# Patient Record
Sex: MALE | Race: BLACK OR AFRICAN AMERICAN | Employment: OTHER | URBAN - METROPOLITAN AREA
[De-identification: names, ages, dates, MRNs, and addresses within clinical notes are randomized per-mention and may not be internally consistent; named-entity substitution may affect disease eponyms.]

---

## 2017-03-17 ENCOUNTER — HOSPITAL ENCOUNTER (OUTPATIENT)
Dept: MRI IMAGING | Age: 52
Discharge: HOME | End: 2017-03-17
Attending: FAMILY MEDICINE | Admitting: FAMILY MEDICINE

## 2017-03-17 DIAGNOSIS — E22.1 HYPERPROLACTINEMIA (HCC): ICD-10-CM

## 2017-04-05 ENCOUNTER — HOSPITAL ENCOUNTER (OUTPATIENT)
Dept: MRI IMAGING | Age: 52
Discharge: HOME | End: 2017-04-05
Attending: FAMILY MEDICINE | Admitting: FAMILY MEDICINE

## 2017-04-05 DIAGNOSIS — E22.1 HYPERPROLACTINEMIA (HCC): ICD-10-CM

## 2018-03-12 ENCOUNTER — OFFICE VISIT (OUTPATIENT)
Dept: INTERNAL MEDICINE | Age: 53
End: 2018-03-12

## 2018-03-12 VITALS
OXYGEN SATURATION: 98 % | HEIGHT: 72 IN | SYSTOLIC BLOOD PRESSURE: 172 MMHG | HEART RATE: 63 BPM | TEMPERATURE: 97.7 F | DIASTOLIC BLOOD PRESSURE: 98 MMHG | BODY MASS INDEX: 42.66 KG/M2 | WEIGHT: 315 LBS

## 2018-03-12 DIAGNOSIS — Z00.00 ROUTINE GENERAL MEDICAL EXAMINATION AT A HEALTH CARE FACILITY: ICD-10-CM

## 2018-03-12 DIAGNOSIS — F31.9 BIPOLAR I DISORDER (HCC): Chronic | ICD-10-CM

## 2018-03-12 DIAGNOSIS — E89.0 POSTOPERATIVE HYPOTHYROIDISM: ICD-10-CM

## 2018-03-12 DIAGNOSIS — Z00.00 ROUTINE GENERAL MEDICAL EXAMINATION AT A HEALTH CARE FACILITY: Primary | ICD-10-CM

## 2018-03-12 DIAGNOSIS — I10 ESSENTIAL HYPERTENSION, BENIGN: Chronic | ICD-10-CM

## 2018-03-12 LAB
ANION GAP SERPL CALCULATED.3IONS-SCNC: 18 MMOL/L (ref 3–16)
BUN BLDV-MCNC: 21 MG/DL (ref 7–20)
CALCIUM SERPL-MCNC: 9.3 MG/DL (ref 8.3–10.6)
CHLORIDE BLD-SCNC: 101 MMOL/L (ref 99–110)
CHOLESTEROL, TOTAL: 282 MG/DL (ref 0–199)
CO2: 25 MMOL/L (ref 21–32)
CREAT SERPL-MCNC: 1.5 MG/DL (ref 0.9–1.3)
GFR AFRICAN AMERICAN: 60
GFR NON-AFRICAN AMERICAN: 49
GLUCOSE BLD-MCNC: 99 MG/DL (ref 70–99)
HCT VFR BLD CALC: 39.4 % (ref 40.5–52.5)
HDLC SERPL-MCNC: 56 MG/DL (ref 40–60)
HEMOGLOBIN: 13.2 G/DL (ref 13.5–17.5)
HEPATITIS C ANTIBODY INTERPRETATION: NORMAL
LDL CHOLESTEROL CALCULATED: 197 MG/DL
MCH RBC QN AUTO: 29.2 PG (ref 26–34)
MCHC RBC AUTO-ENTMCNC: 33.4 G/DL (ref 31–36)
MCV RBC AUTO: 87.5 FL (ref 80–100)
PDW BLD-RTO: 18.5 % (ref 12.4–15.4)
PLATELET # BLD: 288 K/UL (ref 135–450)
PMV BLD AUTO: 8.7 FL (ref 5–10.5)
POTASSIUM SERPL-SCNC: 4.3 MMOL/L (ref 3.5–5.1)
RBC # BLD: 4.5 M/UL (ref 4.2–5.9)
SODIUM BLD-SCNC: 144 MMOL/L (ref 136–145)
T4 FREE: 0.3 NG/DL (ref 0.9–1.8)
TRIGL SERPL-MCNC: 143 MG/DL (ref 0–150)
TSH REFLEX: 116.8 UIU/ML (ref 0.27–4.2)
VLDLC SERPL CALC-MCNC: 29 MG/DL
WBC # BLD: 5.8 K/UL (ref 4–11)

## 2018-03-12 PROCEDURE — 3017F COLORECTAL CA SCREEN DOC REV: CPT | Performed by: NURSE PRACTITIONER

## 2018-03-12 PROCEDURE — G8417 CALC BMI ABV UP PARAM F/U: HCPCS | Performed by: NURSE PRACTITIONER

## 2018-03-12 PROCEDURE — 1036F TOBACCO NON-USER: CPT | Performed by: NURSE PRACTITIONER

## 2018-03-12 PROCEDURE — G8484 FLU IMMUNIZE NO ADMIN: HCPCS | Performed by: NURSE PRACTITIONER

## 2018-03-12 PROCEDURE — G8428 CUR MEDS NOT DOCUMENT: HCPCS | Performed by: NURSE PRACTITIONER

## 2018-03-12 PROCEDURE — 99213 OFFICE O/P EST LOW 20 MIN: CPT | Performed by: NURSE PRACTITIONER

## 2018-03-12 ASSESSMENT — ENCOUNTER SYMPTOMS
SORE THROAT: 0
COLOR CHANGE: 0
WHEEZING: 0
BACK PAIN: 0
ABDOMINAL PAIN: 0
NAUSEA: 0
COUGH: 0
RHINORRHEA: 0
CHEST TIGHTNESS: 0
SINUS PRESSURE: 0
SHORTNESS OF BREATH: 0
ABDOMINAL DISTENTION: 0
DIARRHEA: 0
VOMITING: 0
CONSTIPATION: 0
SINUS PAIN: 0

## 2018-03-12 NOTE — PROGRESS NOTES
Subjective:      Patient ID: Guerda Metz is a 47 y.o. male. [facility] [place]   Rhae Dakins is here to establish care. He has not been taking medications for a week and half. He states his meds were making his \"chest region feel unhealthy. \" He has a  who helps him with his meds. He was seeing Dr. Antonette Bertrand for psychiatry who recently closed her practice. He will be seeing Dr. Pete Herrera but has not seen him yet because he could not make it to his last appointment. He wants to discontinue psych meds. 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 He states this is shown on imaging through . He saw an endocrinologist who told him to stop taking the [place]. He subsequently stopped taking all medication. His blood pressure is elevated in the office today. He has been hospitalized multiple times for psychiatric issues. He had a goiter removed and has subsequent hypothyroidism. He goes to [facility]. Past Medical History:   Diagnosis Date    Bipolar 1 disorder ([facility] [address] 47    Goiter     Hypertension     Hypocalcemia     Hypothyroid     [facility]     Sleep apnea      Past Surgical History:   Procedure Laterality Date    THYROIDECTOMY       Family History   Problem Relation Age of Onset    Diabetes Mother     Diabetes Maternal Grandmother      Social History     Social History    Marital status: Single     Spouse name: N/A    Number of children: N/A    Years of education: N/A     Occupational History    Not on file. Social History Main Topics    Smoking status: Former Smoker    Smokeless tobacco: Never Used    Alcohol use No      Comment: rare    Drug use: No    Sexual activity: No     Other Topics Concern    Not on file     Social History Narrative    No narrative on file       Review of Systems   Constitutional: Negative for chills, fatigue, fever and unexpected weight change.    HENT: Negative

## 2018-03-13 ENCOUNTER — TELEPHONE (OUTPATIENT)
Dept: INTERNAL MEDICINE | Age: 53
End: 2018-03-13

## 2018-03-13 DIAGNOSIS — E11.9 TYPE 2 DIABETES MELLITUS WITHOUT COMPLICATION, WITHOUT LONG-TERM CURRENT USE OF INSULIN (HCC): Primary | ICD-10-CM

## 2018-03-13 DIAGNOSIS — E04.9 GOITER: Primary | ICD-10-CM

## 2018-03-13 DIAGNOSIS — R73.03 PRE-DIABETES: Primary | ICD-10-CM

## 2018-03-13 LAB
ESTIMATED AVERAGE GLUCOSE: 134.1 MG/DL
HBA1C MFR BLD: 6.3 %
HIV AG/AB: NORMAL
HIV ANTIGEN: NORMAL
HIV-1 ANTIBODY: NORMAL
HIV-2 AB: NORMAL
VALPROIC ACID LEVEL: <2.8 UG/ML (ref 50–100)

## 2018-03-13 NOTE — TELEPHONE ENCOUNTER
Spoke to patient   He restarted levothyroxine this morning  He started his Statin last night  He would like to start Metformin

## 2018-03-14 LAB — VITAMIN D 1,25-DIHYDROXY: 65.7 PG/ML (ref 19.9–79.3)

## 2018-03-14 NOTE — TELEPHONE ENCOUNTER
Patient calling concerning message unable to take Metformin     He states while under [facility] care he had a bad reaction and     Will not try it again     Call back [phone]

## 2018-03-21 ENCOUNTER — TELEPHONE (OUTPATIENT)
Dept: INTERNAL MEDICINE | Age: 53
End: 2018-03-21

## 2018-03-21 NOTE — TELEPHONE ENCOUNTER
Pt called [facility] to see if Nanci Rob is ready  He was told by the pharmacy that this needs a PA?  He wasn't sure  Please call [facility] at: [phone] Option 3 then Option 4

## 2018-03-28 ENCOUNTER — TELEPHONE (OUTPATIENT)
Dept: INTERNAL MEDICINE | Age: 53
End: 2018-03-28

## 2018-03-29 NOTE — TELEPHONE ENCOUNTER
I am not sure what this means either? Can we try to reschedule him? Or is he saying he does not want to come here anymore? He has a , but I have never met them before, maybe getting in touch with them could be helpful?

## 2018-04-02 ENCOUNTER — TELEPHONE (OUTPATIENT)
Dept: INTERNAL MEDICINE | Age: 53
End: 2018-04-02

## 2018-04-02 NOTE — TELEPHONE ENCOUNTER
Pt will not be in today  Yesterday he had throbbing torso pain and is just not feeling well enough to come in

## 2018-04-04 ENCOUNTER — TELEPHONE (OUTPATIENT)
Dept: INTERNAL MEDICINE | Age: 53
End: 2018-04-04

## 2018-04-06 ENCOUNTER — TELEPHONE (OUTPATIENT)
Dept: INTERNAL MEDICINE | Age: 53
End: 2018-04-06

## 2018-09-09 ENCOUNTER — HOSPITAL ENCOUNTER (EMERGENCY)
Age: 53
Discharge: HOME | End: 2018-09-09
Attending: EMERGENCY MEDICINE
Payer: MEDICAID

## 2018-09-09 ENCOUNTER — APPOINTMENT (OUTPATIENT)
Dept: GENERAL RADIOLOGY | Age: 53
End: 2018-09-09
Payer: MEDICAID

## 2018-09-09 VITALS
TEMPERATURE: 98 F | WEIGHT: 315 LBS | BODY MASS INDEX: 54.52 KG/M2 | OXYGEN SATURATION: 98 % | DIASTOLIC BLOOD PRESSURE: 78 MMHG | SYSTOLIC BLOOD PRESSURE: 134 MMHG | HEART RATE: 78 BPM | RESPIRATION RATE: 22 BRPM

## 2018-09-09 DIAGNOSIS — R60.0 BILATERAL LOWER EXTREMITY EDEMA: Primary | ICD-10-CM

## 2018-09-09 DIAGNOSIS — L03.119 CELLULITIS OF LOWER EXTREMITY, UNSPECIFIED LATERALITY: ICD-10-CM

## 2018-09-09 LAB
B-TYPE NATRIURETIC PEPTIDE: 75 PG/ML (ref 0–99.9)
BASOPHILS ABSOLUTE: 0 K/UL (ref 0–0.2)
BASOPHILS RELATIVE PERCENT: 0.5 %
BILIRUBIN URINE: NEGATIVE MG/DL
BLOOD, URINE: NEGATIVE
CALCIUM IONIZED: 1.12 MMOL/L (ref 1.12–1.32)
CLARITY: ABNORMAL
CO2: 29 MMOL/L (ref 21–32)
COLOR: ABNORMAL
EOSINOPHILS ABSOLUTE: 0.4 K/UL (ref 0–0.6)
EOSINOPHILS RELATIVE PERCENT: 7 %
EPITHELIAL CELLS, UA: NORMAL /HPF
GFR AFRICAN AMERICAN: >60
GFR NON-AFRICAN AMERICAN: >60
GLUCOSE BLD-MCNC: 103 MG/DL (ref 70–99)
GLUCOSE URINE: NEGATIVE MG/DL
HCT VFR BLD CALC: 35.7 % (ref 40.5–52.5)
HEMOGLOBIN: 12.1 G/DL (ref 13.5–17.5)
KETONES, URINE: NEGATIVE MG/DL
LEUKOCYTE ESTERASE, URINE: NEGATIVE
LYMPHOCYTES ABSOLUTE: 1.2 K/UL (ref 1–5.1)
LYMPHOCYTES RELATIVE PERCENT: 18.5 %
MCH RBC QN AUTO: 29 PG (ref 26–34)
MCHC RBC AUTO-ENTMCNC: 33.8 G/DL (ref 31–36)
MCV RBC AUTO: 85.8 FL (ref 80–100)
MICROSCOPIC EXAMINATION: YES
MONOCYTES ABSOLUTE: 0.6 K/UL (ref 0–1.3)
MONOCYTES RELATIVE PERCENT: 9.3 %
NEUTROPHILS ABSOLUTE: 4.1 K/UL (ref 1.7–7.7)
NEUTROPHILS RELATIVE PERCENT: 64.7 %
NITRITE, URINE: NEGATIVE
PDW BLD-RTO: 18.2 % (ref 12.4–15.4)
PERFORMED ON: ABNORMAL
PERFORMED ON: NORMAL
PH UA: 6.5
PLATELET # BLD: 285 K/UL (ref 135–450)
PMV BLD AUTO: 8.1 FL (ref 5–10.5)
POC ANION GAP: 9 (ref 10–20)
POC BUN: 26 MG/DL (ref 7–18)
POC CHLORIDE: 105 MMOL/L (ref 99–110)
POC CREATININE: 1.2 MG/DL (ref 0.9–1.3)
POC POTASSIUM: 4.2 MMOL/L (ref 3.5–5.1)
POC SAMPLE TYPE: ABNORMAL
POC SAMPLE TYPE: NORMAL
POC SODIUM: 143 MMOL/L (ref 136–145)
PROTEIN UA: 30 MG/DL
RBC # BLD: 4.16 M/UL (ref 4.2–5.9)
RBC UA: NORMAL /HPF (ref 0–2)
SPECIFIC GRAVITY UA: 1.02
UROBILINOGEN, URINE: 1 E.U./DL
WBC # BLD: 6.3 K/UL (ref 4–11)
WBC UA: NORMAL /HPF (ref 0–5)

## 2018-09-09 PROCEDURE — 71046 X-RAY EXAM CHEST 2 VIEWS: CPT

## 2018-09-09 PROCEDURE — 99284 EMERGENCY DEPT VISIT MOD MDM: CPT

## 2018-09-09 PROCEDURE — 83880 ASSAY OF NATRIURETIC PEPTIDE: CPT

## 2018-09-09 PROCEDURE — 85025 COMPLETE CBC W/AUTO DIFF WBC: CPT

## 2018-09-09 PROCEDURE — 36415 COLL VENOUS BLD VENIPUNCTURE: CPT

## 2018-09-09 PROCEDURE — 80047 BASIC METABLC PNL IONIZED CA: CPT

## 2018-09-09 PROCEDURE — 81001 URINALYSIS AUTO W/SCOPE: CPT

## 2018-09-09 PROCEDURE — 93005 ELECTROCARDIOGRAM TRACING: CPT | Performed by: EMERGENCY MEDICINE

## 2018-09-09 RX ORDER — CEPHALEXIN 500 MG/1
500 CAPSULE ORAL 4 TIMES DAILY
Qty: 28 CAPSULE | Refills: 0 | Status: SHIPPED | OUTPATIENT
Start: 2018-09-09 | End: 2018-09-16

## 2018-09-09 RX ORDER — FUROSEMIDE 20 MG/1
20 TABLET ORAL DAILY
Qty: 5 TABLET | Refills: 0 | Status: ON HOLD | OUTPATIENT
Start: 2018-09-09 | End: 2019-02-22 | Stop reason: HOSPADM

## 2018-09-09 ASSESSMENT — PAIN DESCRIPTION - ORIENTATION: ORIENTATION: LEFT;RIGHT

## 2018-09-09 ASSESSMENT — ENCOUNTER SYMPTOMS
NAUSEA: 0
ABDOMINAL PAIN: 0
VOMITING: 0
COUGH: 0
DIARRHEA: 0
SHORTNESS OF BREATH: 0

## 2018-09-09 ASSESSMENT — PAIN DESCRIPTION - LOCATION: LOCATION: LEG

## 2018-09-09 NOTE — ED PROVIDER NOTES
[facility]          ATTENDING PHYSICIAN NOTE       Date of evaluation: 9/9/2018    Chief Complaint     Leg Swelling      History of Present Illness     Terry Mares is a 47 y.o. male with an extensive psychiatric history who presents with complaints of bilateral lower extremity swelling, pain, redness, of several weeks' duration. The patient reports that he was at one time on Lasix for leg swelling. He does not know how long ago he was taken off this medication. In fact he states that there are a lot of medications he is supposed to be taking but is not and there are a lot of medications that he is taking but is not supposed to be. His medication list is very unclear to me and even him at this time. The patient reports no chest pain or shortness of breath. He states that he walks a lot. He did not report this to me but he was seen just a couple of weeks ago at [facility] [place] 2[facility] for the same complaints. At that time it was recommended that he wear compressive stockings after a negative medical evaluation was completed. He was subsequently transferred to psychiatry at that time for further evaluation of his mental illness. The patient has not been wearing his compressive stockings. The patient states that the redness of his legs may be new but he is unsure. He has not noted any fevers. Review of Systems     Review of Systems   Constitutional: Negative for chills and fever. Respiratory: Negative for cough and shortness of breath. Cardiovascular: Positive for leg swelling. Negative for chest pain. Gastrointestinal: Negative for abdominal pain, diarrhea, nausea and vomiting. Genitourinary: Negative for difficulty urinating, dysuria and frequency. Foul smell to urine   All other systems reviewed and are negative.       Past Medical, Surgical, Family, and Social History     He has a past medical history of Bipolar 1 disorder rate and regular rhythm. Occasional extrasystoles are present. Pulmonary/Chest: Effort normal and breath sounds normal. He has no rales. Abdominal: Soft. Bowel sounds are normal. He exhibits no distension. There is no tenderness. Musculoskeletal: He exhibits edema (3+ BLE). Neurological: He is alert and oriented to person, place, and time. Skin: Skin is warm and dry. He is not diaphoretic. There is erythema (BLE - from ankles to knees). Psychiatric: Thought content is not paranoid and not delusional. He does not exhibit a depressed mood. He expresses no homicidal and no suicidal ideation. Nursing note and vitals reviewed. Diagnostic Results     EKG   Interpreted by Jon Bautista MD     Rhythm: normal sinus   Rate: normal  Axis: normal  Ectopy: frequent PVCs  Conduction: normal  ST Segments: no acute change  T Waves: no acute change  Q Waves: none    Clinical Impression: normal sinus rhythm with no acute changes, PVCs      RADIOLOGY:  XR CHEST STANDARD (2 VW)   Final Result   Impression:   1. No acute cardiopulmonary disease.           LABS:   Results for orders placed or performed during the hospital encounter of 09/09/18   CBC auto differential   Result Value Ref Range    WBC 6.3 4.0 - 11.0 K/uL    RBC 4.16 (L) 4.20 - 5.90 M/uL    Hemoglobin 12.1 (L) 13.5 - 17.5 g/dL    Hematocrit 35.7 (L) 40.5 - 52.5 %    MCV 85.8 80.0 - 100.0 fL    MCH 29.0 26.0 - 34.0 pg    MCHC 33.8 31.0 - 36.0 g/dL    RDW 18.2 (H) 12.4 - 15.4 %    Platelets 519 537 - 601 K/uL    MPV 8.1 5.0 - 10.5 fL    Neutrophils % 64.7 %    Lymphocytes % 18.5 %    Monocytes % 9.3 %    Eosinophils % 7.0 %    Basophils % 0.5 %    Neutrophils # 4.1 1.7 - 7.7 K/uL    Lymphocytes # 1.2 1.0 - 5.1 K/uL    Monocytes # 0.6 0.0 - 1.3 K/uL    Eosinophils # 0.4 0.0 - 0.6 K/uL    Basophils # 0.0 0.0 - 0.2 K/uL   Microscopic Urinalysis   Result Value Ref Range    WBC, UA None seen 0 - 5 /HPF    RBC, UA 0-2 0 - 2 /HPF    Epi Cells 0-2 /HPF   POC URINE with Microscopic   Result Value Ref Range    Color, UA Not Entered Straw/Yellow    Clarity, UA Not Entered Clear    Glucose, Ur Negative Negative mg/dL    Bilirubin Urine Negative Negative mg/dL    Ketones, Urine Negative Negative mg/dL    Specific Gravity, UA 1.020 1.005 - 1.030    Blood, Urine Negative Negative    pH, UA 6.5 5.0 - 8.0    Protein, UA 30 (A) Negative mg/dL    Urobilinogen, Urine 1.0 <2.0 E.U./dL    Nitrite, Urine Negative Negative    Leukocyte Esterase, Urine Negative Negative    Microscopic Examination Yes    POCT Venous   Result Value Ref Range    POC Sodium 143 136 - 145 mmol/L    POC Potassium 4.2 3.5 - 5.1 mmol/L    POC Chloride 105 99 - 110 mmol/L    CO2 29 21 - 32 mmol/L    POC Anion Gap 9 (L) 10 - 20    POC Glucose 103 (H) 70 - 99 mg/dl    POC BUN 26 (H) 7 - 18 mg/dL    POC Creatinine 1.2 0.9 - 1.3 mg/dL    GFR Non-African American >60 >60    GFR African American >60     Calcium, Ion 1.12 1.12 - 1.32 mmol/L    Sample Type LEATHA     Performed on SEE BELOW    POCT Venous   Result Value Ref Range    BNP 75.0 0.0 - 99.9 pg/mL    Sample Type LEATHA     Performed on SEE BELOW        RECENT VITALS:  BP: 134/78, Temp: 98 °F (36.7 °C), Pulse: 78, Resp: 22, SpO2: 98 %       ED Course     Nursing Notes, Past Medical Hx, Past Surgical Hx, Social Hx, Allergies, and Family Hx were reviewed. The patient was given the following medications:  Orders Placed This Encounter   Medications    cephALEXin (KEFLEX) 500 MG capsule     Sig: Take 1 capsule by mouth 4 times daily for 7 days     Dispense:  28 capsule     Refill:  0    furosemide (LASIX) 20 MG tablet     Sig: Take 1 tablet by mouth daily for 5 days     Dispense:  5 tablet     Refill:  0       CONSULTS:  None    MEDICAL DECISION MAKING / ASSESSMENT / Ashley Gabriel is a 47 y.o. male presenting with long-standing bilateral lower extremity edema. This is been present for over a month.   He was previously evaluated for this at [facility]

## 2018-09-09 NOTE — ED NOTES
Bed: A07-07  Expected date:   Expected time:   Means of arrival:   Comments:  [facility], [address] [facility]  09/09/18 1993

## 2018-09-10 LAB
EKG ATRIAL RATE: 75 BPM
EKG DIAGNOSIS: NORMAL
EKG P AXIS: 43 DEGREES
EKG P-R INTERVAL: 150 MS
EKG Q-T INTERVAL: 394 MS
EKG QRS DURATION: 110 MS
EKG QTC CALCULATION (BAZETT): 439 MS
EKG R AXIS: -20 DEGREES
EKG T AXIS: 27 DEGREES
EKG VENTRICULAR RATE: 75 BPM

## 2018-10-06 ENCOUNTER — APPOINTMENT (OUTPATIENT)
Dept: GENERAL RADIOLOGY | Age: 53
End: 2018-10-06
Payer: MEDICAID

## 2018-10-06 ENCOUNTER — HOSPITAL ENCOUNTER (EMERGENCY)
Age: 53
Discharge: HOME | End: 2018-10-07
Attending: EMERGENCY MEDICINE
Payer: MEDICAID

## 2018-10-06 DIAGNOSIS — I87.8 VENOUS STASIS: Primary | ICD-10-CM

## 2018-10-06 PROCEDURE — 71046 X-RAY EXAM CHEST 2 VIEWS: CPT

## 2018-10-06 PROCEDURE — 99285 EMERGENCY DEPT VISIT HI MDM: CPT

## 2018-10-06 ASSESSMENT — PAIN DESCRIPTION - DESCRIPTORS: DESCRIPTORS: ACHING

## 2018-10-06 ASSESSMENT — PAIN DESCRIPTION - ORIENTATION: ORIENTATION: RIGHT;LEFT;LOWER

## 2018-10-06 ASSESSMENT — PAIN DESCRIPTION - PAIN TYPE: TYPE: ACUTE PAIN

## 2018-10-06 ASSESSMENT — PAIN DESCRIPTION - LOCATION: LOCATION: LEG

## 2018-10-06 ASSESSMENT — PAIN SCALES - GENERAL: PAINLEVEL_OUTOF10: 8

## 2018-10-06 ASSESSMENT — PAIN DESCRIPTION - FREQUENCY: FREQUENCY: CONTINUOUS

## 2018-10-07 VITALS
BODY MASS INDEX: 41.75 KG/M2 | TEMPERATURE: 98.9 F | DIASTOLIC BLOOD PRESSURE: 102 MMHG | WEIGHT: 315 LBS | SYSTOLIC BLOOD PRESSURE: 186 MMHG | HEART RATE: 61 BPM | OXYGEN SATURATION: 93 % | HEIGHT: 73 IN | RESPIRATION RATE: 20 BRPM

## 2018-10-07 LAB
ANION GAP SERPL CALCULATED.3IONS-SCNC: 12 MMOL/L (ref 3–16)
BUN BLDV-MCNC: 17 MG/DL (ref 7–20)
CALCIUM SERPL-MCNC: 9.2 MG/DL (ref 8.3–10.6)
CHLORIDE BLD-SCNC: 105 MMOL/L (ref 99–110)
CO2: 24 MMOL/L (ref 21–32)
CREAT SERPL-MCNC: 1.1 MG/DL (ref 0.9–1.3)
GFR AFRICAN AMERICAN: >60
GFR NON-AFRICAN AMERICAN: >60
GLUCOSE BLD-MCNC: 99 MG/DL (ref 70–99)
POTASSIUM SERPL-SCNC: 4.4 MMOL/L (ref 3.5–5.1)
PRO-BNP: 106 PG/ML (ref 0–124)
SODIUM BLD-SCNC: 141 MMOL/L (ref 136–145)
TROPONIN: <0.01 NG/ML

## 2018-10-07 PROCEDURE — 93005 ELECTROCARDIOGRAM TRACING: CPT | Performed by: INTERNAL MEDICINE

## 2018-10-07 PROCEDURE — 80048 BASIC METABOLIC PNL TOTAL CA: CPT

## 2018-10-07 PROCEDURE — 96374 THER/PROPH/DIAG INJ IV PUSH: CPT

## 2018-10-07 PROCEDURE — 83880 ASSAY OF NATRIURETIC PEPTIDE: CPT

## 2018-10-07 PROCEDURE — 84484 ASSAY OF TROPONIN QUANT: CPT

## 2018-10-07 PROCEDURE — 6360000002 HC RX W HCPCS: Performed by: INTERNAL MEDICINE

## 2018-10-07 RX ORDER — FUROSEMIDE 10 MG/ML
40 INJECTION INTRAMUSCULAR; INTRAVENOUS ONCE
Status: COMPLETED | OUTPATIENT
Start: 2018-10-07 | End: 2018-10-07

## 2018-10-07 RX ADMIN — FUROSEMIDE 40 MG: 10 INJECTION, SOLUTION INTRAMUSCULAR; INTRAVENOUS at 01:51

## 2018-10-07 ASSESSMENT — ENCOUNTER SYMPTOMS
ABDOMINAL PAIN: 0
BACK PAIN: 0
SHORTNESS OF BREATH: 0
VOMITING: 0
CHEST TIGHTNESS: 0
ABDOMINAL DISTENTION: 0
NAUSEA: 0
CHOKING: 0
STRIDOR: 0
COUGH: 0
WHEEZING: 0
DIARRHEA: 0

## 2018-10-07 NOTE — ED PROVIDER NOTES
care plans were discussed and agreed upon. Clinical Impression     No diagnosis found.     Disposition     PATIENT REFERRED TO:  Javier Ogdenr, [address] [facility] [place] [address] [facility]  [phone]    In 1 week        DISCHARGE MEDICATIONS:  Current Discharge Medication List          Neftali Abraham MD  Resident  10/07/18 1108

## 2018-10-10 LAB
EKG ATRIAL RATE: 59 BPM
EKG DIAGNOSIS: NORMAL
EKG P AXIS: 33 DEGREES
EKG P-R INTERVAL: 154 MS
EKG Q-T INTERVAL: 414 MS
EKG QRS DURATION: 102 MS
EKG QTC CALCULATION (BAZETT): 409 MS
EKG R AXIS: -17 DEGREES
EKG T AXIS: 23 DEGREES
EKG VENTRICULAR RATE: 59 BPM

## 2018-12-14 ENCOUNTER — HOSPITAL ENCOUNTER (EMERGENCY)
Age: 53
Discharge: HOME | End: 2018-12-14
Attending: EMERGENCY MEDICINE
Payer: MEDICAID

## 2018-12-14 VITALS
RESPIRATION RATE: 20 BRPM | OXYGEN SATURATION: 93 % | DIASTOLIC BLOOD PRESSURE: 84 MMHG | TEMPERATURE: 97.9 F | HEART RATE: 58 BPM | SYSTOLIC BLOOD PRESSURE: 154 MMHG

## 2018-12-14 DIAGNOSIS — I87.331 STASIS DERMATITIS OF RIGHT LOWER EXTREMITY WITH VENOUS ULCER DUE TO CHRONIC PERIPHERAL VENOUS HYPERTENSION (HCC): Primary | ICD-10-CM

## 2018-12-14 DIAGNOSIS — L97.919 STASIS DERMATITIS OF RIGHT LOWER EXTREMITY WITH VENOUS ULCER DUE TO CHRONIC PERIPHERAL VENOUS HYPERTENSION (HCC): Primary | ICD-10-CM

## 2018-12-14 LAB
ANION GAP SERPL CALCULATED.3IONS-SCNC: 10 MMOL/L (ref 3–16)
BASOPHILS ABSOLUTE: 0 K/UL (ref 0–0.2)
BASOPHILS RELATIVE PERCENT: 0.5 %
BUN BLDV-MCNC: 24 MG/DL (ref 7–20)
CALCIUM SERPL-MCNC: 9.1 MG/DL (ref 8.3–10.6)
CHLORIDE BLD-SCNC: 105 MMOL/L (ref 99–110)
CO2: 25 MMOL/L (ref 21–32)
CREAT SERPL-MCNC: 1.4 MG/DL (ref 0.9–1.3)
EOSINOPHILS ABSOLUTE: 0.1 K/UL (ref 0–0.6)
EOSINOPHILS RELATIVE PERCENT: 2.5 %
GFR AFRICAN AMERICAN: >60
GFR NON-AFRICAN AMERICAN: 53
GLUCOSE BLD-MCNC: 107 MG/DL (ref 70–99)
HCT VFR BLD CALC: 34.4 % (ref 40.5–52.5)
HEMOGLOBIN: 11 G/DL (ref 13.5–17.5)
LYMPHOCYTES ABSOLUTE: 1.6 K/UL (ref 1–5.1)
LYMPHOCYTES RELATIVE PERCENT: 29.9 %
MCH RBC QN AUTO: 27.1 PG (ref 26–34)
MCHC RBC AUTO-ENTMCNC: 32.1 G/DL (ref 31–36)
MCV RBC AUTO: 84.3 FL (ref 80–100)
MONOCYTES ABSOLUTE: 0.3 K/UL (ref 0–1.3)
MONOCYTES RELATIVE PERCENT: 6.3 %
NEUTROPHILS ABSOLUTE: 3.3 K/UL (ref 1.7–7.7)
NEUTROPHILS RELATIVE PERCENT: 60.8 %
PDW BLD-RTO: 17.7 % (ref 12.4–15.4)
PLATELET # BLD: 316 K/UL (ref 135–450)
PMV BLD AUTO: 7.4 FL (ref 5–10.5)
POTASSIUM SERPL-SCNC: 3.8 MMOL/L (ref 3.5–5.1)
RBC # BLD: 4.08 M/UL (ref 4.2–5.9)
SODIUM BLD-SCNC: 140 MMOL/L (ref 136–145)
WBC # BLD: 5.3 K/UL (ref 4–11)

## 2018-12-14 PROCEDURE — 99283 EMERGENCY DEPT VISIT LOW MDM: CPT

## 2018-12-14 PROCEDURE — 6370000000 HC RX 637 (ALT 250 FOR IP): Performed by: EMERGENCY MEDICINE

## 2018-12-14 PROCEDURE — 80048 BASIC METABOLIC PNL TOTAL CA: CPT

## 2018-12-14 PROCEDURE — 85025 COMPLETE CBC W/AUTO DIFF WBC: CPT

## 2018-12-14 RX ORDER — BACITRACIN ZINC AND POLYMYXIN B SULFATE 500; 1000 [USP'U]/G; [USP'U]/G
OINTMENT TOPICAL ONCE
Status: COMPLETED | OUTPATIENT
Start: 2018-12-14 | End: 2018-12-14

## 2018-12-14 RX ORDER — BACITRACIN ZINC AND POLYMYXIN B SULFATE 500; 1000 [USP'U]/G; [USP'U]/G
OINTMENT TOPICAL
Qty: 1 TUBE | Refills: 1 | Status: SHIPPED | OUTPATIENT
Start: 2018-12-14 | End: 2018-12-21

## 2018-12-14 RX ADMIN — BACITRACIN ZINC AND POLYMYXIN B SULFATE: at 05:26

## 2018-12-14 ASSESSMENT — PAIN SCALES - GENERAL: PAINLEVEL_OUTOF10: 10

## 2018-12-14 ASSESSMENT — PAIN DESCRIPTION - LOCATION: LOCATION: LEG

## 2018-12-14 ASSESSMENT — ENCOUNTER SYMPTOMS
ROS SKIN COMMENTS: SEE HPI
EYES NEGATIVE: 1
RESPIRATORY NEGATIVE: 1
GASTROINTESTINAL NEGATIVE: 1

## 2018-12-14 ASSESSMENT — PAIN DESCRIPTION - DESCRIPTORS: DESCRIPTORS: ACHING

## 2018-12-14 ASSESSMENT — PAIN DESCRIPTION - PAIN TYPE
TYPE: ACUTE PAIN
TYPE: CHRONIC PAIN

## 2018-12-14 ASSESSMENT — PAIN DESCRIPTION - FREQUENCY: FREQUENCY: CONTINUOUS

## 2018-12-14 ASSESSMENT — PAIN DESCRIPTION - ORIENTATION: ORIENTATION: RIGHT

## 2018-12-14 NOTE — ED PROVIDER NOTES
ASPIRIN 81 MG CHEWABLE TABLET    Take 81 mg by mouth daily. DAPAGLIFLOZIN (FARXIGA) 5 MG TABLET    Take 1 tablet by mouth every morning    DESOXIMETASONE (TOPICORT) 0.25 % CREAM    Apply topically 2 times daily. DIPHENHYDRAMINE (BENADRYL) 25 MG TABLET    Take 1 tablet by mouth every 6 hours as needed for Itching. DIVALPROEX (DEPAKOTE) 500 MG DR TABLET    Take 500 mg by mouth 2 times daily. FUROSEMIDE (LASIX) 20 MG TABLET    Take 1 tablet by mouth daily for 5 days    HYDROCHLOROTHIAZIDE (HYDRODIURIL) 25 MG TABLET    Take 25 mg by mouth daily. LEVOTHYROXINE (SYNTHROID) 150 MCG TABLET    Take 1 tablet by mouth daily. LISINOPRIL (PRINIVIL;ZESTRIL) 10 MG TABLET    Take 10 mg by mouth daily. METFORMIN (GLUCOPHAGE) 500 MG TABLET    Take 1 tablet by mouth 2 times daily (with meals)    NUTRITIONAL SUPPLEMENTS (VITAMIN D MAINTENANCE PO)    Take 1,000 Units by mouth daily. VITAMIN D3 (CHOLECALCIFEROL) 1000 UNITS TABS TABLET    Take 1,000 Units by mouth daily. Allergies     He is allergic to carbamazepine; chlorpromazine; and thioridazine. Physical Exam     INITIAL VITALS: BP: (!) 154/84, Temp: 97.9 °F (36.6 °C), Pulse: 58, Resp: 20, SpO2: 93 %   Physical Exam   Constitutional:   Morbidly obese   Cardiovascular: Intact distal pulses. Musculoskeletal: He exhibits edema. 4+ pitting edema to bilateral lower extremities. The right leg has several areas of excoriation and ulceration consistent with chronic venous stasis. There is no surrounding erythema or drainage noted. He does have healed scars on the left leg with no open ulcerations noted. Nursing note and vitals reviewed.       Diagnostic Results     LABS:   Results for orders placed or performed during the hospital encounter of 12/14/18   CBC auto differential   Result Value Ref Range    WBC 5.3 4.0 - 11.0 K/uL    RBC 4.08 (L) 4.20 - 5.90 M/uL    Hemoglobin 11.0 (L) 13.5 - 17.5 g/dL    Hematocrit 34.4 (L) 40.5 - 52.5 %    MCV 84.3 80.0 - 100.0 fL    MCH 27.1 26.0 - 34.0 pg    MCHC 32.1 31.0 - 36.0 g/dL    RDW 17.7 (H) 12.4 - 15.4 %    Platelets 985 604 - 179 K/uL    MPV 7.4 5.0 - 10.5 fL    Neutrophils % 60.8 %    Lymphocytes % 29.9 %    Monocytes % 6.3 %    Eosinophils % 2.5 %    Basophils % 0.5 %    Neutrophils # 3.3 1.7 - 7.7 K/uL    Lymphocytes # 1.6 1.0 - 5.1 K/uL    Monocytes # 0.3 0.0 - 1.3 K/uL    Eosinophils # 0.1 0.0 - 0.6 K/uL    Basophils # 0.0 0.0 - 0.2 K/uL   Basic Metabolic Panel (EP - 1)   Result Value Ref Range    Sodium 140 136 - 145 mmol/L    Potassium 3.8 3.5 - 5.1 mmol/L    Chloride 105 99 - 110 mmol/L    CO2 25 21 - 32 mmol/L    Anion Gap 10 3 - 16    Glucose 107 (H) 70 - 99 mg/dL    BUN 24 (H) 7 - 20 mg/dL    CREATININE 1.4 (H) 0.9 - 1.3 mg/dL    GFR Non- 53 (A) >60    GFR African American >60 >60    Calcium 9.1 8.3 - 10.6 mg/dL     RECENT VITALS:  BP: (!) 154/84,Temp: 97.9 °F (36.6 °C), Pulse: 58, Resp: 20, SpO2: 93 %     Procedures     N/A    ED Course     Nursing Notes, Past Medical Hx, Past Surgical Hx, Social Hx,Allergies, and Family Hx were reviewed. The patient was given the following medications:  Orders Placed This Encounter   Medications    bacitracin-polymyxin b (POLYSPORIN) ointment    bacitracin-polymyxin b (POLYSPORIN) [id number] UNIT/GM ointment     Sig: Apply topically 2 times daily. Dispense:  1 Tube     Refill:  1       CONSULTS:  None    MEDICAL DECISIONMAKING / ASSESSMENT / Selvin Amaya is a 47 y.o. male presents to the emergency department complaining of wounds to his right lower extremity. Patient does have several chronic venous stasis ulcerations to the extremity. There is no surrounding erythema or drainage to be concern for secondary infection. CBC is unremarkable. Renal panel was at the patient's baseline. Patient had a dressing placed.   He will be instructed to follow-up with his primary care provider for further discussion of management of this as

## 2018-12-14 NOTE — ED NOTES
Spoke with [facility] and they arranged transport per BRANDEE/Lisa, RN  12/14/18 [id number]

## 2018-12-20 ENCOUNTER — HOSPITAL ENCOUNTER (EMERGENCY)
Age: 53
Discharge: HOME | End: 2018-12-20
Attending: EMERGENCY MEDICINE
Payer: MEDICAID

## 2018-12-20 ENCOUNTER — APPOINTMENT (OUTPATIENT)
Dept: GENERAL RADIOLOGY | Age: 53
End: 2018-12-20
Payer: MEDICAID

## 2018-12-20 VITALS
DIASTOLIC BLOOD PRESSURE: 103 MMHG | HEART RATE: 66 BPM | WEIGHT: 315 LBS | OXYGEN SATURATION: 92 % | RESPIRATION RATE: 18 BRPM | TEMPERATURE: 98 F | HEIGHT: 72 IN | BODY MASS INDEX: 42.66 KG/M2 | SYSTOLIC BLOOD PRESSURE: 221 MMHG

## 2018-12-20 DIAGNOSIS — S90.32XA CONTUSION OF LEFT FOOT, INITIAL ENCOUNTER: Primary | ICD-10-CM

## 2018-12-20 PROCEDURE — 73650 X-RAY EXAM OF HEEL: CPT

## 2018-12-20 PROCEDURE — 73610 X-RAY EXAM OF ANKLE: CPT

## 2018-12-20 PROCEDURE — 99284 EMERGENCY DEPT VISIT MOD MDM: CPT

## 2018-12-20 ASSESSMENT — PAIN DESCRIPTION - ORIENTATION: ORIENTATION: RIGHT

## 2018-12-20 ASSESSMENT — PAIN DESCRIPTION - PAIN TYPE: TYPE: ACUTE PAIN

## 2018-12-20 ASSESSMENT — PAIN SCALES - GENERAL: PAINLEVEL_OUTOF10: 10

## 2018-12-20 ASSESSMENT — PAIN DESCRIPTION - LOCATION: LOCATION: FOOT
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
## 2019-02-19 ENCOUNTER — APPOINTMENT (OUTPATIENT)
Dept: CT IMAGING | Age: 54
DRG: 815 | End: 2019-02-19
Payer: MEDICAID

## 2019-02-19 ENCOUNTER — HOSPITAL ENCOUNTER (INPATIENT)
Age: 54
LOS: 6 days | Discharge: PSYCHIATRIC OR SUBSTANCE USE FACILITY | DRG: 815 | End: 2019-02-25
Attending: EMERGENCY MEDICINE | Admitting: INTERNAL MEDICINE
Payer: MEDICAID

## 2019-02-19 ENCOUNTER — APPOINTMENT (OUTPATIENT)
Dept: GENERAL RADIOLOGY | Age: 54
DRG: 815 | End: 2019-02-19
Payer: MEDICAID

## 2019-02-19 DIAGNOSIS — T68.XXXA HYPOTHERMIA, INITIAL ENCOUNTER: ICD-10-CM

## 2019-02-19 DIAGNOSIS — J96.01 ACUTE RESPIRATORY FAILURE WITH HYPOXIA AND HYPERCAPNIA (HCC): ICD-10-CM

## 2019-02-19 DIAGNOSIS — E11.9 TYPE 2 DIABETES MELLITUS WITHOUT COMPLICATION, WITHOUT LONG-TERM CURRENT USE OF INSULIN (HCC): ICD-10-CM

## 2019-02-19 DIAGNOSIS — R41.82 ALTERED MENTAL STATUS, UNSPECIFIED ALTERED MENTAL STATUS TYPE: Primary | ICD-10-CM

## 2019-02-19 DIAGNOSIS — J96.02 ACUTE RESPIRATORY FAILURE WITH HYPOXIA AND HYPERCAPNIA (HCC): ICD-10-CM

## 2019-02-19 LAB
ALBUMIN SERPL-MCNC: 2.3 G/DL (ref 3.4–5)
ALP BLD-CCNC: 42 U/L (ref 40–129)
ALT SERPL-CCNC: 36 U/L (ref 10–40)
AMMONIA: 42 UMOL/L (ref 16–60)
AMPHETAMINE SCREEN, URINE: NORMAL
ANION GAP SERPL CALCULATED.3IONS-SCNC: 13 MMOL/L (ref 3–16)
ANION GAP SERPL CALCULATED.3IONS-SCNC: 9 MMOL/L (ref 3–16)
AST SERPL-CCNC: 66 U/L (ref 15–37)
BACTERIA: ABNORMAL /HPF
BARBITURATE SCREEN URINE: NORMAL
BASE EXCESS VENOUS: 4 (ref -3–3)
BASOPHILS ABSOLUTE: 0 K/UL (ref 0–0.2)
BASOPHILS RELATIVE PERCENT: 0.3 %
BENZODIAZEPINE SCREEN, URINE: NORMAL
BILIRUB SERPL-MCNC: <0.2 MG/DL (ref 0–1)
BILIRUBIN DIRECT: <0.2 MG/DL (ref 0–0.3)
BILIRUBIN URINE: NEGATIVE
BILIRUBIN, INDIRECT: ABNORMAL MG/DL (ref 0–1)
BLOOD, URINE: ABNORMAL
BUN BLDV-MCNC: 11 MG/DL (ref 7–20)
BUN BLDV-MCNC: 8 MG/DL (ref 7–20)
CALCIUM IONIZED: 1.11 MMOL/L (ref 1.12–1.32)
CALCIUM SERPL-MCNC: 5.2 MG/DL (ref 8.3–10.6)
CALCIUM SERPL-MCNC: 8.7 MG/DL (ref 8.3–10.6)
CANNABINOID SCREEN URINE: NORMAL
CHLORIDE BLD-SCNC: 100 MMOL/L (ref 99–110)
CHLORIDE BLD-SCNC: 117 MMOL/L (ref 99–110)
CLARITY: ABNORMAL
CO2: 18 MMOL/L (ref 21–32)
CO2: 29 MMOL/L (ref 21–32)
CO2: 31 MMOL/L (ref 21–32)
COCAINE METABOLITE SCREEN URINE: NORMAL
COLOR: YELLOW
CREAT SERPL-MCNC: <0.5 MG/DL (ref 0.9–1.3)
CREAT SERPL-MCNC: <0.5 MG/DL (ref 0.9–1.3)
EOSINOPHILS ABSOLUTE: 0 K/UL (ref 0–0.6)
EOSINOPHILS RELATIVE PERCENT: 0.6 %
EPITHELIAL CELLS, UA: ABNORMAL /HPF
ETHANOL: NORMAL MG/DL (ref 0–0.08)
GFR AFRICAN AMERICAN: >60
GFR NON-AFRICAN AMERICAN: >60
GLUCOSE BLD-MCNC: 104 MG/DL (ref 70–99)
GLUCOSE BLD-MCNC: 147 MG/DL (ref 70–99)
GLUCOSE BLD-MCNC: 150 MG/DL (ref 70–99)
GLUCOSE BLD-MCNC: 166 MG/DL (ref 70–99)
GLUCOSE URINE: NEGATIVE MG/DL
HCO3 VENOUS: 31 MMOL/L (ref 23–29)
HCT VFR BLD CALC: 41.2 % (ref 40.5–52.5)
HEMOGLOBIN: 13.5 G/DL (ref 13.5–17.5)
KETONES, URINE: NEGATIVE MG/DL
LACTATE: 1.28 MMOL/L (ref 0.4–2)
LEUKOCYTE ESTERASE, URINE: NEGATIVE
LIPASE: 12 U/L (ref 13–60)
LYMPHOCYTES ABSOLUTE: 0.5 K/UL (ref 1–5.1)
LYMPHOCYTES RELATIVE PERCENT: 17.3 %
Lab: NORMAL
MAGNESIUM: 0.8 MG/DL (ref 1.8–2.4)
MAGNESIUM: 2.4 MG/DL (ref 1.8–2.4)
MCH RBC QN AUTO: 27.2 PG (ref 26–34)
MCHC RBC AUTO-ENTMCNC: 32.6 G/DL (ref 31–36)
MCV RBC AUTO: 83.3 FL (ref 80–100)
METHADONE SCREEN, URINE: NORMAL
MICROSCOPIC EXAMINATION: YES
MONOCYTES ABSOLUTE: 0.2 K/UL (ref 0–1.3)
MONOCYTES RELATIVE PERCENT: 6.9 %
NEUTROPHILS ABSOLUTE: 2.2 K/UL (ref 1.7–7.7)
NEUTROPHILS RELATIVE PERCENT: 74.9 %
NITRITE, URINE: NEGATIVE
O2 SAT, VEN: 79 %
OPIATE SCREEN URINE: NORMAL
OXYCODONE URINE: NORMAL
PCO2, VEN: 69.7 MM HG (ref 40–50)
PDW BLD-RTO: 18.9 % (ref 12.4–15.4)
PERFORMED ON: ABNORMAL
PERFORMED ON: NORMAL
PH UA: 6
PH UA: 6
PH VENOUS: 7.26 (ref 7.35–7.45)
PHENCYCLIDINE SCREEN URINE: NORMAL
PHOSPHORUS: 2 MG/DL (ref 2.5–4.9)
PHOSPHORUS: 3.4 MG/DL (ref 2.5–4.9)
PLATELET # BLD: 228 K/UL (ref 135–450)
PMV BLD AUTO: 7.7 FL (ref 5–10.5)
PO2, VEN: 52 MM HG
POC ANION GAP: 8 (ref 10–20)
POC BUN: 14 MG/DL (ref 7–18)
POC CHLORIDE: 99 MMOL/L (ref 99–110)
POC CREATININE: 0.6 MG/DL (ref 0.9–1.3)
POC POTASSIUM: 3.7 MMOL/L (ref 3.5–5.1)
POC SAMPLE TYPE: ABNORMAL
POC SAMPLE TYPE: ABNORMAL
POC SAMPLE TYPE: NORMAL
POC SODIUM: 138 MMOL/L (ref 136–145)
POC TROPONIN I: 0 NG/ML (ref 0–0.1)
POTASSIUM SERPL-SCNC: 2.7 MMOL/L (ref 3.5–5.1)
POTASSIUM SERPL-SCNC: 3.7 MMOL/L (ref 3.5–5.1)
PROPOXYPHENE SCREEN: NORMAL
PROTEIN UA: >=300 MG/DL
RBC # BLD: 4.95 M/UL (ref 4.2–5.9)
RBC UA: ABNORMAL /HPF (ref 0–2)
SODIUM BLD-SCNC: 142 MMOL/L (ref 136–145)
SODIUM BLD-SCNC: 144 MMOL/L (ref 136–145)
SPECIFIC GRAVITY UA: >=1.03
TCO2 CALC VENOUS: 33 MMOL/L
TOTAL CK: 1723 U/L (ref 39–308)
TOTAL PROTEIN: 4.7 G/DL (ref 6.4–8.2)
URINE TYPE: ABNORMAL
UROBILINOGEN, URINE: 0.2 E.U./DL
WBC # BLD: 2.9 K/UL (ref 4–11)
WBC UA: ABNORMAL /HPF (ref 0–5)

## 2019-02-19 PROCEDURE — 83690 ASSAY OF LIPASE: CPT

## 2019-02-19 PROCEDURE — 94664 DEMO&/EVAL PT USE INHALER: CPT

## 2019-02-19 PROCEDURE — 83605 ASSAY OF LACTIC ACID: CPT

## 2019-02-19 PROCEDURE — 80076 HEPATIC FUNCTION PANEL: CPT

## 2019-02-19 PROCEDURE — 81001 URINALYSIS AUTO W/SCOPE: CPT

## 2019-02-19 PROCEDURE — 2700000000 HC OXYGEN THERAPY PER DAY

## 2019-02-19 PROCEDURE — 99291 CRITICAL CARE FIRST HOUR: CPT

## 2019-02-19 PROCEDURE — 2580000003 HC RX 258: Performed by: EMERGENCY MEDICINE

## 2019-02-19 PROCEDURE — 94660 CPAP INITIATION&MGMT: CPT

## 2019-02-19 PROCEDURE — 90471 IMMUNIZATION ADMIN: CPT | Performed by: EMERGENCY MEDICINE

## 2019-02-19 PROCEDURE — 85025 COMPLETE CBC W/AUTO DIFF WBC: CPT

## 2019-02-19 PROCEDURE — 80307 DRUG TEST PRSMV CHEM ANLYZR: CPT

## 2019-02-19 PROCEDURE — 93005 ELECTROCARDIOGRAM TRACING: CPT | Performed by: EMERGENCY MEDICINE

## 2019-02-19 PROCEDURE — 6370000000 HC RX 637 (ALT 250 FOR IP): Performed by: STUDENT IN AN ORGANIZED HEALTH CARE EDUCATION/TRAINING PROGRAM

## 2019-02-19 PROCEDURE — 87086 URINE CULTURE/COLONY COUNT: CPT

## 2019-02-19 PROCEDURE — 96365 THER/PROPH/DIAG IV INF INIT: CPT

## 2019-02-19 PROCEDURE — 83735 ASSAY OF MAGNESIUM: CPT

## 2019-02-19 PROCEDURE — 87040 BLOOD CULTURE FOR BACTERIA: CPT

## 2019-02-19 PROCEDURE — 94761 N-INVAS EAR/PLS OXIMETRY MLT: CPT

## 2019-02-19 PROCEDURE — 82550 ASSAY OF CK (CPK): CPT

## 2019-02-19 PROCEDURE — 84100 ASSAY OF PHOSPHORUS: CPT

## 2019-02-19 PROCEDURE — 87641 MR-STAPH DNA AMP PROBE: CPT

## 2019-02-19 PROCEDURE — 90715 TDAP VACCINE 7 YRS/> IM: CPT | Performed by: EMERGENCY MEDICINE

## 2019-02-19 PROCEDURE — 84484 ASSAY OF TROPONIN QUANT: CPT

## 2019-02-19 PROCEDURE — 82533 TOTAL CORTISOL: CPT

## 2019-02-19 PROCEDURE — 96367 TX/PROPH/DG ADDL SEQ IV INF: CPT

## 2019-02-19 PROCEDURE — 82803 BLOOD GASES ANY COMBINATION: CPT

## 2019-02-19 PROCEDURE — G0480 DRUG TEST DEF 1-7 CLASSES: HCPCS

## 2019-02-19 PROCEDURE — 6360000002 HC RX W HCPCS: Performed by: EMERGENCY MEDICINE

## 2019-02-19 PROCEDURE — 51702 INSERT TEMP BLADDER CATH: CPT

## 2019-02-19 PROCEDURE — 96368 THER/DIAG CONCURRENT INF: CPT

## 2019-02-19 PROCEDURE — 71045 X-RAY EXAM CHEST 1 VIEW: CPT

## 2019-02-19 PROCEDURE — 99292 CRITICAL CARE ADDL 30 MIN: CPT

## 2019-02-19 PROCEDURE — 36415 COLL VENOUS BLD VENIPUNCTURE: CPT

## 2019-02-19 PROCEDURE — 2000000000 HC ICU R&B

## 2019-02-19 PROCEDURE — 80047 BASIC METABLC PNL IONIZED CA: CPT

## 2019-02-19 PROCEDURE — 70450 CT HEAD/BRAIN W/O DYE: CPT

## 2019-02-19 PROCEDURE — 70486 CT MAXILLOFACIAL W/O DYE: CPT

## 2019-02-19 PROCEDURE — 82140 ASSAY OF AMMONIA: CPT

## 2019-02-19 RX ORDER — PIOGLITAZONEHYDROCHLORIDE 30 MG/1
30 TABLET ORAL DAILY
Status: ON HOLD | COMMUNITY
End: 2019-03-14 | Stop reason: SDUPTHER

## 2019-02-19 RX ORDER — MAGNESIUM SULFATE IN WATER 40 MG/ML
2 INJECTION, SOLUTION INTRAVENOUS ONCE
Status: COMPLETED | OUTPATIENT
Start: 2019-02-19 | End: 2019-02-19

## 2019-02-19 RX ORDER — ONDANSETRON 2 MG/ML
4 INJECTION INTRAMUSCULAR; INTRAVENOUS EVERY 6 HOURS PRN
Status: DISCONTINUED | OUTPATIENT
Start: 2019-02-19 | End: 2019-02-25 | Stop reason: HOSPADM

## 2019-02-19 RX ORDER — SODIUM CHLORIDE, SODIUM LACTATE, POTASSIUM CHLORIDE, CALCIUM CHLORIDE 600; 310; 30; 20 MG/100ML; MG/100ML; MG/100ML; MG/100ML
1000 INJECTION, SOLUTION INTRAVENOUS ONCE
Status: COMPLETED | OUTPATIENT
Start: 2019-02-19 | End: 2019-02-19

## 2019-02-19 RX ORDER — SODIUM CHLORIDE, SODIUM LACTATE, POTASSIUM CHLORIDE, CALCIUM CHLORIDE 600; 310; 30; 20 MG/100ML; MG/100ML; MG/100ML; MG/100ML
1000 INJECTION, SOLUTION INTRAVENOUS ONCE
Status: DISCONTINUED | OUTPATIENT
Start: 2019-02-19 | End: 2019-02-19

## 2019-02-19 RX ORDER — DEXTROSE MONOHYDRATE 50 MG/ML
100 INJECTION, SOLUTION INTRAVENOUS PRN
Status: DISCONTINUED | OUTPATIENT
Start: 2019-02-19 | End: 2019-02-25 | Stop reason: HOSPADM

## 2019-02-19 RX ORDER — DEXTROSE MONOHYDRATE 25 G/50ML
12.5 INJECTION, SOLUTION INTRAVENOUS PRN
Status: DISCONTINUED | OUTPATIENT
Start: 2019-02-19 | End: 2019-02-25 | Stop reason: HOSPADM

## 2019-02-19 RX ORDER — HALOPERIDOL 5 MG
5 TABLET ORAL 2 TIMES DAILY
Status: ON HOLD | COMMUNITY
End: 2019-03-14 | Stop reason: SDUPTHER

## 2019-02-19 RX ORDER — AMLODIPINE BESYLATE 5 MG/1
5 TABLET ORAL DAILY
Status: ON HOLD | COMMUNITY
End: 2019-02-22 | Stop reason: HOSPADM

## 2019-02-19 RX ORDER — SODIUM CHLORIDE 0.9 % (FLUSH) 0.9 %
10 SYRINGE (ML) INJECTION PRN
Status: DISCONTINUED | OUTPATIENT
Start: 2019-02-19 | End: 2019-02-25 | Stop reason: HOSPADM

## 2019-02-19 RX ORDER — SODIUM CHLORIDE 0.9 % (FLUSH) 0.9 %
10 SYRINGE (ML) INJECTION EVERY 12 HOURS SCHEDULED
Status: DISCONTINUED | OUTPATIENT
Start: 2019-02-19 | End: 2019-02-25 | Stop reason: HOSPADM

## 2019-02-19 RX ORDER — LEVOTHYROXINE SODIUM 0.2 MG/1
200 TABLET ORAL DAILY
Status: ON HOLD | COMMUNITY
End: 2019-02-22 | Stop reason: HOSPADM

## 2019-02-19 RX ORDER — ATORVASTATIN CALCIUM 20 MG/1
20 TABLET, FILM COATED ORAL DAILY
Status: ON HOLD | COMMUNITY
End: 2019-03-14 | Stop reason: HOSPADM

## 2019-02-19 RX ORDER — NICOTINE POLACRILEX 4 MG
15 LOZENGE BUCCAL PRN
Status: DISCONTINUED | OUTPATIENT
Start: 2019-02-19 | End: 2019-02-25 | Stop reason: HOSPADM

## 2019-02-19 RX ORDER — BACITRACIN ZINC AND POLYMYXIN B SULFATE 500; 1000 [USP'U]/G; [USP'U]/G
OINTMENT TOPICAL 2 TIMES DAILY
Status: DISCONTINUED | OUTPATIENT
Start: 2019-02-19 | End: 2019-02-25 | Stop reason: HOSPADM

## 2019-02-19 RX ORDER — FUROSEMIDE 40 MG/1
40 TABLET ORAL DAILY
Status: ON HOLD | COMMUNITY
End: 2019-02-22 | Stop reason: HOSPADM

## 2019-02-19 RX ADMIN — INSULIN LISPRO 1 UNITS: 100 INJECTION, SOLUTION INTRAVENOUS; SUBCUTANEOUS at 23:28

## 2019-02-19 RX ADMIN — SODIUM CHLORIDE, POTASSIUM CHLORIDE, SODIUM LACTATE AND CALCIUM CHLORIDE 1000 ML: 600; 310; 30; 20 INJECTION, SOLUTION INTRAVENOUS at 19:45

## 2019-02-19 RX ADMIN — TETANUS TOXOID, REDUCED DIPHTHERIA TOXOID AND ACELLULAR PERTUSSIS VACCINE, ADSORBED 0.5 ML: 5; 2.5; 8; 8; 2.5 SUSPENSION INTRAMUSCULAR at 20:47

## 2019-02-19 RX ADMIN — VANCOMYCIN HYDROCHLORIDE 2000 MG: 10 INJECTION, POWDER, LYOPHILIZED, FOR SOLUTION INTRAVENOUS at 21:20

## 2019-02-19 RX ADMIN — BACITRACIN ZINC AND POLYMYXIN B SULFATE: at 23:29

## 2019-02-19 RX ADMIN — MAGNESIUM SULFATE HEPTAHYDRATE 2 G: 40 INJECTION, SOLUTION INTRAVENOUS at 20:28

## 2019-02-19 RX ADMIN — CEFEPIME HYDROCHLORIDE 1 G: 1 INJECTION, POWDER, FOR SOLUTION INTRAMUSCULAR; INTRAVENOUS at 20:07

## 2019-02-19 ASSESSMENT — PAIN SCALES - GENERAL: PAINLEVEL_OUTOF10: 2

## 2019-02-19 ASSESSMENT — PAIN DESCRIPTION - FREQUENCY: FREQUENCY: CONTINUOUS

## 2019-02-19 ASSESSMENT — PAIN DESCRIPTION - ORIENTATION: ORIENTATION: LEFT

## 2019-02-19 ASSESSMENT — PAIN DESCRIPTION - PAIN TYPE: TYPE: ACUTE PAIN

## 2019-02-19 ASSESSMENT — PAIN DESCRIPTION - DESCRIPTORS: DESCRIPTORS: ACHING

## 2019-02-19 ASSESSMENT — PAIN DESCRIPTION - LOCATION: LOCATION: OTHER (COMMENT)

## 2019-02-20 ENCOUNTER — APPOINTMENT (OUTPATIENT)
Dept: GENERAL RADIOLOGY | Age: 54
DRG: 815 | End: 2019-02-20
Payer: MEDICAID

## 2019-02-20 ENCOUNTER — APPOINTMENT (OUTPATIENT)
Dept: MRI IMAGING | Age: 54
DRG: 815 | End: 2019-02-20
Payer: MEDICAID

## 2019-02-20 PROBLEM — J81.1 PULMONARY EDEMA: Status: ACTIVE | Noted: 2019-02-20

## 2019-02-20 PROBLEM — F25.9 SCHIZOAFFECTIVE DISORDER (HCC): Status: ACTIVE | Noted: 2018-07-21

## 2019-02-20 PROBLEM — F25.9 SCHIZOAFFECTIVE DISORDER (HCC): Chronic | Status: ACTIVE | Noted: 2018-07-21

## 2019-02-20 PROBLEM — R00.1 BRADYCARDIA: Status: ACTIVE | Noted: 2019-02-20

## 2019-02-20 PROBLEM — G93.6 CEREBRAL EDEMA (HCC): Status: ACTIVE | Noted: 2019-02-20

## 2019-02-20 PROBLEM — G93.41 ACUTE METABOLIC ENCEPHALOPATHY: Status: ACTIVE | Noted: 2019-02-20

## 2019-02-20 LAB
ANION GAP SERPL CALCULATED.3IONS-SCNC: 13 MMOL/L (ref 3–16)
ANION GAP SERPL CALCULATED.3IONS-SCNC: 13 MMOL/L (ref 3–16)
BUN BLDV-MCNC: 12 MG/DL (ref 7–20)
BUN BLDV-MCNC: 13 MG/DL (ref 7–20)
CALCIUM SERPL-MCNC: 8.6 MG/DL (ref 8.3–10.6)
CALCIUM SERPL-MCNC: 8.8 MG/DL (ref 8.3–10.6)
CHLORIDE BLD-SCNC: 101 MMOL/L (ref 99–110)
CHLORIDE BLD-SCNC: 102 MMOL/L (ref 99–110)
CO2: 28 MMOL/L (ref 21–32)
CO2: 29 MMOL/L (ref 21–32)
CORTISOL TOTAL: 9.4 UG/DL
CREAT SERPL-MCNC: 0.8 MG/DL (ref 0.9–1.3)
CREAT SERPL-MCNC: <0.5 MG/DL (ref 0.9–1.3)
EKG ATRIAL RATE: 42 BPM
EKG DIAGNOSIS: NORMAL
EKG P AXIS: 42 DEGREES
EKG P-R INTERVAL: 166 MS
EKG Q-T INTERVAL: 618 MS
EKG QRS DURATION: 130 MS
EKG QTC CALCULATION (BAZETT): 516 MS
EKG R AXIS: -19 DEGREES
EKG T AXIS: 49 DEGREES
EKG VENTRICULAR RATE: 42 BPM
GFR AFRICAN AMERICAN: >60
GFR AFRICAN AMERICAN: >60
GFR NON-AFRICAN AMERICAN: >60
GFR NON-AFRICAN AMERICAN: >60
GLUCOSE BLD-MCNC: 100 MG/DL (ref 70–99)
GLUCOSE BLD-MCNC: 137 MG/DL (ref 70–99)
GLUCOSE BLD-MCNC: 169 MG/DL (ref 70–99)
GLUCOSE BLD-MCNC: 59 MG/DL (ref 70–99)
GLUCOSE BLD-MCNC: 74 MG/DL (ref 70–99)
GLUCOSE BLD-MCNC: 87 MG/DL (ref 70–99)
GLUCOSE BLD-MCNC: 90 MG/DL (ref 70–99)
HCT VFR BLD CALC: 35.9 % (ref 40.5–52.5)
HEMOGLOBIN: 11.7 G/DL (ref 13.5–17.5)
MAGNESIUM: 1.8 MG/DL (ref 1.8–2.4)
MCH RBC QN AUTO: 27.1 PG (ref 26–34)
MCHC RBC AUTO-ENTMCNC: 32.6 G/DL (ref 31–36)
MCV RBC AUTO: 83.1 FL (ref 80–100)
MRSA SCREEN RT-PCR: NORMAL
PDW BLD-RTO: 18.7 % (ref 12.4–15.4)
PERFORMED ON: ABNORMAL
PERFORMED ON: ABNORMAL
PERFORMED ON: NORMAL
PHOSPHORUS: 3.1 MG/DL (ref 2.5–4.9)
PLATELET # BLD: 254 K/UL (ref 135–450)
PMV BLD AUTO: 7.7 FL (ref 5–10.5)
POTASSIUM REFLEX MAGNESIUM: 3.9 MMOL/L (ref 3.5–5.1)
POTASSIUM SERPL-SCNC: 4 MMOL/L (ref 3.5–5.1)
POTASSIUM SERPL-SCNC: 4.4 MMOL/L (ref 3.5–5.1)
RAPID INFLUENZA  B AGN: NEGATIVE
RAPID INFLUENZA A AGN: NEGATIVE
RBC # BLD: 4.32 M/UL (ref 4.2–5.9)
SODIUM BLD-SCNC: 142 MMOL/L (ref 136–145)
SODIUM BLD-SCNC: 144 MMOL/L (ref 136–145)
TOTAL CK: 3632 U/L (ref 39–308)
WBC # BLD: 4.6 K/UL (ref 4–11)

## 2019-02-20 PROCEDURE — 6360000004 HC RX CONTRAST MEDICATION: Performed by: INTERNAL MEDICINE

## 2019-02-20 PROCEDURE — 70546 MR ANGIOGRAPH HEAD W/O&W/DYE: CPT

## 2019-02-20 PROCEDURE — A9579 GAD-BASE MR CONTRAST NOS,1ML: HCPCS | Performed by: INTERNAL MEDICINE

## 2019-02-20 PROCEDURE — 94660 CPAP INITIATION&MGMT: CPT

## 2019-02-20 PROCEDURE — 71045 X-RAY EXAM CHEST 1 VIEW: CPT

## 2019-02-20 PROCEDURE — 92610 EVALUATE SWALLOWING FUNCTION: CPT

## 2019-02-20 PROCEDURE — 82550 ASSAY OF CK (CPK): CPT

## 2019-02-20 PROCEDURE — 87040 BLOOD CULTURE FOR BACTERIA: CPT

## 2019-02-20 PROCEDURE — 6360000002 HC RX W HCPCS: Performed by: STUDENT IN AN ORGANIZED HEALTH CARE EDUCATION/TRAINING PROGRAM

## 2019-02-20 PROCEDURE — 80048 BASIC METABOLIC PNL TOTAL CA: CPT

## 2019-02-20 PROCEDURE — 84100 ASSAY OF PHOSPHORUS: CPT

## 2019-02-20 PROCEDURE — 2000000000 HC ICU R&B

## 2019-02-20 PROCEDURE — 94761 N-INVAS EAR/PLS OXIMETRY MLT: CPT

## 2019-02-20 PROCEDURE — 70553 MRI BRAIN STEM W/O & W/DYE: CPT

## 2019-02-20 PROCEDURE — 94664 DEMO&/EVAL PT USE INHALER: CPT

## 2019-02-20 PROCEDURE — 2580000003 HC RX 258: Performed by: STUDENT IN AN ORGANIZED HEALTH CARE EDUCATION/TRAINING PROGRAM

## 2019-02-20 PROCEDURE — 2700000000 HC OXYGEN THERAPY PER DAY

## 2019-02-20 PROCEDURE — 92526 ORAL FUNCTION THERAPY: CPT

## 2019-02-20 PROCEDURE — 99223 1ST HOSP IP/OBS HIGH 75: CPT | Performed by: INTERNAL MEDICINE

## 2019-02-20 PROCEDURE — 84132 ASSAY OF SERUM POTASSIUM: CPT

## 2019-02-20 PROCEDURE — 89220 SPUTUM SPECIMEN COLLECTION: CPT

## 2019-02-20 PROCEDURE — 6370000000 HC RX 637 (ALT 250 FOR IP): Performed by: STUDENT IN AN ORGANIZED HEALTH CARE EDUCATION/TRAINING PROGRAM

## 2019-02-20 PROCEDURE — 36415 COLL VENOUS BLD VENIPUNCTURE: CPT

## 2019-02-20 PROCEDURE — 87804 INFLUENZA ASSAY W/OPTIC: CPT

## 2019-02-20 PROCEDURE — 2580000003 HC RX 258: Performed by: INTERNAL MEDICINE

## 2019-02-20 PROCEDURE — 85027 COMPLETE CBC AUTOMATED: CPT

## 2019-02-20 RX ORDER — SODIUM CHLORIDE 9 MG/ML
INJECTION, SOLUTION INTRAVENOUS CONTINUOUS
Status: DISCONTINUED | OUTPATIENT
Start: 2019-02-20 | End: 2019-02-25

## 2019-02-20 RX ORDER — 0.9 % SODIUM CHLORIDE 0.9 %
500 INTRAVENOUS SOLUTION INTRAVENOUS ONCE
Status: COMPLETED | OUTPATIENT
Start: 2019-02-20 | End: 2019-02-20

## 2019-02-20 RX ORDER — ACETAMINOPHEN 325 MG/1
650 TABLET ORAL EVERY 4 HOURS PRN
Status: DISCONTINUED | OUTPATIENT
Start: 2019-02-20 | End: 2019-02-25 | Stop reason: HOSPADM

## 2019-02-20 RX ORDER — SODIUM CHLORIDE 9 MG/ML
INJECTION, SOLUTION INTRAVENOUS
Status: DISPENSED
Start: 2019-02-20 | End: 2019-02-20

## 2019-02-20 RX ADMIN — GADOTERIDOL 20 ML: 279.3 INJECTION, SOLUTION INTRAVENOUS at 16:26

## 2019-02-20 RX ADMIN — MUPIROCIN 1 G: 20 OINTMENT TOPICAL at 20:01

## 2019-02-20 RX ADMIN — Medication 10 ML: at 08:59

## 2019-02-20 RX ADMIN — BACITRACIN ZINC AND POLYMYXIN B SULFATE: at 20:02

## 2019-02-20 RX ADMIN — CEFEPIME HYDROCHLORIDE 2 G: 2 INJECTION, POWDER, FOR SOLUTION INTRAVENOUS at 08:51

## 2019-02-20 RX ADMIN — BACITRACIN ZINC AND POLYMYXIN B SULFATE: at 08:59

## 2019-02-20 RX ADMIN — ACETAMINOPHEN 650 MG: 325 TABLET ORAL at 12:20

## 2019-02-20 RX ADMIN — DEXTROSE 50 % IN WATER (D50W) INTRAVENOUS SYRINGE 12.5 G: at 08:49

## 2019-02-20 RX ADMIN — ENOXAPARIN SODIUM 40 MG: 40 INJECTION SUBCUTANEOUS at 09:05

## 2019-02-20 RX ADMIN — Medication 10 ML: at 20:00

## 2019-02-20 RX ADMIN — VANCOMYCIN HYDROCHLORIDE 1750 MG: 10 INJECTION, POWDER, LYOPHILIZED, FOR SOLUTION INTRAVENOUS at 05:25

## 2019-02-20 RX ADMIN — SODIUM CHLORIDE: 9 INJECTION, SOLUTION INTRAVENOUS at 18:56

## 2019-02-20 RX ADMIN — MUPIROCIN 1 G: 20 OINTMENT TOPICAL at 14:31

## 2019-02-20 RX ADMIN — CEFEPIME HYDROCHLORIDE 2 G: 2 INJECTION, POWDER, FOR SOLUTION INTRAVENOUS at 20:00

## 2019-02-20 RX ADMIN — SODIUM CHLORIDE 500 ML: 9 INJECTION, SOLUTION INTRAVENOUS at 08:57

## 2019-02-20 ASSESSMENT — PAIN DESCRIPTION - FREQUENCY
FREQUENCY: CONTINUOUS

## 2019-02-20 ASSESSMENT — PAIN DESCRIPTION - PAIN TYPE
TYPE: ACUTE PAIN

## 2019-02-20 ASSESSMENT — PAIN SCALES - GENERAL
PAINLEVEL_OUTOF10: 7
PAINLEVEL_OUTOF10: 0
PAINLEVEL_OUTOF10: 0
PAINLEVEL_OUTOF10: 5
PAINLEVEL_OUTOF10: 0

## 2019-02-20 ASSESSMENT — PAIN DESCRIPTION - LOCATION
LOCATION: HEAD

## 2019-02-20 ASSESSMENT — ENCOUNTER SYMPTOMS
WHEEZING: 0
SORE THROAT: 0
SHORTNESS OF BREATH: 0
BLOOD IN STOOL: 0
ABDOMINAL PAIN: 0
DIARRHEA: 0
NAUSEA: 0
COUGH: 0
COLOR CHANGE: 0
VOMITING: 0
CONSTIPATION: 0
ALLERGIC/IMMUNOLOGIC NEGATIVE: 1

## 2019-02-20 ASSESSMENT — PAIN DESCRIPTION - DESCRIPTORS
DESCRIPTORS: ACHING

## 2019-02-21 LAB
ANION GAP SERPL CALCULATED.3IONS-SCNC: 14 MMOL/L (ref 3–16)
BUN BLDV-MCNC: 18 MG/DL (ref 7–20)
CALCIUM SERPL-MCNC: 8.7 MG/DL (ref 8.3–10.6)
CHLORIDE BLD-SCNC: 105 MMOL/L (ref 99–110)
CO2: 27 MMOL/L (ref 21–32)
CREAT SERPL-MCNC: 1.3 MG/DL (ref 0.9–1.3)
GFR AFRICAN AMERICAN: >60
GFR NON-AFRICAN AMERICAN: 58
GLUCOSE BLD-MCNC: 111 MG/DL (ref 70–99)
GLUCOSE BLD-MCNC: 91 MG/DL (ref 70–99)
GLUCOSE BLD-MCNC: 99 MG/DL (ref 70–99)
HCT VFR BLD CALC: 36.9 % (ref 40.5–52.5)
HEMOGLOBIN: 12.1 G/DL (ref 13.5–17.5)
MCH RBC QN AUTO: 27.1 PG (ref 26–34)
MCHC RBC AUTO-ENTMCNC: 32.7 G/DL (ref 31–36)
MCV RBC AUTO: 82.8 FL (ref 80–100)
PDW BLD-RTO: 19.4 % (ref 12.4–15.4)
PERFORMED ON: ABNORMAL
PERFORMED ON: NORMAL
PHOSPHORUS: 3.2 MG/DL (ref 2.5–4.9)
PLATELET # BLD: 242 K/UL (ref 135–450)
PMV BLD AUTO: 7.4 FL (ref 5–10.5)
POTASSIUM REFLEX MAGNESIUM: 3.9 MMOL/L (ref 3.5–5.1)
POTASSIUM SERPL-SCNC: 3.9 MMOL/L (ref 3.5–5.1)
RBC # BLD: 4.46 M/UL (ref 4.2–5.9)
SODIUM BLD-SCNC: 146 MMOL/L (ref 136–145)
TOTAL CK: 1470 U/L (ref 39–308)
URINE CULTURE, ROUTINE: NORMAL
WBC # BLD: 4.4 K/UL (ref 4–11)

## 2019-02-21 PROCEDURE — 2700000000 HC OXYGEN THERAPY PER DAY

## 2019-02-21 PROCEDURE — 99233 SBSQ HOSP IP/OBS HIGH 50: CPT | Performed by: INTERNAL MEDICINE

## 2019-02-21 PROCEDURE — 80048 BASIC METABOLIC PNL TOTAL CA: CPT

## 2019-02-21 PROCEDURE — 94660 CPAP INITIATION&MGMT: CPT

## 2019-02-21 PROCEDURE — 6360000002 HC RX W HCPCS: Performed by: STUDENT IN AN ORGANIZED HEALTH CARE EDUCATION/TRAINING PROGRAM

## 2019-02-21 PROCEDURE — 84132 ASSAY OF SERUM POTASSIUM: CPT

## 2019-02-21 PROCEDURE — 94761 N-INVAS EAR/PLS OXIMETRY MLT: CPT

## 2019-02-21 PROCEDURE — 1200000000 HC SEMI PRIVATE

## 2019-02-21 PROCEDURE — 84100 ASSAY OF PHOSPHORUS: CPT

## 2019-02-21 PROCEDURE — 82550 ASSAY OF CK (CPK): CPT

## 2019-02-21 PROCEDURE — 2580000003 HC RX 258: Performed by: STUDENT IN AN ORGANIZED HEALTH CARE EDUCATION/TRAINING PROGRAM

## 2019-02-21 PROCEDURE — 36415 COLL VENOUS BLD VENIPUNCTURE: CPT

## 2019-02-21 PROCEDURE — 85027 COMPLETE CBC AUTOMATED: CPT

## 2019-02-21 PROCEDURE — 92526 ORAL FUNCTION THERAPY: CPT

## 2019-02-21 PROCEDURE — 6370000000 HC RX 637 (ALT 250 FOR IP): Performed by: STUDENT IN AN ORGANIZED HEALTH CARE EDUCATION/TRAINING PROGRAM

## 2019-02-21 RX ORDER — LEVOTHYROXINE SODIUM 0.05 MG/1
50 TABLET ORAL DAILY
Status: DISCONTINUED | OUTPATIENT
Start: 2019-02-21 | End: 2019-02-22

## 2019-02-21 RX ORDER — HALOPERIDOL 5 MG
5 TABLET ORAL 2 TIMES DAILY
Status: DISCONTINUED | OUTPATIENT
Start: 2019-02-21 | End: 2019-02-25 | Stop reason: HOSPADM

## 2019-02-21 RX ADMIN — Medication 10 ML: at 09:29

## 2019-02-21 RX ADMIN — LEVOTHYROXINE SODIUM 50 MCG: 50 TABLET ORAL at 15:18

## 2019-02-21 RX ADMIN — HALOPERIDOL 5 MG: 5 TABLET ORAL at 21:19

## 2019-02-21 RX ADMIN — CEFEPIME HYDROCHLORIDE 2 G: 2 INJECTION, POWDER, FOR SOLUTION INTRAVENOUS at 09:26

## 2019-02-21 RX ADMIN — MUPIROCIN 1 G: 20 OINTMENT TOPICAL at 21:19

## 2019-02-21 RX ADMIN — HALOPERIDOL 5 MG: 5 TABLET ORAL at 15:18

## 2019-02-21 RX ADMIN — BACITRACIN ZINC AND POLYMYXIN B SULFATE: at 09:29

## 2019-02-21 RX ADMIN — ENOXAPARIN SODIUM 40 MG: 40 INJECTION SUBCUTANEOUS at 09:29

## 2019-02-21 RX ADMIN — MUPIROCIN 1 G: 20 OINTMENT TOPICAL at 09:29

## 2019-02-21 RX ADMIN — ACETAMINOPHEN 650 MG: 325 TABLET ORAL at 15:24

## 2019-02-21 RX ADMIN — BACITRACIN ZINC AND POLYMYXIN B SULFATE: at 21:20

## 2019-02-21 RX ADMIN — Medication 10 ML: at 21:19

## 2019-02-21 ASSESSMENT — PAIN DESCRIPTION - PROGRESSION: CLINICAL_PROGRESSION: GRADUALLY WORSENING

## 2019-02-21 ASSESSMENT — PAIN SCALES - GENERAL
PAINLEVEL_OUTOF10: 10
PAINLEVEL_OUTOF10: 0

## 2019-02-21 ASSESSMENT — PAIN DESCRIPTION - DESCRIPTORS: DESCRIPTORS: ACHING

## 2019-02-21 ASSESSMENT — PAIN DESCRIPTION - FREQUENCY: FREQUENCY: CONTINUOUS

## 2019-02-21 ASSESSMENT — PAIN DESCRIPTION - ORIENTATION: ORIENTATION: RIGHT;LEFT

## 2019-02-21 ASSESSMENT — PAIN DESCRIPTION - PAIN TYPE: TYPE: ACUTE PAIN

## 2019-02-21 ASSESSMENT — PAIN DESCRIPTION - LOCATION: LOCATION: LEG

## 2019-02-21 ASSESSMENT — PAIN DESCRIPTION - ONSET: ONSET: GRADUAL

## 2019-02-22 PROBLEM — F29 PSYCHOSIS (HCC): Status: ACTIVE | Noted: 2019-02-22

## 2019-02-22 LAB
ANION GAP SERPL CALCULATED.3IONS-SCNC: 13 MMOL/L (ref 3–16)
BUN BLDV-MCNC: 19 MG/DL (ref 7–20)
CALCIUM SERPL-MCNC: 9.1 MG/DL (ref 8.3–10.6)
CHLORIDE BLD-SCNC: 105 MMOL/L (ref 99–110)
CO2: 27 MMOL/L (ref 21–32)
CREAT SERPL-MCNC: 1.3 MG/DL (ref 0.9–1.3)
EKG ATRIAL RATE: 71 BPM
EKG DIAGNOSIS: NORMAL
EKG P AXIS: 44 DEGREES
EKG P-R INTERVAL: 178 MS
EKG Q-T INTERVAL: 446 MS
EKG QRS DURATION: 110 MS
EKG QTC CALCULATION (BAZETT): 484 MS
EKG R AXIS: -19 DEGREES
EKG T AXIS: 64 DEGREES
EKG VENTRICULAR RATE: 71 BPM
GFR AFRICAN AMERICAN: >60
GFR NON-AFRICAN AMERICAN: 58
GLUCOSE BLD-MCNC: 100 MG/DL (ref 70–99)
GLUCOSE BLD-MCNC: 110 MG/DL (ref 70–99)
GLUCOSE BLD-MCNC: 72 MG/DL (ref 70–99)
GLUCOSE BLD-MCNC: 91 MG/DL (ref 70–99)
GLUCOSE BLD-MCNC: 94 MG/DL (ref 70–99)
HCT VFR BLD CALC: 35.7 % (ref 40.5–52.5)
HEMOGLOBIN: 11.6 G/DL (ref 13.5–17.5)
MCH RBC QN AUTO: 26.8 PG (ref 26–34)
MCHC RBC AUTO-ENTMCNC: 32.6 G/DL (ref 31–36)
MCV RBC AUTO: 82.3 FL (ref 80–100)
PDW BLD-RTO: 19.8 % (ref 12.4–15.4)
PERFORMED ON: ABNORMAL
PERFORMED ON: NORMAL
PLATELET # BLD: 247 K/UL (ref 135–450)
PMV BLD AUTO: 7.4 FL (ref 5–10.5)
POTASSIUM REFLEX MAGNESIUM: 4 MMOL/L (ref 3.5–5.1)
RBC # BLD: 4.34 M/UL (ref 4.2–5.9)
SODIUM BLD-SCNC: 145 MMOL/L (ref 136–145)
T3 FREE: 0.8 PG/ML (ref 2.3–4.2)
T4 FREE: 0.2 NG/DL (ref 0.9–1.8)
TOTAL CK: 782 U/L (ref 39–308)
TSH SERPL DL<=0.05 MIU/L-ACNC: 155 UIU/ML (ref 0.27–4.2)
WBC # BLD: 4.7 K/UL (ref 4–11)

## 2019-02-22 PROCEDURE — 80048 BASIC METABOLIC PNL TOTAL CA: CPT

## 2019-02-22 PROCEDURE — 85027 COMPLETE CBC AUTOMATED: CPT

## 2019-02-22 PROCEDURE — 6370000000 HC RX 637 (ALT 250 FOR IP): Performed by: STUDENT IN AN ORGANIZED HEALTH CARE EDUCATION/TRAINING PROGRAM

## 2019-02-22 PROCEDURE — 97535 SELF CARE MNGMENT TRAINING: CPT

## 2019-02-22 PROCEDURE — 2580000003 HC RX 258: Performed by: STUDENT IN AN ORGANIZED HEALTH CARE EDUCATION/TRAINING PROGRAM

## 2019-02-22 PROCEDURE — 93010 ELECTROCARDIOGRAM REPORT: CPT | Performed by: INTERNAL MEDICINE

## 2019-02-22 PROCEDURE — 82550 ASSAY OF CK (CPK): CPT

## 2019-02-22 PROCEDURE — 36415 COLL VENOUS BLD VENIPUNCTURE: CPT

## 2019-02-22 PROCEDURE — 97166 OT EVAL MOD COMPLEX 45 MIN: CPT

## 2019-02-22 PROCEDURE — 99254 IP/OBS CNSLTJ NEW/EST MOD 60: CPT | Performed by: PSYCHIATRY & NEUROLOGY

## 2019-02-22 PROCEDURE — 97530 THERAPEUTIC ACTIVITIES: CPT

## 2019-02-22 PROCEDURE — 92526 ORAL FUNCTION THERAPY: CPT

## 2019-02-22 PROCEDURE — 97162 PT EVAL MOD COMPLEX 30 MIN: CPT

## 2019-02-22 PROCEDURE — 93005 ELECTROCARDIOGRAM TRACING: CPT | Performed by: PSYCHIATRY & NEUROLOGY

## 2019-02-22 PROCEDURE — 84439 ASSAY OF FREE THYROXINE: CPT

## 2019-02-22 PROCEDURE — 84443 ASSAY THYROID STIM HORMONE: CPT

## 2019-02-22 PROCEDURE — 84481 FREE ASSAY (FT-3): CPT

## 2019-02-22 PROCEDURE — 1200000000 HC SEMI PRIVATE

## 2019-02-22 PROCEDURE — 6360000002 HC RX W HCPCS: Performed by: STUDENT IN AN ORGANIZED HEALTH CARE EDUCATION/TRAINING PROGRAM

## 2019-02-22 RX ORDER — DESOXIMETASONE 2.5 MG/G
CREAM TOPICAL
Qty: 30 G | Refills: 1 | Status: CANCELLED | OUTPATIENT
Start: 2019-02-22

## 2019-02-22 RX ORDER — PIOGLITAZONEHYDROCHLORIDE 30 MG/1
30 TABLET ORAL DAILY
Qty: 30 TABLET | Refills: 3 | Status: CANCELLED | OUTPATIENT
Start: 2019-02-22

## 2019-02-22 RX ORDER — ATORVASTATIN CALCIUM 20 MG/1
20 TABLET, FILM COATED ORAL DAILY
Qty: 30 TABLET | Refills: 3 | Status: CANCELLED | OUTPATIENT
Start: 2019-02-22

## 2019-02-22 RX ORDER — LEVOTHYROXINE SODIUM 0.07 MG/1
75 TABLET ORAL DAILY
Status: DISCONTINUED | OUTPATIENT
Start: 2019-02-23 | End: 2019-02-23

## 2019-02-22 RX ORDER — LEVOTHYROXINE SODIUM 0.07 MG/1
75 TABLET ORAL DAILY
Qty: 30 TABLET | Refills: 1 | Status: ON HOLD | OUTPATIENT
Start: 2019-02-23 | End: 2019-03-14 | Stop reason: HOSPADM

## 2019-02-22 RX ORDER — LEVOTHYROXINE SODIUM 0.07 MG/1
75 TABLET ORAL DAILY
Qty: 30 TABLET | Refills: 1 | Status: CANCELLED | OUTPATIENT
Start: 2019-02-23

## 2019-02-22 RX ADMIN — HALOPERIDOL 5 MG: 5 TABLET ORAL at 08:06

## 2019-02-22 RX ADMIN — BACITRACIN ZINC AND POLYMYXIN B SULFATE: at 08:05

## 2019-02-22 RX ADMIN — Medication 10 ML: at 08:06

## 2019-02-22 RX ADMIN — LEVOTHYROXINE SODIUM 50 MCG: 50 TABLET ORAL at 08:03

## 2019-02-22 RX ADMIN — MUPIROCIN 1 G: 20 OINTMENT TOPICAL at 08:04

## 2019-02-22 RX ADMIN — SODIUM CHLORIDE: 9 INJECTION, SOLUTION INTRAVENOUS at 11:55

## 2019-02-22 RX ADMIN — Medication 10 ML: at 21:08

## 2019-02-22 RX ADMIN — ENOXAPARIN SODIUM 40 MG: 40 INJECTION SUBCUTANEOUS at 08:03

## 2019-02-22 RX ADMIN — HALOPERIDOL 5 MG: 5 TABLET ORAL at 21:08

## 2019-02-22 ASSESSMENT — PAIN SCALES - GENERAL
PAINLEVEL_OUTOF10: 0

## 2019-02-23 LAB
ANION GAP SERPL CALCULATED.3IONS-SCNC: 10 MMOL/L (ref 3–16)
BUN BLDV-MCNC: 19 MG/DL (ref 7–20)
CALCIUM SERPL-MCNC: 9.7 MG/DL (ref 8.3–10.6)
CHLORIDE BLD-SCNC: 103 MMOL/L (ref 99–110)
CO2: 29 MMOL/L (ref 21–32)
CREAT SERPL-MCNC: 1.2 MG/DL (ref 0.9–1.3)
FOLATE: 8.59 NG/ML (ref 4.78–24.2)
GFR AFRICAN AMERICAN: >60
GFR NON-AFRICAN AMERICAN: >60
GLUCOSE BLD-MCNC: 101 MG/DL (ref 70–99)
GLUCOSE BLD-MCNC: 81 MG/DL (ref 70–99)
GLUCOSE BLD-MCNC: 85 MG/DL (ref 70–99)
GLUCOSE BLD-MCNC: 85 MG/DL (ref 70–99)
GLUCOSE BLD-MCNC: 94 MG/DL (ref 70–99)
HCT VFR BLD CALC: 35.4 % (ref 40.5–52.5)
HEMOGLOBIN: 11.5 G/DL (ref 13.5–17.5)
MCH RBC QN AUTO: 27 PG (ref 26–34)
MCHC RBC AUTO-ENTMCNC: 32.5 G/DL (ref 31–36)
MCV RBC AUTO: 83.1 FL (ref 80–100)
PDW BLD-RTO: 19.5 % (ref 12.4–15.4)
PERFORMED ON: NORMAL
PLATELET # BLD: 242 K/UL (ref 135–450)
PMV BLD AUTO: 7.5 FL (ref 5–10.5)
POTASSIUM REFLEX MAGNESIUM: 4.4 MMOL/L (ref 3.5–5.1)
RBC # BLD: 4.26 M/UL (ref 4.2–5.9)
SODIUM BLD-SCNC: 142 MMOL/L (ref 136–145)
TOTAL CK: 611 U/L (ref 39–308)
VITAMIN B-12: 643 PG/ML (ref 211–911)
WBC # BLD: 4.1 K/UL (ref 4–11)

## 2019-02-23 PROCEDURE — 82550 ASSAY OF CK (CPK): CPT

## 2019-02-23 PROCEDURE — 82607 VITAMIN B-12: CPT

## 2019-02-23 PROCEDURE — 6370000000 HC RX 637 (ALT 250 FOR IP): Performed by: STUDENT IN AN ORGANIZED HEALTH CARE EDUCATION/TRAINING PROGRAM

## 2019-02-23 PROCEDURE — 1200000000 HC SEMI PRIVATE

## 2019-02-23 PROCEDURE — 80048 BASIC METABOLIC PNL TOTAL CA: CPT

## 2019-02-23 PROCEDURE — 6370000000 HC RX 637 (ALT 250 FOR IP): Performed by: INTERNAL MEDICINE

## 2019-02-23 PROCEDURE — 6360000002 HC RX W HCPCS: Performed by: STUDENT IN AN ORGANIZED HEALTH CARE EDUCATION/TRAINING PROGRAM

## 2019-02-23 PROCEDURE — 36415 COLL VENOUS BLD VENIPUNCTURE: CPT

## 2019-02-23 PROCEDURE — 85027 COMPLETE CBC AUTOMATED: CPT

## 2019-02-23 PROCEDURE — 82746 ASSAY OF FOLIC ACID SERUM: CPT

## 2019-02-23 PROCEDURE — 2580000003 HC RX 258: Performed by: STUDENT IN AN ORGANIZED HEALTH CARE EDUCATION/TRAINING PROGRAM

## 2019-02-23 RX ORDER — LABETALOL HYDROCHLORIDE 5 MG/ML
10 INJECTION, SOLUTION INTRAVENOUS EVERY 4 HOURS PRN
Status: DISCONTINUED | OUTPATIENT
Start: 2019-02-23 | End: 2019-02-25 | Stop reason: HOSPADM

## 2019-02-23 RX ORDER — LISINOPRIL 20 MG/1
20 TABLET ORAL DAILY
Status: DISCONTINUED | OUTPATIENT
Start: 2019-02-23 | End: 2019-02-25 | Stop reason: HOSPADM

## 2019-02-23 RX ORDER — LEVOTHYROXINE SODIUM 0.12 MG/1
125 TABLET ORAL ONCE
Status: DISCONTINUED | OUTPATIENT
Start: 2019-02-23 | End: 2019-02-23

## 2019-02-23 RX ORDER — LEVOTHYROXINE SODIUM 0.2 MG/1
200 TABLET ORAL DAILY
Status: DISCONTINUED | OUTPATIENT
Start: 2019-02-24 | End: 2019-02-25 | Stop reason: HOSPADM

## 2019-02-23 RX ORDER — LEVOTHYROXINE SODIUM 0.1 MG/1
100 TABLET ORAL ONCE
Status: COMPLETED | OUTPATIENT
Start: 2019-02-23 | End: 2019-02-23

## 2019-02-23 RX ADMIN — HALOPERIDOL 5 MG: 5 TABLET ORAL at 22:45

## 2019-02-23 RX ADMIN — BACITRACIN ZINC AND POLYMYXIN B SULFATE: at 00:26

## 2019-02-23 RX ADMIN — LISINOPRIL 20 MG: 20 TABLET ORAL at 01:29

## 2019-02-23 RX ADMIN — MUPIROCIN 1 G: 20 OINTMENT TOPICAL at 10:08

## 2019-02-23 RX ADMIN — HALOPERIDOL 5 MG: 5 TABLET ORAL at 09:59

## 2019-02-23 RX ADMIN — LEVOTHYROXINE SODIUM 100 MCG: 100 TABLET ORAL at 10:06

## 2019-02-23 RX ADMIN — BACITRACIN ZINC AND POLYMYXIN B SULFATE: at 10:08

## 2019-02-23 RX ADMIN — ENOXAPARIN SODIUM 40 MG: 40 INJECTION SUBCUTANEOUS at 09:59

## 2019-02-23 RX ADMIN — Medication 10 ML: at 10:00

## 2019-02-23 RX ADMIN — LEVOTHYROXINE SODIUM 75 MCG: 75 TABLET ORAL at 06:20

## 2019-02-23 RX ADMIN — MUPIROCIN 1 G: 20 OINTMENT TOPICAL at 00:26

## 2019-02-23 ASSESSMENT — PAIN SCALES - GENERAL
PAINLEVEL_OUTOF10: 0

## 2019-02-24 PROCEDURE — 2580000003 HC RX 258: Performed by: STUDENT IN AN ORGANIZED HEALTH CARE EDUCATION/TRAINING PROGRAM

## 2019-02-24 PROCEDURE — 1200000000 HC SEMI PRIVATE

## 2019-02-24 PROCEDURE — 6370000000 HC RX 637 (ALT 250 FOR IP): Performed by: STUDENT IN AN ORGANIZED HEALTH CARE EDUCATION/TRAINING PROGRAM

## 2019-02-24 PROCEDURE — 2500000003 HC RX 250 WO HCPCS: Performed by: INTERNAL MEDICINE

## 2019-02-24 PROCEDURE — 6360000002 HC RX W HCPCS: Performed by: STUDENT IN AN ORGANIZED HEALTH CARE EDUCATION/TRAINING PROGRAM

## 2019-02-24 RX ORDER — HALOPERIDOL 5 MG/ML
5 INJECTION INTRAMUSCULAR ONCE
Status: COMPLETED | OUTPATIENT
Start: 2019-02-24 | End: 2019-02-24

## 2019-02-24 RX ORDER — OLANZAPINE 10 MG/1
10 INJECTION, POWDER, LYOPHILIZED, FOR SOLUTION INTRAMUSCULAR
Status: COMPLETED | OUTPATIENT
Start: 2019-02-24 | End: 2019-02-24

## 2019-02-24 RX ADMIN — LISINOPRIL 20 MG: 20 TABLET ORAL at 08:08

## 2019-02-24 RX ADMIN — OLANZAPINE 10 MG: 10 INJECTION, POWDER, FOR SOLUTION INTRAMUSCULAR at 12:40

## 2019-02-24 RX ADMIN — Medication 10 ML: at 22:35

## 2019-02-24 RX ADMIN — HALOPERIDOL LACTATE 5 MG: 5 INJECTION, SOLUTION INTRAMUSCULAR at 22:06

## 2019-02-24 ASSESSMENT — PAIN SCALES - GENERAL: PAINLEVEL_OUTOF10: 0

## 2019-02-25 ENCOUNTER — HOSPITAL ENCOUNTER (INPATIENT)
Age: 54
LOS: 17 days | Discharge: OTHER INSTITUTION | DRG: 750 | End: 2019-03-14
Attending: PSYCHIATRY & NEUROLOGY | Admitting: PSYCHIATRY & NEUROLOGY
Payer: MEDICAID

## 2019-02-25 VITALS
HEIGHT: 72 IN | DIASTOLIC BLOOD PRESSURE: 82 MMHG | TEMPERATURE: 97.6 F | SYSTOLIC BLOOD PRESSURE: 125 MMHG | HEART RATE: 74 BPM | OXYGEN SATURATION: 97 % | RESPIRATION RATE: 18 BRPM | BODY MASS INDEX: 42.66 KG/M2 | WEIGHT: 315 LBS

## 2019-02-25 DIAGNOSIS — F25.0 SCHIZOAFFECTIVE DISORDER, BIPOLAR TYPE (HCC): Primary | Chronic | ICD-10-CM

## 2019-02-25 PROBLEM — F25.9: Status: ACTIVE | Noted: 2019-02-25

## 2019-02-25 LAB
ANION GAP SERPL CALCULATED.3IONS-SCNC: 12 MMOL/L (ref 3–16)
BLOOD CULTURE, ROUTINE: NORMAL
BUN BLDV-MCNC: 19 MG/DL (ref 7–20)
CALCIUM SERPL-MCNC: 10.1 MG/DL (ref 8.3–10.6)
CHLORIDE BLD-SCNC: 102 MMOL/L (ref 99–110)
CO2: 29 MMOL/L (ref 21–32)
CREAT SERPL-MCNC: 1.3 MG/DL (ref 0.9–1.3)
CULTURE, BLOOD 2: NORMAL
GFR AFRICAN AMERICAN: >60
GFR NON-AFRICAN AMERICAN: 58
GLUCOSE BLD-MCNC: 114 MG/DL (ref 70–99)
GLUCOSE BLD-MCNC: 83 MG/DL (ref 70–99)
GLUCOSE BLD-MCNC: 98 MG/DL (ref 70–99)
HCT VFR BLD CALC: 40.4 % (ref 40.5–52.5)
HEMOGLOBIN: 12.9 G/DL (ref 13.5–17.5)
MCH RBC QN AUTO: 27.1 PG (ref 26–34)
MCHC RBC AUTO-ENTMCNC: 31.8 G/DL (ref 31–36)
MCV RBC AUTO: 85 FL (ref 80–100)
PDW BLD-RTO: 20 % (ref 12.4–15.4)
PERFORMED ON: NORMAL
PERFORMED ON: NORMAL
PLATELET # BLD: 246 K/UL (ref 135–450)
PMV BLD AUTO: 7.8 FL (ref 5–10.5)
POTASSIUM REFLEX MAGNESIUM: 4.1 MMOL/L (ref 3.5–5.1)
RBC # BLD: 4.75 M/UL (ref 4.2–5.9)
SODIUM BLD-SCNC: 143 MMOL/L (ref 136–145)
TOTAL CK: 631 U/L (ref 39–308)
WBC # BLD: 3.4 K/UL (ref 4–11)

## 2019-02-25 PROCEDURE — 93005 ELECTROCARDIOGRAM TRACING: CPT | Performed by: INTERNAL MEDICINE

## 2019-02-25 PROCEDURE — 6360000002 HC RX W HCPCS: Performed by: INTERNAL MEDICINE

## 2019-02-25 PROCEDURE — 80048 BASIC METABOLIC PNL TOTAL CA: CPT

## 2019-02-25 PROCEDURE — 2500000003 HC RX 250 WO HCPCS: Performed by: INTERNAL MEDICINE

## 2019-02-25 PROCEDURE — 85027 COMPLETE CBC AUTOMATED: CPT

## 2019-02-25 PROCEDURE — 1240000000 HC EMOTIONAL WELLNESS R&B

## 2019-02-25 PROCEDURE — 6370000000 HC RX 637 (ALT 250 FOR IP): Performed by: STUDENT IN AN ORGANIZED HEALTH CARE EDUCATION/TRAINING PROGRAM

## 2019-02-25 PROCEDURE — 82550 ASSAY OF CK (CPK): CPT

## 2019-02-25 PROCEDURE — 36415 COLL VENOUS BLD VENIPUNCTURE: CPT

## 2019-02-25 RX ORDER — HALOPERIDOL 5 MG
5 TABLET ORAL 2 TIMES DAILY
Status: DISCONTINUED | OUTPATIENT
Start: 2019-02-26 | End: 2019-02-27

## 2019-02-25 RX ORDER — LISINOPRIL 20 MG/1
20 TABLET ORAL DAILY
Status: DISCONTINUED | OUTPATIENT
Start: 2019-02-26 | End: 2019-03-14 | Stop reason: HOSPADM

## 2019-02-25 RX ORDER — OLANZAPINE 5 MG/1
10 TABLET, ORALLY DISINTEGRATING ORAL 2 TIMES DAILY PRN
Status: DISCONTINUED | OUTPATIENT
Start: 2019-02-25 | End: 2019-02-27

## 2019-02-25 RX ORDER — OLANZAPINE 10 MG/1
10 INJECTION, POWDER, LYOPHILIZED, FOR SOLUTION INTRAMUSCULAR 2 TIMES DAILY PRN
Status: DISCONTINUED | OUTPATIENT
Start: 2019-02-25 | End: 2019-03-14 | Stop reason: HOSPADM

## 2019-02-25 RX ORDER — DIPHENHYDRAMINE HYDROCHLORIDE 50 MG/ML
50 INJECTION INTRAMUSCULAR; INTRAVENOUS ONCE
Status: COMPLETED | OUTPATIENT
Start: 2019-02-25 | End: 2019-02-25

## 2019-02-25 RX ORDER — TRAZODONE HYDROCHLORIDE 50 MG/1
50 TABLET ORAL NIGHTLY PRN
Status: DISCONTINUED | OUTPATIENT
Start: 2019-02-25 | End: 2019-03-14 | Stop reason: HOSPADM

## 2019-02-25 RX ORDER — MAGNESIUM HYDROXIDE/ALUMINUM HYDROXICE/SIMETHICONE 120; 1200; 1200 MG/30ML; MG/30ML; MG/30ML
30 SUSPENSION ORAL EVERY 6 HOURS PRN
Status: DISCONTINUED | OUTPATIENT
Start: 2019-02-25 | End: 2019-03-14 | Stop reason: HOSPADM

## 2019-02-25 RX ORDER — LEVOTHYROXINE SODIUM 0.2 MG/1
200 TABLET ORAL DAILY
Status: DISCONTINUED | OUTPATIENT
Start: 2019-02-26 | End: 2019-03-14 | Stop reason: HOSPADM

## 2019-02-25 RX ORDER — BENZTROPINE MESYLATE 1 MG/ML
2 INJECTION INTRAMUSCULAR; INTRAVENOUS 2 TIMES DAILY PRN
Status: DISCONTINUED | OUTPATIENT
Start: 2019-02-25 | End: 2019-02-26

## 2019-02-25 RX ORDER — ACETAMINOPHEN 325 MG/1
650 TABLET ORAL EVERY 4 HOURS PRN
Status: DISCONTINUED | OUTPATIENT
Start: 2019-02-25 | End: 2019-03-14 | Stop reason: HOSPADM

## 2019-02-25 RX ORDER — OLANZAPINE 10 MG/1
10 INJECTION, POWDER, LYOPHILIZED, FOR SOLUTION INTRAMUSCULAR ONCE
Status: COMPLETED | OUTPATIENT
Start: 2019-02-25 | End: 2019-02-25

## 2019-02-25 RX ORDER — HYDROXYZINE PAMOATE 25 MG/1
50 CAPSULE ORAL 3 TIMES DAILY PRN
Status: DISCONTINUED | OUTPATIENT
Start: 2019-02-25 | End: 2019-02-28

## 2019-02-25 RX ADMIN — HALOPERIDOL 5 MG: 5 TABLET ORAL at 16:50

## 2019-02-25 RX ADMIN — DIPHENHYDRAMINE HYDROCHLORIDE 50 MG: 50 INJECTION INTRAMUSCULAR; INTRAVENOUS at 19:30

## 2019-02-25 RX ADMIN — OLANZAPINE 10 MG: 10 INJECTION, POWDER, FOR SOLUTION INTRAMUSCULAR at 19:45

## 2019-02-25 ASSESSMENT — PAIN SCALES - GENERAL
PAINLEVEL_OUTOF10: 0
PAINLEVEL_OUTOF10: 0

## 2019-02-26 LAB
EKG ATRIAL RATE: 82 BPM
EKG DIAGNOSIS: NORMAL
EKG P AXIS: 40 DEGREES
EKG P-R INTERVAL: 164 MS
EKG Q-T INTERVAL: 386 MS
EKG QRS DURATION: 110 MS
EKG QTC CALCULATION (BAZETT): 450 MS
EKG R AXIS: -23 DEGREES
EKG T AXIS: 70 DEGREES
EKG VENTRICULAR RATE: 82 BPM

## 2019-02-26 PROCEDURE — 90792 PSYCH DIAG EVAL W/MED SRVCS: CPT | Performed by: NURSE PRACTITIONER

## 2019-02-26 PROCEDURE — 1240000000 HC EMOTIONAL WELLNESS R&B

## 2019-02-26 PROCEDURE — 6370000000 HC RX 637 (ALT 250 FOR IP): Performed by: PHYSICIAN ASSISTANT

## 2019-02-26 PROCEDURE — 93010 ELECTROCARDIOGRAM REPORT: CPT | Performed by: INTERNAL MEDICINE

## 2019-02-26 PROCEDURE — 6370000000 HC RX 637 (ALT 250 FOR IP): Performed by: NURSE PRACTITIONER

## 2019-02-26 PROCEDURE — 6370000000 HC RX 637 (ALT 250 FOR IP): Performed by: PSYCHIATRY & NEUROLOGY

## 2019-02-26 PROCEDURE — 99222 1ST HOSP IP/OBS MODERATE 55: CPT | Performed by: PHYSICIAN ASSISTANT

## 2019-02-26 RX ORDER — FUROSEMIDE 10 MG/ML
40 INJECTION INTRAMUSCULAR; INTRAVENOUS DAILY
Status: DISCONTINUED | OUTPATIENT
Start: 2019-02-27 | End: 2019-02-27 | Stop reason: CLARIF

## 2019-02-26 RX ORDER — AMLODIPINE BESYLATE 5 MG/1
5 TABLET ORAL ONCE
Status: COMPLETED | OUTPATIENT
Start: 2019-02-26 | End: 2019-02-26

## 2019-02-26 RX ORDER — PANTOPRAZOLE SODIUM 40 MG/1
40 TABLET, DELAYED RELEASE ORAL
Status: DISCONTINUED | OUTPATIENT
Start: 2019-02-27 | End: 2019-03-14 | Stop reason: HOSPADM

## 2019-02-26 RX ORDER — ATORVASTATIN CALCIUM 10 MG/1
20 TABLET, FILM COATED ORAL NIGHTLY
Status: DISCONTINUED | OUTPATIENT
Start: 2019-02-26 | End: 2019-03-14 | Stop reason: HOSPADM

## 2019-02-26 RX ORDER — PIOGLITAZONEHYDROCHLORIDE 30 MG/1
30 TABLET ORAL DAILY
Status: DISCONTINUED | OUTPATIENT
Start: 2019-02-27 | End: 2019-03-14 | Stop reason: HOSPADM

## 2019-02-26 RX ORDER — ASPIRIN 81 MG/1
81 TABLET, CHEWABLE ORAL DAILY
Status: DISCONTINUED | OUTPATIENT
Start: 2019-02-26 | End: 2019-03-14 | Stop reason: HOSPADM

## 2019-02-26 RX ORDER — DIVALPROEX SODIUM 500 MG/1
500 TABLET, EXTENDED RELEASE ORAL 2 TIMES DAILY
Status: DISCONTINUED | OUTPATIENT
Start: 2019-02-26 | End: 2019-03-07

## 2019-02-26 RX ADMIN — LISINOPRIL 20 MG: 20 TABLET ORAL at 11:51

## 2019-02-26 RX ADMIN — AMLODIPINE BESYLATE 5 MG: 5 TABLET ORAL at 21:20

## 2019-02-26 RX ADMIN — HALOPERIDOL 5 MG: 5 TABLET ORAL at 11:53

## 2019-02-26 RX ADMIN — LEVOTHYROXINE SODIUM 200 MCG: 200 TABLET ORAL at 06:06

## 2019-02-26 RX ADMIN — HALOPERIDOL 5 MG: 5 TABLET ORAL at 21:20

## 2019-02-26 RX ADMIN — ASPIRIN 81 MG 81 MG: 81 TABLET ORAL at 21:21

## 2019-02-26 RX ADMIN — DIVALPROEX SODIUM 500 MG: 500 TABLET, EXTENDED RELEASE ORAL at 13:59

## 2019-02-26 RX ADMIN — ATORVASTATIN CALCIUM 20 MG: 10 TABLET, FILM COATED ORAL at 21:21

## 2019-02-27 LAB
GLUCOSE BLD-MCNC: 104 MG/DL (ref 70–99)
PERFORMED ON: ABNORMAL

## 2019-02-27 PROCEDURE — 99233 SBSQ HOSP IP/OBS HIGH 50: CPT | Performed by: NURSE PRACTITIONER

## 2019-02-27 PROCEDURE — 2580000003 HC RX 258

## 2019-02-27 PROCEDURE — 1240000000 HC EMOTIONAL WELLNESS R&B

## 2019-02-27 PROCEDURE — 6370000000 HC RX 637 (ALT 250 FOR IP): Performed by: PSYCHIATRY & NEUROLOGY

## 2019-02-27 PROCEDURE — 97167 OT EVAL HIGH COMPLEX 60 MIN: CPT

## 2019-02-27 PROCEDURE — 2500000003 HC RX 250 WO HCPCS: Performed by: PSYCHIATRY & NEUROLOGY

## 2019-02-27 PROCEDURE — 6370000000 HC RX 637 (ALT 250 FOR IP): Performed by: PHYSICIAN ASSISTANT

## 2019-02-27 PROCEDURE — 6370000000 HC RX 637 (ALT 250 FOR IP): Performed by: NURSE PRACTITIONER

## 2019-02-27 RX ORDER — HALOPERIDOL 5 MG
10 TABLET ORAL 2 TIMES DAILY
Status: DISCONTINUED | OUTPATIENT
Start: 2019-02-27 | End: 2019-03-14 | Stop reason: HOSPADM

## 2019-02-27 RX ORDER — RISPERIDONE 1 MG/1
2 TABLET, ORALLY DISINTEGRATING ORAL 3 TIMES DAILY PRN
Status: DISCONTINUED | OUTPATIENT
Start: 2019-02-27 | End: 2019-02-28

## 2019-02-27 RX ORDER — DEXTROSE MONOHYDRATE 50 MG/ML
100 INJECTION, SOLUTION INTRAVENOUS PRN
Status: DISCONTINUED | OUTPATIENT
Start: 2019-02-27 | End: 2019-03-14 | Stop reason: HOSPADM

## 2019-02-27 RX ORDER — DEXTROSE MONOHYDRATE 25 G/50ML
12.5 INJECTION, SOLUTION INTRAVENOUS PRN
Status: DISCONTINUED | OUTPATIENT
Start: 2019-02-27 | End: 2019-03-14 | Stop reason: HOSPADM

## 2019-02-27 RX ORDER — FUROSEMIDE 40 MG/1
40 TABLET ORAL DAILY
Status: DISCONTINUED | OUTPATIENT
Start: 2019-02-27 | End: 2019-03-14 | Stop reason: HOSPADM

## 2019-02-27 RX ORDER — NICOTINE POLACRILEX 4 MG
15 LOZENGE BUCCAL PRN
Status: DISCONTINUED | OUTPATIENT
Start: 2019-02-27 | End: 2019-03-14 | Stop reason: HOSPADM

## 2019-02-27 RX ADMIN — OLANZAPINE 10 MG: 5 TABLET, ORALLY DISINTEGRATING ORAL at 04:09

## 2019-02-27 RX ADMIN — ASPIRIN 81 MG 81 MG: 81 TABLET ORAL at 08:49

## 2019-02-27 RX ADMIN — DIVALPROEX SODIUM 500 MG: 500 TABLET, EXTENDED RELEASE ORAL at 21:07

## 2019-02-27 RX ADMIN — HALOPERIDOL 10 MG: 5 TABLET ORAL at 21:07

## 2019-02-27 RX ADMIN — PIOGLITAZONE 30 MG: 30 TABLET ORAL at 08:49

## 2019-02-27 RX ADMIN — HALOPERIDOL 5 MG: 5 TABLET ORAL at 08:49

## 2019-02-27 RX ADMIN — PANTOPRAZOLE SODIUM 40 MG: 40 TABLET, DELAYED RELEASE ORAL at 08:49

## 2019-02-27 RX ADMIN — LISINOPRIL 20 MG: 20 TABLET ORAL at 08:49

## 2019-02-27 RX ADMIN — LEVOTHYROXINE SODIUM 200 MCG: 200 TABLET ORAL at 05:35

## 2019-02-27 RX ADMIN — WATER 20 ML: 1 INJECTION INTRAMUSCULAR; INTRAVENOUS; SUBCUTANEOUS at 22:45

## 2019-02-27 RX ADMIN — OLANZAPINE 10 MG: 10 INJECTION, POWDER, FOR SOLUTION INTRAMUSCULAR at 22:45

## 2019-02-27 RX ADMIN — ATORVASTATIN CALCIUM 20 MG: 10 TABLET, FILM COATED ORAL at 21:08

## 2019-02-27 RX ADMIN — RISPERIDONE 2 MG: 1 TABLET, ORALLY DISINTEGRATING ORAL at 11:55

## 2019-02-27 RX ADMIN — DIVALPROEX SODIUM 500 MG: 500 TABLET, EXTENDED RELEASE ORAL at 08:49

## 2019-02-27 RX ADMIN — FUROSEMIDE 40 MG: 40 TABLET ORAL at 10:02

## 2019-02-28 PROCEDURE — 1240000000 HC EMOTIONAL WELLNESS R&B

## 2019-02-28 PROCEDURE — 99233 SBSQ HOSP IP/OBS HIGH 50: CPT | Performed by: NURSE PRACTITIONER

## 2019-02-28 PROCEDURE — 6370000000 HC RX 637 (ALT 250 FOR IP): Performed by: PHYSICIAN ASSISTANT

## 2019-02-28 PROCEDURE — 6370000000 HC RX 637 (ALT 250 FOR IP): Performed by: PSYCHIATRY & NEUROLOGY

## 2019-02-28 PROCEDURE — 97530 THERAPEUTIC ACTIVITIES: CPT

## 2019-02-28 PROCEDURE — 97535 SELF CARE MNGMENT TRAINING: CPT

## 2019-02-28 PROCEDURE — 6370000000 HC RX 637 (ALT 250 FOR IP): Performed by: NURSE PRACTITIONER

## 2019-02-28 RX ORDER — HYDROXYZINE 50 MG/1
50 TABLET, FILM COATED ORAL 3 TIMES DAILY PRN
Status: DISCONTINUED | OUTPATIENT
Start: 2019-02-28 | End: 2019-03-14 | Stop reason: HOSPADM

## 2019-02-28 RX ORDER — RISPERIDONE 0.5 MG/1
2 TABLET, ORALLY DISINTEGRATING ORAL 3 TIMES DAILY PRN
Status: DISCONTINUED | OUTPATIENT
Start: 2019-02-28 | End: 2019-03-14 | Stop reason: HOSPADM

## 2019-02-28 RX ADMIN — LISINOPRIL 20 MG: 20 TABLET ORAL at 08:00

## 2019-02-28 RX ADMIN — FUROSEMIDE 40 MG: 40 TABLET ORAL at 07:57

## 2019-02-28 RX ADMIN — DIVALPROEX SODIUM 500 MG: 500 TABLET, EXTENDED RELEASE ORAL at 07:57

## 2019-02-28 RX ADMIN — RISPERIDONE 2 MG: 1 TABLET, ORALLY DISINTEGRATING ORAL at 13:59

## 2019-02-28 RX ADMIN — ACETAMINOPHEN 650 MG: 325 TABLET ORAL at 06:15

## 2019-02-28 RX ADMIN — PIOGLITAZONE 30 MG: 30 TABLET ORAL at 07:57

## 2019-02-28 RX ADMIN — ASPIRIN 81 MG 81 MG: 81 TABLET ORAL at 07:57

## 2019-02-28 RX ADMIN — HALOPERIDOL 10 MG: 5 TABLET ORAL at 07:57

## 2019-02-28 ASSESSMENT — PAIN DESCRIPTION - DESCRIPTORS
DESCRIPTORS: ACHING
DESCRIPTORS: ACHING

## 2019-02-28 ASSESSMENT — PAIN DESCRIPTION - PROGRESSION
CLINICAL_PROGRESSION: GRADUALLY IMPROVING
CLINICAL_PROGRESSION: GRADUALLY WORSENING

## 2019-02-28 ASSESSMENT — PAIN - FUNCTIONAL ASSESSMENT
PAIN_FUNCTIONAL_ASSESSMENT: ACTIVITIES ARE NOT PREVENTED
PAIN_FUNCTIONAL_ASSESSMENT: ACTIVITIES ARE NOT PREVENTED

## 2019-02-28 ASSESSMENT — PAIN DESCRIPTION - PAIN TYPE
TYPE: ACUTE PAIN
TYPE: ACUTE PAIN

## 2019-02-28 ASSESSMENT — PAIN DESCRIPTION - FREQUENCY: FREQUENCY: INTERMITTENT

## 2019-02-28 ASSESSMENT — PAIN DESCRIPTION - LOCATION
LOCATION: GENERALIZED
LOCATION: GENERALIZED

## 2019-02-28 ASSESSMENT — PAIN DESCRIPTION - ONSET: ONSET: GRADUAL

## 2019-02-28 ASSESSMENT — PAIN SCALES - GENERAL
PAINLEVEL_OUTOF10: 10
PAINLEVEL_OUTOF10: 0

## 2019-03-01 LAB
GLUCOSE BLD-MCNC: 95 MG/DL (ref 70–99)
PERFORMED ON: NORMAL

## 2019-03-01 PROCEDURE — 6370000000 HC RX 637 (ALT 250 FOR IP): Performed by: PHYSICIAN ASSISTANT

## 2019-03-01 PROCEDURE — 6370000000 HC RX 637 (ALT 250 FOR IP): Performed by: PSYCHIATRY & NEUROLOGY

## 2019-03-01 PROCEDURE — 6370000000 HC RX 637 (ALT 250 FOR IP): Performed by: NURSE PRACTITIONER

## 2019-03-01 PROCEDURE — 1240000000 HC EMOTIONAL WELLNESS R&B

## 2019-03-01 RX ADMIN — HALOPERIDOL 10 MG: 5 TABLET ORAL at 08:41

## 2019-03-01 RX ADMIN — ATORVASTATIN CALCIUM 20 MG: 10 TABLET, FILM COATED ORAL at 20:36

## 2019-03-01 RX ADMIN — RISPERIDONE 2 MG: 0.5 TABLET, ORALLY DISINTEGRATING ORAL at 00:04

## 2019-03-01 RX ADMIN — DIVALPROEX SODIUM 500 MG: 500 TABLET, EXTENDED RELEASE ORAL at 20:36

## 2019-03-01 RX ADMIN — DIVALPROEX SODIUM 500 MG: 500 TABLET, EXTENDED RELEASE ORAL at 08:41

## 2019-03-01 RX ADMIN — PANTOPRAZOLE SODIUM 40 MG: 40 TABLET, DELAYED RELEASE ORAL at 06:29

## 2019-03-01 RX ADMIN — HALOPERIDOL 10 MG: 5 TABLET ORAL at 20:36

## 2019-03-01 RX ADMIN — ASPIRIN 81 MG 81 MG: 81 TABLET ORAL at 08:41

## 2019-03-01 RX ADMIN — FUROSEMIDE 40 MG: 40 TABLET ORAL at 08:41

## 2019-03-01 RX ADMIN — PIOGLITAZONE 30 MG: 30 TABLET ORAL at 08:41

## 2019-03-01 RX ADMIN — TRAZODONE HYDROCHLORIDE 50 MG: 50 TABLET ORAL at 00:04

## 2019-03-01 RX ADMIN — LEVOTHYROXINE SODIUM 200 MCG: 200 TABLET ORAL at 06:29

## 2019-03-01 RX ADMIN — LISINOPRIL 20 MG: 20 TABLET ORAL at 08:41

## 2019-03-01 ASSESSMENT — PAIN SCALES - GENERAL
PAINLEVEL_OUTOF10: 0
PAINLEVEL_OUTOF10: 10

## 2019-03-01 ASSESSMENT — PAIN DESCRIPTION - LOCATION: LOCATION: CHEST

## 2019-03-01 ASSESSMENT — PAIN DESCRIPTION - ORIENTATION: ORIENTATION: LEFT

## 2019-03-02 PROCEDURE — 6370000000 HC RX 637 (ALT 250 FOR IP): Performed by: PHYSICIAN ASSISTANT

## 2019-03-02 PROCEDURE — 6370000000 HC RX 637 (ALT 250 FOR IP): Performed by: NURSE PRACTITIONER

## 2019-03-02 PROCEDURE — 1240000000 HC EMOTIONAL WELLNESS R&B

## 2019-03-02 PROCEDURE — 6370000000 HC RX 637 (ALT 250 FOR IP): Performed by: PSYCHIATRY & NEUROLOGY

## 2019-03-02 PROCEDURE — 99233 SBSQ HOSP IP/OBS HIGH 50: CPT | Performed by: PSYCHIATRY & NEUROLOGY

## 2019-03-02 RX ADMIN — DIVALPROEX SODIUM 500 MG: 500 TABLET, EXTENDED RELEASE ORAL at 09:02

## 2019-03-02 RX ADMIN — PANTOPRAZOLE SODIUM 40 MG: 40 TABLET, DELAYED RELEASE ORAL at 09:02

## 2019-03-02 RX ADMIN — LISINOPRIL 20 MG: 20 TABLET ORAL at 09:02

## 2019-03-02 RX ADMIN — FUROSEMIDE 40 MG: 40 TABLET ORAL at 09:02

## 2019-03-02 RX ADMIN — ATORVASTATIN CALCIUM 20 MG: 10 TABLET, FILM COATED ORAL at 21:06

## 2019-03-02 RX ADMIN — LEVOTHYROXINE SODIUM 200 MCG: 200 TABLET ORAL at 09:02

## 2019-03-02 RX ADMIN — DIVALPROEX SODIUM 500 MG: 500 TABLET, EXTENDED RELEASE ORAL at 21:06

## 2019-03-02 RX ADMIN — HALOPERIDOL 10 MG: 5 TABLET ORAL at 09:02

## 2019-03-02 RX ADMIN — TRAZODONE HYDROCHLORIDE 50 MG: 50 TABLET ORAL at 21:06

## 2019-03-02 RX ADMIN — ASPIRIN 81 MG 81 MG: 81 TABLET ORAL at 09:02

## 2019-03-02 RX ADMIN — PIOGLITAZONE 30 MG: 30 TABLET ORAL at 09:02

## 2019-03-02 RX ADMIN — HALOPERIDOL 10 MG: 5 TABLET ORAL at 21:06

## 2019-03-03 LAB
GLUCOSE BLD-MCNC: 112 MG/DL (ref 70–99)
PERFORMED ON: ABNORMAL

## 2019-03-03 PROCEDURE — 6370000000 HC RX 637 (ALT 250 FOR IP): Performed by: PSYCHIATRY & NEUROLOGY

## 2019-03-03 PROCEDURE — 6370000000 HC RX 637 (ALT 250 FOR IP): Performed by: NURSE PRACTITIONER

## 2019-03-03 PROCEDURE — 1240000000 HC EMOTIONAL WELLNESS R&B

## 2019-03-03 PROCEDURE — 6370000000 HC RX 637 (ALT 250 FOR IP): Performed by: PHYSICIAN ASSISTANT

## 2019-03-03 RX ADMIN — HALOPERIDOL 10 MG: 5 TABLET ORAL at 20:59

## 2019-03-03 RX ADMIN — DIVALPROEX SODIUM 500 MG: 500 TABLET, EXTENDED RELEASE ORAL at 20:59

## 2019-03-03 RX ADMIN — DIVALPROEX SODIUM 500 MG: 500 TABLET, EXTENDED RELEASE ORAL at 08:34

## 2019-03-03 RX ADMIN — ASPIRIN 81 MG 81 MG: 81 TABLET ORAL at 08:34

## 2019-03-03 RX ADMIN — LISINOPRIL 20 MG: 20 TABLET ORAL at 08:34

## 2019-03-03 RX ADMIN — LEVOTHYROXINE SODIUM 200 MCG: 200 TABLET ORAL at 06:53

## 2019-03-03 RX ADMIN — FUROSEMIDE 40 MG: 40 TABLET ORAL at 08:35

## 2019-03-03 RX ADMIN — PANTOPRAZOLE SODIUM 40 MG: 40 TABLET, DELAYED RELEASE ORAL at 06:53

## 2019-03-03 RX ADMIN — HALOPERIDOL 10 MG: 5 TABLET ORAL at 08:35

## 2019-03-03 RX ADMIN — ATORVASTATIN CALCIUM 20 MG: 10 TABLET, FILM COATED ORAL at 20:59

## 2019-03-03 RX ADMIN — PIOGLITAZONE 30 MG: 30 TABLET ORAL at 08:35

## 2019-03-04 LAB
GLUCOSE BLD-MCNC: 100 MG/DL (ref 70–99)
PERFORMED ON: ABNORMAL

## 2019-03-04 PROCEDURE — 97530 THERAPEUTIC ACTIVITIES: CPT

## 2019-03-04 PROCEDURE — 99233 SBSQ HOSP IP/OBS HIGH 50: CPT | Performed by: NURSE PRACTITIONER

## 2019-03-04 PROCEDURE — 6360000002 HC RX W HCPCS: Performed by: NURSE PRACTITIONER

## 2019-03-04 PROCEDURE — 6370000000 HC RX 637 (ALT 250 FOR IP): Performed by: NURSE PRACTITIONER

## 2019-03-04 PROCEDURE — 1240000000 HC EMOTIONAL WELLNESS R&B

## 2019-03-04 PROCEDURE — 6370000000 HC RX 637 (ALT 250 FOR IP): Performed by: PSYCHIATRY & NEUROLOGY

## 2019-03-04 PROCEDURE — 6370000000 HC RX 637 (ALT 250 FOR IP): Performed by: PHYSICIAN ASSISTANT

## 2019-03-04 RX ORDER — HALOPERIDOL DECANOATE 100 MG/ML
200 INJECTION INTRAMUSCULAR
Status: DISCONTINUED | OUTPATIENT
Start: 2019-03-04 | End: 2019-03-14 | Stop reason: HOSPADM

## 2019-03-04 RX ADMIN — RISPERIDONE 2 MG: 0.5 TABLET, ORALLY DISINTEGRATING ORAL at 08:38

## 2019-03-04 RX ADMIN — ATORVASTATIN CALCIUM 20 MG: 10 TABLET, FILM COATED ORAL at 21:20

## 2019-03-04 RX ADMIN — PANTOPRAZOLE SODIUM 40 MG: 40 TABLET, DELAYED RELEASE ORAL at 06:42

## 2019-03-04 RX ADMIN — FUROSEMIDE 40 MG: 40 TABLET ORAL at 08:38

## 2019-03-04 RX ADMIN — HALOPERIDOL DECANOATE 200 MG: 100 INJECTION INTRAMUSCULAR at 14:43

## 2019-03-04 RX ADMIN — TRAZODONE HYDROCHLORIDE 50 MG: 50 TABLET ORAL at 21:20

## 2019-03-04 RX ADMIN — HALOPERIDOL 10 MG: 5 TABLET ORAL at 21:20

## 2019-03-04 RX ADMIN — ASPIRIN 81 MG 81 MG: 81 TABLET ORAL at 08:38

## 2019-03-04 RX ADMIN — RISPERIDONE 2 MG: 0.5 TABLET, ORALLY DISINTEGRATING ORAL at 17:11

## 2019-03-04 RX ADMIN — PIOGLITAZONE 30 MG: 30 TABLET ORAL at 08:38

## 2019-03-04 RX ADMIN — DIVALPROEX SODIUM 500 MG: 500 TABLET, EXTENDED RELEASE ORAL at 08:38

## 2019-03-04 RX ADMIN — HALOPERIDOL 10 MG: 5 TABLET ORAL at 08:38

## 2019-03-04 RX ADMIN — DIVALPROEX SODIUM 500 MG: 500 TABLET, EXTENDED RELEASE ORAL at 21:20

## 2019-03-04 RX ADMIN — LEVOTHYROXINE SODIUM 200 MCG: 200 TABLET ORAL at 06:42

## 2019-03-05 PROBLEM — F25.9 SCHIZOAFFECTIVE DISORDER, CHRONIC CONDITION WITH ACUTE EXACERBATION (HCC): Status: ACTIVE | Noted: 2019-03-05

## 2019-03-05 LAB
A/G RATIO: 1.1 (ref 1.1–2.2)
ALBUMIN SERPL-MCNC: 3.6 G/DL (ref 3.4–5)
ALP BLD-CCNC: 81 U/L (ref 40–129)
ALT SERPL-CCNC: 23 U/L (ref 10–40)
ANION GAP SERPL CALCULATED.3IONS-SCNC: 8 MMOL/L (ref 3–16)
AST SERPL-CCNC: 23 U/L (ref 15–37)
BILIRUB SERPL-MCNC: <0.2 MG/DL (ref 0–1)
BUN BLDV-MCNC: 30 MG/DL (ref 7–20)
CALCIUM SERPL-MCNC: 8.8 MG/DL (ref 8.3–10.6)
CHLORIDE BLD-SCNC: 96 MMOL/L (ref 99–110)
CO2: 33 MMOL/L (ref 21–32)
CREAT SERPL-MCNC: 1.3 MG/DL (ref 0.9–1.3)
GFR AFRICAN AMERICAN: >60
GFR NON-AFRICAN AMERICAN: 58
GLOBULIN: 3.4 G/DL
GLUCOSE BLD-MCNC: 116 MG/DL (ref 70–99)
GLUCOSE BLD-MCNC: 98 MG/DL (ref 70–99)
PERFORMED ON: NORMAL
POTASSIUM SERPL-SCNC: 4.4 MMOL/L (ref 3.5–5.1)
SODIUM BLD-SCNC: 137 MMOL/L (ref 136–145)
TOTAL PROTEIN: 7 G/DL (ref 6.4–8.2)
VALPROIC ACID LEVEL: 39.3 UG/ML (ref 50–100)

## 2019-03-05 PROCEDURE — 6370000000 HC RX 637 (ALT 250 FOR IP): Performed by: PSYCHIATRY & NEUROLOGY

## 2019-03-05 PROCEDURE — 99233 SBSQ HOSP IP/OBS HIGH 50: CPT | Performed by: NURSE PRACTITIONER

## 2019-03-05 PROCEDURE — 80164 ASSAY DIPROPYLACETIC ACD TOT: CPT

## 2019-03-05 PROCEDURE — 6370000000 HC RX 637 (ALT 250 FOR IP): Performed by: PHYSICIAN ASSISTANT

## 2019-03-05 PROCEDURE — 6370000000 HC RX 637 (ALT 250 FOR IP): Performed by: NURSE PRACTITIONER

## 2019-03-05 PROCEDURE — 80053 COMPREHEN METABOLIC PANEL: CPT

## 2019-03-05 PROCEDURE — 1240000000 HC EMOTIONAL WELLNESS R&B

## 2019-03-05 PROCEDURE — 36415 COLL VENOUS BLD VENIPUNCTURE: CPT

## 2019-03-05 RX ADMIN — ASPIRIN 81 MG 81 MG: 81 TABLET ORAL at 11:58

## 2019-03-05 RX ADMIN — PANTOPRAZOLE SODIUM 40 MG: 40 TABLET, DELAYED RELEASE ORAL at 07:19

## 2019-03-05 RX ADMIN — LEVOTHYROXINE SODIUM 200 MCG: 200 TABLET ORAL at 07:19

## 2019-03-05 RX ADMIN — TRAZODONE HYDROCHLORIDE 50 MG: 50 TABLET ORAL at 21:47

## 2019-03-05 RX ADMIN — DIVALPROEX SODIUM 500 MG: 500 TABLET, EXTENDED RELEASE ORAL at 21:47

## 2019-03-05 RX ADMIN — DIVALPROEX SODIUM 500 MG: 500 TABLET, EXTENDED RELEASE ORAL at 11:58

## 2019-03-05 RX ADMIN — FUROSEMIDE 40 MG: 40 TABLET ORAL at 11:58

## 2019-03-05 RX ADMIN — HALOPERIDOL 10 MG: 5 TABLET ORAL at 11:58

## 2019-03-05 RX ADMIN — HALOPERIDOL 10 MG: 5 TABLET ORAL at 21:47

## 2019-03-05 RX ADMIN — PIOGLITAZONE 30 MG: 30 TABLET ORAL at 11:58

## 2019-03-05 RX ADMIN — ATORVASTATIN CALCIUM 20 MG: 10 TABLET, FILM COATED ORAL at 21:47

## 2019-03-05 RX ADMIN — LISINOPRIL 20 MG: 20 TABLET ORAL at 11:59

## 2019-03-06 PROCEDURE — 1240000000 HC EMOTIONAL WELLNESS R&B

## 2019-03-06 PROCEDURE — 6370000000 HC RX 637 (ALT 250 FOR IP): Performed by: PSYCHIATRY & NEUROLOGY

## 2019-03-06 PROCEDURE — 6370000000 HC RX 637 (ALT 250 FOR IP): Performed by: NURSE PRACTITIONER

## 2019-03-06 PROCEDURE — 99233 SBSQ HOSP IP/OBS HIGH 50: CPT | Performed by: NURSE PRACTITIONER

## 2019-03-06 PROCEDURE — 6370000000 HC RX 637 (ALT 250 FOR IP): Performed by: PHYSICIAN ASSISTANT

## 2019-03-06 RX ADMIN — DIVALPROEX SODIUM 500 MG: 500 TABLET, EXTENDED RELEASE ORAL at 08:54

## 2019-03-06 RX ADMIN — ASPIRIN 81 MG 81 MG: 81 TABLET ORAL at 08:54

## 2019-03-06 RX ADMIN — HALOPERIDOL 10 MG: 5 TABLET ORAL at 08:54

## 2019-03-06 RX ADMIN — PIOGLITAZONE 30 MG: 30 TABLET ORAL at 08:54

## 2019-03-06 RX ADMIN — LEVOTHYROXINE SODIUM 200 MCG: 200 TABLET ORAL at 07:25

## 2019-03-06 RX ADMIN — DIVALPROEX SODIUM 500 MG: 500 TABLET, EXTENDED RELEASE ORAL at 20:57

## 2019-03-06 RX ADMIN — FUROSEMIDE 40 MG: 40 TABLET ORAL at 08:54

## 2019-03-06 RX ADMIN — LISINOPRIL 20 MG: 20 TABLET ORAL at 08:54

## 2019-03-06 RX ADMIN — HALOPERIDOL 10 MG: 5 TABLET ORAL at 20:57

## 2019-03-06 RX ADMIN — PANTOPRAZOLE SODIUM 40 MG: 40 TABLET, DELAYED RELEASE ORAL at 07:25

## 2019-03-06 RX ADMIN — ATORVASTATIN CALCIUM 20 MG: 10 TABLET, FILM COATED ORAL at 20:57

## 2019-03-07 PROCEDURE — 6370000000 HC RX 637 (ALT 250 FOR IP): Performed by: NURSE PRACTITIONER

## 2019-03-07 PROCEDURE — 97535 SELF CARE MNGMENT TRAINING: CPT

## 2019-03-07 PROCEDURE — 1240000000 HC EMOTIONAL WELLNESS R&B

## 2019-03-07 PROCEDURE — 6370000000 HC RX 637 (ALT 250 FOR IP): Performed by: PSYCHIATRY & NEUROLOGY

## 2019-03-07 PROCEDURE — 6370000000 HC RX 637 (ALT 250 FOR IP): Performed by: PHYSICIAN ASSISTANT

## 2019-03-07 PROCEDURE — 99233 SBSQ HOSP IP/OBS HIGH 50: CPT | Performed by: NURSE PRACTITIONER

## 2019-03-07 RX ADMIN — DIVALPROEX SODIUM 750 MG: 500 TABLET, EXTENDED RELEASE ORAL at 21:03

## 2019-03-07 RX ADMIN — ASPIRIN 81 MG 81 MG: 81 TABLET ORAL at 08:45

## 2019-03-07 RX ADMIN — HALOPERIDOL 10 MG: 5 TABLET ORAL at 08:44

## 2019-03-07 RX ADMIN — DIVALPROEX SODIUM 500 MG: 500 TABLET, EXTENDED RELEASE ORAL at 08:44

## 2019-03-07 RX ADMIN — LEVOTHYROXINE SODIUM 200 MCG: 200 TABLET ORAL at 08:43

## 2019-03-07 RX ADMIN — HALOPERIDOL 10 MG: 5 TABLET ORAL at 21:03

## 2019-03-07 RX ADMIN — RISPERIDONE 2 MG: 0.5 TABLET, ORALLY DISINTEGRATING ORAL at 02:48

## 2019-03-07 RX ADMIN — FUROSEMIDE 40 MG: 40 TABLET ORAL at 08:45

## 2019-03-07 RX ADMIN — TRAZODONE HYDROCHLORIDE 50 MG: 50 TABLET ORAL at 02:48

## 2019-03-07 RX ADMIN — PANTOPRAZOLE SODIUM 40 MG: 40 TABLET, DELAYED RELEASE ORAL at 08:43

## 2019-03-07 RX ADMIN — RISPERIDONE 2 MG: 0.5 TABLET, ORALLY DISINTEGRATING ORAL at 17:57

## 2019-03-07 RX ADMIN — ATORVASTATIN CALCIUM 20 MG: 10 TABLET, FILM COATED ORAL at 21:03

## 2019-03-08 PROCEDURE — 6370000000 HC RX 637 (ALT 250 FOR IP): Performed by: NURSE PRACTITIONER

## 2019-03-08 PROCEDURE — 1240000000 HC EMOTIONAL WELLNESS R&B

## 2019-03-08 PROCEDURE — 99233 SBSQ HOSP IP/OBS HIGH 50: CPT | Performed by: NURSE PRACTITIONER

## 2019-03-08 PROCEDURE — 6370000000 HC RX 637 (ALT 250 FOR IP): Performed by: PHYSICIAN ASSISTANT

## 2019-03-08 PROCEDURE — 6370000000 HC RX 637 (ALT 250 FOR IP): Performed by: PSYCHIATRY & NEUROLOGY

## 2019-03-08 RX ADMIN — ASPIRIN 81 MG 81 MG: 81 TABLET ORAL at 08:37

## 2019-03-08 RX ADMIN — LISINOPRIL 20 MG: 20 TABLET ORAL at 08:37

## 2019-03-08 RX ADMIN — RISPERIDONE 2 MG: 0.5 TABLET, ORALLY DISINTEGRATING ORAL at 17:20

## 2019-03-08 RX ADMIN — DIVALPROEX SODIUM 750 MG: 500 TABLET, EXTENDED RELEASE ORAL at 08:37

## 2019-03-08 RX ADMIN — PANTOPRAZOLE SODIUM 40 MG: 40 TABLET, DELAYED RELEASE ORAL at 08:52

## 2019-03-08 RX ADMIN — FUROSEMIDE 40 MG: 40 TABLET ORAL at 08:37

## 2019-03-08 RX ADMIN — PIOGLITAZONE 30 MG: 30 TABLET ORAL at 08:37

## 2019-03-08 RX ADMIN — LEVOTHYROXINE SODIUM 200 MCG: 200 TABLET ORAL at 08:52

## 2019-03-08 RX ADMIN — HALOPERIDOL 10 MG: 5 TABLET ORAL at 08:37

## 2019-03-09 PROCEDURE — 99232 SBSQ HOSP IP/OBS MODERATE 35: CPT | Performed by: PSYCHIATRY & NEUROLOGY

## 2019-03-09 PROCEDURE — 1240000000 HC EMOTIONAL WELLNESS R&B

## 2019-03-09 PROCEDURE — 6370000000 HC RX 637 (ALT 250 FOR IP): Performed by: NURSE PRACTITIONER

## 2019-03-09 PROCEDURE — 6370000000 HC RX 637 (ALT 250 FOR IP): Performed by: PHYSICIAN ASSISTANT

## 2019-03-09 PROCEDURE — 6370000000 HC RX 637 (ALT 250 FOR IP): Performed by: PSYCHIATRY & NEUROLOGY

## 2019-03-09 RX ADMIN — DIVALPROEX SODIUM 750 MG: 500 TABLET, EXTENDED RELEASE ORAL at 20:59

## 2019-03-09 RX ADMIN — HALOPERIDOL 10 MG: 5 TABLET ORAL at 08:02

## 2019-03-09 RX ADMIN — ATORVASTATIN CALCIUM 20 MG: 10 TABLET, FILM COATED ORAL at 20:59

## 2019-03-09 RX ADMIN — HALOPERIDOL 10 MG: 5 TABLET ORAL at 22:34

## 2019-03-09 RX ADMIN — LISINOPRIL 20 MG: 20 TABLET ORAL at 08:02

## 2019-03-09 RX ADMIN — ASPIRIN 81 MG 81 MG: 81 TABLET ORAL at 08:02

## 2019-03-09 RX ADMIN — PIOGLITAZONE 30 MG: 30 TABLET ORAL at 08:02

## 2019-03-09 RX ADMIN — DIVALPROEX SODIUM 750 MG: 500 TABLET, EXTENDED RELEASE ORAL at 08:02

## 2019-03-09 RX ADMIN — FUROSEMIDE 40 MG: 40 TABLET ORAL at 08:02

## 2019-03-10 PROCEDURE — 6370000000 HC RX 637 (ALT 250 FOR IP): Performed by: PHYSICIAN ASSISTANT

## 2019-03-10 PROCEDURE — 6370000000 HC RX 637 (ALT 250 FOR IP): Performed by: PSYCHIATRY & NEUROLOGY

## 2019-03-10 PROCEDURE — 1240000000 HC EMOTIONAL WELLNESS R&B

## 2019-03-10 PROCEDURE — 2500000003 HC RX 250 WO HCPCS: Performed by: PSYCHIATRY & NEUROLOGY

## 2019-03-10 PROCEDURE — 6370000000 HC RX 637 (ALT 250 FOR IP): Performed by: NURSE PRACTITIONER

## 2019-03-10 RX ADMIN — ATORVASTATIN CALCIUM 20 MG: 10 TABLET, FILM COATED ORAL at 22:16

## 2019-03-10 RX ADMIN — DIVALPROEX SODIUM 750 MG: 500 TABLET, EXTENDED RELEASE ORAL at 22:16

## 2019-03-10 RX ADMIN — OLANZAPINE 10 MG: 10 INJECTION, POWDER, FOR SOLUTION INTRAMUSCULAR at 08:47

## 2019-03-11 PROCEDURE — 99233 SBSQ HOSP IP/OBS HIGH 50: CPT | Performed by: NURSE PRACTITIONER

## 2019-03-11 PROCEDURE — 6370000000 HC RX 637 (ALT 250 FOR IP): Performed by: NURSE PRACTITIONER

## 2019-03-11 PROCEDURE — 6370000000 HC RX 637 (ALT 250 FOR IP): Performed by: PHYSICIAN ASSISTANT

## 2019-03-11 PROCEDURE — 1240000000 HC EMOTIONAL WELLNESS R&B

## 2019-03-11 PROCEDURE — 6370000000 HC RX 637 (ALT 250 FOR IP): Performed by: PSYCHIATRY & NEUROLOGY

## 2019-03-11 RX ADMIN — ASPIRIN 81 MG 81 MG: 81 TABLET ORAL at 08:26

## 2019-03-11 RX ADMIN — HALOPERIDOL 10 MG: 5 TABLET ORAL at 03:02

## 2019-03-11 RX ADMIN — HYDROXYZINE HYDROCHLORIDE 50 MG: 50 TABLET, FILM COATED ORAL at 17:26

## 2019-03-11 RX ADMIN — HALOPERIDOL 10 MG: 5 TABLET ORAL at 21:07

## 2019-03-11 RX ADMIN — ATORVASTATIN CALCIUM 20 MG: 10 TABLET, FILM COATED ORAL at 21:07

## 2019-03-11 RX ADMIN — FUROSEMIDE 40 MG: 40 TABLET ORAL at 08:26

## 2019-03-11 RX ADMIN — DIVALPROEX SODIUM 750 MG: 500 TABLET, EXTENDED RELEASE ORAL at 08:26

## 2019-03-11 RX ADMIN — HALOPERIDOL 10 MG: 5 TABLET ORAL at 08:26

## 2019-03-11 RX ADMIN — TRAZODONE HYDROCHLORIDE 50 MG: 50 TABLET ORAL at 21:07

## 2019-03-11 RX ADMIN — LISINOPRIL 20 MG: 20 TABLET ORAL at 08:26

## 2019-03-11 RX ADMIN — PIOGLITAZONE 30 MG: 30 TABLET ORAL at 08:26

## 2019-03-11 RX ADMIN — ACETAMINOPHEN 650 MG: 325 TABLET ORAL at 17:26

## 2019-03-11 RX ADMIN — ACETAMINOPHEN 650 MG: 325 TABLET ORAL at 11:51

## 2019-03-11 RX ADMIN — DIVALPROEX SODIUM 750 MG: 500 TABLET, EXTENDED RELEASE ORAL at 21:07

## 2019-03-11 ASSESSMENT — PAIN SCALES - GENERAL
PAINLEVEL_OUTOF10: 6
PAINLEVEL_OUTOF10: 6

## 2019-03-12 PROCEDURE — 99233 SBSQ HOSP IP/OBS HIGH 50: CPT | Performed by: NURSE PRACTITIONER

## 2019-03-12 PROCEDURE — 6370000000 HC RX 637 (ALT 250 FOR IP): Performed by: PHYSICIAN ASSISTANT

## 2019-03-12 PROCEDURE — 2500000003 HC RX 250 WO HCPCS: Performed by: PSYCHIATRY & NEUROLOGY

## 2019-03-12 PROCEDURE — 6370000000 HC RX 637 (ALT 250 FOR IP): Performed by: NURSE PRACTITIONER

## 2019-03-12 PROCEDURE — 6370000000 HC RX 637 (ALT 250 FOR IP): Performed by: PSYCHIATRY & NEUROLOGY

## 2019-03-12 PROCEDURE — 1240000000 HC EMOTIONAL WELLNESS R&B

## 2019-03-12 RX ADMIN — HALOPERIDOL 10 MG: 5 TABLET ORAL at 07:59

## 2019-03-12 RX ADMIN — HALOPERIDOL 10 MG: 5 TABLET ORAL at 23:00

## 2019-03-12 RX ADMIN — DIVALPROEX SODIUM 750 MG: 500 TABLET, EXTENDED RELEASE ORAL at 23:00

## 2019-03-12 RX ADMIN — RISPERIDONE 2 MG: 0.5 TABLET, ORALLY DISINTEGRATING ORAL at 07:59

## 2019-03-12 RX ADMIN — LEVOTHYROXINE SODIUM 200 MCG: 200 TABLET ORAL at 07:22

## 2019-03-12 RX ADMIN — DIVALPROEX SODIUM 750 MG: 500 TABLET, EXTENDED RELEASE ORAL at 07:59

## 2019-03-12 RX ADMIN — LISINOPRIL 20 MG: 20 TABLET ORAL at 07:59

## 2019-03-12 RX ADMIN — FUROSEMIDE 40 MG: 40 TABLET ORAL at 07:59

## 2019-03-12 RX ADMIN — TRAZODONE HYDROCHLORIDE 50 MG: 50 TABLET ORAL at 23:00

## 2019-03-12 RX ADMIN — ATORVASTATIN CALCIUM 20 MG: 10 TABLET, FILM COATED ORAL at 23:00

## 2019-03-12 RX ADMIN — ASPIRIN 81 MG 81 MG: 81 TABLET ORAL at 07:59

## 2019-03-12 RX ADMIN — OLANZAPINE 10 MG: 10 INJECTION, POWDER, FOR SOLUTION INTRAMUSCULAR at 10:20

## 2019-03-12 RX ADMIN — PANTOPRAZOLE SODIUM 40 MG: 40 TABLET, DELAYED RELEASE ORAL at 07:22

## 2019-03-12 RX ADMIN — PIOGLITAZONE 30 MG: 30 TABLET ORAL at 07:59

## 2019-03-12 ASSESSMENT — PAIN DESCRIPTION - ORIENTATION: ORIENTATION: LEFT

## 2019-03-12 ASSESSMENT — PAIN DESCRIPTION - LOCATION: LOCATION: EYE

## 2019-03-12 ASSESSMENT — PAIN DESCRIPTION - PAIN TYPE: TYPE: ACUTE PAIN

## 2019-03-12 ASSESSMENT — PAIN DESCRIPTION - DESCRIPTORS: DESCRIPTORS: PRESSURE;DISCOMFORT

## 2019-03-13 PROCEDURE — 1240000000 HC EMOTIONAL WELLNESS R&B

## 2019-03-13 PROCEDURE — 6370000000 HC RX 637 (ALT 250 FOR IP): Performed by: PSYCHIATRY & NEUROLOGY

## 2019-03-13 PROCEDURE — 99233 SBSQ HOSP IP/OBS HIGH 50: CPT | Performed by: NURSE PRACTITIONER

## 2019-03-13 PROCEDURE — 6370000000 HC RX 637 (ALT 250 FOR IP): Performed by: PHYSICIAN ASSISTANT

## 2019-03-13 PROCEDURE — 6370000000 HC RX 637 (ALT 250 FOR IP): Performed by: NURSE PRACTITIONER

## 2019-03-13 RX ADMIN — DIVALPROEX SODIUM 750 MG: 500 TABLET, EXTENDED RELEASE ORAL at 08:26

## 2019-03-13 RX ADMIN — LEVOTHYROXINE SODIUM 200 MCG: 200 TABLET ORAL at 06:42

## 2019-03-13 RX ADMIN — HALOPERIDOL 10 MG: 5 TABLET ORAL at 08:22

## 2019-03-13 RX ADMIN — FUROSEMIDE 40 MG: 40 TABLET ORAL at 08:23

## 2019-03-13 RX ADMIN — PANTOPRAZOLE SODIUM 40 MG: 40 TABLET, DELAYED RELEASE ORAL at 06:42

## 2019-03-13 RX ADMIN — PIOGLITAZONE 30 MG: 30 TABLET ORAL at 08:23

## 2019-03-13 RX ADMIN — RISPERIDONE 2 MG: 0.5 TABLET, ORALLY DISINTEGRATING ORAL at 13:15

## 2019-03-13 RX ADMIN — LISINOPRIL 20 MG: 20 TABLET ORAL at 08:23

## 2019-03-13 RX ADMIN — ASPIRIN 81 MG 81 MG: 81 TABLET ORAL at 08:23

## 2019-03-14 VITALS
BODY MASS INDEX: 51.73 KG/M2 | SYSTOLIC BLOOD PRESSURE: 128 MMHG | TEMPERATURE: 98.3 F | RESPIRATION RATE: 16 BRPM | HEART RATE: 86 BPM | HEIGHT: 72 IN | DIASTOLIC BLOOD PRESSURE: 78 MMHG

## 2019-03-14 PROCEDURE — 99239 HOSP IP/OBS DSCHRG MGMT >30: CPT | Performed by: NURSE PRACTITIONER

## 2019-03-14 PROCEDURE — 5130000000 HC BRIDGE APPOINTMENT

## 2019-03-14 PROCEDURE — 6370000000 HC RX 637 (ALT 250 FOR IP): Performed by: PHYSICIAN ASSISTANT

## 2019-03-14 PROCEDURE — 6370000000 HC RX 637 (ALT 250 FOR IP): Performed by: PSYCHIATRY & NEUROLOGY

## 2019-03-14 PROCEDURE — 6370000000 HC RX 637 (ALT 250 FOR IP): Performed by: NURSE PRACTITIONER

## 2019-03-14 RX ORDER — DIVALPROEX SODIUM 250 MG/1
750 TABLET, EXTENDED RELEASE ORAL 2 TIMES DAILY
Qty: 180 TABLET | Refills: 0 | Status: ON HOLD | OUTPATIENT
Start: 2019-03-14 | End: 2019-04-08 | Stop reason: HOSPADM

## 2019-03-14 RX ORDER — ATORVASTATIN CALCIUM 20 MG/1
20 TABLET, FILM COATED ORAL NIGHTLY
Qty: 30 TABLET | Refills: 0 | Status: ON HOLD | OUTPATIENT
Start: 2019-03-14 | End: 2019-08-06 | Stop reason: HOSPADM

## 2019-03-14 RX ORDER — LISINOPRIL 20 MG/1
20 TABLET ORAL DAILY
Qty: 30 TABLET | Refills: 0 | Status: ON HOLD | OUTPATIENT
Start: 2019-03-14 | End: 2019-08-06 | Stop reason: SDUPTHER

## 2019-03-14 RX ORDER — LEVOTHYROXINE SODIUM 0.2 MG/1
200 TABLET ORAL DAILY
Qty: 30 TABLET | Refills: 0 | Status: ON HOLD | OUTPATIENT
Start: 2019-03-15 | End: 2019-08-06 | Stop reason: HOSPADM

## 2019-03-14 RX ORDER — ASPIRIN 81 MG/1
81 TABLET ORAL DAILY
Qty: 30 TABLET | Refills: 0 | Status: ON HOLD | OUTPATIENT
Start: 2019-03-14 | End: 2019-08-06 | Stop reason: SDUPTHER

## 2019-03-14 RX ORDER — PANTOPRAZOLE SODIUM 40 MG/1
40 TABLET, DELAYED RELEASE ORAL
Qty: 30 TABLET | Refills: 0 | Status: ON HOLD | OUTPATIENT
Start: 2019-03-15 | End: 2019-08-06 | Stop reason: HOSPADM

## 2019-03-14 RX ORDER — PIOGLITAZONEHYDROCHLORIDE 30 MG/1
30 TABLET ORAL DAILY
Qty: 30 TABLET | Refills: 0 | Status: ON HOLD | OUTPATIENT
Start: 2019-03-14 | End: 2019-04-08 | Stop reason: HOSPADM

## 2019-03-14 RX ORDER — HALOPERIDOL 10 MG/1
10 TABLET ORAL 2 TIMES DAILY
Qty: 60 TABLET | Refills: 0 | Status: ON HOLD | OUTPATIENT
Start: 2019-03-14 | End: 2019-04-08 | Stop reason: HOSPADM

## 2019-03-14 RX ORDER — HALOPERIDOL DECANOATE 100 MG/ML
200 INJECTION INTRAMUSCULAR
Status: ON HOLD | COMMUNITY
Start: 2019-04-03 | End: 2019-04-08 | Stop reason: HOSPADM

## 2019-03-14 RX ORDER — FUROSEMIDE 40 MG/1
40 TABLET ORAL DAILY
Qty: 30 TABLET | Refills: 0 | Status: ON HOLD | OUTPATIENT
Start: 2019-03-15 | End: 2019-08-06 | Stop reason: SDUPTHER

## 2019-03-14 RX ADMIN — LEVOTHYROXINE SODIUM 200 MCG: 200 TABLET ORAL at 10:15

## 2019-03-14 RX ADMIN — DIVALPROEX SODIUM 750 MG: 500 TABLET, EXTENDED RELEASE ORAL at 10:13

## 2019-03-14 RX ADMIN — HALOPERIDOL 10 MG: 5 TABLET ORAL at 10:22

## 2019-03-14 RX ADMIN — FUROSEMIDE 40 MG: 40 TABLET ORAL at 10:26

## 2019-03-14 RX ADMIN — LISINOPRIL 20 MG: 20 TABLET ORAL at 10:27

## 2019-03-14 RX ADMIN — ASPIRIN 81 MG 81 MG: 81 TABLET ORAL at 10:14

## 2019-03-14 RX ADMIN — PANTOPRAZOLE SODIUM 40 MG: 40 TABLET, DELAYED RELEASE ORAL at 10:15

## 2019-03-14 RX ADMIN — PIOGLITAZONE 30 MG: 30 TABLET ORAL at 10:21

## 2019-04-03 ENCOUNTER — HOSPITAL ENCOUNTER (INPATIENT)
Age: 54
LOS: 4 days | Discharge: NURSING, REHAB OR LONG-TERM CARE | DRG: 420 | End: 2019-04-08
Attending: EMERGENCY MEDICINE | Admitting: INTERNAL MEDICINE
Payer: MEDICAID

## 2019-04-03 DIAGNOSIS — J18.9 HCAP (HEALTHCARE-ASSOCIATED PNEUMONIA): Primary | ICD-10-CM

## 2019-04-03 DIAGNOSIS — R53.83 LETHARGY: ICD-10-CM

## 2019-04-03 DIAGNOSIS — R09.02 HYPOXIA: ICD-10-CM

## 2019-04-03 PROCEDURE — 99285 EMERGENCY DEPT VISIT HI MDM: CPT

## 2019-04-04 ENCOUNTER — APPOINTMENT (OUTPATIENT)
Dept: GENERAL RADIOLOGY | Age: 54
DRG: 420 | End: 2019-04-04
Payer: MEDICAID

## 2019-04-04 ENCOUNTER — APPOINTMENT (OUTPATIENT)
Dept: CT IMAGING | Age: 54
DRG: 420 | End: 2019-04-04
Payer: MEDICAID

## 2019-04-04 PROBLEM — J96.21 ACUTE AND CHRONIC RESPIRATORY FAILURE WITH HYPOXIA (HCC): Status: ACTIVE | Noted: 2019-04-04

## 2019-04-04 LAB
AMPHETAMINE SCREEN, URINE: NORMAL
ANION GAP SERPL CALCULATED.3IONS-SCNC: 10 MMOL/L (ref 3–16)
BARBITURATE SCREEN URINE: NORMAL
BASE EXCESS VENOUS: 7 (ref -3–3)
BASOPHILS ABSOLUTE: 0.1 K/UL (ref 0–0.2)
BASOPHILS RELATIVE PERCENT: 0.8 %
BENZODIAZEPINE SCREEN, URINE: NORMAL
BUN BLDV-MCNC: 23 MG/DL (ref 7–20)
C DIFFICILE TOXIN, EIA: NORMAL
CALCIUM SERPL-MCNC: 9 MG/DL (ref 8.3–10.6)
CANNABINOID SCREEN URINE: NORMAL
CHLORIDE BLD-SCNC: 101 MMOL/L (ref 99–110)
CO2: 27 MMOL/L (ref 21–32)
COCAINE METABOLITE SCREEN URINE: NORMAL
CREAT SERPL-MCNC: 1 MG/DL (ref 0.9–1.3)
EKG ATRIAL RATE: 58 BPM
EKG DIAGNOSIS: NORMAL
EKG P AXIS: 50 DEGREES
EKG P-R INTERVAL: 186 MS
EKG Q-T INTERVAL: 414 MS
EKG QRS DURATION: 124 MS
EKG QTC CALCULATION (BAZETT): 406 MS
EKG R AXIS: -14 DEGREES
EKG T AXIS: 42 DEGREES
EKG VENTRICULAR RATE: 58 BPM
EOSINOPHILS ABSOLUTE: 0.1 K/UL (ref 0–0.6)
EOSINOPHILS RELATIVE PERCENT: 0.7 %
ESTIMATED AVERAGE GLUCOSE: 128.4 MG/DL
ETHANOL: NORMAL MG/DL (ref 0–0.08)
GFR AFRICAN AMERICAN: >60
GFR NON-AFRICAN AMERICAN: >60
GLUCOSE BLD-MCNC: 105 MG/DL (ref 70–99)
GLUCOSE BLD-MCNC: 114 MG/DL (ref 70–99)
GLUCOSE BLD-MCNC: 115 MG/DL (ref 70–99)
GLUCOSE BLD-MCNC: 115 MG/DL (ref 70–99)
GLUCOSE BLD-MCNC: 124 MG/DL (ref 70–99)
GLUCOSE BLD-MCNC: 91 MG/DL (ref 70–99)
GLUCOSE BLD-MCNC: 92 MG/DL (ref 70–99)
HBA1C MFR BLD: 6.1 %
HCO3 VENOUS: 33.1 MMOL/L (ref 23–29)
HCT VFR BLD CALC: 32.7 % (ref 40.5–52.5)
HEMOGLOBIN: 10.6 G/DL (ref 13.5–17.5)
LYMPHOCYTES ABSOLUTE: 1 K/UL (ref 1–5.1)
LYMPHOCYTES RELATIVE PERCENT: 12.5 %
Lab: NORMAL
MCH RBC QN AUTO: 27.3 PG (ref 26–34)
MCHC RBC AUTO-ENTMCNC: 32.2 G/DL (ref 31–36)
MCV RBC AUTO: 84.7 FL (ref 80–100)
METHADONE SCREEN, URINE: NORMAL
MONOCYTES ABSOLUTE: 1 K/UL (ref 0–1.3)
MONOCYTES RELATIVE PERCENT: 12.1 %
NEUTROPHILS ABSOLUTE: 6.2 K/UL (ref 1.7–7.7)
NEUTROPHILS RELATIVE PERCENT: 73.9 %
O2 SAT, VEN: 43 %
OPIATE SCREEN URINE: NORMAL
OXYCODONE URINE: NORMAL
PCO2, VEN: 64.9 MM HG (ref 40–50)
PDW BLD-RTO: 19.7 % (ref 12.4–15.4)
PERFORMED ON: ABNORMAL
PERFORMED ON: NORMAL
PERFORMED ON: NORMAL
PH UA: 5
PH VENOUS: 7.32 (ref 7.35–7.45)
PHENCYCLIDINE SCREEN URINE: NORMAL
PLATELET # BLD: 270 K/UL (ref 135–450)
PMV BLD AUTO: 8.2 FL (ref 5–10.5)
PO2, VEN: 27 MM HG
POC SAMPLE TYPE: ABNORMAL
POTASSIUM SERPL-SCNC: 5.4 MMOL/L (ref 3.5–5.1)
POTASSIUM SERPL-SCNC: 5.7 MMOL/L (ref 3.5–5.1)
PRO-BNP: 127 PG/ML (ref 0–124)
PRO-BNP: 144 PG/ML (ref 0–124)
PROCALCITONIN: 0.21 NG/ML (ref 0–0.15)
PROPOXYPHENE SCREEN: NORMAL
RBC # BLD: 3.87 M/UL (ref 4.2–5.9)
SODIUM BLD-SCNC: 138 MMOL/L (ref 136–145)
T4 FREE: 0.5 NG/DL (ref 0.9–1.8)
TCO2 CALC VENOUS: 35 MMOL/L
TROPONIN: <0.01 NG/ML
TROPONIN: <0.01 NG/ML
TSH REFLEX: 70.39 UIU/ML (ref 0.27–4.2)
WBC # BLD: 8.4 K/UL (ref 4–11)

## 2019-04-04 PROCEDURE — 96375 TX/PRO/DX INJ NEW DRUG ADDON: CPT

## 2019-04-04 PROCEDURE — 85025 COMPLETE CBC W/AUTO DIFF WBC: CPT

## 2019-04-04 PROCEDURE — 71045 X-RAY EXAM CHEST 1 VIEW: CPT

## 2019-04-04 PROCEDURE — 84145 PROCALCITONIN (PCT): CPT

## 2019-04-04 PROCEDURE — 80048 BASIC METABOLIC PNL TOTAL CA: CPT

## 2019-04-04 PROCEDURE — 73610 X-RAY EXAM OF ANKLE: CPT

## 2019-04-04 PROCEDURE — 84132 ASSAY OF SERUM POTASSIUM: CPT

## 2019-04-04 PROCEDURE — G0378 HOSPITAL OBSERVATION PER HR: HCPCS

## 2019-04-04 PROCEDURE — 83880 ASSAY OF NATRIURETIC PEPTIDE: CPT

## 2019-04-04 PROCEDURE — 96365 THER/PROPH/DIAG IV INF INIT: CPT

## 2019-04-04 PROCEDURE — 87040 BLOOD CULTURE FOR BACTERIA: CPT

## 2019-04-04 PROCEDURE — 87449 NOS EACH ORGANISM AG IA: CPT

## 2019-04-04 PROCEDURE — 96372 THER/PROPH/DIAG INJ SC/IM: CPT

## 2019-04-04 PROCEDURE — 6360000002 HC RX W HCPCS: Performed by: EMERGENCY MEDICINE

## 2019-04-04 PROCEDURE — 84484 ASSAY OF TROPONIN QUANT: CPT

## 2019-04-04 PROCEDURE — 6360000002 HC RX W HCPCS: Performed by: INTERNAL MEDICINE

## 2019-04-04 PROCEDURE — 96367 TX/PROPH/DG ADDL SEQ IV INF: CPT

## 2019-04-04 PROCEDURE — 82803 BLOOD GASES ANY COMBINATION: CPT

## 2019-04-04 PROCEDURE — 87324 CLOSTRIDIUM AG IA: CPT

## 2019-04-04 PROCEDURE — 2580000003 HC RX 258: Performed by: EMERGENCY MEDICINE

## 2019-04-04 PROCEDURE — 84439 ASSAY OF FREE THYROXINE: CPT

## 2019-04-04 PROCEDURE — 93010 ELECTROCARDIOGRAM REPORT: CPT | Performed by: INTERNAL MEDICINE

## 2019-04-04 PROCEDURE — 1200000000 HC SEMI PRIVATE

## 2019-04-04 PROCEDURE — 83036 HEMOGLOBIN GLYCOSYLATED A1C: CPT

## 2019-04-04 PROCEDURE — 36415 COLL VENOUS BLD VENIPUNCTURE: CPT

## 2019-04-04 PROCEDURE — 6370000000 HC RX 637 (ALT 250 FOR IP): Performed by: INTERNAL MEDICINE

## 2019-04-04 PROCEDURE — 84443 ASSAY THYROID STIM HORMONE: CPT

## 2019-04-04 PROCEDURE — G0480 DRUG TEST DEF 1-7 CLASSES: HCPCS

## 2019-04-04 PROCEDURE — 93005 ELECTROCARDIOGRAM TRACING: CPT | Performed by: INTERNAL MEDICINE

## 2019-04-04 PROCEDURE — 96366 THER/PROPH/DIAG IV INF ADDON: CPT

## 2019-04-04 PROCEDURE — 2580000003 HC RX 258: Performed by: INTERNAL MEDICINE

## 2019-04-04 PROCEDURE — 80307 DRUG TEST PRSMV CHEM ANLYZR: CPT

## 2019-04-04 PROCEDURE — 70450 CT HEAD/BRAIN W/O DYE: CPT

## 2019-04-04 RX ORDER — PIOGLITAZONEHYDROCHLORIDE 15 MG/1
30 TABLET ORAL DAILY
Status: DISCONTINUED | OUTPATIENT
Start: 2019-04-04 | End: 2019-04-04

## 2019-04-04 RX ORDER — ALBUTEROL SULFATE 2.5 MG/3ML
2.5 SOLUTION RESPIRATORY (INHALATION)
Status: DISCONTINUED | OUTPATIENT
Start: 2019-04-04 | End: 2019-04-04

## 2019-04-04 RX ORDER — ACETAMINOPHEN 325 MG/1
650 TABLET ORAL EVERY 4 HOURS PRN
Status: DISCONTINUED | OUTPATIENT
Start: 2019-04-04 | End: 2019-04-08 | Stop reason: HOSPADM

## 2019-04-04 RX ORDER — SODIUM CHLORIDE 0.9 % (FLUSH) 0.9 %
10 SYRINGE (ML) INJECTION EVERY 12 HOURS SCHEDULED
Status: DISCONTINUED | OUTPATIENT
Start: 2019-04-04 | End: 2019-04-08 | Stop reason: HOSPADM

## 2019-04-04 RX ORDER — PANTOPRAZOLE SODIUM 40 MG/1
40 TABLET, DELAYED RELEASE ORAL
Status: DISCONTINUED | OUTPATIENT
Start: 2019-04-04 | End: 2019-04-08 | Stop reason: HOSPADM

## 2019-04-04 RX ORDER — SODIUM CHLORIDE 0.9 % (FLUSH) 0.9 %
10 SYRINGE (ML) INJECTION PRN
Status: DISCONTINUED | OUTPATIENT
Start: 2019-04-04 | End: 2019-04-08 | Stop reason: HOSPADM

## 2019-04-04 RX ORDER — LOPERAMIDE HYDROCHLORIDE 2 MG/1
2 CAPSULE ORAL 4 TIMES DAILY PRN
Status: DISCONTINUED | OUTPATIENT
Start: 2019-04-04 | End: 2019-04-08 | Stop reason: HOSPADM

## 2019-04-04 RX ORDER — NICOTINE POLACRILEX 4 MG
15 LOZENGE BUCCAL PRN
Status: DISCONTINUED | OUTPATIENT
Start: 2019-04-04 | End: 2019-04-08 | Stop reason: HOSPADM

## 2019-04-04 RX ORDER — DEXTROSE MONOHYDRATE 50 MG/ML
100 INJECTION, SOLUTION INTRAVENOUS PRN
Status: DISCONTINUED | OUTPATIENT
Start: 2019-04-04 | End: 2019-04-08 | Stop reason: HOSPADM

## 2019-04-04 RX ORDER — DEXTROSE MONOHYDRATE 25 G/50ML
12.5 INJECTION, SOLUTION INTRAVENOUS PRN
Status: DISCONTINUED | OUTPATIENT
Start: 2019-04-04 | End: 2019-04-08 | Stop reason: HOSPADM

## 2019-04-04 RX ORDER — HALOPERIDOL 5 MG
10 TABLET ORAL 2 TIMES DAILY
Status: DISCONTINUED | OUTPATIENT
Start: 2019-04-04 | End: 2019-04-08 | Stop reason: HOSPADM

## 2019-04-04 RX ORDER — ALBUTEROL SULFATE 2.5 MG/3ML
2.5 SOLUTION RESPIRATORY (INHALATION) EVERY 4 HOURS PRN
Status: DISCONTINUED | OUTPATIENT
Start: 2019-04-04 | End: 2019-04-07

## 2019-04-04 RX ORDER — LISINOPRIL 20 MG/1
20 TABLET ORAL DAILY
Status: DISCONTINUED | OUTPATIENT
Start: 2019-04-04 | End: 2019-04-05

## 2019-04-04 RX ORDER — IPRATROPIUM BROMIDE AND ALBUTEROL SULFATE 2.5; .5 MG/3ML; MG/3ML
1 SOLUTION RESPIRATORY (INHALATION)
Status: DISCONTINUED | OUTPATIENT
Start: 2019-04-04 | End: 2019-04-04 | Stop reason: SDUPTHER

## 2019-04-04 RX ORDER — LEVOTHYROXINE SODIUM 0.1 MG/1
200 TABLET ORAL DAILY
Status: DISCONTINUED | OUTPATIENT
Start: 2019-04-04 | End: 2019-04-08 | Stop reason: HOSPADM

## 2019-04-04 RX ORDER — HALOPERIDOL 5 MG/ML
2 INJECTION INTRAMUSCULAR EVERY 6 HOURS PRN
Status: DISCONTINUED | OUTPATIENT
Start: 2019-04-04 | End: 2019-04-08 | Stop reason: HOSPADM

## 2019-04-04 RX ORDER — FUROSEMIDE 10 MG/ML
60 INJECTION INTRAMUSCULAR; INTRAVENOUS 2 TIMES DAILY
Status: DISCONTINUED | OUTPATIENT
Start: 2019-04-04 | End: 2019-04-05

## 2019-04-04 RX ORDER — ONDANSETRON 2 MG/ML
4 INJECTION INTRAMUSCULAR; INTRAVENOUS EVERY 6 HOURS PRN
Status: DISCONTINUED | OUTPATIENT
Start: 2019-04-04 | End: 2019-04-08 | Stop reason: HOSPADM

## 2019-04-04 RX ORDER — ATORVASTATIN CALCIUM 20 MG/1
20 TABLET, FILM COATED ORAL NIGHTLY
Status: DISCONTINUED | OUTPATIENT
Start: 2019-04-04 | End: 2019-04-08 | Stop reason: HOSPADM

## 2019-04-04 RX ORDER — ASPIRIN 81 MG/1
81 TABLET ORAL DAILY
Status: DISCONTINUED | OUTPATIENT
Start: 2019-04-04 | End: 2019-04-08 | Stop reason: HOSPADM

## 2019-04-04 RX ORDER — FUROSEMIDE 40 MG/1
40 TABLET ORAL DAILY
Status: DISCONTINUED | OUTPATIENT
Start: 2019-04-04 | End: 2019-04-04

## 2019-04-04 RX ADMIN — Medication 10 ML: at 10:59

## 2019-04-04 RX ADMIN — LISINOPRIL 20 MG: 20 TABLET ORAL at 10:57

## 2019-04-04 RX ADMIN — LEVOTHYROXINE SODIUM 200 MCG: 100 TABLET ORAL at 10:54

## 2019-04-04 RX ADMIN — HALOPERIDOL 10 MG: 5 TABLET ORAL at 10:58

## 2019-04-04 RX ADMIN — AZITHROMYCIN DIHYDRATE 500 MG: 500 INJECTION, POWDER, LYOPHILIZED, FOR SOLUTION INTRAVENOUS at 03:29

## 2019-04-04 RX ADMIN — PANTOPRAZOLE SODIUM 40 MG: 40 TABLET, DELAYED RELEASE ORAL at 10:57

## 2019-04-04 RX ADMIN — ENOXAPARIN SODIUM 40 MG: 40 INJECTION SUBCUTANEOUS at 10:57

## 2019-04-04 RX ADMIN — ASPIRIN 81 MG: 81 TABLET, COATED ORAL at 10:55

## 2019-04-04 RX ADMIN — CEFEPIME HYDROCHLORIDE 1 G: 1 INJECTION, POWDER, FOR SOLUTION INTRAMUSCULAR; INTRAVENOUS at 06:21

## 2019-04-04 RX ADMIN — FUROSEMIDE 40 MG: 10 INJECTION, SOLUTION INTRAMUSCULAR; INTRAVENOUS at 12:45

## 2019-04-04 RX ADMIN — VANCOMYCIN HYDROCHLORIDE 2000 MG: 10 INJECTION, POWDER, LYOPHILIZED, FOR SOLUTION INTRAVENOUS at 07:00

## 2019-04-04 RX ADMIN — DIVALPROEX SODIUM 750 MG: 500 TABLET, EXTENDED RELEASE ORAL at 10:56

## 2019-04-04 RX ADMIN — FUROSEMIDE 40 MG: 40 TABLET ORAL at 10:57

## 2019-04-04 RX ADMIN — ATORVASTATIN CALCIUM 20 MG: 20 TABLET, FILM COATED ORAL at 20:36

## 2019-04-04 ASSESSMENT — PAIN SCALES - GENERAL
PAINLEVEL_OUTOF10: 0
PAINLEVEL_OUTOF10: 0

## 2019-04-04 NOTE — ED NOTES
Pt requesting to go to BR, attempt to assist pt to BR. Max assist x2 just to assist pt to edge of the bed. Pt unable to get out of bed. Assisted pt with urinal.  MD made aware.   Pt on 1/2 L 02 per NC and will trial without 48 Monie Welsh, RN  04/04/19 [id number]

## 2019-04-04 NOTE — CONSULTS
Clinical Pharmacy Progress Note    Admit date: 4/3/2019     Subjective/Objective:  Pt presented to ED with and  found to have HCAP  Pharmacy is consulted to dose Vancomycin x1 dose in ED per Dr. Christy Hankins    Ht = 72 in  Wt = 172.4 kg      Assessment/Plan:  · Will order Vancomycin 2000 mg IVx1 for administration in ED per ER pharmacy consult. · If Vancomycin is to continue on admission and pharmacy is to manage dosing, please re-consult with admission orders.       Thanks--    [employer], [facility]

## 2019-04-04 NOTE — H&P
Hospital Medicine History & Physical      PCP: Bo Corcoran MD    Date of Admission: 4/3/2019    Date of Service: Pt seen/examined on 4/3/2019 11:58 PM and   Admitted to Inpatient with expected LOS greater than two midnights due to medical therapy. Chief Complaint:  Ifall and hypoglycemia    History Of Present Illness:    47 y.o. male with PMHx significant for morbid obesity, type 2 diabetes mellitus on oral medication, schizoaffective disorder, previous thyroidectomy, previous parathyroidectomy, admitted to the hospital with low blood sugar and fall  Patient has extensive psych history was essentially found on the ground. On arrival his blood sugar was around 50  Patient was intermittently agitated  Was not cooperating with the ED physician  Currently patient is awake alert oriented in time place and person however is a very poor historian  He tends to be extremely tangential and at times outright doesn't make any sense  He states that he had a fall on [address]  Is not able to tell me circumstances of the fall  When asked if his swelling in his lower extremity is increased he only sees yes  He complains of some shortness of breath but no fevers no cough  He denies any chest pain  Only complaining of some pain in his right ankle      Past Medical History:          Diagnosis Date    Bipolar 1 disorder ([address] 1984    Cellulitis     Chest pain     Diabetes mellitus ([address]     Goiter     Hypertension     Hypocalcemia     Hypothyroid     Kidney stone     Pyelonephritis     Sch[facility]ia [facility] [address] [facility]     Sleep apnea        Past Surgical History:          Procedure Laterality Date    THYROIDECTOMY         Medications Prior to Admission:      Prior to Admission medications    Medication Sig Start Date End Date Taking?  Authorizing Provider   aspirin 81 MG EC tablet Take 1 tablet by mouth daily 3/14/19  Yes TIFFANIE Erazo - CNP   divalproex (DEPAKOTE ER) 250 MG extended release tablet Take 3 tablets by mouth 2 times daily 3/14/19  Yes [place], APRN - CNP   metFORMIN (GLUCOPHAGE) 500 MG tablet Take 1 tablet by mouth 2 times daily (with meals) 3/14/19  Yes [place], APRN - CNP   pioglitazone (ACTOS) 30 MG tablet Take 1 tablet by mouth daily 3/14/19  Yes [place], APRN - CNP   atorvastatin (LIPITOR) 20 MG tablet Take 1 tablet by mouth nightly 3/14/19  Yes [place], APRN - CNP   lisinopril (PRINIVIL;ZESTRIL) 20 MG tablet Take 1 tablet by mouth daily 3/14/19  Yes [place], APRN - CNP   haloperidol (HALDOL) 10 MG tablet Take 1 tablet by mouth 2 times daily 3/14/19  Yes [place], APRN - CNP   furosemide (LASIX) 40 MG tablet Take 1 tablet by mouth daily 3/15/19  Yes [place], APRN - CNP   levothyroxine (SYNTHROID) 200 MCG tablet Take 1 tablet by mouth Daily 3/15/19  Yes [place], APRN - CNP   pantoprazole (PROTONIX) 40 MG tablet Take 1 tablet by mouth every morning (before breakfast) 3/15/19  Yes [place], APRN - CNP   haloperidol decanoate (HALDOL DECANOATE) 100 MG/ML injection Inject 2 mLs into the muscle every 30 days Next Dose Due 4/3/2019 4/3/19   [place], APRN - CNP       Allergies:  Carbamazepine; Chlorpromazine; and Thioridazine    Social History:      The patient currently is homeless    TOBACCO:   reports that he has quit smoking. He has never used smokeless tobacco.  ETOH:   reports that he does not drink alcohol. Family History:      Reviewed in detail and negative for DM, CAD, Cancer, CVA. Positive as follows:        Problem Relation Age of Onset    Diabetes Mother     Diabetes Maternal Grandmother        REVIEW OF SYSTEMS:   Pertinent positives as noted in the HPI. All other systems reviewed and negative.     PHYSICAL EXAM:    /60   Pulse 60   Temp 98.4 °F (36.9 °C) (Oral)   Resp 20   Ht 6' 0.05\" (1.83 m)   Wt (!) 365 lb 8.4 oz (165.8 kg)   SpO2 (!) 75% Comment: Pt arrived to floor off O2, placed on 4L  BMI 49.51 kg/m²     General appearance:  No apparent distress, appears stated age and cooperative. HEENT:  Normal cephalic, atraumatic without obvious deformity. Pupils equal, round, and reactive to light. Extra ocular muscles intact. Conjunctivae/corneas clear. Neck: Supple, with full range of motion. No jugular venous distention. Trachea midline. Respiratory: Decreased air entry at lung bases  Cardiovascular:  Regular rate and rhythm with normal S1/S2 without MURMUR, rubs or gallops. Abdomen: Soft, non-tender, non-distended with normal bowel sounds. Musculoskeletal:  3+ pitting edema both lower extremity with erythema  Skin: Skin color, texture, turgor normal.  No rashes or lesions. Neurologic:  Neurovascularly intact without any focal sensory/motor deficits. Cranial nerves: II-XII intact, grossly non-focal.  Psychiatric:  Alert and oriented, thought content appropriate, normal insight  Capillary Refill: Brisk,< 3 seconds   Peripheral Pulses: +2 palpable, equal bilaterally       Labs:     Recent Labs     04/04/19 0201   WBC 8.4   HGB 10.6*   HCT 32.7*        Recent Labs     04/04/19 0201 04/04/19  1059     --    K 5.7* 5.4*     --    CO2 27  --    BUN 23*  --    CREATININE 1.0  --    CALCIUM 9.0  --      No results for input(s): AST, ALT, BILIDIR, BILITOT, ALKPHOS in the last 72 hours. No results for input(s): INR in the last 72 hours. Recent Labs     04/04/19 0201   TROPONINI <0.01       Urinalysis:      Lab Results   Component Value Date    NITRU Negative 02/19/2019    WBCUA 0-2 02/19/2019    BACTERIA Rare 02/19/2019    RBCUA 0-2 02/19/2019    BLOODU LARGE 02/19/2019    SPECGRAV >=1.030 02/19/2019    GLUCOSEU Negative 02/19/2019       Radiology:     CXR: I have reviewed the CXR with the following interpretation:pulm vasc congestion      XR ANKLE LEFT (MIN 3 VIEWS)   Final Result       No fracture or dislocation. Flattening of the tibial plafond and adjacent talus.          Appearance of flattening of the hindfoot although not weightbearing    images. Appearance of soft tissue swelling at the partially imaged dorsal foot. XR CHEST PORTABLE   Final Result   Cardiomegaly. Diffuse airspace opacities throughout both lungs either    represents edema or pneumonia. CT Head WO Contrast   Final Result       No intracranial hemorrhage, mass effect or calvarial fracture. The ventricles are unchanged in size, configuration and remain midline. Confluent areas of bilateral symmetric appearing white matter low    attenuation is not significantly changed. Appearance of the sulci and extra-axial spaces are unchanged. ASSESSMENT/PLAN:    Active Hospital Problems    Diagnosis Date Noted    Acute and chronic respiratory failure with hypoxia (HCC) [J96.21] 04/04/2019       Acute Medical Issues Being Addressed:    21-year-old gentleman admitted with fall    Fall suspect mechanical however unable to get any detailed history to figure out that says this is syncope versus fall  He is moving all 4 extremities denies any pain in any of his joints    Hypoglycemia on background of type 2 diabetes mellitus  Check A1c next line blood Sugar stable  Home oral medications are on hold  Will do sliding scale    Probable heart failure systolic versus diastolic  Will start Lasix 60 IV b.i.d.   Strict intake and output  Monitor electrolytes  Check echocardiogram  Will get EKG and troponins  I do not think he has a pneumonia  Will discontinue antibiotics  Check pro-calcitonin    Morbid obesity with probable underlying LADONNA/OHS  Patient is homeless  Has extensive psychiatric history  Schizoaffective disorder      DVT Prophylaxis: sc lovenox   Diet: DIET CARB CONTROL;  Code Status: Full Code    PT/OT Eval Status: will have eval if appropriate given patient's condition    Dispo - once acute medical process is resolved       Kristine Fishman MD    Thank you Kristine Pereira MD for

## 2019-04-04 NOTE — PROGRESS NOTES
Pt is very lethargic. He does arouse but falls right back to sleep. He does appear to have periods of apnea while sleeping. This RN does not feel comfortable administering any oral medications at this time. Also the patient's K+ is 5.7 and is having frequent PVCs on the monitor. MD made aware.

## 2019-04-04 NOTE — PROGRESS NOTES
Pt is being combative, verbal abusive and swinging fists at staff. He is refusing vital signs and wearing O2. Pt does desaturate to the 70's when not on O2. This RN left room after patient stated, \"Bitch get out of my room\" MD made aware.

## 2019-04-04 NOTE — PROGRESS NOTES
Patient admitted to room 4451 from ED. Oriented to room, call light, and floor policies. Plan of care reviewed with patient/family. Pt is resting in bed sleeping and snoring very loudly. No pain at this time; no s/s of distress noted. Assessment completed; VSS. Tele in place readingSB rhythm with a rate of 58. Pt rates a high fall risk;  bed alarm to be placed on the bed/chair. Safety precautions in place; call light and bedside table within reach. Pt encouraged to call for needs or ambulation. Pt VU. Will continue to monitor.

## 2019-04-04 NOTE — PROGRESS NOTES
RESPIRATORY THERAPY ASSESSMENT    Name:  Manuel Jolly  Medical Record Number:  [id number]  Age: 47 y.o. Gender: male  : 1965  Today's Date:  2019  Room:  3022/1386-62    Assessment     Is the patient being admitted for a COPD or Asthma exacerbation? No   (If yes the patient will be seen every 4 hours for the first 24 hours and then reassessed)    Patient Admission Diagnosis      Allergies  Allergies   Allergen Reactions    Carbamazepine      Per chart review 12    Chlorpromazine      Per chart review 12    Thioridazine      Per chart review 12       Minimum Predicted Vital Capacity:     9579           Actual Vital Capacity:     unable /pt agitated, Bipolar             Pulmonary History: former smoker  Home Oxygen Therapy:  room air  Home Respiratory Therapy:None   Current Respiratory Therapy:  HHN Albuterol Q4WA          Respiratory Severity Index(RSI)   Patients with orders for inhalation medications, oxygen, or any therapeutic treatment modality will be placed on Respiratory Protocol. They will be assessed with the first treatment and at least every 72 hours thereafter. The following severity scale will be used to determine frequency of treatment intervention.     Smoking History: Pulmonary Disease or Smoking History, Greater than 15 pack year = 2    Social History  Social History     Tobacco Use    Smoking status: Former Smoker    Smokeless tobacco: Never Used   Substance Use Topics    Alcohol use: No     Comment: rare    Drug use: No       Recent Surgical History: None = 0  Past Surgical History  Past Surgical History:   Procedure Laterality Date    THYROIDECTOMY         Level of Consciousness: Alert, Follows Commands but Disoriented = 1    Level of Activity: Walking with assistance = 1    Respiratory Pattern: Regular Pattern; RR 8-20 = 0    Breath Sounds: Clear = 0    Sputum   ,  ,    Cough: Strong, spontaneous, non-productive = 0    Vital Signs   /60   Pulse 60

## 2019-04-04 NOTE — ED NOTES
Pt to EMS by squad for left akle pain after fall. sts that \"his legs gave out\" while walking down the sidewalk. Pt also arrives incontinent of large amount of stool and urine and pants completely saturated. EMS also sts that pts blood sugar was 50 upon their arrival. Given oral glucose. Pt cleaned and changed into clean gown upon arrival. Pt difficult to keep awake. Requires constant stimulation to stay awake and answer questions.       Kellen Castellon RN  04/04/19 [id number]

## 2019-04-04 NOTE — PLAN OF CARE
Remains free from falls and accidental injury. Assessed as high fall risk, all precautions in place, utilizing call light appropriately for needs. Will monitor.

## 2019-04-04 NOTE — PROGRESS NOTES
Pt still refusing vital signs and assessment. Was able to obtain O2 saturations at 93% on 2L nasal cannula.

## 2019-04-04 NOTE — ED PROVIDER NOTES
[address] [facility]          ATTENDING PHYSICIAN NOTE       Date of evaluation: 4/3/2019    Chief Complaint     Fall; Diarrhea; and Hypoglycemia      History of Present Illness     Sola Moody is a 47 y.o. male who presents to the emergency department for low blood sugar and a fall. Patient has a history of multiple psychiatric disease and was found to on the ground. Patient did have initial blood sugar by EMS of 50 was given oral glucose. On arrival to the emergency department, the patient was intermittently agitated at other times lethargic. He would not fully cooperate with examination. He answered the majority of my questions was \"I don't fucking know. \"  He does state that his left ankle hurts will not answer about pain in other areas. Review of Systems     Review of Systems   Unable to perform ROS: Psychiatric disorder       Past Medical, Surgical, Family, and Social History     He has a past medical history of Bipolar 1 disorder ([address] Cellulitis, Chest pain, Diabetes mellitus ([address] Goiter, Hypertension, Hypocalcemia, Hypothyroid, Kidney stone, Pyelonephritis, Schizophrenia ([address] and Sleep apnea. He has a past surgical history that includes Thyroidectomy. His family history includes Diabetes in his maternal grandmother and mother. He reports that he has quit smoking. He has never used smokeless tobacco. He reports that he does not drink alcohol or use drugs.     Medications     Previous Medications    ASPIRIN 81 MG EC TABLET    Take 1 tablet by mouth daily    ATORVASTATIN (LIPITOR) 20 MG TABLET    Take 1 tablet by mouth nightly    DIVALPROEX (DEPAKOTE ER) 250 MG EXTENDED RELEASE TABLET    Take 3 tablets by mouth 2 times daily    FUROSEMIDE (LASIX) 40 MG TABLET    Take 1 tablet by mouth daily    HALOPERIDOL (HALDOL) 10 MG TABLET    Take 1 tablet by mouth 2 times daily    HALOPERIDOL DECANOATE (HALDOL DECANOATE) 100 MG/ML INJECTION    Inject 2 mLs into the muscle every 30 days Next Dose Due 4/3/2019    LEVOTHYROXINE (SYNTHROID) 200 MCG TABLET    Take 1 tablet by mouth Daily    LISINOPRIL (PRINIVIL;ZESTRIL) 20 MG TABLET    Take 1 tablet by mouth daily    METFORMIN (GLUCOPHAGE) 500 MG TABLET    Take 1 tablet by mouth 2 times daily (with meals)    PANTOPRAZOLE (PROTONIX) 40 MG TABLET    Take 1 tablet by mouth every morning (before breakfast)    PIOGLITAZONE (ACTOS) 30 MG TABLET    Take 1 tablet by mouth daily       Allergies     He is allergic to carbamazepine; chlorpromazine; and thioridazine. Physical Exam     INITIAL VITALS: BP: (!) 158/88, Temp: 98.4 °F (36.9 °C), Pulse: 72, Resp: 18, SpO2: 90 %    Physical Exam   Constitutional: He is oriented to person, place, and time. No distress. Morbidly obese, disheveled appearing. HENT:   Head: Normocephalic and atraumatic. Mouth/Throat: Oropharynx is clear and moist. No oropharyngeal exudate. Eyes: Pupils are equal, round, and reactive to light. Conjunctivae are normal. No scleral icterus. Neck: Normal range of motion. Neck supple. Cardiovascular: Normal rate, regular rhythm and normal heart sounds. Exam reveals no gallop and no friction rub. No murmur heard. Pulmonary/Chest:   Diminished breath sounds though limited by body habitus and poor inspiratory effort. Abdominal: Soft. There is no tenderness. There is no rebound and no guarding. Musculoskeletal: Normal range of motion. He exhibits edema. Chronic lower extremity edema with chronic venous stasis changes present. Neurological: He is alert and oriented to person, place, and time. No cranial nerve deficit. He exhibits normal muscle tone. Coordination normal.   Skin: Skin is warm and dry. There is erythema. Mild erythema of the bilateral lower extremities with chronic venous stasis changes. Nursing note and vitals reviewed. Diagnostic Results     RADIOLOGY:  XR ANKLE LEFT (MIN 3 VIEWS)   Final Result       No fracture or dislocation. Flattening of the tibial plafond and adjacent talus. Appearance of flattening of the hindfoot although not weightbearing    images. Appearance of soft tissue swelling at the partially imaged dorsal foot. XR CHEST PORTABLE   Final Result   Cardiomegaly. Diffuse airspace opacities throughout both lungs either    represents edema or pneumonia. CT Head WO Contrast   Final Result       No intracranial hemorrhage, mass effect or calvarial fracture. The ventricles are unchanged in size, configuration and remain midline. Confluent areas of bilateral symmetric appearing white matter low    attenuation is not significantly changed. Appearance of the sulci and extra-axial spaces are unchanged.               LABS:   Results for orders placed or performed during the hospital encounter of 04/03/19   CBC auto differential   Result Value Ref Range    WBC 8.4 4.0 - 11.0 K/uL    RBC 3.87 (L) 4.20 - 5.90 M/uL    Hemoglobin 10.6 (L) 13.5 - 17.5 g/dL    Hematocrit 32.7 (L) 40.5 - 52.5 %    MCV 84.7 80.0 - 100.0 fL    MCH 27.3 26.0 - 34.0 pg    MCHC 32.2 31.0 - 36.0 g/dL    RDW 19.7 (H) 12.4 - 15.4 %    Platelets 016 302 - 927 K/uL    MPV 8.2 5.0 - 10.5 fL    Neutrophils % 73.9 %    Lymphocytes % 12.5 %    Monocytes % 12.1 %    Eosinophils % 0.7 %    Basophils % 0.8 %    Neutrophils # 6.2 1.7 - 7.7 K/uL    Lymphocytes # 1.0 1.0 - 5.1 K/uL    Monocytes # 1.0 0.0 - 1.3 K/uL    Eosinophils # 0.1 0.0 - 0.6 K/uL    Basophils # 0.1 0.0 - 0.2 K/uL   Basic Metabolic Panel (EP - 1)   Result Value Ref Range    Sodium 138 136 - 145 mmol/L    Potassium 5.7 (H) 3.5 - 5.1 mmol/L    Chloride 101 99 - 110 mmol/L    CO2 27 21 - 32 mmol/L    Anion Gap 10 3 - 16    Glucose 115 (H) 70 - 99 mg/dL    BUN 23 (H) 7 - 20 mg/dL    CREATININE 1.0 0.9 - 1.3 mg/dL    GFR Non-African American >60 >60    GFR African American >60 >60    Calcium 9.0 8.3 - 10.6 mg/dL   Ethanol   Result Value Ref Range thoughts that his hypoxia was more due to his sleep apnea. He did become more awake but continued to have low oximeter on room air. He was very difficult to try to get up to ambulate so it is felt that he likely needs admission for treatment. Patient was recently admitted to the hospital so will need treatment for healthcare acquired pneumonia. Clinical Impression     1. HCAP (healthcare-associated pneumonia)    2. Lethargy    3. Hypoxia        Disposition     PATIENT REFERRED TO:  No follow-up provider specified.     DISCHARGE MEDICATIONS:  New Prescriptions    No medications on file       [id number] Tye Martinez MD  04/04/19 2849

## 2019-04-04 NOTE — ED NOTES
Pt 02 sats with 02 80's, 02 1L NC applied.   Will continue to monitor     Rekha العلي RN  04/04/19 2545

## 2019-04-05 ENCOUNTER — APPOINTMENT (OUTPATIENT)
Dept: VASCULAR LAB | Age: 54
DRG: 420 | End: 2019-04-05
Payer: MEDICAID

## 2019-04-05 LAB
ALBUMIN SERPL-MCNC: 3.4 G/DL (ref 3.4–5)
ALP BLD-CCNC: 73 U/L (ref 40–129)
ALT SERPL-CCNC: 16 U/L (ref 10–40)
ANION GAP SERPL CALCULATED.3IONS-SCNC: 12 MMOL/L (ref 3–16)
AST SERPL-CCNC: 26 U/L (ref 15–37)
BASOPHILS ABSOLUTE: 0 K/UL (ref 0–0.2)
BASOPHILS RELATIVE PERCENT: 0.4 %
BILIRUB SERPL-MCNC: <0.2 MG/DL (ref 0–1)
BILIRUBIN DIRECT: <0.2 MG/DL (ref 0–0.3)
BILIRUBIN, INDIRECT: NORMAL MG/DL (ref 0–1)
BUN BLDV-MCNC: 22 MG/DL (ref 7–20)
CALCIUM SERPL-MCNC: 8.9 MG/DL (ref 8.3–10.6)
CHLORIDE BLD-SCNC: 106 MMOL/L (ref 99–110)
CO2: 26 MMOL/L (ref 21–32)
CREAT SERPL-MCNC: 1.4 MG/DL (ref 0.9–1.3)
EOSINOPHILS ABSOLUTE: 0.2 K/UL (ref 0–0.6)
EOSINOPHILS RELATIVE PERCENT: 4 %
GFR AFRICAN AMERICAN: >60
GFR NON-AFRICAN AMERICAN: 53
GLUCOSE BLD-MCNC: 106 MG/DL (ref 70–99)
GLUCOSE BLD-MCNC: 150 MG/DL (ref 70–99)
GLUCOSE BLD-MCNC: 240 MG/DL (ref 70–99)
GLUCOSE BLD-MCNC: 97 MG/DL (ref 70–99)
HCT VFR BLD CALC: 30.9 % (ref 40.5–52.5)
HEMOGLOBIN: 10 G/DL (ref 13.5–17.5)
LYMPHOCYTES ABSOLUTE: 1.1 K/UL (ref 1–5.1)
LYMPHOCYTES RELATIVE PERCENT: 22.4 %
MAGNESIUM: 1.7 MG/DL (ref 1.8–2.4)
MCH RBC QN AUTO: 27.6 PG (ref 26–34)
MCHC RBC AUTO-ENTMCNC: 32.5 G/DL (ref 31–36)
MCV RBC AUTO: 85 FL (ref 80–100)
MONOCYTES ABSOLUTE: 0.5 K/UL (ref 0–1.3)
MONOCYTES RELATIVE PERCENT: 10.8 %
NEUTROPHILS ABSOLUTE: 3.1 K/UL (ref 1.7–7.7)
NEUTROPHILS RELATIVE PERCENT: 62.4 %
PDW BLD-RTO: 19.5 % (ref 12.4–15.4)
PERFORMED ON: ABNORMAL
PLATELET # BLD: 235 K/UL (ref 135–450)
PMV BLD AUTO: 7.8 FL (ref 5–10.5)
POTASSIUM REFLEX MAGNESIUM: 5 MMOL/L (ref 3.5–5.1)
POTASSIUM SERPL-SCNC: 5 MMOL/L (ref 3.5–5.1)
RBC # BLD: 3.63 M/UL (ref 4.2–5.9)
SODIUM BLD-SCNC: 144 MMOL/L (ref 136–145)
TOTAL PROTEIN: 7.5 G/DL (ref 6.4–8.2)
VALPROIC ACID LEVEL: 10.8 UG/ML (ref 50–100)
WBC # BLD: 5 K/UL (ref 4–11)

## 2019-04-05 PROCEDURE — 83735 ASSAY OF MAGNESIUM: CPT

## 2019-04-05 PROCEDURE — 80164 ASSAY DIPROPYLACETIC ACD TOT: CPT

## 2019-04-05 PROCEDURE — 97535 SELF CARE MNGMENT TRAINING: CPT

## 2019-04-05 PROCEDURE — 85025 COMPLETE CBC W/AUTO DIFF WBC: CPT

## 2019-04-05 PROCEDURE — 97166 OT EVAL MOD COMPLEX 45 MIN: CPT

## 2019-04-05 PROCEDURE — 6370000000 HC RX 637 (ALT 250 FOR IP): Performed by: INTERNAL MEDICINE

## 2019-04-05 PROCEDURE — 80076 HEPATIC FUNCTION PANEL: CPT

## 2019-04-05 PROCEDURE — G0378 HOSPITAL OBSERVATION PER HR: HCPCS

## 2019-04-05 PROCEDURE — 2580000003 HC RX 258: Performed by: INTERNAL MEDICINE

## 2019-04-05 PROCEDURE — 80048 BASIC METABOLIC PNL TOTAL CA: CPT

## 2019-04-05 PROCEDURE — 36415 COLL VENOUS BLD VENIPUNCTURE: CPT

## 2019-04-05 PROCEDURE — 1200000000 HC SEMI PRIVATE

## 2019-04-05 PROCEDURE — 96372 THER/PROPH/DIAG INJ SC/IM: CPT

## 2019-04-05 PROCEDURE — 6360000002 HC RX W HCPCS: Performed by: INTERNAL MEDICINE

## 2019-04-05 PROCEDURE — 97530 THERAPEUTIC ACTIVITIES: CPT

## 2019-04-05 PROCEDURE — 6370000000 HC RX 637 (ALT 250 FOR IP): Performed by: PSYCHIATRY & NEUROLOGY

## 2019-04-05 PROCEDURE — 97116 GAIT TRAINING THERAPY: CPT

## 2019-04-05 PROCEDURE — 99254 IP/OBS CNSLTJ NEW/EST MOD 60: CPT | Performed by: PSYCHIATRY & NEUROLOGY

## 2019-04-05 PROCEDURE — 97162 PT EVAL MOD COMPLEX 30 MIN: CPT

## 2019-04-05 PROCEDURE — 93971 EXTREMITY STUDY: CPT

## 2019-04-05 RX ORDER — MAGNESIUM SULFATE 1 G/100ML
1 INJECTION INTRAVENOUS ONCE
Status: DISCONTINUED | OUTPATIENT
Start: 2019-04-05 | End: 2019-04-08 | Stop reason: HOSPADM

## 2019-04-05 RX ORDER — DIVALPROEX SODIUM 250 MG/1
250 TABLET, EXTENDED RELEASE ORAL DAILY
Status: DISCONTINUED | OUTPATIENT
Start: 2019-04-05 | End: 2019-04-08 | Stop reason: HOSPADM

## 2019-04-05 RX ORDER — DIVALPROEX SODIUM 500 MG/1
500 TABLET, EXTENDED RELEASE ORAL NIGHTLY
Status: DISCONTINUED | OUTPATIENT
Start: 2019-04-05 | End: 2019-04-08 | Stop reason: HOSPADM

## 2019-04-05 RX ADMIN — DIVALPROEX SODIUM 500 MG: 500 TABLET, EXTENDED RELEASE ORAL at 20:17

## 2019-04-05 RX ADMIN — Medication 10 ML: at 20:24

## 2019-04-05 RX ADMIN — ENOXAPARIN SODIUM 30 MG: 30 INJECTION SUBCUTANEOUS at 20:19

## 2019-04-05 RX ADMIN — HALOPERIDOL 10 MG: 5 TABLET ORAL at 20:20

## 2019-04-05 RX ADMIN — LOPERAMIDE HYDROCHLORIDE 2 MG: 2 CAPSULE ORAL at 00:51

## 2019-04-05 RX ADMIN — PANTOPRAZOLE SODIUM 40 MG: 40 TABLET, DELAYED RELEASE ORAL at 05:25

## 2019-04-05 RX ADMIN — ATORVASTATIN CALCIUM 20 MG: 20 TABLET, FILM COATED ORAL at 20:17

## 2019-04-05 RX ADMIN — LEVOTHYROXINE SODIUM 200 MCG: 100 TABLET ORAL at 05:25

## 2019-04-05 ASSESSMENT — ENCOUNTER SYMPTOMS
COUGH: 0
VOMITING: 0
NAUSEA: 0

## 2019-04-05 ASSESSMENT — PAIN SCALES - GENERAL
PAINLEVEL_OUTOF10: 0

## 2019-04-05 NOTE — PROGRESS NOTES
Physical Therapy    Facility/Department: [place] [address] [facility]  Initial Assessment and treatment    NAME: Catherine Bedolla  : 1965  MRN: [id number]    Date of Service: 2019    Discharge Recommendations:    Catherine Bedolla scored a 14/24 on the AM-PAC short mobility form. Current research shows that an AM-PAC score of 17 or less is typically not associated with a discharge to the patient's home setting. Based on the patients AM-PAC score and their current functional mobility deficits, it is recommended that the patient have 3-5 sessions per week of Physical Therapy at d/c to increase the patients independence. PT Equipment Recommendations  Equipment Needed: No(defer to next level of care)    Assessment   Body structures, Functions, Activity limitations: Decreased functional mobility ; Decreased cognition;Decreased endurance;Decreased strength;Decreased balance;Decreased safe awareness; Increased Pain  Assessment: Patient tolerated session well, transferring to EOB and over to bedside chair with assistance as above and use of FWW. Patient requires increased time to complete all tasks as movements are effortful and patient frequently distracted by verbal conversation. He needs continuous verbal cues for re-direction and attention to task. Patient is a questionable historian although it does appear that he was independent with ADLs prior to admission. Patient currently functioning well below baseline level. Recommend continued skilled PT upon discharge. Treatment Diagnosis: impaired mobility associated with respiratory failure with hypoxia  Prognosis: Good  Decision Making: Medium Complexity  Patient Education: Educated on role of PT, safety with mobility, d/c recommendations; patient verbalized understanding. REQUIRES PT FOLLOW UP: Yes  Activity Tolerance  Activity Tolerance: Patient Tolerated treatment well;Patient limited by endurance; Patient limited by pain  Activity Tolerance: patient reporting B knee pain and left ankle pain with mobility       Patient Diagnosis(es): The primary encounter diagnosis was HCAP (healthcare-associated pneumonia). Diagnoses of Lethargy and Hypoxia were also pertinent to this visit. has a past medical history of Bipolar 1 disorder ([address] Cellulitis, Chest pain, Diabetes mellitus ([address] Goiter, Hypertension, Hypocalcemia, Hypothyroid, Kidney stone, Pyelonephritis, Schizophrenia ([address] and Sleep apnea. has a past surgical history that includes Thyroidectomy. Restrictions  Position Activity Restriction  Other position/activity restrictions: up with assistance  Vision/Hearing  Vision: Within Functional Limits  Hearing: Within functional limits     Subjective  General  Chart Reviewed: Yes  Patient assessed for rehabilitation services?: Yes  Additional Pertinent Hx: Patient is a 46 y/o male admitted 4/3 with hypoglycemia and a fall. CT head, x-ray left ankle and chest x-ray (-) for acute findings. PMH significant for bipolar 1 disorder, cellulitis, chest pain, diabetes, HTN, schizophrenia, sleep apnea. Response To Previous Treatment: Not applicable  Family / Caregiver Present: No  Referring Practitioner: Boris Arroyo MD  Referral Date : 04/04/19  Diagnosis: acute and chronic respiratory failure with hypoxia  Follows Commands: Within Functional Limits  Other (Comment): patient needs increased verbal and tactile cues, frequent cues for redirection, speaks tangentially   General Comment  Comments: Patient supine in bed upon arrival.  Subjective  Subjective: Patient agreeable to PT evaluation.    Pain Screening  Patient Currently in Pain: Yes(reports pain in B knees and left ankle with mobility, not rated, RN aware)  Vital Signs  Patient Currently in Pain: Yes(reports pain in B knees and left ankle with mobility, not rated, RN aware)       Orientation  Orientation  Overall Orientation Status: Within Functional Limits  Social/Functional History  Social/Functional History  Lives With: Other (comment)(brother)  Type of Home: House  Home Layout: One level  Home Access: Stairs to enter without rails  Entrance Stairs - Number of Steps: 2  Bathroom Shower/Tub: Tub/Shower unit  Bathroom Toilet: Standard  Bathroom Equipment: (none)  Home Equipment: (reports brother has cane and walker that he doesn't use; unsure if patient would be able to use equipment)  ADL Assistance: [facility] [address]: Independent  Homemaking Responsibilities: Yes  Ambulation Assistance: Independent  Transfer Assistance: Independent  Active : Yes  Additional Comments: patient had fall at home leading to hospital admission (reports pain in B knees/legs that led to fall)  Cognition   Cognition  Overall Cognitive Status: Exceptions  Arousal/Alertness: Appropriate responses to stimuli  Following Commands: Follows one step commands with repetition; Follows one step commands with increased time  Attention Span: Attends with cues to redirect; Difficulty dividing attention  Memory: Appears intact  Safety Judgement: Decreased awareness of need for safety  Insights: Decreased awareness of deficits  Initiation: Requires cues for some  Sequencing: Requires cues for some    Objective          AROM RLE (degrees)  RLE AROM: WFL  AROM LLE (degrees)  LLE AROM : WFL  Strength RLE  Strength RLE: Exception  Comment: not formally assessed, appears grossly weakened with functional mobility  Strength LLE  Strength LLE: Exception  Comment: not formally assessed, appears grossly weakened with functional mobility        Bed mobility  Supine to Sit: Stand by assistance(increased time to complete tasks, movement effortful, head of bed elevated, use of bedrail)  Scooting: Stand by assistance(to scoot toward edge of bed, verbal cues for safety)  Comment: patient sitting in bedside chair at end of session  Transfers  Sit to Stand:  Moderate Assistance(from edge of raised bed, verbal cues for UE placement, movement effortful)  Stand to sit: Moderate Assistance(verbal cues for safety and reach back prior to sitting)  Bed to Chair: Contact guard assistance(CGA x 2 to take steps with FWW)  Ambulation  Ambulation?: Yes  Ambulation 1  Surface: level tile  Device: Rolling Walker(bariatric)  Assistance: Contact guard assistance  Quality of Gait: significantly decreased step length/height bilaterally with shuffling steps, gait fairly steady with no overt loss of balance noted but movement effortful  Distance: 6-7 steps from edge of bed to bedside chair  Comments: frequent verbal cues for safe use of walker  Stairs/Curb  Stairs?: No     Balance  Sitting - Static: Fair;+(supervision to sit EOB)  Standing - Static: Fair(CGA with UE support)  Standing - Dynamic: Fair(CGA to take steps with FWW to bedside chair)        Plan   Plan  Times per week: 2-5  Current Treatment Recommendations: Strengthening, Gait Training, Patient/Caregiver Education & Training, Balance Training, Functional Mobility Training, Endurance Training, Transfer Training, Safety Education & Training  Safety Devices  Type of devices: Telesitter in use, Call light within reach, Chair alarm in place, Nurse notified, Left in chair      OutComes Score                                                  AM-PAC Score  AM-PAC Inpatient Mobility Raw Score : 14  AM-PAC Inpatient T-Scale Score : 38.1  Mobility Inpatient CMS 0-100% Score: 61.29  Mobility Inpatient CMS G-Code Modifier : CL          Goals  Short term goals  Time Frame for Short term goals: discharge  Short term goal 1: patient will perform bed mobility with supervision  Short term goal 2: patient will perform transfers sit<>stand with SBA  Short term goal 3: patient will ambulate 21' with FWW and SBA  Patient Goals   Patient goals : none stated       Therapy Time   Individual Concurrent Group Co-treatment   Time In 0814         Time Out 0857         Minutes 43         Timed Code Treatment Minutes: 28 Minutes    Timed Code Treatment Minutes: 28 Minutes    Total Treatment Minutes:    15 minutes evaluation + 28 minutes treatment = 43 total treatment minutes    If patient is discharged from the hospital prior to next treatment session, this note will serve as the discharge summary.      Tara CHRISTENSEN [facility].

## 2019-04-05 NOTE — PROGRESS NOTES
fever.   Respiratory: Negative for cough. Cardiovascular: Positive for leg swelling. Gastrointestinal: Negative for nausea and vomiting. Neurological: Negative for headaches. Psychiatric/Behavioral: Positive for agitation. Physical Exam   Constitutional: He is oriented to person, place, and time. He appears well-developed and well-nourished. Morbidly obese   HENT:   Head: Normocephalic. Eyes: Pupils are equal, round, and reactive to light. Neck: Normal range of motion. Cardiovascular: Normal rate, regular rhythm and normal heart sounds. Pulmonary/Chest: Effort normal and breath sounds normal. No respiratory distress. Abdominal: Soft. Bowel sounds are normal.   Musculoskeletal: Normal range of motion. He exhibits edema. Neurological: He is alert and oriented to person, place, and time. No cranial nerve deficit. Skin: Skin is warm and dry. Psychiatric: His affect is inappropriate. His speech is tangential. He is agitated. Thought content is delusional. He expresses impulsivity and inappropriate judgment. DATA:       Labs:  CBC:   Recent Labs     04/04/19 0201 04/05/19  0433   WBC 8.4 5.0   HGB 10.6* 10.0*   HCT 32.7* 30.9*    235       BMP:   Recent Labs     04/04/19  0201 04/04/19  1059 04/05/19  0433     --  144   K 5.7* 5.4* 5.0  5.0     --  106   CO2 27  --  26   BUN 23*  --  22*   CREATININE 1.0  --  1.4*   GLUCOSE 115*  --  97     LFT's: No results for input(s): AST, ALT, ALB, BILITOT, ALKPHOS in the last 72 hours. Troponin:   Recent Labs     04/04/19  0201 04/04/19  1059   TROPONINI <0.01 <0.01     BNP: No results for input(s): BNP in the last 72 hours. ABGs: No results for input(s): PHART, TDY0GPD, PO2ART in the last 72 hours. INR: No results for input(s): INR in the last 72 hours. U/A:  Recent Labs     04/04/19  1550   PHUR 5.0       XR ANKLE LEFT (MIN 3 VIEWS)   Final Result       No fracture or dislocation.          Flattening of the tibial plafond and adjacent talus. Appearance of flattening of the hindfoot although not weightbearing    images. Appearance of soft tissue swelling at the partially imaged dorsal foot. XR CHEST PORTABLE   Final Result   Cardiomegaly. Diffuse airspace opacities throughout both lungs either    represents edema or pneumonia. CT Head WO Contrast   Final Result       No intracranial hemorrhage, mass effect or calvarial fracture. The ventricles are unchanged in size, configuration and remain midline. Confluent areas of bilateral symmetric appearing white matter low    attenuation is not significantly changed. Appearance of the sulci and extra-axial spaces are unchanged. ASSESSMENT AND PLAN:   47year old male admitted for fall secondary to hypoglycemia    Mechanical fall-Patient unable to explain the etiology. -Moves all four extremities spontaneously    Hypothyroidism  -Elevated TSH, low T4.   -resumed on home synthroid. Most likely was not taking his home dose. Hypoglycemia-In setting of Type 2 DM (resolved)  -On sliding scale  -Hypoglycemia protocol  -A1c ordered    Heart failure   -On lasix 60mg IV BID-Discontinued in setting of worsened creatinine.   -Ins and outs. 2.7L  -electrolytes monitor  -ECHO ordered  -EKG trops negative. EKG showed LVH  -Ordered lower extremity dopplers for lower extremity leg swelling    Hyperkalemia-resolved  -Resolved with lasix    Morbid obesity probably has LADONNA/OHS  -Homeless  -psychiatry history    Schizoaffective disorder  -Psych consulted   -On depakote 250 and 500 am and nightly  -haldol 10mg BID  -IM haldol if needed for agitation.  Monitor QTc        Will discuss with attending physician Dr. Massiel Dobbs: Carb control  PPX: Lovnox  DISPO: Madonna Barron MD, PGY-2  4/5/2019,  9:11 AM

## 2019-04-05 NOTE — PROGRESS NOTES
Patient became agitated and refused study before Echo tech was able to perform the study. Cardiology manager is aware of situation and took patient back to their room. Called RN and she is aware of the situation.

## 2019-04-05 NOTE — CARE COORDINATION
CM received call from [facility] at [facility], they are able to accept the patient for admission for SNF. They are starting pre-cert today 4/5, pre-cert will not be obtained until likely Monday or Tuesday at this point. CM will continue to follow and will follow up with Nichole in admissions ([phone]) on Monday to check pre-cert status. Patient will likely need updated therapy notes on Monday for pre-cert purposes. CM will complete HENS prior to discharge.     Thank you,  Latoya Mckeon RN,   4th Floor Progressive Care Unit  [phone]

## 2019-04-05 NOTE — CONSULTS
[facility]    [facility]              [phone]                       Plan :       As below     Assessment :       Acute elevation of creatinine: It is likely from diuresis. He did not receive any nephrotoxins and has no other inciting events. I agree with holding diuretics and lisinopril for today. No need to give IV fluid. I expect creatinine to slowly improve back to baseline. Hypertension: Blood pressure is well controlled. No need to make any changes. Hyperlipidemia: Continue with moderate intensity Lipitor. No further workup for acute elevation of creatinine is required. If kidney function does not improve we will do ultrasound of the kidney. Compliance with medication was stressed. [facility] would like to thank Autumn Arnold MD   for opportunity to serve this patient      Please call with questions at-   24 Hrs Answering service [phone] or  7 am- 5 pm via Perfect serve or cell phone  [address] [address]          CC/reason for consult :     Acute kidney injury. HPI :     Teresa Lopez is a 47 y.o. male presented to   the hospital on 4/3/2019 with  hypoglycemia. He has past medical history of bipolar disorder, diabetes, morbid obesity, schizophrenia, obstructive sleep apnea, hypertension and goiter. He presented with a fall. He was found to have a blood sugar of 50. It is worth mentioning patient is not giving any reliable history and has labile mood. Most of the history was obtained by reviewing the chart and speaking to the staff. He was started on Lasix IV b.i.d. since yesterday for fluid overload. His insulin was adjusted for hypoglycemia. Creatinine increased from 1.0-1.4. He diuresed about 3.2 L with stable blood pressure. He did not receive IV contrast.    He had intermittent elevated creatinine in the past as well probably as a result of diuresis.      Interval History:     He is agitated and refusing most of Mass- no, appears symmetrical, JVD- not visible  Respiratory: Respiratory effort-  OK, wheeze- no, crackles - no  Cardiovascular:  Ausculation- No M/R/G, Edema +  Abdomen: visible mass- no, distention- no, scar- no, tenderness- no                            hepatosplenomegaly-  no  Musculoskeletal:  clubbing no,cyanosis- no , digital ischemia- no                           muscle strength- grossly normal , tone - grossly normal  Skin: rashes- no , ulcers- no, induration- no, tightening - no  Psychiatric:  Not evaluated   LABS:     Recent Labs     04/04/19 0201 04/05/19  0433   WBC 8.4 5.0   HGB 10.6* 10.0*   HCT 32.7* 30.9*    235     Recent Labs     04/04/19 0201 04/04/19  1059 04/05/19  0433     --  144   K 5.7* 5.4* 5.0  5.0     --  106   CO2 27  --  26   BUN 23*  --  22*   CREATININE 1.0  --  1.4*   GLUCOSE 115*  --  97   MG  --   --  1.70*      Nephrology  [address], [address]  Office: [phone]  Cell: [phone]  Fax: [phone]

## 2019-04-05 NOTE — CARE COORDINATION
2019  [place] [address] Case Management Department    Patient: Julian Mcnally  MRN: [id number] / : 1965  ACCT: [de-identified]          Admission Documentation  Attending Provider: Gustavo Maya MD  Admit date/time: 4/3/2019 11:58 PM  Status: Inpatient [101]  Diagnosis: Acute and chronic respiratory failure with hypoxia ([address]     Readmission within last 30 days:  yes     Living Situation  Discharge Planning  Living Arrangements: Other (Comment)(HOMELESS)  Support Systems: Spouse/Significant Other(Pt states he has a girlfriend )  Potential Assistance Needed: [place], Outpatient PT/OT, Prescription Assistance  Type of Home Care Services: None  Patient expects to be discharged to[de-identified] Pt doesn't know    Service Assessment    Values / Beliefs  Do you have any ethnic, cultural, sacramental, or spiritual Baptism needs you would like us to be aware of while you are in the hospital?: No    Advance Directives (For Healthcare)  Healthcare Directive: No, patient does not have an advance directive for healthcare treatment    [address] [place] [address] home placement and skilled nursing facility     Patient was staying at the [facility] prior to admission, per patient he is not allowed to return there at discharge. Patient is homeless and is active with [facility] for CM services and medication management. Patient has not been compliant with medications and was cleared by psychiatry this admission. Patient not safe to return to a shelter and would benefit from skilled therapy to improve mobility. Patient was agreeable to SNF placement and could benefit from long term care placement for continued medication management and stability. Patient did have a legal guardian up until  when he was deemed competent to make his own decisions. LALO called [facility] and left voicemail requesting assistance with housing or discharge planning placement, no return call at this time.  Attempted to call LALO [place] with [facility] at number listed and it is a non-working number. Durable Medical Equipment   cane and walker    Home Health/Skilled Nursing  Services at Discharge: Other SNF vs long term placement with appropriate psychiatric follow up  Home care at home? No Provider: n/a Provider Phone: [address]  Patient manages home medical care prior to admission. Pharmacy: [facility]  Potential Assistance Purchasing Medications:  Yes  Does patient want to participate in local refill/meds to beds program?: Yes     Meds To Beds General Rules:  1. Can ONLY be done Monday- Friday between 8:30am-5pm  2. Prescription(s) must be in pharmacy by 3pm to be filled same day  3. Copy of patient's insurance/ prescription drug card and patient face sheet must be sent along with the prescription(s)  4. Cost of Rx cannot be added to hospital bill. If financial assistance is needed, please contact unit  or ;  or  CANNOT provide pharmacy voucher for patients co-pays  5. Patients can then  the prescription on their way out of the hospital at discharge, or pharmacy can deliver to the bedside if staff is available. (payment due at time of pick-up or delivery - cash, check, or card accepted)     Goals of Care  Patient expects to be discharged to[de-identified] Pt doesn't know  Patient plans for SNF: YES, patient agreeable to SNF placement for rehab. Patient would benefit from long term care placement for continued medication management and stability. Patient is estranged from his family and has no guardian per court records since 2002 when he was deemed competent. Patient has CM with [facility], per patient it is Fang Hernandez, the number provided is not a working number. CM did call and left voicemail with Severo Heaps, CM at [facility] requesting return call to assist with discharge placement of patient at discharge, as per patient his CM is working on housing for him but has not been successful in obtaining.  Patient is 2 person assist

## 2019-04-05 NOTE — PROGRESS NOTES
Pt was down in cardiology receiving echocardiogram but before the procedure pt started refusing and verbally abused the staff per echo tech. Pt was transported back to the floor.

## 2019-04-05 NOTE — PROGRESS NOTES
Vascular technician came to the room to attempt to do doppler study on patients lower extremities. Half way through the procedure, pt would not allow the tech to finish. Pt educated on importance of testing while in the hospital. Pt yelled at this this RN and the vascular tech. Test unable to be completely finished.

## 2019-04-05 NOTE — CONSULTS
PSYCHIATRY CONSULT, INITIAL EVALUATION    Nicolette Filter [id number]     Date/time of admission: 4/3/2019 11:58 PM    CC/Reason for Consult: agitation    HPI: 47 y.o. male with h/o schizoaffective disorder who is admitted to medicine for fall. Pt had a fall and was found to be hypoglycemic. Psych consulted for agitation. Pt has been difficult with staff: verbally aggressive, refusing care at times. Spoke to pt at bedside who says his primary problem is housing. Says he has been trying to get his CM at [facility] to get him housing but is having difficulty. He had been staying at the [facility] but apparently they won't allow him back. He says he has been taking his medication outside the hospital but he has refused a few doses here, stating he doesn't like taking haldol and he thought the Depakote dose was higher than it should be. He denies SI/HI, AVH, paranoia. Says his boyfriend is a  who wants to go on a date with him (I believe this is a chronic delusion of his if I remember correctly). He denies depressed mood. Anxiety manageable. Able to sleep when he has somewhere to rest.     context: medical hospitalization  severity: moderate  location: AMS / mood disturbance  associated symptoms: see above  modifiers: course of illness, stressors, non-adherence to meds  duration: chronic    History obtained from pt and chart. Past Psychiatric History:   Past Diagnosis: Schizoaffective Disorder  Past Suicide Attempts: none documented  Past Violence: hx aggression when decompensated  Previous Admits: [facility], [facility] 2018.  [facility] March 2019  Outpatient Services: [address] [place] [address], [address] [place] [address] [id number] [phone]  Past Medication Trials: Haldol, [place] [facility], Depakote  Family Hx Psychiatric: unknown      Substance Use History:   Nicotine:    Alcohol: none   Illicits:  none   Caffeine:    Past Medical History:   Past Medical History:   Diagnosis Date    Bipolar 1 disorder ([facility] [address] 1984    Cellulitis     Chest decanoate (HALDOL DECANOATE) 100 MG/ML injection Inject 2 mLs into the muscle every 30 days Next Dose Due 4/3/2019         Current Medications ordered:  Scheduled Meds:   aspirin  81 mg Oral Daily    atorvastatin  20 mg Oral Nightly    divalproex  750 mg Oral BID    haloperidol  10 mg Oral BID    levothyroxine  200 mcg Oral Daily    lisinopril  20 mg Oral Daily    pantoprazole  40 mg Oral QAM AC    sodium chloride flush  10 mL Intravenous 2 times per day    insulin lispro  0-6 Units Subcutaneous TID WC    insulin lispro  0-3 Units Subcutaneous Nightly    furosemide  60 mg Intravenous BID    enoxaparin  30 mg Subcutaneous BID     Continuous Infusions:   dextrose       PRN Meds:.sodium chloride flush, magnesium hydroxide, ondansetron, glucose, dextrose, glucagon (rDNA), dextrose, acetaminophen, albuterol, haloperidol lactate, loperamide    ROS: Denies trouble with fever, rash, headache, vision changes, chest pain, shortness of breath, nausea, extremity pain, weakness, dysuria. OBJECTIVE:  Vitals: [place]ster Vitals:    04/05/19 0049 04/05/19 0200 04/05/19 0515 04/05/19 0600   BP: (!) 113/48  (!) 117/57    Pulse: 61 65 70 75   Resp: 20  16    Temp: 98.7 °F (37.1 °C)  98.8 °F (37.1 °C)    TempSrc: Oral  Oral    SpO2: 91%  92%    Weight:   (!) 362 lb 3.5 oz (164.3 kg)    Height:         Body mass index is 49.06 kg/m².     Mental Status Exam:   Appearance: appears stated age, fair eye contact, obese, malodorous  Behavior/Movement/Muscle Tone: no PMR/PMA, no abnormal movements appreciated, anti-gravity  Gait/station: not tested  Speech:tone, prosody, volume, rate, production wnl, nonpressured but mumbled at times  Mood/Affect: irritable, labile  Thought Process: goal directed mostly, at times disorganized  Thought Content: Denies SI/HI, erotomanic delusions, no AH/VH and not RTIS  Orientation: alert, oriented to person place and situation  Fund Of Knowledge: BENY  Memory:grossly intact to recent and remote content discussed  Insight/Judgment: fair/fair    Labs:   Complete Blood Count:  Recent Labs     04/04/19  0201 04/05/19  0433   WBC 8.4 5.0   HGB 10.6* 10.0*   HCT 32.7* 30.9*   MCV 84.7 85.0    235       Basic Metabolic Panel:  Recent Labs     04/04/19  0201 04/04/19  1059 04/05/19  0433     --  144   K 5.7* 5.4* 5.0     --  106   CO2 27  --  26   BUN 23*  --  22*   MG  --   --  1.70*       Hepatic Panel:  No results for input(s): AST, ALT, ALBUMIN, TOTALPROTIEN in the last 72 hours. Invalid input(s): BILT, BILD     Lab Results   Component Value Date    COLORU Yellow 02/19/2019    NITRU Negative 02/19/2019    GLUCOSEU Negative 02/19/2019    KETUA Negative 02/19/2019    UROBILINOGEN 0.2 02/19/2019    BILIRUBINUR Negative 02/19/2019         Imaging: [facility] 4/4/19   No intracranial hemorrhage, mass effect or calvarial fracture.         The ventricles are unchanged in size, configuration and remain midline.         Confluent areas of bilateral symmetric appearing white matter low    attenuation is not significantly changed.         Appearance of the sulci and extra-axial spaces are unchanged. Axis I  Schizoaffective Disorder    Axis II: No diagnosis     Axis III       Diagnosis Date    Bipolar 1 disorder ([facility] [address] 1984    Cellulitis     Chest pain     Diabetes mellitus ([facility] [address] [facility]     Goiter     Hypertension     Hypocalcemia     Hypothyroid     Kidney stone     Pyelonephritis     Schizophrenia ([facility] [address]     Sleep apnea       Active Problems:    Acute and chronic respiratory failure with hypoxia (HCC)  Resolved Problems:    * No resolved hospital problems. *       Axis IV  Problems related to the social environment and Housing problems      ASSESSMENT AND RECOMMENDATIONS: 47 y.o. male admitted to medicine service on 4/3/2019 for fall    1. Psych: this patient is known to me from prior admissions.  He does appear to be at or near his baseline at this time, which does include irritability and difficulty with staff as well as some delusions. No Si/HI, AVH and no evidence of clear inability to care for himself, though it does appear he sometimes chooses not to follow instructions of care providers. The primary problem for him at this time is housing. I would recommend asking social work for their help with coordinating with his [facility]  and see what they are doing to help him with housing. I will also decrease his Depakote to 250mg qAM and 500mg qHS as he refuses to take any more than this. He may still refuse to take it.   -Check VPA level, LFT, ammonia  -For agitation requiring emergency medication when he is refusing PO, recommend Haldol IM 10mg BID PRN and check EKG for QTc prolongation. This should only be used if he is unable to be verbally de-escalated and if posing a physical threat to others.   -Does not meet criteria for inpatient psychiatry or hold. Appropriate for outpatient level of care. -F/u with  [facility]    2. Agitation recs: Patient is high risk for agitation, in the event of which we would recommend considering the following:   -recommend 1:1 observation: No   -prn med options: see above    3. Delirium: Patient is high risk for delirium.   -Nonpharmacologic evidence-based delirium precautions include the following: frequently reorient, shades up during day/down at night, TV off except for soft music only, have familiar objects at bedside, encourage friends/family to spend the night, minimize anticholingeric medications, minimize polypharmacy, minimize restraints (includes lines and/or foleys and/or feeding tubes as able) and minimize overnight checks/vitals to encourage restful consistent sleep patterns    4. Substance Abuse/Dep/Withdrawal: no active issues    5.  Safety: does not require admission to inpatient psychiatry     Risk Assessment: The patient has the following risk factors for violence to self/others: male, history of dangerousness to others, mood disorder, psychosis, chronic medical illness. Protective factors include: positive social support, future orientation, sobriety. Overall the risk is moderate for future dangerousness. However there is no imminent risk of dangerousness as the patient is now: sober, future oriented, has outpatient psychiatric follow-up referral, and has social support. Thank you for this consult, please call us with any further questions. I will not continue to follow at this time.     Rudi Newell   Psychiatrist

## 2019-04-06 LAB
ALBUMIN SERPL-MCNC: 3.3 G/DL (ref 3.4–5)
ANION GAP SERPL CALCULATED.3IONS-SCNC: 7 MMOL/L (ref 3–16)
BUN BLDV-MCNC: 19 MG/DL (ref 7–20)
CALCIUM SERPL-MCNC: 8.9 MG/DL (ref 8.3–10.6)
CHLORIDE BLD-SCNC: 103 MMOL/L (ref 99–110)
CO2: 32 MMOL/L (ref 21–32)
CREAT SERPL-MCNC: 1.1 MG/DL (ref 0.9–1.3)
GFR AFRICAN AMERICAN: >60
GFR NON-AFRICAN AMERICAN: >60
GLUCOSE BLD-MCNC: 110 MG/DL (ref 70–99)
GLUCOSE BLD-MCNC: 121 MG/DL (ref 70–99)
GLUCOSE BLD-MCNC: 72 MG/DL (ref 70–99)
PERFORMED ON: ABNORMAL
PERFORMED ON: NORMAL
PHOSPHORUS: 3.3 MG/DL (ref 2.5–4.9)
POTASSIUM SERPL-SCNC: 4.5 MMOL/L (ref 3.5–5.1)
SODIUM BLD-SCNC: 142 MMOL/L (ref 136–145)

## 2019-04-06 PROCEDURE — 1200000000 HC SEMI PRIVATE

## 2019-04-06 PROCEDURE — 6360000002 HC RX W HCPCS: Performed by: INTERNAL MEDICINE

## 2019-04-06 PROCEDURE — 6370000000 HC RX 637 (ALT 250 FOR IP): Performed by: INTERNAL MEDICINE

## 2019-04-06 PROCEDURE — 36415 COLL VENOUS BLD VENIPUNCTURE: CPT

## 2019-04-06 PROCEDURE — G0378 HOSPITAL OBSERVATION PER HR: HCPCS

## 2019-04-06 PROCEDURE — 6370000000 HC RX 637 (ALT 250 FOR IP): Performed by: PSYCHIATRY & NEUROLOGY

## 2019-04-06 PROCEDURE — 96372 THER/PROPH/DIAG INJ SC/IM: CPT

## 2019-04-06 PROCEDURE — 80069 RENAL FUNCTION PANEL: CPT

## 2019-04-06 PROCEDURE — 2580000003 HC RX 258: Performed by: INTERNAL MEDICINE

## 2019-04-06 RX ADMIN — LEVOTHYROXINE SODIUM 200 MCG: 100 TABLET ORAL at 05:08

## 2019-04-06 RX ADMIN — ASPIRIN 81 MG: 81 TABLET, COATED ORAL at 08:25

## 2019-04-06 RX ADMIN — HALOPERIDOL 10 MG: 5 TABLET ORAL at 19:55

## 2019-04-06 RX ADMIN — PANTOPRAZOLE SODIUM 40 MG: 40 TABLET, DELAYED RELEASE ORAL at 05:08

## 2019-04-06 RX ADMIN — DIVALPROEX SODIUM 500 MG: 500 TABLET, EXTENDED RELEASE ORAL at 19:56

## 2019-04-06 RX ADMIN — ATORVASTATIN CALCIUM 20 MG: 20 TABLET, FILM COATED ORAL at 19:56

## 2019-04-06 RX ADMIN — ENOXAPARIN SODIUM 30 MG: 30 INJECTION SUBCUTANEOUS at 19:55

## 2019-04-06 RX ADMIN — ENOXAPARIN SODIUM 30 MG: 30 INJECTION SUBCUTANEOUS at 11:21

## 2019-04-06 ASSESSMENT — PAIN SCALES - GENERAL
PAINLEVEL_OUTOF10: 0

## 2019-04-06 ASSESSMENT — ENCOUNTER SYMPTOMS
VOMITING: 0
COUGH: 0
NAUSEA: 0

## 2019-04-06 NOTE — PROGRESS NOTES
Pt's blood sugar was 72 this morning. He ate his full breakfast. Pt refused mid day blood sugar check.

## 2019-04-06 NOTE — PLAN OF CARE
Problem: Falls - Risk of:  Goal: Will remain free from falls  Description  Will remain free from falls  Outcome: Ongoing  Goal: Absence of physical injury  Description  Absence of physical injury  Outcome: Ongoing     Problem: Risk for Impaired Skin Integrity  Goal: Tissue integrity - skin and mucous membranes  Description  Structural intactness and normal physiological function of skin and  mucous membranes.   Outcome: Ongoing     Problem: Fluid Volume:  Goal: Hemodynamic stability will improve  Description  Hemodynamic stability will improve  Outcome: Ongoing  Goal: Ability to maintain a balanced intake and output will improve  Description  Ability to maintain a balanced intake and output will improve  Outcome: Ongoing     Problem: Health Behavior:  Goal: Identification of resources available to assist in meeting health care needs will improve  Description  Identification of resources available to assist in meeting health care needs will improve  Outcome: Ongoing     Problem: Respiratory:  Goal: Respiratory status will improve  Description  Respiratory status will improve  Outcome: Ongoing     Problem: Discharge Planning:  Goal: Discharged to appropriate level of care  Description  Discharged to appropriate level of care  Outcome: Ongoing     Problem: Pain:  Goal: Pain level will decrease  Description  Pain level will decrease  Outcome: Ongoing  Goal: Control of acute pain  Description  Control of acute pain  Outcome: Ongoing  Goal: Control of chronic pain  Description  Control of chronic pain  Outcome: Ongoing

## 2019-04-06 NOTE — PROGRESS NOTES
Pt has thrown a large amount of urine on the floor. Pt is agitated, demanding the nurse clean it up. Nurse cleaned some of it and then asked the  to call facilities. Pt is agitated that the nurse will not clean it up and told this nurse to \"get the fuck out of the room, boy. \"

## 2019-04-06 NOTE — PROGRESS NOTES
Negative for chills and fever. Respiratory: Negative for cough. Cardiovascular: Positive for leg swelling. Gastrointestinal: Negative for nausea and vomiting. Neurological: Negative for headaches. Psychiatric/Behavioral: Positive for agitation. Physical Exam   Constitutional: He is oriented to person, place, and time. He appears well-developed and well-nourished. Morbidly obese   HENT:   Head: Normocephalic. Eyes: Pupils are equal, round, and reactive to light. Neck: Normal range of motion. Cardiovascular: Normal rate, regular rhythm and normal heart sounds. Pulmonary/Chest: Effort normal and breath sounds normal. No respiratory distress. Abdominal: Soft. Bowel sounds are normal.   Musculoskeletal: Normal range of motion. He exhibits edema. Neurological: He is alert and oriented to person, place, and time. No cranial nerve deficit. Skin: Skin is warm and dry. Psychiatric: His affect is inappropriate. His speech is tangential. He is agitated. Thought content is delusional. He expresses impulsivity and inappropriate judgment. DATA:       Labs:  CBC:   Recent Labs     04/04/19 0201 04/05/19 0433   WBC 8.4 5.0   HGB 10.6* 10.0*   HCT 32.7* 30.9*    235       BMP:   Recent Labs     04/04/19 0201 04/05/19 0433 04/06/19  0436     --  144 142   K 5.7*   < > 5.0  5.0 4.5     --  106 103   CO2 27  --  26 32   BUN 23*  --  22* 19   CREATININE 1.0  --  1.4* 1.1   GLUCOSE 115*  --  97 110*    < > = values in this interval not displayed. LFT's:   Recent Labs     04/05/19  0433   AST 26   ALT 16   BILITOT <0.2   ALKPHOS 73     Troponin:   Recent Labs     04/04/19 0201 04/04/19  1059   TROPONINI <0.01 <0.01     BNP: No results for input(s): BNP in the last 72 hours. ABGs: No results for input(s): PHART, ZJF0RHM, PO2ART in the last 72 hours. INR: No results for input(s): INR in the last 72 hours.     U/A:  Recent Labs     04/04/19  1550   PHUR 5.0       VL Extremity Venous Bilateral         XR ANKLE LEFT (MIN 3 VIEWS)   Final Result       No fracture or dislocation. Flattening of the tibial plafond and adjacent talus. Appearance of flattening of the hindfoot although not weightbearing    images. Appearance of soft tissue swelling at the partially imaged dorsal foot. XR CHEST PORTABLE   Final Result   Cardiomegaly. Diffuse airspace opacities throughout both lungs either    represents edema or pneumonia. CT Head WO Contrast   Final Result       No intracranial hemorrhage, mass effect or calvarial fracture. The ventricles are unchanged in size, configuration and remain midline. Confluent areas of bilateral symmetric appearing white matter low    attenuation is not significantly changed. Appearance of the sulci and extra-axial spaces are unchanged. ASSESSMENT AND PLAN:   47year old male admitted for fall secondary to hypoglycemia    Mechanical fall-Patient unable to explain the etiology. -Moves all four extremities spontaneously    Hypothyroidism uncontrolled suspect secondary to noncompliance  -Elevated TSH, low T4.   -resumed on home synthroid. Most likely was not taking his home dose. Hypoglycemia-In setting of Type 2 DM (resolved)  -On sliding scale  -Hypoglycemia protocol  -A1c ordered    Acute on chronic diastolic heart failure  -On lasix 60mg IV BID-Discontinued in setting of worsened creatinine.   -Ins and outs. 2.7L  -electrolytes monitor  -ECHO ordered patient refused  -EKG trops negative.  EKG showed LVH  -Lower extremity Dopplers negative for DVT    Acute kidney injury prerenal  Diuretics on hold next line nephrology consulted  Creatinine slowly trending down      Hyperkalemia-resolved  -Resolved with lasix    Morbid obesity probably has LADONNA/OHS  -Homeless  -psychiatry history    Schizoaffective disorder  -Psych consulted   -On depakote 250 and 500 am and nightly  -haldol 10mg BID  -IM haldol if needed for agitation.  Monitor QTc    At this time patient is awaiting placement    Code Status:FULL  FEN: Carb control  PPX: Lovnox  DISPO: JOSE Jacob MD,   4/6/2019,  2:02 PM

## 2019-04-06 NOTE — PROGRESS NOTES
[facility]    [facility]. [facility]              [phone]                       Plan :       Hold diuretics    Creatinine is better from 1.4 >> 1.1     Assessment :       Acute elevation of creatinine: It is likely from diuresis. He did not receive any nephrotoxins and has no other inciting events. Continue holding diuretics and lisinopril. No need to give IV fluid. I expect creatinine to slowly improve back to baseline. Hypertension: Blood pressure is well controlled. No need to make any changes. Hyperlipidemia: Continue with moderate intensity Lipitor. No further workup for acute elevation of creatinine is required. If kidney function does not improve we will do ultrasound of the kidney. Compliance with medication was stressed. [facility] would like to thank Jennifer Hein MD   for opportunity to serve this patient      Please call with questions at-   24 Hrs Answering service [phone] or  7 am- 5 pm via Perfect serve or cell phone  [id number] [address]          CC/reason for consult :     Acute kidney injury. HPI :     Dimas Salcido is a 47 y.o. male presented to   the hospital on 4/3/2019 with  hypoglycemia. He has past medical history of bipolar disorder, diabetes, morbid obesity, schizophrenia, obstructive sleep apnea, hypertension and goiter. He presented with a fall. He was found to have a blood sugar of 50. It is worth mentioning patient is not giving any reliable history and has labile mood. Most of the history was obtained by reviewing the chart and speaking to the staff. He was started on Lasix IV b.i.d. since yesterday for fluid overload. His insulin was adjusted for hypoglycemia. Creatinine increased from 1.0-1.4. He diuresed about 3.2 L with stable blood pressure. He did not receive IV contrast.    He had intermittent elevated creatinine in the past as well probably as a result of diuresis.      Interval History:     He is agitated and refusing most of the medications. BP and urine out put is stable. ROS:     Seen with- none    positives in bold   Constitutional:  fever, chills, weakness, weight change, fatigue  Skin:  rash, pruritus, hair loss, bruising, dry skin, petechiae  Head, Face, Neck   headaches, swelling,  cervical adenopathy  Respiratory: shortness of breath, cough, or wheezing  Cardiovascular: chest pain, palpitations, dizzy, edema  Gastrointestinal: nausea, vomiting, diarrhea, constipation,belly pain    Yellow skin, blood in stool  Musculoskeletal:  back pain, muscle weakness, gait problems,       joint pain or swelling. Genitourinary:  dysuria, poor urine flow, flank pain, blood in urine  Neurologic:  vertigo, TIA'S, syncope, seizures, focal weakness  Psychosocial:  insomnia, anxiety, or depression. Additional positive findings:                          All other remaining systems are negative.         PMH/PSH/SH/Family History:     Past Medical History:   Diagnosis Date    Bipolar 1 disorder ([facility] 1984    Cellulitis     Chest pain     Diabetes mellitus ([facility]     Goiter     Hypertension     Hypocalcemia     Hypothyroid     Kidney stone     Pyelonephritis     Schizophrenia ([facility]     Sleep apnea        Past Surgical History:   Procedure Laterality Date    THYROIDECTOMY         Social History     Socioeconomic History    Marital status: Single     Spouse name: Not on file    Number of children: Not on file    Years of education: Not on file    Highest education level: Not on file   Occupational History    Not on file   Social Needs    Financial resource strain: Not on file    Food insecurity:     Worry: Not on file     Inability: Not on file    Transportation needs:     Medical: Not on file     Non-medical: Not on file   Tobacco Use    Smoking status: Former Smoker    Smokeless tobacco: Never Used   Substance and Sexual Activity    Alcohol use: No     Comment: rare    Drug use: No    Sexual activity: Never   Lifestyle    Physical activity:     Days per week: Not on file     Minutes per session: Not on file    Stress: Not on file   Relationships    Social connections:     Talks on phone: Not on file     Gets together: Not on file     Attends Faith service: Not on file     Active member of club or organization: Not on file     Attends meetings of clubs or organizations: Not on file     Relationship status: Not on file    Intimate partner violence:     Fear of current or ex partner: Not on file     Emotionally abused: Not on file     Physically abused: Not on file     Forced sexual activity: Not on file   Other Topics Concern    Not on file   Social History Narrative    Not on file           Problem Relation Age of Onset    Diabetes Mother     Diabetes Maternal Grandmother           Medication:     Scheduled Meds:   divalproex  250 mg Oral Daily    divalproex  500 mg Oral Nightly    magnesium sulfate  1 g Intravenous Once    aspirin  81 mg Oral Daily    atorvastatin  20 mg Oral Nightly    haloperidol  10 mg Oral BID    levothyroxine  200 mcg Oral Daily    pantoprazole  40 mg Oral QAM AC    sodium chloride flush  10 mL Intravenous 2 times per day    insulin lispro  0-6 Units Subcutaneous TID WC    insulin lispro  0-3 Units Subcutaneous Nightly    enoxaparin  30 mg Subcutaneous BID     Continuous Infusions:   dextrose       PRN Meds:.sodium chloride flush, magnesium hydroxide, ondansetron, glucose, dextrose, glucagon (rDNA), dextrose, acetaminophen, albuterol, haloperidol lactate, loperamide       Vitals :     Vitals:    04/06/19 1117   BP: (!) 142/75   Pulse:    Resp:    Temp: 98.3 °F (36.8 °C)   SpO2: 95%       I & O :       Intake/Output Summary (Last 24 hours) at 4/6/2019 1137  Last data filed at 4/6/2019 1009  Gross per 24 hour   Intake 880 ml   Output 1800 ml   Net -920 ml        Physical Examination :     General appearance: Anxious- no, distressed- no, in good spirits- yes  HEENT: Lips- normal, teeth- ok , oral mucosa- moist  Neck : Mass- no, appears symmetrical, JVD- not visible  Respiratory: Respiratory effort-  OK, wheeze- no, crackles - no  Cardiovascular: Ausculation- No M/R/G, Edema +  Abdomen: visible mass- no, distention- no, scar- no, tenderness- no                            hepatosplenomegaly-  no  Musculoskeletal:  clubbing no,cyanosis- no , digital ischemia- no                           muscle strength- grossly normal , tone - grossly normal  Skin: rashes- no , ulcers- no, induration- no, tightening - no  Psychiatric:  Not evaluated   LABS:     Recent Labs     04/04/19 0201 04/05/19 0433   WBC 8.4 5.0   HGB 10.6* 10.0*   HCT 32.7* 30.9*    235     Recent Labs     04/04/19 0201 04/05/19 0433 04/06/19  0436     --  144 142   K 5.7*   < > 5.0  5.0 4.5     --  106 103   CO2 27  --  26 32   BUN 23*  --  22* 19   CREATININE 1.0  --  1.4* 1.1   GLUCOSE 115*  --  97 110*   MG  --   --  1.70*  --    PHOS  --   --   --  3.3    < > = values in this interval not displayed.       Nephrology  [address]  Office: [phone]  Cell: [phone]  Fax: [phone]

## 2019-04-07 LAB
ANION GAP SERPL CALCULATED.3IONS-SCNC: 10 MMOL/L (ref 3–16)
BASOPHILS ABSOLUTE: 0 K/UL (ref 0–0.2)
BASOPHILS RELATIVE PERCENT: 0.6 %
BUN BLDV-MCNC: 21 MG/DL (ref 7–20)
CALCIUM SERPL-MCNC: 9.2 MG/DL (ref 8.3–10.6)
CHLORIDE BLD-SCNC: 99 MMOL/L (ref 99–110)
CO2: 29 MMOL/L (ref 21–32)
CREAT SERPL-MCNC: 1.2 MG/DL (ref 0.9–1.3)
EOSINOPHILS ABSOLUTE: 0.2 K/UL (ref 0–0.6)
EOSINOPHILS RELATIVE PERCENT: 4.3 %
GFR AFRICAN AMERICAN: >60
GFR NON-AFRICAN AMERICAN: >60
GLUCOSE BLD-MCNC: 88 MG/DL (ref 70–99)
GLUCOSE BLD-MCNC: 90 MG/DL (ref 70–99)
HCT VFR BLD CALC: 33.4 % (ref 40.5–52.5)
HEMOGLOBIN: 10.8 G/DL (ref 13.5–17.5)
LYMPHOCYTES ABSOLUTE: 1.4 K/UL (ref 1–5.1)
LYMPHOCYTES RELATIVE PERCENT: 29.4 %
MAGNESIUM: 1.7 MG/DL (ref 1.8–2.4)
MCH RBC QN AUTO: 27.1 PG (ref 26–34)
MCHC RBC AUTO-ENTMCNC: 32.3 G/DL (ref 31–36)
MCV RBC AUTO: 84.1 FL (ref 80–100)
MONOCYTES ABSOLUTE: 0.4 K/UL (ref 0–1.3)
MONOCYTES RELATIVE PERCENT: 8 %
NEUTROPHILS ABSOLUTE: 2.8 K/UL (ref 1.7–7.7)
NEUTROPHILS RELATIVE PERCENT: 57.7 %
PDW BLD-RTO: 18.9 % (ref 12.4–15.4)
PERFORMED ON: NORMAL
PLATELET # BLD: 289 K/UL (ref 135–450)
PMV BLD AUTO: 7.6 FL (ref 5–10.5)
POTASSIUM SERPL-SCNC: 4.2 MMOL/L (ref 3.5–5.1)
RBC # BLD: 3.98 M/UL (ref 4.2–5.9)
SODIUM BLD-SCNC: 138 MMOL/L (ref 136–145)
WBC # BLD: 4.9 K/UL (ref 4–11)

## 2019-04-07 PROCEDURE — 80048 BASIC METABOLIC PNL TOTAL CA: CPT

## 2019-04-07 PROCEDURE — 36415 COLL VENOUS BLD VENIPUNCTURE: CPT

## 2019-04-07 PROCEDURE — 1200000000 HC SEMI PRIVATE

## 2019-04-07 PROCEDURE — 6370000000 HC RX 637 (ALT 250 FOR IP): Performed by: PSYCHIATRY & NEUROLOGY

## 2019-04-07 PROCEDURE — 96372 THER/PROPH/DIAG INJ SC/IM: CPT

## 2019-04-07 PROCEDURE — 94664 DEMO&/EVAL PT USE INHALER: CPT

## 2019-04-07 PROCEDURE — 83735 ASSAY OF MAGNESIUM: CPT

## 2019-04-07 PROCEDURE — 6360000002 HC RX W HCPCS: Performed by: INTERNAL MEDICINE

## 2019-04-07 PROCEDURE — 6370000000 HC RX 637 (ALT 250 FOR IP): Performed by: INTERNAL MEDICINE

## 2019-04-07 PROCEDURE — 85025 COMPLETE CBC W/AUTO DIFF WBC: CPT

## 2019-04-07 PROCEDURE — G0378 HOSPITAL OBSERVATION PER HR: HCPCS

## 2019-04-07 RX ORDER — ZIPRASIDONE MESYLATE 20 MG/ML
10 INJECTION, POWDER, LYOPHILIZED, FOR SOLUTION INTRAMUSCULAR ONCE
Status: DISCONTINUED | OUTPATIENT
Start: 2019-04-07 | End: 2019-04-08 | Stop reason: HOSPADM

## 2019-04-07 RX ADMIN — ASPIRIN 81 MG: 81 TABLET, COATED ORAL at 09:43

## 2019-04-07 RX ADMIN — DIVALPROEX SODIUM 250 MG: 250 TABLET, EXTENDED RELEASE ORAL at 09:42

## 2019-04-07 RX ADMIN — ENOXAPARIN SODIUM 30 MG: 30 INJECTION SUBCUTANEOUS at 09:43

## 2019-04-07 RX ADMIN — HALOPERIDOL 10 MG: 5 TABLET ORAL at 09:42

## 2019-04-07 ASSESSMENT — ENCOUNTER SYMPTOMS
COUGH: 0
NAUSEA: 0
VOMITING: 0

## 2019-04-07 ASSESSMENT — PAIN SCALES - GENERAL: PAINLEVEL_OUTOF10: 0

## 2019-04-07 NOTE — PROGRESS NOTES
of MDI with spacer     d. RR > 30 bpm   5. Bronchodilators will be delivered via Metered Dose Inhaler (MDI), HHN, Aerogen to intubated patients on mechanical ventilation. 6. Inhalation medication orders will be delivered and/or substituted as outlined below. Aerosolized Medications Ordering and Administration Guidelines:    1. All Medications will be ordered by a physician, and their frequency and/or modality will be adjusted as defined by the patients Respiratory Severity Index (RSI) score. 2. If the patient does not have documented COPD, consider discontinuing anticholinergics when RSI is less than 9.  3. If the bronchospasm worsens (increased RSI), then the bronchodilator frequency can be increased to a maximum of every 4 hours. If greater than every 4 hours is required, the physician will be contacted. 4. If the bronchospasm improves, the frequency of the bronchodilator can be decreased, based on the patient's RSI, but not less than home treatment regimen frequency. 5. Bronchodilator(s) will be discontinued if patient has a RSI less than 9 and has received no scheduled or as needed treatment for 72  Hrs. Patients Ordered on a Mucolytic Agent:    1. Must always be administered with a bronchodilator. 2. Discontinue if patient experiences worsened bronchospasm, or secretions have lessened to the point that the patient is able to clear them with a cough. Anti-inflammatory and Combination Medications:    1. If the patient lacks prior history of lung disease, is not using inhaled anti-inflammatory medication at home, and lacks wheezing by examination or by history for at least 24 hours, contact physician for possible discontinuation.

## 2019-04-07 NOTE — PROGRESS NOTES
Vitals deferred at this time as patient is sleeping and resting comfotably. Will obtain once patient is awake. Will continue to monitor.

## 2019-04-07 NOTE — PROGRESS NOTES
Patient agitated with morning, would not allow RN to obtain vitals or lab to draw blood. Will continue to monitor.

## 2019-04-07 NOTE — PROGRESS NOTES
Pt became very agitated after avysys camera alarmed d/t him standing. Situation escalated quickly and staff was unable to calm pt, security and nursing supervisor  Called. Pt called 911, and multiple officers arrived. Md notified of pt's behavior. Pt was able to eventually calm down. Will hold off on  IM Geodon for now. Pt resting quietly in chair at this time.

## 2019-04-07 NOTE — PROGRESS NOTES
Resident Progress Note  PGY-2                                                             Code:Full Code  Admit Date: 4/3/2019                                             PCP: Gerry Perea MD                                 SUBJECTIVE:     Interval Hx:   feels well  No new complaints        MEDICATIONS:   Scheduled Meds:   divalproex  250 mg Oral Daily    divalproex  500 mg Oral Nightly    magnesium sulfate  1 g Intravenous Once    aspirin  81 mg Oral Daily    atorvastatin  20 mg Oral Nightly    haloperidol  10 mg Oral BID    levothyroxine  200 mcg Oral Daily    pantoprazole  40 mg Oral QAM AC    sodium chloride flush  10 mL Intravenous 2 times per day    insulin lispro  0-6 Units Subcutaneous TID WC    insulin lispro  0-3 Units Subcutaneous Nightly    enoxaparin  30 mg Subcutaneous BID      Continuous Infusions:   dextrose       PRN Meds:sodium chloride flush, magnesium hydroxide, ondansetron, glucose, dextrose, glucagon (rDNA), dextrose, acetaminophen, albuterol, haloperidol lactate, loperamide    Allergies: Allergies   Allergen Reactions    Carbamazepine      Per chart review 2/9/12    Chlorpromazine      Per chart review 2/9/12    Thioridazine      Per chart review 2/9/12       PHYSICAL EXAM:       Vitals: /60   Pulse 64   Temp 99.2 °F (37.3 °C) (Oral)   Resp 20   Ht 6' 0.05\" (1.83 m)   Wt (!) 362 lb 3.5 oz (164.3 kg)   SpO2 95%   BMI 49.06 kg/m²     I/O:      Intake/Output Summary (Last 24 hours) at 4/7/2019 1005  Last data filed at 4/7/2019 0614  Gross per 24 hour   Intake 1320 ml   Output 2175 ml   Net -855 ml     No intake/output data recorded. I/O last 3 completed shifts: In: 36 [P.O.:1320]  Out: 5398 [Urine:2675]  Review of Systems   Constitutional: Negative for chills and fever. Respiratory: Negative for cough. Cardiovascular: Positive for leg swelling. Gastrointestinal: Negative for nausea and vomiting. Neurological: Negative for headaches. Cardiomegaly. Diffuse airspace opacities throughout both lungs either    represents edema or pneumonia. CT Head WO Contrast   Final Result       No intracranial hemorrhage, mass effect or calvarial fracture. The ventricles are unchanged in size, configuration and remain midline. Confluent areas of bilateral symmetric appearing white matter low    attenuation is not significantly changed. Appearance of the sulci and extra-axial spaces are unchanged. ASSESSMENT AND PLAN:   47year old male admitted for fall secondary to hypoglycemia    Mechanical fall-Patient unable to explain the etiology. -Moves all four extremities spontaneously    Hypothyroidism uncontrolled suspect secondary to noncompliance  -Elevated TSH, low T4.   -resumed on home synthroid. Most likely was not taking his home dose. Hypoglycemia-In setting of Type 2 DM (resolved)  -On sliding scale  -Hypoglycemia protocol  -A1c ordered    Acute on chronic diastolic heart failure  -On lasix 60mg IV BID-Discontinued in setting of worsened creatinine.   -Ins and outs. 2.7L  -electrolytes monitor  -ECHO ordered patient refused  -EKG trops negative. EKG showed LVH  -Lower extremity Dopplers negative for DVT    Acute kidney injury prerenal  Diuretics on hold next line nephrology consulted  Creatinine slowly trending down    Hyperkalemia-resolved    Morbid obesity probably has LADONNA/OHS  -Homeless  -psychiatry history    Schizoaffective disorder  -Psych consulted   -On depakote 250 and 500 am and nightly  -haldol 10mg BID  -IM haldol if needed for agitation.  Monitor QTc    At this time patient is awaiting placement      Code Status:FULL  FEN: Carb control  PPX: Lovnox  DISPO: JOSE Soriano MD,   4/7/2019,  10:05 AM

## 2019-04-07 NOTE — PLAN OF CARE
Pt is not following safety guidelines, does not call appropriately for nursing staff. When staff tries to assist pt he becomes agitated and verbally abusive. Camera in use. Pt with steady gait.

## 2019-04-07 NOTE — PLAN OF CARE
Problem: Falls - Risk of:  Goal: Will remain free from falls  4/7/2019 0229 by Nhi Avendano RN  Outcome: Ongoing   Patient has remained free from falls during shift. Patient uses call light appropriately, [employer] camera in place. Butcher Fall Risk; High (45& higher). Patient has non-skid socks on, fall precautions are in place, dome light illuminated. Bed is in lowest position, with alarm on, locked wheels, and call light is with in reach. Will continue to monitor. Problem: Risk for Impaired Skin Integrity  Goal: Tissue integrity - skin and mucous membranes  4/7/2019 0229 by Nhi Avendano RN  Outcome: Ongoing  Patient has shown no new signs of skin breakdown this shift. Zach score completed this shift. Special mattress in place, patient aware of bathroom needs and turns self. Will continue to monitor. Problem: Health Behavior:  Goal: Identification of resources available to assist in meeting health care needs will improve  4/7/2019 0229 by Nhi Avendano RN  Outcome: Ongoing  Patient had started off shift pleasant with this RN. More recently (0200) patient has been more anxious and telling RN to get out of patients room. Patient remains on [employer] camera. Will continue to monitor.

## 2019-04-07 NOTE — PROGRESS NOTES
Patient refused to allow PCA to obtain blood glucose. Patient was agreeable to vitals and administration of medications. Will continue to monitor.

## 2019-04-07 NOTE — PROGRESS NOTES
[facility]    [facility]              [phone]                       Plan :       Hold diuretics    Creatinine is better from 1.4 >> 1.1  No labs from today     Assessment :       Acute elevation of creatinine: It is likely from diuresis. He did not receive any nephrotoxins and has no other inciting events. Continue holding diuretics and lisinopril. No need to give IV fluid. I expect creatinine to slowly improve back to baseline. Hypertension: Blood pressure is well controlled. No need to make any changes. Hyperlipidemia: Continue with moderate intensity Lipitor. No further workup for acute elevation of creatinine is required. If kidney function does not improve we will do ultrasound of the kidney. Compliance with medication was stressed. [facility] would like to thank Evelyn Castro MD   for opportunity to serve this patient      Please call with questions at-   24 Hrs Answering service [phone] or  7 am- 5 pm via Perfect serve or cell phone  [phone] [address]          CC/reason for consult :     Acute kidney injury. HPI :     Anisha Calhoun is a 47 y.o. male presented to   the hospital on 4/3/2019 with  hypoglycemia. He has past medical history of bipolar disorder, diabetes, morbid obesity, schizophrenia, obstructive sleep apnea, hypertension and goiter. He presented with a fall. He was found to have a blood sugar of 50. It is worth mentioning patient is not giving any reliable history and has labile mood. Most of the history was obtained by reviewing the chart and speaking to the staff. He was started on Lasix IV b.i.d. since yesterday for fluid overload. His insulin was adjusted for hypoglycemia. Creatinine increased from 1.0-1.4. He diuresed about 3.2 L with stable blood pressure. He did not receive IV contrast.    He had intermittent elevated creatinine in the past as well probably as a result of diuresis. Interval History:     He is agitated and refusing most of the medications. BP and urine out put is stable. ROS:     Seen with- none    positives in bold   Constitutional:  fever, chills, weakness, weight change, fatigue  Skin:  rash, pruritus, hair loss, bruising, dry skin, petechiae  Head, Face, Neck   headaches, swelling,  cervical adenopathy  Respiratory: shortness of breath, cough, or wheezing  Cardiovascular: chest pain, palpitations, dizzy, edema  Gastrointestinal: nausea, vomiting, diarrhea, constipation,belly pain    Yellow skin, blood in stool  Musculoskeletal:  back pain, muscle weakness, gait problems,       joint pain or swelling. Genitourinary:  dysuria, poor urine flow, flank pain, blood in urine  Neurologic:  vertigo, TIA'S, syncope, seizures, focal weakness  Psychosocial:  insomnia, anxiety, or depression. Additional positive findings:                          All other remaining systems are negative.         PMH/PSH/SH/Family History:     Past Medical History:   Diagnosis Date    Bipolar 1 disorder ([facility] 1984    Cellulitis     Chest pain     Diabetes mellitus ([facility]     Goiter     Hypertension     Hypocalcemia     Hypothyroid     Kidney stone     Pyelonephritis     Schizophrenia ([facility]     Sleep apnea        Past Surgical History:   Procedure Laterality Date    THYROIDECTOMY         Social History     Socioeconomic History    Marital status: Single     Spouse name: Not on file    Number of children: Not on file    Years of education: Not on file    Highest education level: Not on file   Occupational History    Not on file   Social Needs    Financial resource strain: Not on file    Food insecurity:     Worry: Not on file     Inability: Not on file    Transportation needs:     Medical: Not on file     Non-medical: Not on file   Tobacco Use    Smoking status: Former Smoker    Smokeless tobacco: Never Used   Substance and Sexual Activity    Alcohol use: No     Comment: rare  Drug use: No    Sexual activity: Never   Lifestyle    Physical activity:     Days per week: Not on file     Minutes per session: Not on file    Stress: Not on file   Relationships    Social connections:     Talks on phone: Not on file     Gets together: Not on file     Attends Faith service: Not on file     Active member of club or organization: Not on file     Attends meetings of clubs or organizations: Not on file     Relationship status: Not on file    Intimate partner violence:     Fear of current or ex partner: Not on file     Emotionally abused: Not on file     Physically abused: Not on file     Forced sexual activity: Not on file   Other Topics Concern    Not on file   Social History Narrative    Not on file           Problem Relation Age of Onset    Diabetes Mother     Diabetes Maternal Grandmother           Medication:     Scheduled Meds:   ziprasidone  10 mg Intramuscular Once    divalproex  250 mg Oral Daily    divalproex  500 mg Oral Nightly    magnesium sulfate  1 g Intravenous Once    aspirin  81 mg Oral Daily    atorvastatin  20 mg Oral Nightly    haloperidol  10 mg Oral BID    levothyroxine  200 mcg Oral Daily    pantoprazole  40 mg Oral QAM AC    sodium chloride flush  10 mL Intravenous 2 times per day    insulin lispro  0-6 Units Subcutaneous TID WC    insulin lispro  0-3 Units Subcutaneous Nightly    enoxaparin  30 mg Subcutaneous BID     Continuous Infusions:   dextrose       PRN Meds:.sodium chloride flush, magnesium hydroxide, ondansetron, glucose, dextrose, glucagon (rDNA), dextrose, acetaminophen, albuterol, haloperidol lactate, loperamide       Vitals :     Vitals:    04/06/19 2255   BP: 119/60   Pulse: 64   Resp: 20   Temp: 99.2 °F (37.3 °C)   SpO2: 95%       I & O :       Intake/Output Summary (Last 24 hours) at 4/7/2019 1120  Last data filed at 4/7/2019 0614  Gross per 24 hour   Intake 960 ml   Output 2175 ml   Net -1215 ml        Physical Examination : General appearance: Anxious- no, distressed- no, in good spirits- yes  HEENT: Lips- normal, teeth- ok , oral mucosa- moist  Neck : Mass- no, appears symmetrical, JVD- not visible  Respiratory: Respiratory effort-  OK, wheeze- no, crackles - no  Cardiovascular:  Ausculation- No M/R/G, Edema +  Abdomen: visible mass- no, distention- no, scar- no, tenderness- no                            hepatosplenomegaly-  no  Musculoskeletal:  clubbing no,cyanosis- no , digital ischemia- no                           muscle strength- grossly normal , tone - grossly normal  Skin: rashes- no , ulcers- no, induration- no, tightening - no  Psychiatric:  Not evaluated   LABS:     Recent Labs     04/05/19  0433   WBC 5.0   HGB 10.0*   HCT 30.9*        Recent Labs     04/05/19  0433 04/06/19  0436    142   K 5.0  5.0 4.5    103   CO2 26 32   BUN 22* 19   CREATININE 1.4* 1.1   GLUCOSE 97 110*   MG 1.70*  --    PHOS  --  3.3      [facility]ph[facility]  [facility] [address], [address]  Office: [phone]  Cell: [phone]  Fax: [phone]

## 2019-04-08 VITALS
SYSTOLIC BLOOD PRESSURE: 153 MMHG | OXYGEN SATURATION: 93 % | RESPIRATION RATE: 18 BRPM | HEIGHT: 72 IN | TEMPERATURE: 99.3 F | WEIGHT: 315 LBS | HEART RATE: 79 BPM | BODY MASS INDEX: 42.66 KG/M2 | DIASTOLIC BLOOD PRESSURE: 76 MMHG

## 2019-04-08 PROCEDURE — G0378 HOSPITAL OBSERVATION PER HR: HCPCS

## 2019-04-08 PROCEDURE — 6370000000 HC RX 637 (ALT 250 FOR IP): Performed by: INTERNAL MEDICINE

## 2019-04-08 RX ORDER — DIVALPROEX SODIUM 250 MG/1
250 TABLET, EXTENDED RELEASE ORAL DAILY
Qty: 30 TABLET | Refills: 0 | Status: ON HOLD
Start: 2019-04-09 | End: 2019-08-06 | Stop reason: HOSPADM

## 2019-04-08 RX ORDER — DIVALPROEX SODIUM 500 MG/1
500 TABLET, EXTENDED RELEASE ORAL NIGHTLY
Qty: 30 TABLET | Refills: 0 | Status: ON HOLD
Start: 2019-04-08 | End: 2019-08-06 | Stop reason: SDUPTHER

## 2019-04-08 RX ORDER — HALOPERIDOL 1 MG/1
10 TABLET ORAL 2 TIMES DAILY
Qty: 120 TABLET | Refills: 0 | Status: ON HOLD
Start: 2019-04-08 | End: 2019-08-06 | Stop reason: HOSPADM

## 2019-04-08 RX ADMIN — LEVOTHYROXINE SODIUM 200 MCG: 100 TABLET ORAL at 05:46

## 2019-04-08 RX ADMIN — ASPIRIN 81 MG: 81 TABLET, COATED ORAL at 10:46

## 2019-04-08 RX ADMIN — ACETAMINOPHEN 650 MG: 325 TABLET ORAL at 00:23

## 2019-04-08 ASSESSMENT — PAIN DESCRIPTION - PROGRESSION
CLINICAL_PROGRESSION: GRADUALLY WORSENING

## 2019-04-08 ASSESSMENT — PAIN DESCRIPTION - FREQUENCY: FREQUENCY: INTERMITTENT

## 2019-04-08 ASSESSMENT — PAIN DESCRIPTION - LOCATION: LOCATION: GENERALIZED

## 2019-04-08 ASSESSMENT — PAIN DESCRIPTION - PAIN TYPE: TYPE: ACUTE PAIN

## 2019-04-08 ASSESSMENT — PAIN SCALES - GENERAL
PAINLEVEL_OUTOF10: 5
PAINLEVEL_OUTOF10: 0

## 2019-04-08 ASSESSMENT — PAIN DESCRIPTION - DESCRIPTORS: DESCRIPTORS: ACHING

## 2019-04-08 ASSESSMENT — PAIN DESCRIPTION - ORIENTATION: ORIENTATION: RIGHT;LEFT

## 2019-04-08 ASSESSMENT — PAIN - FUNCTIONAL ASSESSMENT: PAIN_FUNCTIONAL_ASSESSMENT: ACTIVITIES ARE NOT PREVENTED

## 2019-04-08 ASSESSMENT — PAIN DESCRIPTION - ONSET: ONSET: GRADUAL

## 2019-04-08 NOTE — DISCHARGE SUMMARY
Hospital Medicine Discharge Summary    Patient ID: Manuel Bajwa   Gender: male  : 1965   Age: 47 y.o. MRN: [id number]  Code Status: Full Code  Patient's PCP: Madi Head MD    Admit Date: 4/3/2019     Discharge Date:   2019    Admitting Physician: Riaz Chung MD     Discharge Physician: Ja Rivera     Discharge Diagnoses: Active Hospital Problems    Diagnosis Date Noted    Acute and chronic respiratory failure with hypoxia ([facility] [J96.21] 2019       The patient was seen and examined on day of discharge and this discharge summary is in conjunction with any daily progress note from day of discharge. Hospital Course:  4/3 was brought to the emergency department after being found down. This was thought to be secondary to a mechanical fall of unknown etiology. EMS noted low BS. In ED was found to have episodes of hypoxia but responded to supplemental O2. His Labs were remarkable for respiratory acidosis and his chest xray showed some patchy airspace disease. His BNP was wnl. Originally he was thought to have developed HCAP due to a previous admission in being treated with CAP. He was given Azithromycin and cefepime for this. However it was thought that this diagnosis was unlikely and these medications were discontinued. Problems at this visit were addressed as follows:    Hypoglycemia: Home meds held. Blood sugars resolved with oral diet. Probable HF with systolic vs diastolic:  He presented with chest xray showing bilaterally pachy airspace opacities. He had SpO2s in the low 90's that responded to 02. Throughout the admission he was afebrile. Did not have leukocytosis or cough. EKG showed LVH likely secondary to long standing untreated LADONNA vs HTN. Troponins were negative. He was given lasix 60 IV bid but this was discontinued. He was noncompliant with medications and refused ECHO. ELPIDIO:    he developed a rise in his Cr after being placed on 60mg IV lasix. Details   aspirin 81 MG EC tablet Take 1 tablet by mouth daily  Qty: 30 tablet, Refills: 0      divalproex (DEPAKOTE ER) 250 MG extended release tablet Take 3 tablets by mouth 2 times daily  Qty: 180 tablet, Refills: 0    Associated Diagnoses: Schizoaffective disorder, bipolar type (HCC)      metFORMIN (GLUCOPHAGE) 500 MG tablet Take 1 tablet by mouth 2 times daily (with meals)  Qty: 60 tablet, Refills: 0      pioglitazone (ACTOS) 30 MG tablet Take 1 tablet by mouth daily  Qty: 30 tablet, Refills: 0      atorvastatin (LIPITOR) 20 MG tablet Take 1 tablet by mouth nightly  Qty: 30 tablet, Refills: 0      lisinopril (PRINIVIL;ZESTRIL) 20 MG tablet Take 1 tablet by mouth daily  Qty: 30 tablet, Refills: 0      haloperidol (HALDOL) 10 MG tablet Take 1 tablet by mouth 2 times daily  Qty: 60 tablet, Refills: 0    Associated Diagnoses: Schizoaffective disorder, bipolar type (HCC)      furosemide (LASIX) 40 MG tablet Take 1 tablet by mouth daily  Qty: 30 tablet, Refills: 0      levothyroxine (SYNTHROID) 200 MCG tablet Take 1 tablet by mouth Daily  Qty: 30 tablet, Refills: 0      pantoprazole (PROTONIX) 40 MG tablet Take 1 tablet by mouth every morning (before breakfast)  Qty: 30 tablet, Refills: 0      haloperidol decanoate (HALDOL DECANOATE) 100 MG/ML injection Inject 2 mLs into the muscle every 30 days Next Dose Due 4/3/2019             Time Spent on discharge is more than 2 hours in the examination, evaluation, counseling and review of medications and discharge plan.       SignedIra [place]   4/8/2019  Medical Student

## 2019-04-08 NOTE — PROGRESS NOTES
[facility]    [facility]              [phone]                       Plan :       Holding diuretics    Creatinine is better from 1.4 >> 1.2    Assessment :       Acute elevation of creatinine: It is likely from diuresis. He did not receive any nephrotoxins and has no other inciting events. Continue holding diuretics and lisinopril. No need to give IV fluid. I expect creatinine to slowly improve back to baseline. Hypertension: Blood pressure is well controlled. No need to make any changes. Hyperlipidemia: Continue with moderate intensity Lipitor. No further workup for acute elevation of creatinine is required. If kidney function does not improve we will do ultrasound of the kidney. Compliance with medication was stressed. [facility] would like to thank [place] MD   for opportunity to serve this patient      Please call with questions at-   24 Hrs Answering service [phone] or  7 am- 5 pm via Perfect serve or cell phone  [phone] [address]          CC/reason for consult :     Acute kidney injury. HPI :     Teresa Lopez is a 47 y.o. male presented to   the hospital on 4/3/2019 with  hypoglycemia. He has past medical history of bipolar disorder, diabetes, morbid obesity, schizophrenia, obstructive sleep apnea, hypertension and goiter. He presented with a fall. He was found to have a blood sugar of 50. It is worth mentioning patient is not giving any reliable history and has labile mood. Most of the history was obtained by reviewing the chart and speaking to the staff. He was started on Lasix IV b.i.d. since yesterday for fluid overload. His insulin was adjusted for hypoglycemia. Creatinine increased from 1.0-1.4. He diuresed about 3.2 L with stable blood pressure. He did not receive IV contrast.    He had intermittent elevated creatinine in the past as well probably as a result of diuresis.      Interval History: Sexual activity: Never   Lifestyle    Physical activity:     Days per week: Not on file     Minutes per session: Not on file    Stress: Not on file   Relationships    Social connections:     Talks on phone: Not on file     Gets together: Not on file     Attends Holiness service: Not on file     Active member of club or organization: Not on file     Attends meetings of clubs or organizations: Not on file     Relationship status: Not on file    Intimate partner violence:     Fear of current or ex partner: Not on file     Emotionally abused: Not on file     Physically abused: Not on file     Forced sexual activity: Not on file   Other Topics Concern    Not on file   Social History Narrative    Not on file           Problem Relation Age of Onset    Diabetes Mother     Diabetes Maternal Grandmother           Medication:     Scheduled Meds:   ziprasidone  10 mg Intramuscular Once    divalproex  250 mg Oral Daily    divalproex  500 mg Oral Nightly    magnesium sulfate  1 g Intravenous Once    aspirin  81 mg Oral Daily    atorvastatin  20 mg Oral Nightly    haloperidol  10 mg Oral BID    levothyroxine  200 mcg Oral Daily    pantoprazole  40 mg Oral QAM AC    sodium chloride flush  10 mL Intravenous 2 times per day    insulin lispro  0-6 Units Subcutaneous TID WC    insulin lispro  0-3 Units Subcutaneous Nightly    enoxaparin  30 mg Subcutaneous BID     Continuous Infusions:   dextrose       PRN Meds:.sodium chloride flush, magnesium hydroxide, ondansetron, glucose, dextrose, glucagon (rDNA), dextrose, acetaminophen, haloperidol lactate, loperamide       Vitals :     Vitals:    04/07/19 1940   BP: 129/65   Pulse: 95   Resp: 18   Temp: 98.6 °F (37 °C)   SpO2: 93%       I & O :       Intake/Output Summary (Last 24 hours) at 4/8/2019 0842  Last data filed at 4/8/2019 0500  Gross per 24 hour   Intake 960 ml   Output 1700 ml   Net -740 ml        Physical Examination :     General appearance: Anxious- no,

## 2019-04-08 NOTE — PLAN OF CARE
Problem: Falls - Risk of:  Goal: Will remain free from falls  4/8/2019 0042 by Hi Moody RN  Outcome: Ongoing  Patient has remained free from falls during shift. Patient uses call light appropriately. Butcher Fall Risk; High (45& higher). Patient has non-skid socks on, fall precautions are in place, dome light illuminated. Bed is in lowest position, with alarm on, locked wheels, and call light is with in reach. Will continue to monitor. Problem: Health Behavior:  Goal: Identification of resources available to assist in meeting health care needs will improve  Outcome: Ongoing   Patient continues to be very verbally aggressive to various staff members, yelling at them to get out of room. Patient has not been very cooperative this shift, and does not want to take medication. Will continue to monitor.

## 2019-04-08 NOTE — FLOWSHEET NOTE
Pt refusing all AM vital signs, refused  AM medications, Head to toe. Will continue to monitor.  Electronically signed by Washington Davis RN on 4/8/2019 at 9:21 AM

## 2019-04-08 NOTE — PROGRESS NOTES
Pt refusing vital signs, head-to toe assessment at this time.  Electronically signed by Maynor Guan RN on 4/8/2019 at 11:37 AM

## 2019-04-08 NOTE — CARE COORDINATION
CM informed by Nichole in admissions with [facility], they have obtained pre-cert and can accept the patient for admission today.  Nursing made aware, will update patient and MD.    Thank you,  Tami Gonzalez RN,   4th Floor Progressive Care Unit  [phone]

## 2019-04-08 NOTE — PROGRESS NOTES
Pt came out of room to nurses station upset about placing breakfast order. Security called. Situation was deescalated. Pt returned to room. Will continue to monitor.  Electronically signed by Washington Davis RN on 4/8/2019 at 8:02 AM

## 2019-04-08 NOTE — CARE COORDINATION
Case Management Discharge Assessment    2019  [facility] Case Management Department    Patient: Wyatt Mckeon  MRN: [id number] / : 1965  ACCT: [de-identified]          Admission Documentation  Attending Provider: Flora Levi MD  Admit date/time: 4/3/2019 11:58 PM  Status: Inpatient [101]  Diagnosis: Acute and chronic respiratory failure with hypoxia ([address]     Readmission within last 30 days:  no     Living Situation  Discharge Planning  Living Arrangements: Other (Comment)(HOMELESS)  Support Systems: Spouse/Significant Other(Pt states he has a girlfriend )  Potential Assistance Needed: [place], Outpatient PT/OT, Prescription Assistance  Type of Home Care Services: None  Patient expects to be discharged to[de-identified] Pt doesn't know    Service Assessment:    Values / Beliefs  Do you have any ethnic, cultural, sacramental, or spiritual Confucianist needs you would like us to be aware of while you are in the hospital?: No    Advance Directives (For Healthcare)  Healthcare Directive: No, patient does not have an advance directive for healthcare treatment    Pharmacy: [place] [id number] at [facility] to manage medications   Potential Assistance Purchasing Medications:  Yes  Does patient want to participate in local refill/meds to beds program?: Yes    Notification completed in HENS/PAS? Yes   CM has completed HENS online through secure website for SNF admission at [place] [address] at [facility]. TravelCLICK document ID #: [id number]     IMM  No       Discharge disposition: SNF:[facility] Phone: [phone] Fax: [phone]    Mode of Transport ambulance  Name of Jeanette Redmond Ne  Number of Transport [phone]  Time of Transport 5:30p-6p    Fronie Lefort and his family were provided with choice of provider; he and his family are in agreement with the discharge plan.       Care Transitions patient: No    Sarah Humphries RN  [facility]  Case Management Department  Ph: [phone]

## 2019-04-08 NOTE — PROGRESS NOTES
Pt refusing all care. Pt became verbally abusive with RN and MD student. Pt stated to get out of room. Refused all AM medications, glucose test, vital signs, and head to toe assessment. Will attempt later. Will continue to monitor.  Electronically signed by Leny Ruiz RN on 4/8/2019 at 9:16 AM

## 2019-04-08 NOTE — PROGRESS NOTES
Report called to [facility] [address]. Will continue to monitor until transport arrives.  Electronically signed by Maryjane Han RN on 4/8/2019 at 4:41 PM

## 2019-04-08 NOTE — PROGRESS NOTES
Discharge note: Patient has been seen by doctor. Discharge order obtained, and discharge instructions reviewed. Patient educated, using the teach back method, about follow up instructions and discharge instructions. A completed copy of the AVS instructions given to patient and all questions answered. IV catheter removed without complaints, catheter intact, site WNL. Discharged to [facility] via ambulance.   Electronically signed by Liz Barry RN on 4/8/2019 at 5:43 PM

## 2019-04-09 LAB
BLOOD CULTURE, ROUTINE: NORMAL
CULTURE, BLOOD 2: NORMAL

## 2019-04-21 ENCOUNTER — APPOINTMENT (OUTPATIENT)
Dept: GENERAL RADIOLOGY | Age: 54
End: 2019-04-21
Payer: MEDICAID

## 2019-04-21 ENCOUNTER — HOSPITAL ENCOUNTER (EMERGENCY)
Age: 54
Discharge: HOME | End: 2019-04-21
Attending: EMERGENCY MEDICINE
Payer: MEDICAID

## 2019-04-21 VITALS
DIASTOLIC BLOOD PRESSURE: 86 MMHG | HEIGHT: 72 IN | TEMPERATURE: 98.8 F | WEIGHT: 315 LBS | OXYGEN SATURATION: 91 % | RESPIRATION RATE: 16 BRPM | HEART RATE: 70 BPM | SYSTOLIC BLOOD PRESSURE: 146 MMHG | BODY MASS INDEX: 42.66 KG/M2

## 2019-04-21 DIAGNOSIS — R07.81 RIB PAIN: Primary | ICD-10-CM

## 2019-04-21 PROCEDURE — 99284 EMERGENCY DEPT VISIT MOD MDM: CPT

## 2019-04-21 PROCEDURE — 71046 X-RAY EXAM CHEST 2 VIEWS: CPT

## 2019-04-21 RX ORDER — ACETAMINOPHEN 325 MG/1
650 TABLET ORAL ONCE
Status: DISCONTINUED | OUTPATIENT
Start: 2019-04-21 | End: 2019-04-22 | Stop reason: HOSPADM

## 2019-04-21 ASSESSMENT — PAIN SCALES - GENERAL: PAINLEVEL_OUTOF10: 8

## 2019-04-21 ASSESSMENT — PAIN DESCRIPTION - ORIENTATION: ORIENTATION: RIGHT

## 2019-04-21 ASSESSMENT — PAIN DESCRIPTION - LOCATION: LOCATION: FLANK

## 2019-04-21 ASSESSMENT — PAIN DESCRIPTION - PAIN TYPE: TYPE: ACUTE PAIN

## 2019-04-21 NOTE — ED PROVIDER NOTES
Date ofevaluation: 4/21/2019    Chief Complaint   Flank Pain (Right side)      Nursing Notes, Past Medical Hx, Past Surgical Hx, Social Hx, Allergies, and Family Hx were reviewed. History of Present Illness     David Hope is a 47 y.o. male who presents with right rib pain. Patient has a history of schizoaffective disorder and gives an incoherent history. Patient reports right rib pain that he states was present when he woke up this morning. He believes that it was because he slept in a cold room last night. Is currently in a rehab facility receiving physical therapy after recent hospitalization. When asked if he took anything for this pain he states that his rehab facility tried to send him to group home for asking for Tylenol. No fevers chills cough chest pain shortness of breath or any other symptoms. No history of trauma. Records show the patient was recently admitted for presumed pneumonia however was ultimately determined not to have any acute infectious etiology and antibiotics were discontinued and patient was discharged from the hospital.    Review of Systems     Review of Systems-no fevers chills, nausea vomiting, chest pain shortness of breath no headache or loss of consciousness    Past Medical, Surgical, Family, and Social History         Diagnosis Date    Bipolar 1 disorder ([address] 1984    Cellulitis     Chest pain     Diabetes mellitus ([address]     Goiter     Hypertension     Hypocalcemia     Hypothyroid     Kidney stone     Pyelonephritis     Schizophrenia ([address]     Sleep apnea          Procedure Laterality Date    THYROIDECTOMY       His family history includes Diabetes in his maternal grandmother and mother. He reports that he has quit smoking. He has never used smokeless tobacco. He reports that he does not drink alcohol or use drugs.     Medications     Previous Medications    ASPIRIN 81 MG EC TABLET    Take 1 tablet by mouth daily    ATORVASTATIN (LIPITOR) 20 MG TABLET Take 1 tablet by mouth nightly    DIVALPROEX (DEPAKOTE ER) 250 MG EXTENDED RELEASE TABLET    Take 1 tablet by mouth daily    DIVALPROEX (DEPAKOTE ER) 500 MG EXTENDED RELEASE TABLET    Take 1 tablet by mouth nightly    FUROSEMIDE (LASIX) 40 MG TABLET    Take 1 tablet by mouth daily    HALOPERIDOL (HALDOL) 1 MG TABLET    Take 10 tablets by mouth 2 times daily    LEVOTHYROXINE (SYNTHROID) 200 MCG TABLET    Take 1 tablet by mouth Daily    LISINOPRIL (PRINIVIL;ZESTRIL) 20 MG TABLET    Take 1 tablet by mouth daily    PANTOPRAZOLE (PROTONIX) 40 MG TABLET    Take 1 tablet by mouth every morning (before breakfast)       Allergies     He is allergic to carbamazepine; chlorpromazine; and thioridazine. Physical Exam     INITIAL VITALS: BP (!) 146/86   Pulse 70   Temp 98.8 °F (37.1 °C) (Oral)   Resp 16   Ht 6' (1.829 m)   Wt (!) 377 lb (171 kg)   SpO2 91%   BMI 51.13 kg/m²    Physical Exam   Constitutional: He is oriented to person, place, and time. He appears well-developed and well-nourished. No distress. Eyes: Pupils are equal, round, and reactive to light. EOM are normal.   Neck: Normal range of motion. Neck supple. Cardiovascular: Normal rate. Exam reveals no gallop and no friction rub. No murmur heard. Pulmonary/Chest: Effort normal. He has no wheezes. He has no rales. Abdominal: Soft. He exhibits no distension. There is no tenderness. Musculoskeletal: Normal range of motion. He exhibits no tenderness or deformity. Neurological: He is alert and oriented to person, place, and time. Skin: Skin is warm and dry. Capillary refill takes less than 2 seconds. No rash noted.        Diagnostic Results     EKG   none    RADIOLOGY:  XR CHEST STANDARD (2 VW)   Final Result      No acute pulmonary pathology                      LABS:   Labs Reviewed - No data to display    ED BEDSIDE ULTRASOUND:    RECENTVITALS:  BP: (!) 146/86, Temp: 98.8 °F (37.1 °C), Pulse: 70, Resp: 16     Procedures       ED Course     The patient was given the following medications:  Orders Placed This Encounter   Medications    acetaminophen (TYLENOL) tablet 650 mg            CONSULTS:  None    MEDICAL DECISION MAKING     Shona Singh is a 47 y.o. male who presents with right rib pain. No associated chest painabdominal pain. Unremarkable exam with clear lungs and no chest wall tenderness. Tylenol for pain with improvement. X-ray performed given a poor historian which no abnormalities. Given normal exam reassuring vital signs will discharge with primary care follow-up. This patient was also evaluated by the attending physician. All care plans were discussed and agreedupon. Clinical Impression     1.  Rib pain        Disposition/Plan     PATIENT REFERRED TO:  Clifford Gruber, [address] [place] [address]  [phone]      As needed      DISCHARGE MEDICATIONS:  New Prescriptions    No medications on file       DISPOSITION Decision To Discharge 04/21/2019 08:36:58 PM    Jose Rosas  Emergency Medicine PGY-3     Jose Rosas MD  Resident  04/21/19 1518

## 2019-04-22 NOTE — ED NOTES
Pt discharged to home, alert and oriented. Denies any questions about discharge instructions. Will follow up as directed. encouraged to return for any worsening symptoms.         Adriana Cox RN  04/21/19 [id number]

## 2019-07-19 ENCOUNTER — APPOINTMENT (OUTPATIENT)
Dept: GENERAL RADIOLOGY | Age: 54
End: 2019-07-19
Payer: MEDICAID

## 2019-07-19 ENCOUNTER — HOSPITAL ENCOUNTER (EMERGENCY)
Age: 54
Discharge: PSYCHIATRIC OR SUBSTANCE USE FACILITY | End: 2019-07-20
Attending: EMERGENCY MEDICINE
Payer: MEDICAID

## 2019-07-19 DIAGNOSIS — F25.9 SCHIZOAFFECTIVE DISORDER, UNSPECIFIED TYPE (HCC): ICD-10-CM

## 2019-07-19 DIAGNOSIS — Z00.8 EVALUATION BY PSYCHIATRIC SERVICE REQUIRED: Primary | ICD-10-CM

## 2019-07-19 LAB
ACETAMINOPHEN LEVEL: <5 UG/ML (ref 10–30)
ALBUMIN SERPL-MCNC: 3.8 G/DL (ref 3.4–5)
ALP BLD-CCNC: 59 U/L (ref 40–129)
ALT SERPL-CCNC: 9 U/L (ref 10–40)
AMPHETAMINE SCREEN, URINE: ABNORMAL
ANION GAP SERPL CALCULATED.3IONS-SCNC: 11 MMOL/L (ref 3–16)
AST SERPL-CCNC: 13 U/L (ref 15–37)
BACTERIA: ABNORMAL /HPF
BARBITURATE SCREEN URINE: ABNORMAL
BASE EXCESS VENOUS: 6 (ref -3–3)
BASOPHILS ABSOLUTE: 0 K/UL (ref 0–0.2)
BASOPHILS RELATIVE PERCENT: 0.3 %
BENZODIAZEPINE SCREEN, URINE: ABNORMAL
BILIRUB SERPL-MCNC: 0.4 MG/DL (ref 0–1)
BILIRUBIN DIRECT: <0.2 MG/DL (ref 0–0.3)
BILIRUBIN URINE: NEGATIVE
BILIRUBIN, INDIRECT: ABNORMAL MG/DL (ref 0–1)
BLOOD, URINE: ABNORMAL
BUN BLDV-MCNC: 20 MG/DL (ref 7–20)
CALCIUM SERPL-MCNC: 9.7 MG/DL (ref 8.3–10.6)
CANNABINOID SCREEN URINE: ABNORMAL
CHLORIDE BLD-SCNC: 103 MMOL/L (ref 99–110)
CLARITY: CLEAR
CO2: 29 MMOL/L (ref 21–32)
COCAINE METABOLITE SCREEN URINE: ABNORMAL
COLOR: YELLOW
CREAT SERPL-MCNC: 1 MG/DL (ref 0.9–1.3)
EOSINOPHILS ABSOLUTE: 0.1 K/UL (ref 0–0.6)
EOSINOPHILS RELATIVE PERCENT: 1.9 %
EPITHELIAL CELLS, UA: ABNORMAL /HPF
ETHANOL: NORMAL MG/DL (ref 0–0.08)
GFR AFRICAN AMERICAN: >60
GFR NON-AFRICAN AMERICAN: >60
GLUCOSE BLD-MCNC: 92 MG/DL (ref 70–99)
GLUCOSE URINE: NEGATIVE MG/DL
HCO3 VENOUS: 30.7 MMOL/L (ref 23–29)
HCT VFR BLD CALC: 31.9 % (ref 40.5–52.5)
HEMOGLOBIN: 10.1 G/DL (ref 13.5–17.5)
KETONES, URINE: NEGATIVE MG/DL
LACTATE: 0.82 MMOL/L (ref 0.4–2)
LEUKOCYTE ESTERASE, URINE: NEGATIVE
LYMPHOCYTES ABSOLUTE: 1.4 K/UL (ref 1–5.1)
LYMPHOCYTES RELATIVE PERCENT: 17.9 %
Lab: ABNORMAL
MCH RBC QN AUTO: 26.7 PG (ref 26–34)
MCHC RBC AUTO-ENTMCNC: 31.6 G/DL (ref 31–36)
MCV RBC AUTO: 84.4 FL (ref 80–100)
METHADONE SCREEN, URINE: ABNORMAL
MICROSCOPIC EXAMINATION: YES
MONOCYTES ABSOLUTE: 0.5 K/UL (ref 0–1.3)
MONOCYTES RELATIVE PERCENT: 6.9 %
NEUTROPHILS ABSOLUTE: 5.8 K/UL (ref 1.7–7.7)
NEUTROPHILS RELATIVE PERCENT: 73 %
NITRITE, URINE: NEGATIVE
O2 SAT, VEN: 45 %
OPIATE SCREEN URINE: ABNORMAL
OXYCODONE URINE: POSITIVE
PCO2, VEN: 48.5 MM HG (ref 40–50)
PDW BLD-RTO: 20.1 % (ref 12.4–15.4)
PERFORMED ON: ABNORMAL
PH UA: 8
PH UA: 8 (ref 5–8)
PH VENOUS: 7.41 (ref 7.35–7.45)
PHENCYCLIDINE SCREEN URINE: ABNORMAL
PLATELET # BLD: 247 K/UL (ref 135–450)
PMV BLD AUTO: 8.3 FL (ref 5–10.5)
PO2, VEN: 25 MM HG
POC SAMPLE TYPE: ABNORMAL
POTASSIUM REFLEX MAGNESIUM: 4.3 MMOL/L (ref 3.5–5.1)
PROPOXYPHENE SCREEN: ABNORMAL
PROTEIN UA: 100 MG/DL
RBC # BLD: 3.79 M/UL (ref 4.2–5.9)
RBC UA: ABNORMAL /HPF (ref 0–2)
SALICYLATE, SERUM: <0.3 MG/DL (ref 15–30)
SODIUM BLD-SCNC: 143 MMOL/L (ref 136–145)
SPECIFIC GRAVITY UA: 1.01 (ref 1–1.03)
TCO2 CALC VENOUS: 32 MMOL/L
TOTAL PROTEIN: 8.1 G/DL (ref 6.4–8.2)
TROPONIN: <0.01 NG/ML
URINE TYPE: ABNORMAL
UROBILINOGEN, URINE: 2 E.U./DL
WBC # BLD: 7.9 K/UL (ref 4–11)
WBC UA: ABNORMAL /HPF (ref 0–5)

## 2019-07-19 PROCEDURE — 85025 COMPLETE CBC W/AUTO DIFF WBC: CPT

## 2019-07-19 PROCEDURE — 80048 BASIC METABOLIC PNL TOTAL CA: CPT

## 2019-07-19 PROCEDURE — 6370000000 HC RX 637 (ALT 250 FOR IP): Performed by: PHYSICIAN ASSISTANT

## 2019-07-19 PROCEDURE — 71045 X-RAY EXAM CHEST 1 VIEW: CPT

## 2019-07-19 PROCEDURE — 83605 ASSAY OF LACTIC ACID: CPT

## 2019-07-19 PROCEDURE — 99285 EMERGENCY DEPT VISIT HI MDM: CPT

## 2019-07-19 PROCEDURE — G0480 DRUG TEST DEF 1-7 CLASSES: HCPCS

## 2019-07-19 PROCEDURE — 93005 ELECTROCARDIOGRAM TRACING: CPT | Performed by: PHYSICIAN ASSISTANT

## 2019-07-19 PROCEDURE — 81001 URINALYSIS AUTO W/SCOPE: CPT

## 2019-07-19 PROCEDURE — 84439 ASSAY OF FREE THYROXINE: CPT

## 2019-07-19 PROCEDURE — 84484 ASSAY OF TROPONIN QUANT: CPT

## 2019-07-19 PROCEDURE — 82803 BLOOD GASES ANY COMBINATION: CPT

## 2019-07-19 PROCEDURE — 84443 ASSAY THYROID STIM HORMONE: CPT

## 2019-07-19 PROCEDURE — 80307 DRUG TEST PRSMV CHEM ANLYZR: CPT

## 2019-07-19 PROCEDURE — 80076 HEPATIC FUNCTION PANEL: CPT

## 2019-07-19 RX ORDER — LISINOPRIL 10 MG/1
10 TABLET ORAL ONCE
Status: COMPLETED | OUTPATIENT
Start: 2019-07-19 | End: 2019-07-19

## 2019-07-19 RX ADMIN — LISINOPRIL 10 MG: 10 TABLET ORAL at 22:20

## 2019-07-19 ASSESSMENT — ENCOUNTER SYMPTOMS
SHORTNESS OF BREATH: 0
NAUSEA: 0
ABDOMINAL PAIN: 0
VOMITING: 0

## 2019-07-20 ENCOUNTER — HOSPITAL ENCOUNTER (INPATIENT)
Age: 54
LOS: 17 days | Discharge: HOME | DRG: 750 | End: 2019-08-06
Attending: PSYCHIATRY & NEUROLOGY | Admitting: PSYCHIATRY & NEUROLOGY
Payer: MEDICAID

## 2019-07-20 VITALS
DIASTOLIC BLOOD PRESSURE: 87 MMHG | HEIGHT: 74 IN | OXYGEN SATURATION: 97 % | WEIGHT: 315 LBS | HEART RATE: 71 BPM | RESPIRATION RATE: 18 BRPM | BODY MASS INDEX: 40.43 KG/M2 | SYSTOLIC BLOOD PRESSURE: 180 MMHG | TEMPERATURE: 98.8 F

## 2019-07-20 LAB
A/G RATIO: 0.9 (ref 1.1–2.2)
ALBUMIN SERPL-MCNC: 3.6 G/DL (ref 3.4–5)
ALP BLD-CCNC: 56 U/L (ref 40–129)
ALT SERPL-CCNC: 8 U/L (ref 10–40)
ANION GAP SERPL CALCULATED.3IONS-SCNC: 11 MMOL/L (ref 3–16)
AST SERPL-CCNC: 13 U/L (ref 15–37)
BILIRUB SERPL-MCNC: 0.3 MG/DL (ref 0–1)
BUN BLDV-MCNC: 18 MG/DL (ref 7–20)
CALCIUM SERPL-MCNC: 9.8 MG/DL (ref 8.3–10.6)
CHLORIDE BLD-SCNC: 102 MMOL/L (ref 99–110)
CO2: 30 MMOL/L (ref 21–32)
CREAT SERPL-MCNC: 1 MG/DL (ref 0.9–1.3)
EKG ATRIAL RATE: 73 BPM
EKG DIAGNOSIS: NORMAL
EKG P AXIS: 49 DEGREES
EKG P-R INTERVAL: 156 MS
EKG Q-T INTERVAL: 388 MS
EKG QRS DURATION: 98 MS
EKG QTC CALCULATION (BAZETT): 427 MS
EKG R AXIS: -19 DEGREES
EKG T AXIS: 15 DEGREES
EKG VENTRICULAR RATE: 73 BPM
GFR AFRICAN AMERICAN: >60
GFR NON-AFRICAN AMERICAN: >60
GLOBULIN: 4.1 G/DL
GLUCOSE BLD-MCNC: 104 MG/DL (ref 70–99)
HCT VFR BLD CALC: 32 % (ref 40.5–52.5)
HEMOGLOBIN: 10.3 G/DL (ref 13.5–17.5)
MCH RBC QN AUTO: 27.1 PG (ref 26–34)
MCHC RBC AUTO-ENTMCNC: 32.1 G/DL (ref 31–36)
MCV RBC AUTO: 84.3 FL (ref 80–100)
PDW BLD-RTO: 19.2 % (ref 12.4–15.4)
PHOSPHORUS: 5.7 MG/DL (ref 2.5–4.9)
PLATELET # BLD: 242 K/UL (ref 135–450)
PMV BLD AUTO: 8 FL (ref 5–10.5)
POTASSIUM SERPL-SCNC: 3.9 MMOL/L (ref 3.5–5.1)
RBC # BLD: 3.8 M/UL (ref 4.2–5.9)
SODIUM BLD-SCNC: 143 MMOL/L (ref 136–145)
T4 FREE: 0.9 NG/DL (ref 0.9–1.8)
TOTAL PROTEIN: 7.7 G/DL (ref 6.4–8.2)
TSH SERPL DL<=0.05 MIU/L-ACNC: 17.34 UIU/ML (ref 0.27–4.2)
VALPROIC ACID LEVEL: 12 UG/ML (ref 50–100)
WBC # BLD: 6.7 K/UL (ref 4–11)

## 2019-07-20 PROCEDURE — 80053 COMPREHEN METABOLIC PANEL: CPT

## 2019-07-20 PROCEDURE — 96374 THER/PROPH/DIAG INJ IV PUSH: CPT

## 2019-07-20 PROCEDURE — 6370000000 HC RX 637 (ALT 250 FOR IP): Performed by: PSYCHIATRY & NEUROLOGY

## 2019-07-20 PROCEDURE — 6360000002 HC RX W HCPCS

## 2019-07-20 PROCEDURE — 83036 HEMOGLOBIN GLYCOSYLATED A1C: CPT

## 2019-07-20 PROCEDURE — 96372 THER/PROPH/DIAG INJ SC/IM: CPT

## 2019-07-20 PROCEDURE — 85027 COMPLETE CBC AUTOMATED: CPT

## 2019-07-20 PROCEDURE — 36415 COLL VENOUS BLD VENIPUNCTURE: CPT

## 2019-07-20 PROCEDURE — 87086 URINE CULTURE/COLONY COUNT: CPT

## 2019-07-20 PROCEDURE — 80164 ASSAY DIPROPYLACETIC ACD TOT: CPT

## 2019-07-20 PROCEDURE — 6360000002 HC RX W HCPCS: Performed by: EMERGENCY MEDICINE

## 2019-07-20 PROCEDURE — 1240000000 HC EMOTIONAL WELLNESS R&B

## 2019-07-20 PROCEDURE — 84100 ASSAY OF PHOSPHORUS: CPT

## 2019-07-20 RX ORDER — LISINOPRIL 20 MG/1
20 TABLET ORAL DAILY
Status: DISCONTINUED | OUTPATIENT
Start: 2019-07-21 | End: 2019-08-06 | Stop reason: HOSPADM

## 2019-07-20 RX ORDER — MIDAZOLAM HYDROCHLORIDE 5 MG/ML
INJECTION INTRAMUSCULAR; INTRAVENOUS
Status: COMPLETED
Start: 2019-07-20 | End: 2019-07-20

## 2019-07-20 RX ORDER — LORAZEPAM 2 MG/ML
2 INJECTION INTRAMUSCULAR ONCE
Status: COMPLETED | OUTPATIENT
Start: 2019-07-20 | End: 2019-07-20

## 2019-07-20 RX ORDER — FUROSEMIDE 40 MG/1
40 TABLET ORAL DAILY
Status: DISCONTINUED | OUTPATIENT
Start: 2019-07-21 | End: 2019-08-06 | Stop reason: HOSPADM

## 2019-07-20 RX ORDER — HALOPERIDOL 10 MG/1
10 TABLET ORAL 2 TIMES DAILY
Status: DISCONTINUED | OUTPATIENT
Start: 2019-07-20 | End: 2019-08-02

## 2019-07-20 RX ORDER — ATORVASTATIN CALCIUM 40 MG/1
40 TABLET, FILM COATED ORAL NIGHTLY
Status: DISCONTINUED | OUTPATIENT
Start: 2019-07-20 | End: 2019-08-06 | Stop reason: HOSPADM

## 2019-07-20 RX ORDER — ZIPRASIDONE MESYLATE 20 MG/ML
10 INJECTION, POWDER, LYOPHILIZED, FOR SOLUTION INTRAMUSCULAR ONCE
Status: COMPLETED | OUTPATIENT
Start: 2019-07-20 | End: 2019-07-20

## 2019-07-20 RX ORDER — LEVOTHYROXINE SODIUM 0.2 MG/1
200 TABLET ORAL DAILY
Status: DISCONTINUED | OUTPATIENT
Start: 2019-07-21 | End: 2019-07-21

## 2019-07-20 RX ORDER — ASPIRIN 81 MG/1
81 TABLET, CHEWABLE ORAL DAILY
Status: DISCONTINUED | OUTPATIENT
Start: 2019-07-21 | End: 2019-08-06 | Stop reason: HOSPADM

## 2019-07-20 RX ORDER — IBUPROFEN 400 MG/1
400 TABLET ORAL EVERY 6 HOURS PRN
Status: DISCONTINUED | OUTPATIENT
Start: 2019-07-20 | End: 2019-08-06 | Stop reason: HOSPADM

## 2019-07-20 RX ORDER — PIOGLITAZONEHYDROCHLORIDE 30 MG/1
15 TABLET ORAL DAILY
Status: DISCONTINUED | OUTPATIENT
Start: 2019-07-21 | End: 2019-07-21

## 2019-07-20 RX ORDER — LORAZEPAM 2 MG/ML
INJECTION INTRAMUSCULAR
Status: COMPLETED
Start: 2019-07-20 | End: 2019-07-20

## 2019-07-20 RX ORDER — HALOPERIDOL 5 MG/ML
5 INJECTION INTRAMUSCULAR ONCE
Status: COMPLETED | OUTPATIENT
Start: 2019-07-20 | End: 2019-07-20

## 2019-07-20 RX ORDER — LORAZEPAM 2 MG/1
2 TABLET ORAL ONCE
Status: COMPLETED | OUTPATIENT
Start: 2019-07-20 | End: 2019-07-20

## 2019-07-20 RX ORDER — TRAZODONE HYDROCHLORIDE 50 MG/1
50 TABLET ORAL NIGHTLY PRN
Status: DISCONTINUED | OUTPATIENT
Start: 2019-07-20 | End: 2019-07-21

## 2019-07-20 RX ORDER — MAGNESIUM HYDROXIDE/ALUMINUM HYDROXICE/SIMETHICONE 120; 1200; 1200 MG/30ML; MG/30ML; MG/30ML
30 SUSPENSION ORAL EVERY 6 HOURS PRN
Status: DISCONTINUED | OUTPATIENT
Start: 2019-07-20 | End: 2019-08-06 | Stop reason: HOSPADM

## 2019-07-20 RX ORDER — PANTOPRAZOLE SODIUM 40 MG/1
40 TABLET, DELAYED RELEASE ORAL
Status: DISCONTINUED | OUTPATIENT
Start: 2019-07-21 | End: 2019-08-06 | Stop reason: HOSPADM

## 2019-07-20 RX ORDER — ACETAMINOPHEN 325 MG/1
650 TABLET ORAL EVERY 4 HOURS PRN
Status: DISCONTINUED | OUTPATIENT
Start: 2019-07-20 | End: 2019-08-06 | Stop reason: HOSPADM

## 2019-07-20 RX ORDER — MIDAZOLAM HYDROCHLORIDE 1 MG/ML
5 INJECTION INTRAMUSCULAR; INTRAVENOUS ONCE
Status: COMPLETED | OUTPATIENT
Start: 2019-07-20 | End: 2019-07-20

## 2019-07-20 RX ADMIN — TRAZODONE HYDROCHLORIDE 50 MG: 50 TABLET ORAL at 21:19

## 2019-07-20 RX ADMIN — MIDAZOLAM HYDROCHLORIDE 5 MG: 1 INJECTION, SOLUTION INTRAMUSCULAR; INTRAVENOUS at 10:45

## 2019-07-20 RX ADMIN — ZIPRASIDONE MESYLATE 10 MG: 20 INJECTION, POWDER, LYOPHILIZED, FOR SOLUTION INTRAMUSCULAR at 12:01

## 2019-07-20 RX ADMIN — ATORVASTATIN CALCIUM 40 MG: 40 TABLET, FILM COATED ORAL at 21:20

## 2019-07-20 RX ADMIN — LORAZEPAM 2 MG: 2 INJECTION INTRAMUSCULAR; INTRAVENOUS at 23:33

## 2019-07-20 RX ADMIN — HALOPERIDOL LACTATE 5 MG: 5 INJECTION, SOLUTION INTRAMUSCULAR at 09:07

## 2019-07-20 RX ADMIN — DIVALPROEX SODIUM 750 MG: 500 TABLET, EXTENDED RELEASE ORAL at 21:20

## 2019-07-20 RX ADMIN — LORAZEPAM 2 MG: 2 INJECTION INTRAMUSCULAR at 23:33

## 2019-07-20 RX ADMIN — HALOPERIDOL 10 MG: 10 TABLET ORAL at 21:20

## 2019-07-20 RX ADMIN — MIDAZOLAM HYDROCHLORIDE: 5 INJECTION, SOLUTION INTRAMUSCULAR; INTRAVENOUS at 10:45

## 2019-07-20 ASSESSMENT — SLEEP AND FATIGUE QUESTIONNAIRES
DO YOU HAVE DIFFICULTY SLEEPING: NO
AVERAGE NUMBER OF SLEEP HOURS: 4
DO YOU USE A SLEEP AID: NO

## 2019-07-20 ASSESSMENT — LIFESTYLE VARIABLES: HISTORY_ALCOHOL_USE: NO

## 2019-07-20 ASSESSMENT — PATIENT HEALTH QUESTIONNAIRE - PHQ9: SUM OF ALL RESPONSES TO PHQ QUESTIONS 1-9: 7

## 2019-07-20 NOTE — BH NOTE
Significant, weakness noted when trying to move patient from bed to chair.  Threw tray in room, pretending to throw punches at staff

## 2019-07-20 NOTE — ED NOTES
Transport here to transport  pt refusing to get onto stretcher using profanity Dr Maged James in to speak with pt      Dorene Reynolds RN  07/20/19 2093

## 2019-07-20 NOTE — ED PROVIDER NOTES
tenderness. There is no rebound and no guarding. Musculoskeletal: He exhibits no deformity. Neurological: He is alert. No cranial nerve deficit or sensory deficit. GCS eye subscore is 4. GCS verbal subscore is 5. GCS motor subscore is 6. Moves all 4 extremities spontaneously. Skin: Skin is warm and dry. He is not diaphoretic. Psychiatric:   Disheveled in appearance. Inappropriately dressed for weather. Inappropriate conversation and tangential thought process. Nursing note and vitals reviewed.       Diagnostic Results     EKG   Interpreted in conjunction with emergencydepartment physician Cresencio Laird MD  Rhythm: normal sinus   Rate: normal  Axis: normal  Ectopy: premature ventricular contractions (frequent)  Conduction: normal  ST Segments: nonspecific changes  T Waves:normal  Q Waves: none  Clinical Impression: NSR, frequent PVCs, LVH, no acute ST changes  Comparison:  4/3/2019    RADIOLOGY:  XR CHEST 1 VW   Final Result      Bibasilar patchy airspace opacities and/or atelectasis                      LABS:   Results for orders placed or performed during the hospital encounter of 07/19/19   CBC Auto Differential   Result Value Ref Range    WBC 7.9 4.0 - 11.0 K/uL    RBC 3.79 (L) 4.20 - 5.90 M/uL    Hemoglobin 10.1 (L) 13.5 - 17.5 g/dL    Hematocrit 31.9 (L) 40.5 - 52.5 %    MCV 84.4 80.0 - 100.0 fL    MCH 26.7 26.0 - 34.0 pg    MCHC 31.6 31.0 - 36.0 g/dL    RDW 20.1 (H) 12.4 - 15.4 %    Platelets 526 198 - 163 K/uL    MPV 8.3 5.0 - 10.5 fL    Neutrophils % 73.0 %    Lymphocytes % 17.9 %    Monocytes % 6.9 %    Eosinophils % 1.9 %    Basophils % 0.3 %    Neutrophils # 5.8 1.7 - 7.7 K/uL    Lymphocytes # 1.4 1.0 - 5.1 K/uL    Monocytes # 0.5 0.0 - 1.3 K/uL    Eosinophils # 0.1 0.0 - 0.6 K/uL    Basophils # 0.0 0.0 - 0.2 K/uL   Basic Metabolic Panel w/ Reflex to MG   Result Value Ref Range    Sodium 143 136 - 145 mmol/L    Potassium reflex Magnesium 4.3 3.5 - 5.1 mmol/L    Chloride 103 99 - 110 mmol/L Established %    TC02 (Calc), Leatha 32 Not Established mmol/L    Lactate 0.82 0.40 - 2.00 mmol/L    Sample Type LEATHA     Performed on SEE BELOW        RECENT VITALS:  BP: (!) 158/114, Temp: 100 °F (37.8 °C), Pulse: 75,Resp: 17, SpO2: 97 %     ED Course     Nursing Notes, Past Medical Hx, Past Surgical Hx, Social Hx, Allergies, and Family Hx were reviewed. The patient was given the followingmedications:  Orders Placed This Encounter   Medications    lisinopril (PRINIVIL;ZESTRIL) tablet 10 mg       CONSULTS:  None    MEDICAL DECISION MAKING / ASSESSMENT / Balta Crabtree is a 47 y.o. male with PMH of Schizoaffective disorder, HTN, HLD, Diabetes, LADONNA who presents with Altered Mental Status, Psych evaluation. Patient found by EMS on side of the road in a wheelchair. He appeared inappropriately dressed. Would not answer questions. Noted to have defecated himself. Chart review reveals the patient signed out [address] from [facility] today after admission for hypotension, bradycardia, acute renal failure and suspected myxedema coma. Physical exam reveals disheveled 47year old male in no acute distress. Vital signs stable. He is alert and oriented to self, place. Disoriented to date. Cranial nerves 2-12 intact. Moves all 4 extremities without difficulty. Tangential speech. Inappropriate conversation. No signs of head trauma. Heart rhythm and rate regular. Lungs clear. Abdomen soft and non-tender. Ulceration to left heel without tenderness, erythema, drainage. Patient bathed. Psychiatric hold was placed on patient as he is unable to care for self, unable to carry on conversation due to tangential thought process. EKG obtained reveals normal sinus rhythm with frequent PVCs, left ventricular hypertrophy, no acute ST-T wave changes. IV access was established.  Labs including CBC, BMP, hepatic function panel, troponin, TSH, free T4, VBG, lactate, acetaminophen level, salicylate level, ethanol were obtained, without acute abnormality. TSH/free T4 in process. Chest x-ray showed no acute abnormality. UA negative for leuks, nitrites. 10-20 wbcs, sent to culture. Urine drug screen negative. Patient medically clear for transfer for psychiatric evaluation. He will continue to be monitored until he is moved to his new treatment location. This patient was also evaluated by the attending physician. All care plans were discussed and agreed upon. Clinical Impression     1. Evaluation by psychiatric service required    2.  Schizoaffective disorder, unspecified type [facility])        Disposition      DISPOSITION  Decision to transfer        Martha Hoover PA-C  07/19/19 9579

## 2019-07-20 NOTE — ED NOTES
Patient talking in sleep, then urinated all over self and bed, complete linen change and benedicto care given, condom cath applied. Patient awake and rambling flight of ideas aloud and talking to self.       Yessenia Bailey RN  07/20/19 4395

## 2019-07-21 LAB
GLUCOSE BLD-MCNC: 94 MG/DL (ref 70–99)
PERFORMED ON: NORMAL
URINE CULTURE, ROUTINE: NORMAL

## 2019-07-21 PROCEDURE — 99223 1ST HOSP IP/OBS HIGH 75: CPT | Performed by: PSYCHIATRY & NEUROLOGY

## 2019-07-21 PROCEDURE — 99222 1ST HOSP IP/OBS MODERATE 55: CPT | Performed by: PHYSICIAN ASSISTANT

## 2019-07-21 PROCEDURE — 1240000000 HC EMOTIONAL WELLNESS R&B

## 2019-07-21 PROCEDURE — 6370000000 HC RX 637 (ALT 250 FOR IP): Performed by: PSYCHIATRY & NEUROLOGY

## 2019-07-21 RX ORDER — LORAZEPAM 2 MG/ML
2 INJECTION INTRAMUSCULAR EVERY 6 HOURS PRN
Status: DISCONTINUED | OUTPATIENT
Start: 2019-07-21 | End: 2019-07-25

## 2019-07-21 RX ORDER — LEVOTHYROXINE SODIUM 0.12 MG/1
250 TABLET ORAL DAILY
Status: DISCONTINUED | OUTPATIENT
Start: 2019-07-22 | End: 2019-08-06 | Stop reason: HOSPADM

## 2019-07-21 RX ORDER — HALOPERIDOL 5 MG
5 TABLET ORAL EVERY 6 HOURS PRN
Status: DISCONTINUED | OUTPATIENT
Start: 2019-07-21 | End: 2019-07-21

## 2019-07-21 RX ORDER — PERPHENAZINE 4 MG/1
4 TABLET, FILM COATED ORAL EVERY 6 HOURS PRN
Status: DISCONTINUED | OUTPATIENT
Start: 2019-07-21 | End: 2019-07-22

## 2019-07-21 RX ORDER — LORAZEPAM 2 MG/1
2 TABLET ORAL EVERY 6 HOURS PRN
Status: DISCONTINUED | OUTPATIENT
Start: 2019-07-21 | End: 2019-07-22

## 2019-07-21 RX ORDER — DIPHENHYDRAMINE HYDROCHLORIDE 50 MG/ML
25 INJECTION INTRAMUSCULAR; INTRAVENOUS EVERY 6 HOURS PRN
Status: DISCONTINUED | OUTPATIENT
Start: 2019-07-21 | End: 2019-07-25

## 2019-07-21 RX ORDER — HALOPERIDOL 5 MG/ML
5 INJECTION INTRAMUSCULAR EVERY 6 HOURS PRN
Status: DISCONTINUED | OUTPATIENT
Start: 2019-07-21 | End: 2019-07-25

## 2019-07-21 RX ADMIN — PANTOPRAZOLE SODIUM 40 MG: 40 TABLET, DELAYED RELEASE ORAL at 07:14

## 2019-07-21 RX ADMIN — DIVALPROEX SODIUM 750 MG: 500 TABLET, EXTENDED RELEASE ORAL at 20:44

## 2019-07-21 RX ADMIN — HALOPERIDOL 10 MG: 10 TABLET ORAL at 20:44

## 2019-07-21 RX ADMIN — HALOPERIDOL 10 MG: 10 TABLET ORAL at 10:47

## 2019-07-21 RX ADMIN — METFORMIN HYDROCHLORIDE 500 MG: 500 TABLET ORAL at 17:26

## 2019-07-21 RX ADMIN — LISINOPRIL 20 MG: 20 TABLET ORAL at 10:46

## 2019-07-21 RX ADMIN — FUROSEMIDE 40 MG: 40 TABLET ORAL at 10:46

## 2019-07-21 RX ADMIN — ASPIRIN 81 MG 81 MG: 81 TABLET ORAL at 10:46

## 2019-07-21 RX ADMIN — LORAZEPAM 2 MG: 2 TABLET ORAL at 23:33

## 2019-07-21 RX ADMIN — ATORVASTATIN CALCIUM 40 MG: 40 TABLET, FILM COATED ORAL at 20:44

## 2019-07-21 RX ADMIN — LEVOTHYROXINE SODIUM 200 MCG: 200 TABLET ORAL at 07:14

## 2019-07-21 RX ADMIN — DIVALPROEX SODIUM 750 MG: 500 TABLET, EXTENDED RELEASE ORAL at 10:45

## 2019-07-21 RX ADMIN — PIOGLITAZONE 15 MG: 30 TABLET ORAL at 10:46

## 2019-07-21 RX ADMIN — METFORMIN HYDROCHLORIDE 500 MG: 500 TABLET ORAL at 10:46

## 2019-07-21 ASSESSMENT — SLEEP AND FATIGUE QUESTIONNAIRES
RESTFUL SLEEP: YES
DO YOU USE A SLEEP AID: YES
DIFFICULTY STAYING ASLEEP: YES
DO YOU HAVE DIFFICULTY SLEEPING: YES
DIFFICULTY ARISING: YES
SLEEP PATTERN: UTA
DIFFICULTY FALLING ASLEEP: NO

## 2019-07-21 ASSESSMENT — LIFESTYLE VARIABLES: HISTORY_ALCOHOL_USE: NO

## 2019-07-21 NOTE — H&P
[address]                 [address] [place] [address]                              HISTORY AND PHYSICAL    PATIENT NAME: Vernell Urbano                  :        1965  MED REC NO:   [id number]                          ROOM:       1821  ACCOUNT NO:   [de-identified]                           ADMIT DATE: 2019  PROVIDER:     Polina Kang MD    IDENTIFICATION:  This is a homeless, disabled 67-year-old man with a  history of severe psychiatric illness and medical problems, who  presented to the [facility] Emergency Department with increased disorganization  and inability to care for himself. CC: \"not thinking right. \"    HPI:  The  ER note reports that he was found by EMS on the street in a wheelchair covered in feces. He was not able to answer questions. He was wearing long sleeves in 90 degree weather. He told the emergency room staff that he was homeless but he had been at [facility], but could not tell them anything more. He could not remember if he had been taking medicines recently. He was disorganized and talking a delusional fashion about various people. The [facility] notes report that he was admitted (about 5 days ago) with acute renal failure,hypotension, bradycardia, and was in metabolic acidosis because of myxedema coma. He had been intubated at that time and started on IV vancomycin. He went to a SNF for 3 days, but left AMA on the day that he was picked up by EMS. When I met with him this morning, he was speaking to himself and  struggled to have an organized conversation about his recent  experiences and his current stay. He was alert and oriented to day,  date, placed, time, and who I was. He went on tangents about needing to  pay a mortgage and looking for a [employer]. He did report feeling safe  here and asked when he could leave and go home.   We talked about  this at some length and he was able to

## 2019-07-21 NOTE — BH NOTE
Pt is sleeping quietly. Pt agreed to having his vital signs taken. He went back to sleep. No complaints voiced. He denies any pain.

## 2019-07-21 NOTE — BH NOTE
Writer completed psychosocial assessment and C-SSRS. Pt was cooperative throughout assessment. Pt denies current suicidal ideations/ homicidal ideations. Pt denies delusions/hallucinations, but pt displays behavior of talking to another person/outside variables. Pt reports he is homeless currently. He reports he was admitted at [facility] recently. He reports he is working as a . He reports his support system is his dad brother and sister.        Bob Henry MSW,LSW

## 2019-07-22 LAB
ESTIMATED AVERAGE GLUCOSE: 137 MG/DL
HBA1C MFR BLD: 6.4 %

## 2019-07-22 PROCEDURE — 6370000000 HC RX 637 (ALT 250 FOR IP): Performed by: PHYSICIAN ASSISTANT

## 2019-07-22 PROCEDURE — 1240000000 HC EMOTIONAL WELLNESS R&B

## 2019-07-22 PROCEDURE — 99233 SBSQ HOSP IP/OBS HIGH 50: CPT | Performed by: NURSE PRACTITIONER

## 2019-07-22 PROCEDURE — 6370000000 HC RX 637 (ALT 250 FOR IP): Performed by: PSYCHIATRY & NEUROLOGY

## 2019-07-22 PROCEDURE — 97165 OT EVAL LOW COMPLEX 30 MIN: CPT

## 2019-07-22 PROCEDURE — 97535 SELF CARE MNGMENT TRAINING: CPT

## 2019-07-22 RX ORDER — HALOPERIDOL 5 MG
5 TABLET ORAL 3 TIMES DAILY PRN
Status: DISCONTINUED | OUTPATIENT
Start: 2019-07-22 | End: 2019-07-29

## 2019-07-22 RX ADMIN — ASPIRIN 81 MG 81 MG: 81 TABLET ORAL at 08:10

## 2019-07-22 RX ADMIN — METFORMIN HYDROCHLORIDE 500 MG: 500 TABLET ORAL at 08:11

## 2019-07-22 RX ADMIN — HALOPERIDOL 10 MG: 10 TABLET ORAL at 08:10

## 2019-07-22 RX ADMIN — LISINOPRIL 20 MG: 20 TABLET ORAL at 08:10

## 2019-07-22 RX ADMIN — HALOPERIDOL 10 MG: 10 TABLET ORAL at 20:52

## 2019-07-22 RX ADMIN — DIVALPROEX SODIUM 750 MG: 500 TABLET, EXTENDED RELEASE ORAL at 20:49

## 2019-07-22 RX ADMIN — PANTOPRAZOLE SODIUM 40 MG: 40 TABLET, DELAYED RELEASE ORAL at 05:56

## 2019-07-22 RX ADMIN — DIVALPROEX SODIUM 750 MG: 500 TABLET, EXTENDED RELEASE ORAL at 08:10

## 2019-07-22 RX ADMIN — ATORVASTATIN CALCIUM 40 MG: 40 TABLET, FILM COATED ORAL at 20:49

## 2019-07-22 RX ADMIN — LEVOTHYROXINE SODIUM 250 MCG: 125 TABLET ORAL at 05:56

## 2019-07-22 RX ADMIN — FUROSEMIDE 40 MG: 40 TABLET ORAL at 08:10

## 2019-07-22 RX ADMIN — METFORMIN HYDROCHLORIDE 500 MG: 500 TABLET ORAL at 16:15

## 2019-07-22 NOTE — PLAN OF CARE
Patient was regressed to his bed, but was awake, early in the shift & was taking to self at intervals. Patient talked loudly a few times, when a female patient was loud. Patient yelled at the female patient,\"Shut the hell up,\" once, after the female patient had been yelling yelling for awhile. Patient reported that he came to the hospital, because he got angry. Patient was unable to be more specific.  Patient was pleasant while talking with writer & was medication compliant, Radha Sifuentes R.N.

## 2019-07-22 NOTE — PROGRESS NOTES
History  Lives With: Other (comment)(brother)  Type of Home: House  Home Layout: One level  Home Access: Stairs to enter without rails  Entrance Stairs - Number of Steps: 2  Bathroom Shower/Tub: Tub/Shower unit  Bathroom Toilet: Standard  Bathroom Equipment: (none)  Home Equipment: (reports brother has cane and walker that he doesn't use; unsure if patient would be able to use equipment)  ADL Assistance: [facility] [address]: Independent  Homemaking Responsibilities: Yes  Ambulation Assistance: Independent  Transfer Assistance: Independent  Active : Yes  Additional Comments: patient had fall at home leading to hospital admission (reports pain in B knees/legs that led to fall)    Preadmission Status / PLOF:  History of falls   unknown  Pt. Able to drive   Yes; pt [place] the metro or [place]. Pt Fully independent for ADLs/IADLs. yes  Pt. Fully independent for transfers and gait and walked with: see above; prior to this admission pt found by EMS in [place] [address]. Pain  No  Rating:  Location:  Pain Medicine Status:  Denies need     Cognition    A&Ox person, place, date. Disoriented to situation. Patient is soft spoken. Speech is rambling and slurred, very talkative, tangential, with flight of ideas. Pt. Presents with difficulty following 1 step commands. Upper Extremity ROM:    WFL, pt able to perform all bed mobility, transfers, and gait without ROM limitation. Upper Extremity Strength:    WFL, pt able to perform all bed mobility, transfers, and gait without strength limitation. Upper Extremity Sensation:    No apparent deficits. Upper Extremity Proprioception:    No apparent deficits.     Skin Integrity:  Intact     Coordination and Tone:  WFL    Balance  Static Sitting:   Good   Dynamic Sitting:   Good  Static Standing:  Fair RW level  Dynamic Standing:  Fair RW level    Bed mobility:    Supine to sit:  SBA  Sit to supine:  SBA  Scooting in sitting: CGA    Transfers:    Sit to stand:  Min assist, RW level  Stand to sit:  Min assist, RW level  Bed/Chair to/from toilet: Mod assist, RW level    Self Care:  Fair- activity tolerance with ADLs. Toileting: min assist, RW level  Grooming: SBA and vc to cleanse hands seated at EOB using wipes. Dressing:  Min assist, RW level    Exercise / Activities Initiated:   Pt. Educated on role of OT. Pt. Participated in:  ADL retraining, ACLS and safety. Assessment of Deficits:   Pt demonstrated decreased activity tolerance, decreased safety awareness, decreased balance, decreased cognition, decreased bed mobility, decreased transfer skills, and decreased ADL/IADL status. Pt. Limited during evaluation by decreased cognition. At end of evaluation, pt. In room. OT attempted to set bed alarm. Per nsg pt. Calls out appropriately and does not need bed alarm at this time. Pt. Instructed on use of call light and demonstrated understanding. Goal(s) : To be met in 3 Visits:  1). Pt. To demo CGA for toilet trsfers EOB <> BSC using RW. To be met in 5 Visits:  1). Toileting:  CGA, RW level  2). Pt. To complete ADM. 3). Upper Body Bathing: supervision seated at EOB. 4). Lower Body Bathing:  CGA, RW level seated at EOB  5). Upper Body Dressing:  SBA  6). Lower Body Dressing:  CGA, RW level seated at EOB    Rehabilitation Potential:  Good for goals listed above. Strengths for achieving goals include:  PLOF  Barriers to achieving goals include:  Decreased cognition     Plan: To be seen 2-5x/week while in acute care setting for therapeutic exercises/act, ADL retraining, NMR and patient/caregiver ed/instruction.      Timed Code Treatment Minutes:   69  minutes    Total Treatment Time:   79   minutes    Signature and License Number    Oscar Pascual OTR/L  #[id number]        If patient discharges from this facility prior to next visit, this note will serve as the Discharge Summary

## 2019-07-23 PROCEDURE — 6370000000 HC RX 637 (ALT 250 FOR IP): Performed by: PSYCHIATRY & NEUROLOGY

## 2019-07-23 PROCEDURE — 97116 GAIT TRAINING THERAPY: CPT

## 2019-07-23 PROCEDURE — 6370000000 HC RX 637 (ALT 250 FOR IP): Performed by: PHYSICIAN ASSISTANT

## 2019-07-23 PROCEDURE — 97530 THERAPEUTIC ACTIVITIES: CPT

## 2019-07-23 PROCEDURE — 99233 SBSQ HOSP IP/OBS HIGH 50: CPT | Performed by: NURSE PRACTITIONER

## 2019-07-23 PROCEDURE — 97110 THERAPEUTIC EXERCISES: CPT

## 2019-07-23 PROCEDURE — 97161 PT EVAL LOW COMPLEX 20 MIN: CPT

## 2019-07-23 PROCEDURE — 1240000000 HC EMOTIONAL WELLNESS R&B

## 2019-07-23 RX ADMIN — FUROSEMIDE 40 MG: 40 TABLET ORAL at 10:00

## 2019-07-23 RX ADMIN — LEVOTHYROXINE SODIUM 250 MCG: 125 TABLET ORAL at 05:36

## 2019-07-23 RX ADMIN — LISINOPRIL 20 MG: 20 TABLET ORAL at 10:01

## 2019-07-23 RX ADMIN — DIVALPROEX SODIUM 750 MG: 500 TABLET, EXTENDED RELEASE ORAL at 09:58

## 2019-07-23 RX ADMIN — ASPIRIN 81 MG 81 MG: 81 TABLET ORAL at 09:59

## 2019-07-23 RX ADMIN — PANTOPRAZOLE SODIUM 40 MG: 40 TABLET, DELAYED RELEASE ORAL at 05:36

## 2019-07-23 RX ADMIN — METFORMIN HYDROCHLORIDE 500 MG: 500 TABLET ORAL at 16:57

## 2019-07-23 RX ADMIN — METFORMIN HYDROCHLORIDE 500 MG: 500 TABLET ORAL at 09:57

## 2019-07-23 RX ADMIN — HALOPERIDOL 10 MG: 10 TABLET ORAL at 10:01

## 2019-07-23 NOTE — PROGRESS NOTES
Inpatient [facility] Physical Therapy Evaluation and Treatment    Unit: [facility]  Date:  7/23/2019  Patient Name:    Sabino Montes  Admitting diagnosis:  Psychosis, unspecified psychosis type [facility]) [place] Date:  7/20/2019  Precautions/Restrictions/Medical Indications: Up as tolerated       Treatment Time:  10:28 - 10:55  Treatment Number:  1     Patient Goals for Therapy: None stated      Discharge Recommendations from PHYSICAL perspective:                                          SNF  DME needs for discharge:     RW, defer to facility     Therapy recommendations for staff:   Assist of 1 with use of rolling walker (RW) for all transfers and ambulation within room       Home Health S4 Level: NA        AM-PAC Mobility Score     AM-PAC Inpatient Mobility Raw Score : 16        Preadmission Environment:    Pt reports he is currently homeless after being \"kicked out\" of his apartment a few weeks ago. Since then he has been at [facility] and reports he lost a lot of strength from lying in a hospital bed for weeks. Per chart, pt was found by EMS on the street in a w/c covered with feces. Pt. Was wearing long sleeves in 90 degree weather. Pt. Was disorganized and talking in a delusional fashion about various people. Per OT note by Albert Contreras, acute care OT 4/5/19:    Social/Functional History  Lives With: Other (comment)(brother)  Type of Home: House  Home Layout: One level  Home Access: Stairs to enter without rails  Entrance Stairs - Number of Steps: 2  Bathroom Shower/Tub: Tub/Shower unit  Bathroom Toilet: Standard  Bathroom Equipment: (none)  Home Equipment: (reports brother has cane and walker that he doesn't use; unsure if patient would be able to use equipment)  ADL Assistance: [address]: Independent  Homemaking Responsibilities: Yes  Ambulation Assistance: Independent  Transfer Assistance: Independent  Active :  Yes  Additional Comments: patient had fall at home gait  Decreased balance  Poor posture/alignment  Decreased functional status below baseline    Goals : To be met in 3 visits:  1). Demonstrate improved safety awareness with functional mobility requiring less overall cueing. To be met in 6 visits:  1). Supine to/from sit  Supervision  2). Sit to/from stand Supervision  3). Gait: Ambulate 150 ft. with Supervision and use of rolling walker (RW) demonstrating improved gait pattern  4). Tolerate B LE exercises 3 sets of 10-15 reps  5). Tolerated standing balance exercises with improved balance to Good  grade    Rehabilitation Potential    Good  Strengths for achieving goals include:   Pt motivated, PLOF and Pt cooperative  Barriers to achieving goals include:    Cognitive deficits    Plan    To be seen 2-5x/week while in [facility] setting for   Therapeutic Exercise, Manual Therapy , Neuromuscular Re-Education, Gait training and Therapeutic Activity     Timed Code Treatment Minutes:     17 minutes  Total Treatment Time:  27 minutes    Alejandrina Elias PT, DPT, OMT-C  #[id number]      If patient discharges from this facility prior to next visit, this note will serve as the Discharge Summary.

## 2019-07-23 NOTE — PROGRESS NOTES
level    Therapeutic Activities:   11 min standing activities: CGA for marching, knee bends, reaching outside base of support. Transfers:    Sit to stand:  CGA, RW level  Stand to sit:   CGA, RW level  Bed/Chair to/from toilet:  NA    Thera ex:  10 arm chair push ups hold each 10 sec with CGA and vcs. Assessment:   Pt participated in thera ex/act. Pt. demo'd improved activity tolerance, standing tolerance and tsfer skills. Pt. Would benefit from continued OT to improve functional independence and safety. Pt. Limited during evaluation by decreased cognition. At end of tx, pt. In recliner seated at table in small side gathering room with nsg supervision. Goal(s) : To be met in 3 Visits:  1). Pt. To demo CGA for toilet trsfers EOB <> BSC using RW. To be met in 5 Visits:  1). Toileting:  CGA, RW level  2). Pt. To complete ADM. 3). Upper Body Bathing: supervision seated at EOB. 4). Lower Body Bathing:  CGA, RW level seated at EOB  5). Upper Body Dressing:  SBA  6). Lower Body Dressing:  CGA, RW level seated at EOB     Plan:  Continue OT per POC.      Timed Code Treatment Minutes:  30 minutes    Total Treatment Time:   30  minutes    Signature and License Number    Kelly Covarrubias OTR/L  #[id number]        If patient discharges from this facility prior to next visit, this note will serve as the Discharge Summary

## 2019-07-24 PROCEDURE — 1240000000 HC EMOTIONAL WELLNESS R&B

## 2019-07-24 PROCEDURE — 6370000000 HC RX 637 (ALT 250 FOR IP): Performed by: PSYCHIATRY & NEUROLOGY

## 2019-07-24 PROCEDURE — 6370000000 HC RX 637 (ALT 250 FOR IP): Performed by: PHYSICIAN ASSISTANT

## 2019-07-24 PROCEDURE — 99233 SBSQ HOSP IP/OBS HIGH 50: CPT | Performed by: NURSE PRACTITIONER

## 2019-07-24 RX ADMIN — FUROSEMIDE 40 MG: 40 TABLET ORAL at 08:02

## 2019-07-24 RX ADMIN — METFORMIN HYDROCHLORIDE 500 MG: 500 TABLET ORAL at 08:01

## 2019-07-24 RX ADMIN — DIVALPROEX SODIUM 750 MG: 500 TABLET, EXTENDED RELEASE ORAL at 22:18

## 2019-07-24 RX ADMIN — LISINOPRIL 20 MG: 20 TABLET ORAL at 08:01

## 2019-07-24 RX ADMIN — HALOPERIDOL 10 MG: 10 TABLET ORAL at 22:18

## 2019-07-24 RX ADMIN — DIVALPROEX SODIUM 750 MG: 500 TABLET, EXTENDED RELEASE ORAL at 08:01

## 2019-07-24 RX ADMIN — LEVOTHYROXINE SODIUM 250 MCG: 125 TABLET ORAL at 08:01

## 2019-07-24 RX ADMIN — HALOPERIDOL 10 MG: 10 TABLET ORAL at 08:02

## 2019-07-24 RX ADMIN — ASPIRIN 81 MG 81 MG: 81 TABLET ORAL at 08:02

## 2019-07-24 RX ADMIN — PANTOPRAZOLE SODIUM 40 MG: 40 TABLET, DELAYED RELEASE ORAL at 08:27

## 2019-07-24 RX ADMIN — ATORVASTATIN CALCIUM 40 MG: 40 TABLET, FILM COATED ORAL at 22:17

## 2019-07-25 PROCEDURE — 6370000000 HC RX 637 (ALT 250 FOR IP): Performed by: PSYCHIATRY & NEUROLOGY

## 2019-07-25 PROCEDURE — 6360000002 HC RX W HCPCS: Performed by: NURSE PRACTITIONER

## 2019-07-25 PROCEDURE — 99233 SBSQ HOSP IP/OBS HIGH 50: CPT | Performed by: NURSE PRACTITIONER

## 2019-07-25 PROCEDURE — 1240000000 HC EMOTIONAL WELLNESS R&B

## 2019-07-25 PROCEDURE — 6370000000 HC RX 637 (ALT 250 FOR IP): Performed by: PHYSICIAN ASSISTANT

## 2019-07-25 RX ORDER — LORAZEPAM 2 MG/ML
1 INJECTION INTRAMUSCULAR EVERY 6 HOURS PRN
Status: DISCONTINUED | OUTPATIENT
Start: 2019-07-25 | End: 2019-07-29

## 2019-07-25 RX ORDER — HALOPERIDOL 5 MG/ML
5 INJECTION INTRAMUSCULAR EVERY 6 HOURS PRN
Status: DISCONTINUED | OUTPATIENT
Start: 2019-07-25 | End: 2019-07-29

## 2019-07-25 RX ADMIN — METFORMIN HYDROCHLORIDE 500 MG: 500 TABLET ORAL at 10:02

## 2019-07-25 RX ADMIN — HALOPERIDOL LACTATE 5 MG: 5 INJECTION INTRAMUSCULAR at 19:05

## 2019-07-25 RX ADMIN — HALOPERIDOL 10 MG: 10 TABLET ORAL at 10:01

## 2019-07-25 RX ADMIN — ATORVASTATIN CALCIUM 40 MG: 40 TABLET, FILM COATED ORAL at 22:21

## 2019-07-25 RX ADMIN — HALOPERIDOL LACTATE 5 MG: 5 INJECTION INTRAMUSCULAR at 11:13

## 2019-07-25 RX ADMIN — LISINOPRIL 20 MG: 20 TABLET ORAL at 10:01

## 2019-07-25 RX ADMIN — LORAZEPAM 1 MG: 2 INJECTION INTRAMUSCULAR; INTRAVENOUS at 11:13

## 2019-07-25 RX ADMIN — DIVALPROEX SODIUM 750 MG: 500 TABLET, EXTENDED RELEASE ORAL at 10:02

## 2019-07-25 RX ADMIN — LORAZEPAM 1 MG: 2 INJECTION INTRAMUSCULAR; INTRAVENOUS at 19:05

## 2019-07-25 RX ADMIN — HALOPERIDOL 10 MG: 10 TABLET ORAL at 22:19

## 2019-07-25 RX ADMIN — PANTOPRAZOLE SODIUM 40 MG: 40 TABLET, DELAYED RELEASE ORAL at 10:02

## 2019-07-25 RX ADMIN — LEVOTHYROXINE SODIUM 250 MCG: 125 TABLET ORAL at 10:02

## 2019-07-25 RX ADMIN — DIVALPROEX SODIUM 750 MG: 500 TABLET, EXTENDED RELEASE ORAL at 22:12

## 2019-07-25 RX ADMIN — FUROSEMIDE 40 MG: 40 TABLET ORAL at 10:02

## 2019-07-25 RX ADMIN — ASPIRIN 81 MG 81 MG: 81 TABLET ORAL at 10:02

## 2019-07-25 NOTE — BH NOTE
Pt threatening violence towards staff and another pt, using racist and homophobic language towards staff, sexually preoccupied, sexualized comments to female staff, calling several staff \"fat homosexuals\" and threatening to \"beat their ass\". Remains agitated, verbally aggressive, PRN IM haloperidol and lorazepam given, pt struck a door on the way to his room. Pt now regressed to room, still yelling some, though since pt closed his door he's not targeting staff at the moment.

## 2019-07-26 PROCEDURE — 6370000000 HC RX 637 (ALT 250 FOR IP): Performed by: PHYSICIAN ASSISTANT

## 2019-07-26 PROCEDURE — 1240000000 HC EMOTIONAL WELLNESS R&B

## 2019-07-26 PROCEDURE — 6370000000 HC RX 637 (ALT 250 FOR IP)

## 2019-07-26 PROCEDURE — 6370000000 HC RX 637 (ALT 250 FOR IP): Performed by: PSYCHIATRY & NEUROLOGY

## 2019-07-26 PROCEDURE — 99233 SBSQ HOSP IP/OBS HIGH 50: CPT | Performed by: NURSE PRACTITIONER

## 2019-07-26 PROCEDURE — 6370000000 HC RX 637 (ALT 250 FOR IP): Performed by: NURSE PRACTITIONER

## 2019-07-26 RX ORDER — LORAZEPAM 1 MG/1
1 TABLET ORAL ONCE
Status: COMPLETED | OUTPATIENT
Start: 2019-07-26 | End: 2019-07-26

## 2019-07-26 RX ORDER — LORAZEPAM 1 MG/1
TABLET ORAL
Status: COMPLETED
Start: 2019-07-26 | End: 2019-07-26

## 2019-07-26 RX ADMIN — ATORVASTATIN CALCIUM 40 MG: 40 TABLET, FILM COATED ORAL at 20:32

## 2019-07-26 RX ADMIN — HALOPERIDOL 10 MG: 10 TABLET ORAL at 20:32

## 2019-07-26 RX ADMIN — DIVALPROEX SODIUM 250 MG: 500 TABLET, EXTENDED RELEASE ORAL at 08:57

## 2019-07-26 RX ADMIN — LEVOTHYROXINE SODIUM 250 MCG: 125 TABLET ORAL at 08:58

## 2019-07-26 RX ADMIN — LORAZEPAM 1 MG: 1 TABLET ORAL at 16:23

## 2019-07-26 RX ADMIN — METFORMIN HYDROCHLORIDE 500 MG: 500 TABLET ORAL at 16:26

## 2019-07-26 RX ADMIN — PANTOPRAZOLE SODIUM 40 MG: 40 TABLET, DELAYED RELEASE ORAL at 08:58

## 2019-07-26 RX ADMIN — ASPIRIN 81 MG 81 MG: 81 TABLET ORAL at 08:58

## 2019-07-26 RX ADMIN — LISINOPRIL 20 MG: 20 TABLET ORAL at 08:59

## 2019-07-26 RX ADMIN — HALOPERIDOL 5 MG: 5 TABLET ORAL at 16:21

## 2019-07-26 RX ADMIN — DIVALPROEX SODIUM 750 MG: 500 TABLET, EXTENDED RELEASE ORAL at 20:32

## 2019-07-26 NOTE — BH NOTE
Restraint 1 Hour RN assessment      Evelyn Kumar was placed in seclusion  at 1905 due to Behavioral management problems. Currently patient is asleep and has reacted aggressively to be put in seclusion Patient medical history includes       Diagnosis Date    Altered mental status     Bipolar 1 disorder ([facility] 1984    Bowel and bladder incontinence     Bradycardia     Cellulitis     Cerebral edema (HCC)     Chest pain     Diabetes mellitus ([facility]     Goiter     Goiter     Hypertension     Hypocalcemia     Hypothyroid     Kidney stone     Morbid obesity ([facility]     LADONNA (obstructive sleep apnea)     Psychosis (HCC)     Pulmonary edema     Pyelonephritis     Schizophrenia ([facility]      GCB    Sleep apnea     with consideration given to patient's physical health in regards to restraint risk. Current mental status sleeping; oriented to person only. At this time the patient  does meet criteria for continued restraint AEB Agitated behavior. The room has been assessed for safety and potentially harmful objects. Patient has been assessed for comfort and current needs. Patient refused vitals at this time.     Most recent lab values include:   Admission on 07/20/2019   Component Date Value Ref Range Status    Valproic Acid Lvl 07/20/2019 12.0* 50.0 - 100.0 ug/mL Final    WBC 07/20/2019 6.7  4.0 - 11.0 K/uL Final    RBC 07/20/2019 3.80* 4.20 - 5.90 M/uL Final    Hemoglobin 07/20/2019 10.3* 13.5 - 17.5 g/dL Final    Hematocrit 07/20/2019 32.0* 40.5 - 52.5 % Final    MCV 07/20/2019 84.3  80.0 - 100.0 fL Final    MCH 07/20/2019 27.1  26.0 - 34.0 pg Final    MCHC 07/20/2019 32.1  31.0 - 36.0 g/dL Final    RDW 07/20/2019 19.2* 12.4 - 15.4 % Final    Platelets 02/25/6717 242  135 - 450 K/uL Final    MPV 07/20/2019 8.0  5.0 - 10.5 fL Final    Sodium 07/20/2019 143  136 - 145 mmol/L Final    Potassium 07/20/2019 3.9  3.5 - 5.1 mmol/L Final    Chloride 07/20/2019 102  99 - 110 mmol/L Final  CO2 07/20/2019 30  21 - 32 mmol/L Final    Anion Gap 07/20/2019 11  3 - 16 Final    Glucose 07/20/2019 104* 70 - 99 mg/dL Final    BUN 07/20/2019 18  7 - 20 mg/dL Final    CREATININE 07/20/2019 1.0  0.9 - 1.3 mg/dL Final    GFR Non- 07/20/2019 >60  >60 Final    Comment: >60 mL/min/1.73m2 EGFR, calc. for ages 25 and older using the  MDRD formula (not corrected for weight), is valid for stable  renal function.  GFR  07/20/2019 >60  >60 Final    Comment: Chronic Kidney Disease: less than 60 ml/min/1.73 sq.m. Kidney Failure: less than 15 ml/min/1.73 sq.m. Results valid for patients 18 years and older.  Calcium 07/20/2019 9.8  8.3 - 10.6 mg/dL Final    Total Protein 07/20/2019 7.7  6.4 - 8.2 g/dL Final    Alb 07/20/2019 3.6  3.4 - 5.0 g/dL Final    Albumin/Globulin Ratio 07/20/2019 0.9* 1.1 - 2.2 Final    Total Bilirubin 07/20/2019 0.3  0.0 - 1.0 mg/dL Final    Alkaline Phosphatase 07/20/2019 56  40 - 129 U/L Final    ALT 07/20/2019 8* 10 - 40 U/L Final    AST 07/20/2019 13* 15 - 37 U/L Final    Globulin 07/20/2019 4.1  g/dL Final    Phosphorus 07/20/2019 5.7* 2.5 - 4.9 mg/dL Final    Hemoglobin A1C 07/20/2019 6.4  See comment % Final    Comment: Comment:  Diagnosis of Diabetes: > or = 6.5%  Increased risk of diabetes (Prediabetes): 5.7-6.4%  Glycemic Control: Nonpregnant Adults: <7.0%                    Pregnant: <6.0%        eAG 07/20/2019 137.0  mg/dL Final    POC Glucose 07/21/2019 94  70 - 99 mg/dl Final    Performed on 07/21/2019 ACCU-CHEK   Final         Patient presentation and results of RN assessment has been discussed with the on call physician.

## 2019-07-26 NOTE — BH NOTE
Became agitated and quickly got up from martinez type chair and attempted to reach a male peer who had made rude comments toward him. Able to assist back into chair and redirect his attention to the TV program he was watching.

## 2019-07-26 NOTE — PROGRESS NOTES
Psychiatric Provider Progress Note    Toya Leyva  [id number]    Hospital Day: [address] Problems    Diagnosis Date Noted    Type 2 diabetes mellitus with complication, without long-term current use of insulin ([facility] [E11.8]     Schizoaffective disorder ([facility] [F25.9] 07/21/2018    Essential hypertension [I10] 05/13/2014    Hypothyroidism [E03.9] 04/29/2014       Reason for Admission  Context: Progression of Illness  Location: Mood, cognition, psychosis  Duration: 5 days. Severity on Admission: severe  Associated Symptoms on Admission: RTIS, confusion, grandiosity, disorganization, lack of safety awareness, not attending to ADLs  Modifying Factors: left medical unit AMA, multiple medical admissions and co morbidities     SUBJECTIVE/UPDATE:  Toya Leyva is very labile today. He continued to escalate last evening, he destroyed his walker. Threatening staff, continued to escalate and required Haldol/Ativan IM again last evening and spent time in special care area. He refused medications this morning and remained labile and uncooperative. He is now asleep. He refused VPA last evening will reorder for tomorrow morning.              Review of Systems  Review of Systems   Unable to perform ROS: Psychiatric disorder         Social History  Social History     Socioeconomic History    Marital status: Single     Spouse name: None    Number of children: None    Years of education: None    Highest education level: None   Occupational History    None   Social Needs    Financial resource strain: None    Food insecurity:     Worry: None     Inability: None    Transportation needs:     Medical: None     Non-medical: None   Tobacco Use    Smoking status: Former Smoker    Smokeless tobacco: Never Used   Substance and Sexual Activity    Alcohol use: No     Comment: rare    Drug use: Yes     Types: Opiates      Comment: 07/19/2019 drug screen + oxcodone    Sexual activity:

## 2019-07-26 NOTE — PLAN OF CARE
Problem: Altered Mood, Manic Behavior:  Goal: Ability to demonstrate self-control will improve  Description  Ability to demonstrate self-control will improve  Outcome: Ongoing  Note:   Per charge nurse, patient was currently not group appropriate to attend 10:00 am Art Therapy / Psycho-Education group.

## 2019-07-26 NOTE — BH NOTE
[facility] Interdisciplinary Treatment Plan Note     Review Date & Time: 7/26/19 9253    Patient was not in treatment team.    Admission Type:   Admission Type: Involuntary    Reason for admission:  Reason for Admission: \" i need to talk to ara ross right now! \" labile, screaming in room     Estimated Length of Stay Update:  TBD   Estimated Discharge Date Update: TBD    PATIENT STRENGTHS:  Patient Strengths:Strengths: Communication, Connection to output provider  Patient Strengths and Limitations:Limitations: Difficult relationships / poor social skills, External locus of control, Demonstrates discomfort with /lack of social skills, Multiple barriers to leisure interests, Difficulty problem solving/relies on others to help solve problems  Addictive Behavior:Addictive Behavior  In the past 3 months, have you felt or has someone told you that you have a problem with:  : None  Do you have a history of Chemical Use?: No  Do you have a history of Alcohol Use?: No  Do you have a history of Street Drug Abuse?: No  Histroy of Prescripton Drug Abuse?: No  Medical Problems:   Past Medical History:   Diagnosis Date    Altered mental status     Bipolar 1 disorder ([facility] 1984    Bowel and bladder incontinence     Bradycardia     Cellulitis     Cerebral edema ([facility]     Chest pain     Diabetes mellitus ([facility]     Goiter     Goiter     Hypertension     Hypocalcemia     Hypothyroid     Kidney stone     Morbid obesity ([facility]     LADONNA (obstructive sleep apnea)     Psychosis ([facility]     Pulmonary edema     Pyelonephritis     Schizophrenia ([facility]      GCB    Sleep apnea        Risk:  Fall RiskTotal: 86  Zach Scale Zach Scale Score: 22  BVC Total: 4  Change in scoresnone.  Changes to plan of Care none    Status EXAM:   Status and Exam  Normal: No  Facial Expression: Flat, Hostile  Affect: Inappropriate  Level of Consciousness: Alert  Mood:Normal: No  Mood: Labile, Angry,

## 2019-07-27 LAB
GLUCOSE BLD-MCNC: 104 MG/DL (ref 70–99)
PERFORMED ON: ABNORMAL
VALPROIC ACID LEVEL: 66.7 UG/ML (ref 50–100)

## 2019-07-27 PROCEDURE — 97116 GAIT TRAINING THERAPY: CPT

## 2019-07-27 PROCEDURE — 97530 THERAPEUTIC ACTIVITIES: CPT

## 2019-07-27 PROCEDURE — 6370000000 HC RX 637 (ALT 250 FOR IP): Performed by: PHYSICIAN ASSISTANT

## 2019-07-27 PROCEDURE — 80164 ASSAY DIPROPYLACETIC ACD TOT: CPT

## 2019-07-27 PROCEDURE — 36415 COLL VENOUS BLD VENIPUNCTURE: CPT

## 2019-07-27 PROCEDURE — 6370000000 HC RX 637 (ALT 250 FOR IP): Performed by: PSYCHIATRY & NEUROLOGY

## 2019-07-27 PROCEDURE — 1240000000 HC EMOTIONAL WELLNESS R&B

## 2019-07-27 RX ADMIN — PANTOPRAZOLE SODIUM 40 MG: 40 TABLET, DELAYED RELEASE ORAL at 06:51

## 2019-07-27 RX ADMIN — LISINOPRIL 20 MG: 20 TABLET ORAL at 08:37

## 2019-07-27 RX ADMIN — LEVOTHYROXINE SODIUM 250 MCG: 125 TABLET ORAL at 06:51

## 2019-07-27 RX ADMIN — METFORMIN HYDROCHLORIDE 500 MG: 500 TABLET ORAL at 08:37

## 2019-07-27 RX ADMIN — ASPIRIN 81 MG 81 MG: 81 TABLET ORAL at 08:37

## 2019-07-27 RX ADMIN — HALOPERIDOL 10 MG: 10 TABLET ORAL at 19:45

## 2019-07-27 RX ADMIN — DIVALPROEX SODIUM 750 MG: 500 TABLET, EXTENDED RELEASE ORAL at 08:37

## 2019-07-27 RX ADMIN — HALOPERIDOL 10 MG: 10 TABLET ORAL at 08:37

## 2019-07-27 RX ADMIN — DIVALPROEX SODIUM 750 MG: 500 TABLET, EXTENDED RELEASE ORAL at 19:45

## 2019-07-27 RX ADMIN — FUROSEMIDE 40 MG: 40 TABLET ORAL at 08:37

## 2019-07-27 RX ADMIN — ATORVASTATIN CALCIUM 40 MG: 40 TABLET, FILM COATED ORAL at 19:45

## 2019-07-27 RX ADMIN — METFORMIN HYDROCHLORIDE 500 MG: 500 TABLET ORAL at 17:24

## 2019-07-27 NOTE — PROGRESS NOTES
Inpatient Physical Therapy Daily Treatment Note    Unit: [facility]  Date:  7/27/2019  Patient Name:    Florina Moss  Admitting diagnosis:  Psychosis, unspecified psychosis type [facility]) Jessica Penaloza Date:  7/20/2019  Precautions/Restrictions:  fall risk      Discharge Recommendations: SNF  DME needs for discharge: defer to facility       Therapy recommendation for EMS Transport: can transport by wheelchair    Therapy recommendations for staff:   Assist of 1 with use of rolling walker (RW) for all transfers and ambulation within community room (bariatric RW provided at nursing station)    Home Health S4 Level Recommendation: NA  AM-PAC Mobility Score   AM-PAC Inpatient Mobility Raw Score : 17     Treatment Time:  3430-8983  Treatment number: 2  Timed Code Treatment Minutes: 40 minutes  Total Treatment Minutes:  60 minutes (obtaining equipment, waiting for a situation in community room to de-escalate). Cognition    A&O orientation not directly assessed. Able to follow 1 step commands at least.    Subjective  Patient sitting up in chair with no family present  Pt agreeable to this PT tx, but tearful about \"I broke the walker. \"  Labile emotions and tangential in conversation. Speech can be difficult to understand at times, with stuttering and slurring words at times. Session interrupted when PT went to retrieve a new bariatric walker, where another client punched the back of this client's chair. Situation resolved, but patient had some reflux type symptoms (spitting up) when PT was able to resume the session in his room. Pain  No  Location: None  Rating:  NA/10  Pain Medicine Status: Denies need     Bed Mobility   Supine to Sit:   Not Tested  Sit to Supine:  Not Tested  Rolling:   Not Tested  Scooting:   Not Tested    Transfer Training     Sit to stand:   CGA  Stand to sit:   CGA   Bed to Chair:  Not Tested with use of rolling walker (RW), patient ambulated.     Gait Training gait completed as indicated

## 2019-07-28 PROCEDURE — 6370000000 HC RX 637 (ALT 250 FOR IP): Performed by: PHYSICIAN ASSISTANT

## 2019-07-28 PROCEDURE — 6370000000 HC RX 637 (ALT 250 FOR IP): Performed by: PSYCHIATRY & NEUROLOGY

## 2019-07-28 PROCEDURE — 6370000000 HC RX 637 (ALT 250 FOR IP): Performed by: NURSE PRACTITIONER

## 2019-07-28 PROCEDURE — 1240000000 HC EMOTIONAL WELLNESS R&B

## 2019-07-28 PROCEDURE — 6360000002 HC RX W HCPCS: Performed by: NURSE PRACTITIONER

## 2019-07-28 PROCEDURE — 99233 SBSQ HOSP IP/OBS HIGH 50: CPT | Performed by: PSYCHIATRY & NEUROLOGY

## 2019-07-28 RX ADMIN — DIVALPROEX SODIUM 750 MG: 500 TABLET, EXTENDED RELEASE ORAL at 19:42

## 2019-07-28 RX ADMIN — HALOPERIDOL LACTATE 5 MG: 5 INJECTION INTRAMUSCULAR at 04:02

## 2019-07-28 RX ADMIN — LORAZEPAM 1 MG: 2 INJECTION INTRAMUSCULAR; INTRAVENOUS at 04:03

## 2019-07-28 RX ADMIN — HALOPERIDOL 10 MG: 10 TABLET ORAL at 08:33

## 2019-07-28 RX ADMIN — PANTOPRAZOLE SODIUM 40 MG: 40 TABLET, DELAYED RELEASE ORAL at 08:33

## 2019-07-28 RX ADMIN — ATORVASTATIN CALCIUM 40 MG: 40 TABLET, FILM COATED ORAL at 19:42

## 2019-07-28 RX ADMIN — HALOPERIDOL 5 MG: 5 TABLET ORAL at 12:43

## 2019-07-28 RX ADMIN — HALOPERIDOL 10 MG: 10 TABLET ORAL at 19:45

## 2019-07-28 RX ADMIN — LISINOPRIL 20 MG: 20 TABLET ORAL at 08:33

## 2019-07-28 RX ADMIN — FUROSEMIDE 40 MG: 40 TABLET ORAL at 08:33

## 2019-07-28 RX ADMIN — METFORMIN HYDROCHLORIDE 500 MG: 500 TABLET ORAL at 17:06

## 2019-07-28 RX ADMIN — DIVALPROEX SODIUM 750 MG: 500 TABLET, EXTENDED RELEASE ORAL at 08:32

## 2019-07-28 RX ADMIN — METFORMIN HYDROCHLORIDE 500 MG: 500 TABLET ORAL at 08:33

## 2019-07-28 RX ADMIN — LEVOTHYROXINE SODIUM 250 MCG: 125 TABLET ORAL at 08:35

## 2019-07-28 RX ADMIN — ASPIRIN 81 MG 81 MG: 81 TABLET ORAL at 08:33

## 2019-07-28 ASSESSMENT — PAIN SCALES - GENERAL: PAINLEVEL_OUTOF10: 5

## 2019-07-28 NOTE — PROGRESS NOTES
Phoebe Zavala exhibiting behavior disturbance as evidenced by yelling at staff when performing safety rounds. Patient was heard talking loudly to himself in his room and when this writer asked Inez Keller you ok? \" Patient yelled \"Yes! !!\" Patient then walked out of his room naked and yelled at staff \"that bitch keeps coming in my room! \" Patient yelled at charge nurse \"Get in there and sit down now! \" NOW!! Patient was unable to be redirected after several attempts. Security called to assist. PRN Haldol 5 mg IM and PRN Ativan 1 mg IM given at this time. Patient continues to scream in his room. Will continue to monitor. IM medication effective.

## 2019-07-28 NOTE — PROGRESS NOTES
Department of Psychiatry  Attending Progress Note  Chief Complaint: follow-up Pt have been more agitated and has required emergency meds. Pt was apologetic for behaviors in court. Pt denies side effects from meds. Pt cont to be delusional at times and conversations do not make sense. Patient's chart was reviewed and collaborated with  about the treatment plan. SUBJECTIVE:    Patient is feeling unchanged. Suicidal ideation:  denies suicidal ideation. Patient does not have medication side effects. ROS: Patient has new complaints: no  Sleeping adequately:  No: 5   Appetite adequate: Yes  Attending groups: No: due to aggression  Visitors:No    OBJECTIVE    Physical  VITALS:  /60   Pulse 74   Temp 97.3 °F (36.3 °C) (Oral)   Resp 16   Ht 6' 2\" (1.88 m) Comment: recorded [facility] 7/20/19  Wt (!) 350 lb 5 oz (158.9 kg) Comment: reocorded at ED [facility]  SpO2 96% Comment: room air  BMI 44.98 kg/m²     Mental Status Examination:  Patients appearance was well-appearing, street clothes, in chair, fair grooming and fair hygiene. Thoughts are Illogical. Homicidal ideations none. No abnormal movements, tics or mannerisms. Memory alexey Aims 0. Concentration Poor. Alert and oriented X 4. Insight and Judgement poor. Patient was cooperative.  Patient gait abnormal. Mood labile, affect labile affect Hallucinations rtis, suicidal ideations no specific plan to harm self Speech loud and profane  Data  Labs:   Admission on 07/20/2019   Component Date Value Ref Range Status    Valproic Acid Lvl 07/20/2019 12.0* 50.0 - 100.0 ug/mL Final    WBC 07/20/2019 6.7  4.0 - 11.0 K/uL Final    RBC 07/20/2019 3.80* 4.20 - 5.90 M/uL Final    Hemoglobin 07/20/2019 10.3* 13.5 - 17.5 g/dL Final    Hematocrit 07/20/2019 32.0* 40.5 - 52.5 % Final    MCV 07/20/2019 84.3  80.0 - 100.0 fL Final    MCH 07/20/2019 27.1  26.0 - 34.0 pg Final    MCHC 07/20/2019 32.1  31.0 - 36.0 g/dL Final    RDW 07/20/2019 Performed on 07/27/2019 ACCU-CHEK   Final            Medications  Current Facility-Administered Medications: haloperidol lactate (HALDOL) injection 5 mg, 5 mg, Intramuscular, Q6H PRN **AND** LORazepam (ATIVAN) injection 1 mg, 1 mg, Intramuscular, Q6H PRN **AND** [DISCONTINUED] diphenhydrAMINE (BENADRYL) injection 25 mg, 25 mg, Intramuscular, Q6H PRN  haloperidol (HALDOL) tablet 5 mg, 5 mg, Oral, TID PRN  levothyroxine (SYNTHROID) tablet 250 mcg, 250 mcg, Oral, Daily  acetaminophen (TYLENOL) tablet 650 mg, 650 mg, Oral, Q4H PRN  ibuprofen (ADVIL;MOTRIN) tablet 400 mg, 400 mg, Oral, Q6H PRN  magnesium hydroxide (MILK OF MAGNESIA) 400 MG/5ML suspension 30 mL, 30 mL, Oral, Daily PRN  aluminum & magnesium hydroxide-simethicone (MAALOX) 200-200-20 MG/5ML suspension 30 mL, 30 mL, Oral, Q6H PRN  divalproex (DEPAKOTE ER) extended release tablet 750 mg, 750 mg, Oral, BID  furosemide (LASIX) tablet 40 mg, 40 mg, Oral, Daily  pantoprazole (PROTONIX) tablet 40 mg, 40 mg, Oral, QAM AC  atorvastatin (LIPITOR) tablet 40 mg, 40 mg, Oral, Nightly  haloperidol (HALDOL) tablet 10 mg, 10 mg, Oral, BID  aspirin chewable tablet 81 mg, 81 mg, Oral, Daily  lisinopril (PRINIVIL;ZESTRIL) tablet 20 mg, 20 mg, Oral, Daily  metFORMIN (GLUCOPHAGE) tablet 500 mg, 500 mg, Oral, BID WC    ASSESSMENT AND PLAN    Principal Problem:    Schizoaffective disorder (HCC)  Active Problems:    Essential hypertension    Hypothyroidism    Type 2 diabetes mellitus with complication, without long-term current use of insulin (HCC)  Resolved Problems:    * No resolved hospital problems. *       1. Patient s symptoms   show no change  2. Probable discharge is pt probated  3. Discharge planning is incomplete  4 Suicidal ideation is unchanged  5 total time 40 minutes    I spent 35 minutes face to face and more than 50 % was spent coordinating care

## 2019-07-29 PROCEDURE — 6370000000 HC RX 637 (ALT 250 FOR IP): Performed by: NURSE PRACTITIONER

## 2019-07-29 PROCEDURE — 6370000000 HC RX 637 (ALT 250 FOR IP): Performed by: PHYSICIAN ASSISTANT

## 2019-07-29 PROCEDURE — 1240000000 HC EMOTIONAL WELLNESS R&B

## 2019-07-29 PROCEDURE — 99233 SBSQ HOSP IP/OBS HIGH 50: CPT | Performed by: NURSE PRACTITIONER

## 2019-07-29 PROCEDURE — 6370000000 HC RX 637 (ALT 250 FOR IP): Performed by: PSYCHIATRY & NEUROLOGY

## 2019-07-29 RX ORDER — DIVALPROEX SODIUM 500 MG/1
500 TABLET, DELAYED RELEASE ORAL DAILY
Status: DISCONTINUED | OUTPATIENT
Start: 2019-07-29 | End: 2019-08-02

## 2019-07-29 RX ORDER — OLANZAPINE 10 MG/1
10 INJECTION, POWDER, LYOPHILIZED, FOR SOLUTION INTRAMUSCULAR 2 TIMES DAILY PRN
Status: DISCONTINUED | OUTPATIENT
Start: 2019-07-29 | End: 2019-08-06 | Stop reason: HOSPADM

## 2019-07-29 RX ORDER — OLANZAPINE 5 MG/1
5 TABLET ORAL 3 TIMES DAILY PRN
Status: DISCONTINUED | OUTPATIENT
Start: 2019-07-29 | End: 2019-08-06 | Stop reason: HOSPADM

## 2019-07-29 RX ORDER — DIVALPROEX SODIUM 500 MG/1
1000 TABLET, EXTENDED RELEASE ORAL NIGHTLY
Status: DISCONTINUED | OUTPATIENT
Start: 2019-07-30 | End: 2019-08-02

## 2019-07-29 RX ADMIN — ASPIRIN 81 MG 81 MG: 81 TABLET ORAL at 08:14

## 2019-07-29 RX ADMIN — OLANZAPINE 5 MG: 5 TABLET, FILM COATED ORAL at 12:42

## 2019-07-29 RX ADMIN — DIVALPROEX SODIUM 750 MG: 500 TABLET, EXTENDED RELEASE ORAL at 08:14

## 2019-07-29 RX ADMIN — METFORMIN HYDROCHLORIDE 500 MG: 500 TABLET ORAL at 08:15

## 2019-07-29 RX ADMIN — HALOPERIDOL 10 MG: 10 TABLET ORAL at 08:14

## 2019-07-29 RX ADMIN — LEVOTHYROXINE SODIUM 250 MCG: 125 TABLET ORAL at 06:30

## 2019-07-29 RX ADMIN — LISINOPRIL 20 MG: 20 TABLET ORAL at 08:14

## 2019-07-29 RX ADMIN — ACETAMINOPHEN 650 MG: 325 TABLET ORAL at 11:32

## 2019-07-29 RX ADMIN — FUROSEMIDE 40 MG: 40 TABLET ORAL at 08:15

## 2019-07-29 RX ADMIN — PANTOPRAZOLE SODIUM 40 MG: 40 TABLET, DELAYED RELEASE ORAL at 06:30

## 2019-07-29 RX ADMIN — DIVALPROEX SODIUM 500 MG: 500 TABLET, DELAYED RELEASE ORAL at 12:41

## 2019-07-29 RX ADMIN — HALOPERIDOL 10 MG: 10 TABLET ORAL at 21:25

## 2019-07-29 RX ADMIN — ATORVASTATIN CALCIUM 40 MG: 40 TABLET, FILM COATED ORAL at 21:25

## 2019-07-29 RX ADMIN — METFORMIN HYDROCHLORIDE 500 MG: 500 TABLET ORAL at 16:49

## 2019-07-29 ASSESSMENT — PAIN SCALES - GENERAL
PAINLEVEL_OUTOF10: 1
PAINLEVEL_OUTOF10: 2

## 2019-07-29 NOTE — PROGRESS NOTES
connections:     Talks on phone: None     Gets together: None     Attends Confucianism service: None     Active member of club or organization: None     Attends meetings of clubs or organizations: None     Relationship status: None    Intimate partner violence:     Fear of current or ex partner: None     Emotionally abused: None     Physically abused: None     Forced sexual activity: None   Other Topics Concern    None   Social History Narrative    None       Scheduled Medications   levothyroxine  250 mcg Oral Daily    divalproex  750 mg Oral BID    furosemide  40 mg Oral Daily    pantoprazole  40 mg Oral QAM AC    atorvastatin  40 mg Oral Nightly    haloperidol  10 mg Oral BID    aspirin  81 mg Oral Daily    lisinopril  20 mg Oral Daily    metFORMIN  500 mg Oral BID WC       PRN Medications  haloperidol lactate **AND** LORazepam **AND** [DISCONTINUED] diphenhydrAMINE, haloperidol, acetaminophen, ibuprofen, magnesium hydroxide, aluminum & magnesium hydroxide-simethicone    OBJECTIVE  Labs:  No results found for this or any previous visit (from the past 24 hour(s)).     Physical Assessment:  Constitutional: No acute distress noted  HEENT: Atraumatic  Cardiovascular: VSS  Respiratory: no distress noted  Neurological: No cranial nerve abnormalities apparent  MS: No abnormalities apparent  Gait: Slow, shuffling gate   Psychiatric: Please see below    Vital Signs:  Vitals:    07/27/19 0821 07/27/19 1935 07/28/19 0822 07/28/19 2000   BP: (!) 147/77 129/66 113/60 (!) 153/113   Pulse: 69 89 74 77   Resp: 16 16 16 18   Temp: 97.7 °F (36.5 °C) 98.2 °F (36.8 °C) 97.3 °F (36.3 °C)    TempSrc: Oral Oral Oral    SpO2:  96%     Weight:       Height:             PSYCHIATRIC ASSESSMENT   Sleep: [] 0-3  [x] 4-6 [] 7-9   [] 10+  Appetite adequate: [x] Yes  [] No  Attending to hygiene: [x] Yes  [] No   EPS: [] Yes  [x] No  Attending groups: [] Yes  [x] No  Patient reports side effects from psychiatric medications: [x] No  []

## 2019-07-30 PROCEDURE — 6370000000 HC RX 637 (ALT 250 FOR IP): Performed by: PSYCHIATRY & NEUROLOGY

## 2019-07-30 PROCEDURE — 6370000000 HC RX 637 (ALT 250 FOR IP): Performed by: PHYSICIAN ASSISTANT

## 2019-07-30 PROCEDURE — 6370000000 HC RX 637 (ALT 250 FOR IP): Performed by: NURSE PRACTITIONER

## 2019-07-30 PROCEDURE — 99233 SBSQ HOSP IP/OBS HIGH 50: CPT | Performed by: NURSE PRACTITIONER

## 2019-07-30 PROCEDURE — 1240000000 HC EMOTIONAL WELLNESS R&B

## 2019-07-30 RX ADMIN — DIVALPROEX SODIUM 500 MG: 500 TABLET, DELAYED RELEASE ORAL at 09:37

## 2019-07-30 RX ADMIN — ASPIRIN 81 MG 81 MG: 81 TABLET ORAL at 09:37

## 2019-07-30 RX ADMIN — HALOPERIDOL 10 MG: 10 TABLET ORAL at 09:37

## 2019-07-30 RX ADMIN — PANTOPRAZOLE SODIUM 40 MG: 40 TABLET, DELAYED RELEASE ORAL at 07:31

## 2019-07-30 RX ADMIN — HALOPERIDOL 10 MG: 10 TABLET ORAL at 21:46

## 2019-07-30 RX ADMIN — LEVOTHYROXINE SODIUM 250 MCG: 125 TABLET ORAL at 07:31

## 2019-07-30 RX ADMIN — METFORMIN HYDROCHLORIDE 500 MG: 500 TABLET ORAL at 16:31

## 2019-07-30 RX ADMIN — LISINOPRIL 20 MG: 20 TABLET ORAL at 09:37

## 2019-07-30 RX ADMIN — FUROSEMIDE 40 MG: 40 TABLET ORAL at 09:37

## 2019-07-30 RX ADMIN — ATORVASTATIN CALCIUM 40 MG: 40 TABLET, FILM COATED ORAL at 21:47

## 2019-07-30 RX ADMIN — ACETAMINOPHEN 650 MG: 325 TABLET ORAL at 16:31

## 2019-07-30 RX ADMIN — METFORMIN HYDROCHLORIDE 500 MG: 500 TABLET ORAL at 09:37

## 2019-07-30 ASSESSMENT — PAIN SCALES - GENERAL: PAINLEVEL_OUTOF10: 3

## 2019-07-30 NOTE — BH NOTE
Pt started yelling at peer d/t peer pacing the milieu. Peer ignored pt's outburst. Writer turned music off and tried to redirect pt, but pt said \"I don't care if you turn the music off, bitch! I will turn the tv up! \" Writer assured pt if he stopped yelling the music could be turned back on. Pt did calm down a few minutes later and asked to use the phone. Will continue to monitor.

## 2019-07-30 NOTE — PLAN OF CARE
Pt has remained free from falls and injury so far this shift. Pt had several angry outbursts this shift calling writer and other staff members \"bitch, whore, and skank\". Tearful episodes. Sexually inappropriate language towards writer and other female staff. Writer informed pt that language was inappropriate. Threw walker down in front of him. Pleasant towards other pt so far. Pt allowed vitals to be taken by another nurse and was med compliant after being told he could have a trial in group today. Pt is more cooperative if conversation is initiated about food. Sitting in dayroom listening to music at this time. Walker within easy reach. Recliner wheels locked. Will continue to monitor.

## 2019-07-30 NOTE — BH NOTE
Pt is showering in dark. Room light is on but bathroom light is off. Writer asked if light could be turned on and pt replied \"Get the fuck out, bitch! \" Writer instructed pt to pull red cord if he needed anything. Will continue to monitor.

## 2019-07-30 NOTE — GROUP NOTE
Group Therapy Note    Date: July 30    Group Start Time: 1000  Group End Time: 1050  Group Topic: Psychoeducation    [facility] [place], [place]        Group Therapy Note    Attendees: 12       Patient's Goal: To identify 5 core values and create a collage with provided materials. Notes: Pt attended group session. Pt completed a collage, identifying things he values. Pt shared with the group. Pt was tangential, however, he was easily redirected. Status After Intervention:  Improved    Participation Level:  Active Listener    Participation Quality: Appropriate, Attentive and Sharing      Speech:  normal      Thought Process/Content: Logical      Affective Functioning: Congruent      Mood: elevated      Level of consciousness:  Alert and Attentive      Response to Learning: Capable of insight      Endings: None Reported    Modes of Intervention: Education, Support, Socialization and Activity      Discipline Responsible: Psychoeducational Specialist      Signature:  Dawna Hyatt MA, CTRS

## 2019-07-31 PROCEDURE — 6370000000 HC RX 637 (ALT 250 FOR IP): Performed by: NURSE PRACTITIONER

## 2019-07-31 PROCEDURE — 6370000000 HC RX 637 (ALT 250 FOR IP): Performed by: PHYSICIAN ASSISTANT

## 2019-07-31 PROCEDURE — 99233 SBSQ HOSP IP/OBS HIGH 50: CPT | Performed by: NURSE PRACTITIONER

## 2019-07-31 PROCEDURE — 1240000000 HC EMOTIONAL WELLNESS R&B

## 2019-07-31 PROCEDURE — 6370000000 HC RX 637 (ALT 250 FOR IP): Performed by: PSYCHIATRY & NEUROLOGY

## 2019-07-31 RX ADMIN — PANTOPRAZOLE SODIUM 40 MG: 40 TABLET, DELAYED RELEASE ORAL at 08:48

## 2019-07-31 RX ADMIN — ASPIRIN 81 MG 81 MG: 81 TABLET ORAL at 08:48

## 2019-07-31 RX ADMIN — LEVOTHYROXINE SODIUM 250 MCG: 125 TABLET ORAL at 08:48

## 2019-07-31 RX ADMIN — DIVALPROEX SODIUM 500 MG: 500 TABLET, DELAYED RELEASE ORAL at 08:49

## 2019-07-31 RX ADMIN — METFORMIN HYDROCHLORIDE 500 MG: 500 TABLET ORAL at 08:48

## 2019-07-31 RX ADMIN — OLANZAPINE 5 MG: 5 TABLET, FILM COATED ORAL at 15:32

## 2019-07-31 RX ADMIN — LISINOPRIL 20 MG: 20 TABLET ORAL at 08:48

## 2019-07-31 RX ADMIN — HALOPERIDOL 10 MG: 10 TABLET ORAL at 08:48

## 2019-07-31 RX ADMIN — FUROSEMIDE 40 MG: 40 TABLET ORAL at 08:48

## 2019-07-31 NOTE — PROGRESS NOTES
Occupational Therapy  Attempted to see patient this pm, held per RN due to status. Will continue to follow.   Gabriela May, OTR/L [id number]

## 2019-07-31 NOTE — PROGRESS NOTES
activity:     Days per week: None     Minutes per session: None    Stress: None   Relationships    Social connections:     Talks on phone: None     Gets together: None     Attends Quaker service: None     Active member of club or organization: None     Attends meetings of clubs or organizations: None     Relationship status: None    Intimate partner violence:     Fear of current or ex partner: None     Emotionally abused: None     Physically abused: None     Forced sexual activity: None   Other Topics Concern    None   Social History Narrative    None       Scheduled Medications   divalproex  1,000 mg Oral Nightly    divalproex  500 mg Oral Daily    levothyroxine  250 mcg Oral Daily    furosemide  40 mg Oral Daily    pantoprazole  40 mg Oral QAM AC    atorvastatin  40 mg Oral Nightly    haloperidol  10 mg Oral BID    aspirin  81 mg Oral Daily    lisinopril  20 mg Oral Daily    metFORMIN  500 mg Oral BID WC       PRN Medications  OLANZapine, OLANZapine, acetaminophen, ibuprofen, magnesium hydroxide, aluminum & magnesium hydroxide-simethicone    OBJECTIVE  Labs:  No results found for this or any previous visit (from the past 24 hour(s)).     Physical Assessment:  Constitutional: No acute distress noted  HEENT: Atraumatic  Cardiovascular: VSS  Respiratory: no distress noted  Neurological: No cranial nerve abnormalities apparent  MS: No abnormalities apparent  Gait: Slow, shuffling gate   Psychiatric: Please see below    Vital Signs:  Vitals:    07/28/19 2000 07/30/19 0915 07/30/19 2011 07/31/19 0848   BP: (!) 153/113 135/74  133/84   Pulse: 77 75  88   Resp: 18  18    Temp:       TempSrc:       SpO2:       Weight:       Height:             PSYCHIATRIC ASSESSMENT   Sleep: [] 0-3  [x] 4-6 [] 7-9   [] 10+  Appetite adequate: [x] Yes  [] No  Attending to hygiene: [x] Yes  [] No   EPS: [] Yes  [x] No  Attending groups: [] Yes  [x] No  Patient reports side effects from psychiatric medications: [x] No  []

## 2019-07-31 NOTE — PROGRESS NOTES
Patient declined his 06:00 protonix & synthroid. \"I can't take the medication, because my feet hurt. \" Patient decline offer of tylenol.  Sally Sifuentes R.N.

## 2019-08-01 PROCEDURE — 6370000000 HC RX 637 (ALT 250 FOR IP): Performed by: PSYCHIATRY & NEUROLOGY

## 2019-08-01 PROCEDURE — 2580000003 HC RX 258

## 2019-08-01 PROCEDURE — 2500000003 HC RX 250 WO HCPCS: Performed by: NURSE PRACTITIONER

## 2019-08-01 PROCEDURE — 1240000000 HC EMOTIONAL WELLNESS R&B

## 2019-08-01 PROCEDURE — 99233 SBSQ HOSP IP/OBS HIGH 50: CPT | Performed by: NURSE PRACTITIONER

## 2019-08-01 PROCEDURE — 6370000000 HC RX 637 (ALT 250 FOR IP): Performed by: NURSE PRACTITIONER

## 2019-08-01 PROCEDURE — 6370000000 HC RX 637 (ALT 250 FOR IP): Performed by: PHYSICIAN ASSISTANT

## 2019-08-01 RX ADMIN — LISINOPRIL 20 MG: 20 TABLET ORAL at 12:27

## 2019-08-01 RX ADMIN — ASPIRIN 81 MG 81 MG: 81 TABLET ORAL at 12:26

## 2019-08-01 RX ADMIN — LEVOTHYROXINE SODIUM 250 MCG: 125 TABLET ORAL at 06:48

## 2019-08-01 RX ADMIN — HALOPERIDOL 10 MG: 10 TABLET ORAL at 12:27

## 2019-08-01 RX ADMIN — DIVALPROEX SODIUM 500 MG: 500 TABLET, DELAYED RELEASE ORAL at 12:26

## 2019-08-01 RX ADMIN — WATER 20 ML: 1 INJECTION INTRAMUSCULAR; INTRAVENOUS; SUBCUTANEOUS at 20:02

## 2019-08-01 RX ADMIN — PANTOPRAZOLE SODIUM 40 MG: 40 TABLET, DELAYED RELEASE ORAL at 06:48

## 2019-08-01 RX ADMIN — METFORMIN HYDROCHLORIDE 500 MG: 500 TABLET ORAL at 12:27

## 2019-08-01 RX ADMIN — FUROSEMIDE 40 MG: 40 TABLET ORAL at 12:27

## 2019-08-01 RX ADMIN — OLANZAPINE 10 MG: 10 INJECTION, POWDER, FOR SOLUTION INTRAMUSCULAR at 19:35

## 2019-08-02 LAB
GLUCOSE BLD-MCNC: 113 MG/DL (ref 70–99)
PERFORMED ON: ABNORMAL

## 2019-08-02 PROCEDURE — 6370000000 HC RX 637 (ALT 250 FOR IP): Performed by: PHYSICIAN ASSISTANT

## 2019-08-02 PROCEDURE — 1240000000 HC EMOTIONAL WELLNESS R&B

## 2019-08-02 PROCEDURE — 6370000000 HC RX 637 (ALT 250 FOR IP): Performed by: PSYCHIATRY & NEUROLOGY

## 2019-08-02 PROCEDURE — 99233 SBSQ HOSP IP/OBS HIGH 50: CPT | Performed by: NURSE PRACTITIONER

## 2019-08-02 PROCEDURE — 6370000000 HC RX 637 (ALT 250 FOR IP): Performed by: NURSE PRACTITIONER

## 2019-08-02 RX ORDER — DIVALPROEX SODIUM 500 MG/1
500 TABLET, DELAYED RELEASE ORAL 3 TIMES DAILY
Status: DISCONTINUED | OUTPATIENT
Start: 2019-08-02 | End: 2019-08-06 | Stop reason: HOSPADM

## 2019-08-02 RX ORDER — HALOPERIDOL 10 MG/1
10 TABLET ORAL 3 TIMES DAILY
Status: DISCONTINUED | OUTPATIENT
Start: 2019-08-02 | End: 2019-08-06 | Stop reason: HOSPADM

## 2019-08-02 RX ADMIN — DIVALPROEX SODIUM 500 MG: 500 TABLET, DELAYED RELEASE ORAL at 09:58

## 2019-08-02 RX ADMIN — FUROSEMIDE 40 MG: 40 TABLET ORAL at 09:58

## 2019-08-02 RX ADMIN — HALOPERIDOL 10 MG: 10 TABLET ORAL at 09:58

## 2019-08-02 RX ADMIN — LEVOTHYROXINE SODIUM 250 MCG: 125 TABLET ORAL at 09:58

## 2019-08-02 RX ADMIN — LISINOPRIL 20 MG: 20 TABLET ORAL at 09:58

## 2019-08-02 RX ADMIN — HALOPERIDOL 10 MG: 10 TABLET ORAL at 14:51

## 2019-08-02 RX ADMIN — PANTOPRAZOLE SODIUM 40 MG: 40 TABLET, DELAYED RELEASE ORAL at 09:57

## 2019-08-02 RX ADMIN — ASPIRIN 81 MG 81 MG: 81 TABLET ORAL at 09:58

## 2019-08-02 RX ADMIN — METFORMIN HYDROCHLORIDE 500 MG: 500 TABLET ORAL at 09:58

## 2019-08-02 RX ADMIN — DIVALPROEX SODIUM 500 MG: 500 TABLET, DELAYED RELEASE ORAL at 14:51

## 2019-08-02 NOTE — PLAN OF CARE
Patient has been mostly sleep since 20:15, after being medicated with zyprexa  at 19:35, for agitation. Patient has been awake twice, briefly. When awake patient was heard yelling out,\"You bitch. \" Taras Sifuentes R.N.

## 2019-08-02 NOTE — PROGRESS NOTES
Patient declined his 07:00 protonix & synthroid. The on-coming shift will be notified.  Terrance Sifuentes R.N.

## 2019-08-02 NOTE — PROGRESS NOTES
Patient was awake & yelling out at 2135. When writer checked on him & asked if he needed anything, he said \"Yes, for you to go to hell, bitch. \" Patient declined his 21:00 medications.  Beverly Sifuentes R.N.

## 2019-08-02 NOTE — PLAN OF CARE
Told \" no \" to request for juice due to diabetes. Demanded to have his fingerstick taken. FSBS= 113. Given apple juice. Continues to scream over any attempts at conversation. Has telephone in his pocket. Refuses to answer if he has TV remote in his pocket as well.

## 2019-08-02 NOTE — PROGRESS NOTES
Patient out to the team station & asked for 5 cartons of milk & some juice. Patient was given 3 cartons of milk & 1 juice. Patient was also given a sandwich per request. Patient continues irritable & easily agitated. \"Sit down on your white ass & shut up. \" Patient is currently, sitting in the dayroom, listening to music per request.\"Do is my cousin. \" Patient continues to talk to himself.  Mary Sifuentes R.N.

## 2019-08-02 NOTE — PROGRESS NOTES
Patient continued agitated at intervals & denied offer of medication. Patient stated being on psychotropic medications since 1984 & I'm allergic to them. I was in [address] [place] [address] Rd. \" Patient also complained of his feet hurting & declined offer of tylenol. Patient continues to talk to himself.  Terrance Sifuentes R.N.

## 2019-08-02 NOTE — PROGRESS NOTES
Talks on phone: None     Gets together: None     Attends Yazdanism service: None     Active member of club or organization: None     Attends meetings of clubs or organizations: None     Relationship status: None    Intimate partner violence:     Fear of current or ex partner: None     Emotionally abused: None     Physically abused: None     Forced sexual activity: None   Other Topics Concern    None   Social History Narrative    None       Scheduled Medications   divalproex  1,000 mg Oral Nightly    divalproex  500 mg Oral Daily    levothyroxine  250 mcg Oral Daily    furosemide  40 mg Oral Daily    pantoprazole  40 mg Oral QAM AC    atorvastatin  40 mg Oral Nightly    haloperidol  10 mg Oral BID    aspirin  81 mg Oral Daily    lisinopril  20 mg Oral Daily    metFORMIN  500 mg Oral BID WC       PRN Medications  OLANZapine, OLANZapine, acetaminophen, ibuprofen, magnesium hydroxide, aluminum & magnesium hydroxide-simethicone    OBJECTIVE  Labs:  No results found for this or any previous visit (from the past 24 hour(s)).     Physical Assessment:  Constitutional: No acute distress noted  HEENT: Atraumatic  Cardiovascular: VSS  Respiratory: no distress noted  Neurological: No cranial nerve abnormalities apparent  MS: No abnormalities apparent  Gait: Slow, shuffling gate   Psychiatric: Please see below    Vital Signs:  Vitals:    07/31/19 0848 07/31/19 2017 08/01/19 0738 08/01/19 1926   BP: 133/84  126/87    Pulse: 88  72    Resp:  18 16 16   Temp:   97.7 °F (36.5 °C)    TempSrc:   Oral    SpO2:       Weight:       Height:             PSYCHIATRIC ASSESSMENT   Sleep: [] 0-3  [x] 4-6 [] 7-9   [] 10+  Appetite adequate: [x] Yes  [] No  Attending to hygiene: [x] Yes  [] No   EPS: [] Yes  [x] No  Attending groups: [] Yes  [x] No  Patient reports side effects from psychiatric medications: [x] No  [] Yes:    Patient on more than 1 Antipsychotic: [x] No [] Yes:    PSYCHIATRIC EXAMINATION / MENTAL STATUS EXAM: take medications. · Multidisciplinary evaluation    · Encourage participation in therapeutic programming and milieu activities    · Monitor for safety     2. HTN: Continue home medication. Monitor VS BID  3. DM: Continue home medication. 4. Legal: Involuntary - Probated, also on Community Probate in [place]  5.  Discharge:   · Collaborate with SW to gather collateral and determine appropriate community support and disposition     · Follow up/Disposition: PASAR filed      Total time: 35 minutes spent with patient completing evaluation process and more than 50% of the time was spent completing evaluation and planning treatment with the patient    Dr. Vinayak Mays, DNP, APRN, PMHNP  08/02/19

## 2019-08-03 PROCEDURE — 6370000000 HC RX 637 (ALT 250 FOR IP): Performed by: PSYCHIATRY & NEUROLOGY

## 2019-08-03 PROCEDURE — 6370000000 HC RX 637 (ALT 250 FOR IP): Performed by: PHYSICIAN ASSISTANT

## 2019-08-03 PROCEDURE — 99233 SBSQ HOSP IP/OBS HIGH 50: CPT | Performed by: PSYCHIATRY & NEUROLOGY

## 2019-08-03 PROCEDURE — 6370000000 HC RX 637 (ALT 250 FOR IP): Performed by: NURSE PRACTITIONER

## 2019-08-03 PROCEDURE — 1240000000 HC EMOTIONAL WELLNESS R&B

## 2019-08-03 RX ADMIN — HALOPERIDOL 10 MG: 10 TABLET ORAL at 08:19

## 2019-08-03 RX ADMIN — ASPIRIN 81 MG 81 MG: 81 TABLET ORAL at 08:20

## 2019-08-03 RX ADMIN — HALOPERIDOL 10 MG: 10 TABLET ORAL at 19:54

## 2019-08-03 RX ADMIN — FUROSEMIDE 40 MG: 40 TABLET ORAL at 08:57

## 2019-08-03 RX ADMIN — ATORVASTATIN CALCIUM 40 MG: 40 TABLET, FILM COATED ORAL at 19:54

## 2019-08-03 RX ADMIN — ACETAMINOPHEN 650 MG: 325 TABLET ORAL at 19:56

## 2019-08-03 RX ADMIN — LEVOTHYROXINE SODIUM 250 MCG: 125 TABLET ORAL at 06:34

## 2019-08-03 RX ADMIN — METFORMIN HYDROCHLORIDE 500 MG: 500 TABLET ORAL at 17:18

## 2019-08-03 RX ADMIN — DIVALPROEX SODIUM 500 MG: 500 TABLET, DELAYED RELEASE ORAL at 19:54

## 2019-08-03 RX ADMIN — METFORMIN HYDROCHLORIDE 500 MG: 500 TABLET ORAL at 08:19

## 2019-08-03 RX ADMIN — LISINOPRIL 20 MG: 20 TABLET ORAL at 08:57

## 2019-08-03 ASSESSMENT — PAIN SCALES - GENERAL: PAINLEVEL_OUTOF10: 10

## 2019-08-03 ASSESSMENT — PAIN DESCRIPTION - LOCATION: LOCATION: LEG

## 2019-08-03 ASSESSMENT — PAIN DESCRIPTION - PAIN TYPE: TYPE: ACUTE PAIN

## 2019-08-03 ASSESSMENT — PAIN DESCRIPTION - ORIENTATION: ORIENTATION: RIGHT;LEFT

## 2019-08-03 NOTE — PLAN OF CARE
Patient was asleep in his bed, until 21:00. Patient started screaming at [facility], when writer checked on & asked him if he needed help, getting his side rail down. Patient, screamed,\"Get out! You should of knocked 1st.\" Patient continued to focus on that & would not listen, when writer was trying to explain that patient had been sleeping & writer didn't want to wake him. Patient out to the recliner in the dayroom & requested a snack. Patient was given sprite, sandwich & chips, which patient drank & ate. Patient asked for more food. Patient was encouraged to take his 21:00 medications. \"I'm allergic to valproic acid. \" Patient started yelling & screaming at [facility]. Patient was unable to process, when writer informed him, that has been taking depakote & took it earlier today. \" I'm only suppose to take abilify, 1 in the morning & 2 at night. \" Patient was instructed to discuss this, with his doctor. \"No you call him now bitch. \" Patient has been grandiose,\"You are just jealous, that I'm filthy rich. \"Writer attempted to have 1:1 & ask assessment questions, which patient agreed, that writer could do. Patient was irritable & would answer the question, with comments, that had nothing to do with the question being asks. \"You are just trying to antagonize me with the questions. \" Writer just ended the attempt at a 1:1.   Bry Sifuentes R.N.

## 2019-08-04 PROCEDURE — 6370000000 HC RX 637 (ALT 250 FOR IP): Performed by: PSYCHIATRY & NEUROLOGY

## 2019-08-04 PROCEDURE — 1240000000 HC EMOTIONAL WELLNESS R&B

## 2019-08-04 PROCEDURE — 6370000000 HC RX 637 (ALT 250 FOR IP): Performed by: NURSE PRACTITIONER

## 2019-08-04 PROCEDURE — 6370000000 HC RX 637 (ALT 250 FOR IP): Performed by: PHYSICIAN ASSISTANT

## 2019-08-04 RX ADMIN — DIVALPROEX SODIUM 500 MG: 500 TABLET, DELAYED RELEASE ORAL at 20:15

## 2019-08-04 RX ADMIN — ASPIRIN 81 MG 81 MG: 81 TABLET ORAL at 09:37

## 2019-08-04 RX ADMIN — IBUPROFEN 400 MG: 400 TABLET ORAL at 23:00

## 2019-08-04 RX ADMIN — METFORMIN HYDROCHLORIDE 500 MG: 500 TABLET ORAL at 16:58

## 2019-08-04 RX ADMIN — DIVALPROEX SODIUM 500 MG: 500 TABLET, DELAYED RELEASE ORAL at 09:37

## 2019-08-04 RX ADMIN — HALOPERIDOL 10 MG: 10 TABLET ORAL at 09:38

## 2019-08-04 RX ADMIN — LEVOTHYROXINE SODIUM 250 MCG: 125 TABLET ORAL at 09:39

## 2019-08-04 RX ADMIN — METFORMIN HYDROCHLORIDE 500 MG: 500 TABLET ORAL at 09:38

## 2019-08-04 RX ADMIN — IBUPROFEN 400 MG: 400 TABLET ORAL at 01:26

## 2019-08-04 RX ADMIN — HALOPERIDOL 10 MG: 10 TABLET ORAL at 20:15

## 2019-08-04 RX ADMIN — FUROSEMIDE 40 MG: 40 TABLET ORAL at 09:38

## 2019-08-04 RX ADMIN — ATORVASTATIN CALCIUM 40 MG: 40 TABLET, FILM COATED ORAL at 20:15

## 2019-08-04 RX ADMIN — HALOPERIDOL 10 MG: 10 TABLET ORAL at 15:45

## 2019-08-04 RX ADMIN — PANTOPRAZOLE SODIUM 40 MG: 40 TABLET, DELAYED RELEASE ORAL at 09:38

## 2019-08-04 RX ADMIN — LISINOPRIL 20 MG: 20 TABLET ORAL at 09:37

## 2019-08-04 ASSESSMENT — PAIN SCALES - GENERAL
PAINLEVEL_OUTOF10: 10
PAINLEVEL_OUTOF10: 7

## 2019-08-04 NOTE — BH NOTE
Attempted to remind Camron Wilkins of available meal. Remains in the bathroom standing at the shower \"Get out I'm busy\". This writer was able to visualize Fabio's feet from under the bathroom door and standing in the opening of the shower.

## 2019-08-04 NOTE — BH NOTE
Talking loudly while in his bathroom. Noted able to get out of bed and with all 4 rails up. Rails at the bottom of bed lowered. Inform morning meal was available when he was ready for it. Muttered response.

## 2019-08-05 PROCEDURE — 99233 SBSQ HOSP IP/OBS HIGH 50: CPT | Performed by: NURSE PRACTITIONER

## 2019-08-05 PROCEDURE — 1240000000 HC EMOTIONAL WELLNESS R&B

## 2019-08-05 PROCEDURE — 6370000000 HC RX 637 (ALT 250 FOR IP): Performed by: NURSE PRACTITIONER

## 2019-08-05 PROCEDURE — 6370000000 HC RX 637 (ALT 250 FOR IP): Performed by: PHYSICIAN ASSISTANT

## 2019-08-05 PROCEDURE — 6370000000 HC RX 637 (ALT 250 FOR IP): Performed by: PSYCHIATRY & NEUROLOGY

## 2019-08-05 RX ADMIN — DIVALPROEX SODIUM 500 MG: 500 TABLET, DELAYED RELEASE ORAL at 07:52

## 2019-08-05 RX ADMIN — HALOPERIDOL 10 MG: 10 TABLET ORAL at 07:53

## 2019-08-05 RX ADMIN — FUROSEMIDE 40 MG: 40 TABLET ORAL at 07:53

## 2019-08-05 RX ADMIN — HALOPERIDOL 10 MG: 10 TABLET ORAL at 20:20

## 2019-08-05 RX ADMIN — DIVALPROEX SODIUM 500 MG: 500 TABLET, DELAYED RELEASE ORAL at 17:44

## 2019-08-05 RX ADMIN — LISINOPRIL 20 MG: 20 TABLET ORAL at 07:52

## 2019-08-05 RX ADMIN — ACETAMINOPHEN 650 MG: 325 TABLET ORAL at 17:44

## 2019-08-05 RX ADMIN — ASPIRIN 81 MG 81 MG: 81 TABLET ORAL at 07:53

## 2019-08-05 RX ADMIN — LEVOTHYROXINE SODIUM 250 MCG: 125 TABLET ORAL at 05:39

## 2019-08-05 RX ADMIN — METFORMIN HYDROCHLORIDE 500 MG: 500 TABLET ORAL at 17:44

## 2019-08-05 RX ADMIN — HALOPERIDOL 10 MG: 10 TABLET ORAL at 17:45

## 2019-08-05 RX ADMIN — ATORVASTATIN CALCIUM 40 MG: 40 TABLET, FILM COATED ORAL at 20:20

## 2019-08-05 RX ADMIN — DIVALPROEX SODIUM 500 MG: 500 TABLET, DELAYED RELEASE ORAL at 20:20

## 2019-08-05 RX ADMIN — METFORMIN HYDROCHLORIDE 500 MG: 500 TABLET ORAL at 07:53

## 2019-08-05 ASSESSMENT — PAIN DESCRIPTION - LOCATION: LOCATION: FOOT

## 2019-08-05 ASSESSMENT — PAIN DESCRIPTION - PAIN TYPE: TYPE: CHRONIC PAIN

## 2019-08-05 ASSESSMENT — PAIN SCALES - GENERAL
PAINLEVEL_OUTOF10: 10
PAINLEVEL_OUTOF10: 0
PAINLEVEL_OUTOF10: 0

## 2019-08-05 ASSESSMENT — PAIN DESCRIPTION - ORIENTATION: ORIENTATION: RIGHT;LEFT

## 2019-08-05 NOTE — PROGRESS NOTES
Patient asked for Tylenol for a HA and when staff looked up if he can have the medication and when staff told him he also had Depakote and Haldol ordered. He exploded and started yelling and ranting about how he is supposed to be discharged. He refused the Tylenol. He went to him room where he continued to yell.

## 2019-08-05 NOTE — BH NOTE
Patient states he left [facility] after peer upset him. This is a peer who has previously gotten into verbal arguments with. Patient states \"I did it appropriately\" and left the group on his own.

## 2019-08-05 NOTE — BH NOTE
Writer received Level II Evaluation with approval for LTC placement. Writer confirmed that preceertification process is completed for [facility]. Writer anticipates discharge tomorrow 8/6/19.     Umberto Giles MSW, LSW

## 2019-08-05 NOTE — PROGRESS NOTES
Types: Opiates      Comment: 07/19/2019 drug screen + oxcodone    Sexual activity: Yes     Partners: Male, Female   Lifestyle    Physical activity:     Days per week: None     Minutes per session: None    Stress: None   Relationships    Social connections:     Talks on phone: None     Gets together: None     Attends Restoration service: None     Active member of club or organization: None     Attends meetings of clubs or organizations: None     Relationship status: None    Intimate partner violence:     Fear of current or ex partner: None     Emotionally abused: None     Physically abused: None     Forced sexual activity: None   Other Topics Concern    None   Social History Narrative    None       Scheduled Medications   divalproex  500 mg Oral TID    haloperidol  10 mg Oral TID    levothyroxine  250 mcg Oral Daily    furosemide  40 mg Oral Daily    pantoprazole  40 mg Oral QAM AC    atorvastatin  40 mg Oral Nightly    aspirin  81 mg Oral Daily    lisinopril  20 mg Oral Daily    metFORMIN  500 mg Oral BID WC       PRN Medications  OLANZapine, OLANZapine, acetaminophen, ibuprofen, magnesium hydroxide, aluminum & magnesium hydroxide-simethicone    OBJECTIVE  Labs:  No results found for this or any previous visit (from the past 24 hour(s)).     Physical Assessment:  Constitutional: No acute distress noted  HEENT: Atraumatic  Cardiovascular: VSS  Respiratory: no distress noted  Neurological: No cranial nerve abnormalities apparent  MS: No abnormalities apparent  Gait: Slow, shuffling gate   Psychiatric: Please see below    Vital Signs:  Vitals:    08/01/19 0738 08/01/19 1926 08/03/19 0857 08/05/19 0752   BP: 126/87  127/69 (!) 158/70   Pulse: 72   70   Resp: 16 16  18   Temp:    98.4 °F (36.9 °C)   TempSrc: Oral   Oral   SpO2:       Weight:       Height:             PSYCHIATRIC ASSESSMENT   Sleep: [x] 0-3  [] 4-6 [] 7-9   [] 10+  Appetite adequate: [x] Yes  [] No  Attending to hygiene: [x] Yes  [] No

## 2019-08-05 NOTE — BH NOTE
[facility] taken to [facility] at this time. Explained expectations of behavior to patient, he verbalized understanding.

## 2019-08-06 VITALS
WEIGHT: 315 LBS | OXYGEN SATURATION: 90 % | DIASTOLIC BLOOD PRESSURE: 67 MMHG | SYSTOLIC BLOOD PRESSURE: 131 MMHG | HEART RATE: 71 BPM | RESPIRATION RATE: 18 BRPM | TEMPERATURE: 98 F | BODY MASS INDEX: 40.43 KG/M2 | HEIGHT: 74 IN

## 2019-08-06 PROCEDURE — 6370000000 HC RX 637 (ALT 250 FOR IP): Performed by: PSYCHIATRY & NEUROLOGY

## 2019-08-06 PROCEDURE — 6370000000 HC RX 637 (ALT 250 FOR IP): Performed by: NURSE PRACTITIONER

## 2019-08-06 PROCEDURE — 6370000000 HC RX 637 (ALT 250 FOR IP): Performed by: PHYSICIAN ASSISTANT

## 2019-08-06 PROCEDURE — 5130000000 HC BRIDGE APPOINTMENT

## 2019-08-06 PROCEDURE — 99239 HOSP IP/OBS DSCHRG MGMT >30: CPT | Performed by: PSYCHIATRY & NEUROLOGY

## 2019-08-06 RX ORDER — OLANZAPINE 5 MG/1
5 TABLET ORAL 3 TIMES DAILY PRN
Qty: 30 TABLET | Refills: 0 | Status: ON HOLD | OUTPATIENT
Start: 2019-08-06 | End: 2022-07-16

## 2019-08-06 RX ORDER — DIVALPROEX SODIUM 500 MG/1
500 TABLET, EXTENDED RELEASE ORAL NIGHTLY
Qty: 30 TABLET | Refills: 0 | Status: SHIPPED | OUTPATIENT
Start: 2019-08-06

## 2019-08-06 RX ORDER — LEVOTHYROXINE SODIUM 0.12 MG/1
250 TABLET ORAL DAILY
Qty: 60 TABLET | Refills: 0 | Status: ON HOLD | OUTPATIENT
Start: 2019-08-07 | End: 2022-07-16

## 2019-08-06 RX ORDER — HALOPERIDOL 10 MG/1
10 TABLET ORAL 3 TIMES DAILY
Qty: 90 TABLET | Refills: 0 | Status: ON HOLD | OUTPATIENT
Start: 2019-08-06 | End: 2022-07-16

## 2019-08-06 RX ORDER — ASPIRIN 81 MG/1
81 TABLET ORAL DAILY
Qty: 30 TABLET | Refills: 0 | Status: SHIPPED | OUTPATIENT
Start: 2019-08-06

## 2019-08-06 RX ORDER — LISINOPRIL 20 MG/1
20 TABLET ORAL DAILY
Qty: 30 TABLET | Refills: 0 | Status: ON HOLD | OUTPATIENT
Start: 2019-08-06 | End: 2022-07-17 | Stop reason: HOSPADM

## 2019-08-06 RX ORDER — ATORVASTATIN CALCIUM 40 MG/1
40 TABLET, FILM COATED ORAL NIGHTLY
Qty: 30 TABLET | Refills: 0 | Status: SHIPPED | OUTPATIENT
Start: 2019-08-06

## 2019-08-06 RX ORDER — FUROSEMIDE 40 MG/1
40 TABLET ORAL DAILY
Qty: 30 TABLET | Refills: 0 | Status: ON HOLD | OUTPATIENT
Start: 2019-08-06 | End: 2022-07-17 | Stop reason: HOSPADM

## 2019-08-06 RX ORDER — PANTOPRAZOLE SODIUM 40 MG/1
40 TABLET, DELAYED RELEASE ORAL
Qty: 30 TABLET | Refills: 0 | Status: SHIPPED | OUTPATIENT
Start: 2019-08-07

## 2019-08-06 RX ADMIN — LEVOTHYROXINE SODIUM 250 MCG: 125 TABLET ORAL at 05:50

## 2019-08-06 RX ADMIN — LISINOPRIL 20 MG: 20 TABLET ORAL at 07:51

## 2019-08-06 RX ADMIN — METFORMIN HYDROCHLORIDE 500 MG: 500 TABLET ORAL at 07:51

## 2019-08-06 RX ADMIN — DIVALPROEX SODIUM 500 MG: 500 TABLET, DELAYED RELEASE ORAL at 07:51

## 2019-08-06 RX ADMIN — PANTOPRAZOLE SODIUM 40 MG: 40 TABLET, DELAYED RELEASE ORAL at 05:50

## 2019-08-06 RX ADMIN — ASPIRIN 81 MG 81 MG: 81 TABLET ORAL at 07:51

## 2019-08-06 RX ADMIN — FUROSEMIDE 40 MG: 40 TABLET ORAL at 07:51

## 2019-08-06 RX ADMIN — HALOPERIDOL 10 MG: 10 TABLET ORAL at 07:51

## 2019-08-06 ASSESSMENT — PAIN SCALES - GENERAL: PAINLEVEL_OUTOF10: 0

## 2019-08-06 NOTE — BH NOTE
Bridge Appointment completed: Reviewed Discharge Instructions with patient. Patient verbalizes understanding and agreement with the discharge plan using the teachback method.      Referral for Outpatient Tobacco Cessation Counseling, upon discharge (juan alberto X if applicable and completed):    ( )  Hospital staff assisted patient to call Quit Line or faxed referral               during hospitalization                  (X)  Recognizing danger situations (included triggers and roadblocks), if not completed on admission                    (X)  Coping skills (new ways to manage stress, exercise, relaxation techniques, changing routine, distraction), if not completed on admission      (X)  Basic information about quitting (benefits of quitting, techniques in how to quit, available resources, if not completed on admission  ( ) Referral for counseling faxed to [facility]   ( ) Patient refused referral  ( ) Patient refused counseling  ( ) Patient refused smoking cessation medication upon discharge    Vaccinations (juan alberto X if applicable and completed):  ( ) Patient states already received influenza vaccine elsewhere  ( ) Patient received influenza vaccine during this hospitalization  ( ) Patient refused influenza vaccine at this time  (X) not indicated

## 2019-08-06 NOTE — PLAN OF CARE
Problem: Anxiety:  Goal: Level of anxiety will decrease  Description  Level of anxiety will decrease  Note:   Patient declines being anxious at this time. He gives an illogical answer when asked about anxiety stating \"I just miss my shoes, my feet hurt, I just want my shoes on, I'm just sore. \"  He declines offer for PRN for pain at this time, encouraged to elevate feet and legs that are mildly edematous. This morning thus far he is brightened, euphoric, and elevated. He continues with mood lability which largely depends on the content patient is talking about or if he is RTIS. He was readily medication compliant this morning with scheduled medications. Continues to focus on discharge.

## 2019-08-06 NOTE — BH NOTE
Writer spoke with pt's [facility] [address] [facility]) ACT  Jasmine Wilcox [phone] to inform him of pt's discharge and transfer to [address] [place] [address]. Supervisor shared that a  will be out to the facility tomorrow to visit with pt.     Linda Gupta MSW, LSW

## 2019-08-06 NOTE — PROGRESS NOTES
Patient was in the bathroom at 22:25 & yelled at [address] when being checked. Patient slammed the bathroom door, as he came out to go to bed. Patient was talking to himself loudly & was crying at intervals, until asleep at 23:52 & continues asleep.  Jamil Sifuentes R.N.

## 2019-08-09 NOTE — DISCHARGE SUMMARY
diet, continue activity as tolerated. Condition on Discharge:  Emily Damon was in stable condition. Emily Damon did not have suicidal or homicidal thoughts, and was future oriented. Emily Damon no longer represented an imminent risk of danger to themselves and/or others. At the time of discharge Emily Damon  had received the maximum medical benefit from this hospitalization and could be appropriately managed with outpatient treatment. Medication List      START taking these medications    metFORMIN 500 MG tablet  Commonly known as:  GLUCOPHAGE  Take 1 tablet by mouth 2 times daily (with meals)     OLANZapine 5 MG tablet  Commonly known as:  ZYPREXA  Take 1 tablet by mouth 3 times daily as needed (agitation)        CHANGE how you take these medications    atorvastatin 40 MG tablet  Commonly known as:  LIPITOR  Take 1 tablet by mouth nightly  What changed:    · medication strength  · how much to take     divalproex 500 MG extended release tablet  Commonly known as:  DEPAKOTE ER  Take 1 tablet by mouth nightly  What changed:  Another medication with the same name was removed.  Continue taking this medication, and follow the directions you see here.     haloperidol 10 MG tablet  Commonly known as:  HALDOL  Take 1 tablet by mouth 3 times daily  What changed:    · medication strength  · when to take this     levothyroxine 125 MCG tablet  Commonly known as:  SYNTHROID  Take 2 tablets by mouth Daily  What changed:    · medication strength  · how much to take        CONTINUE taking these medications    aspirin 81 MG EC tablet  Take 1 tablet by mouth daily     furosemide 40 MG tablet  Commonly known as:  LASIX  Take 1 tablet by mouth daily     lisinopril 20 MG tablet  Commonly known as:  PRINIVIL;ZESTRIL  Take 1 tablet by mouth daily     pantoprazole 40 MG tablet  Commonly known as:  PROTONIX  Take 1 tablet by mouth every morning (before breakfast)           Where to Get Your Medications

## 2019-09-10 ENCOUNTER — HOSPITAL ENCOUNTER (EMERGENCY)
Age: 54
Discharge: HOME | End: 2019-09-11
Attending: EMERGENCY MEDICINE
Payer: MEDICAID

## 2019-09-10 ENCOUNTER — APPOINTMENT (OUTPATIENT)
Dept: GENERAL RADIOLOGY | Age: 54
End: 2019-09-10
Payer: MEDICAID

## 2019-09-10 DIAGNOSIS — M79.89 LEG SWELLING: Primary | ICD-10-CM

## 2019-09-10 LAB
BASE EXCESS VENOUS: 1 (ref -3–3)
HCO3 VENOUS: 28.5 MMOL/L (ref 23–29)
O2 SAT, VEN: 48 %
PCO2, VEN: 63.5 MM HG (ref 40–50)
PERFORMED ON: ABNORMAL
PH VENOUS: 7.26 (ref 7.35–7.45)
PO2, VEN: 31 MM HG
POC SAMPLE TYPE: ABNORMAL
TCO2 CALC VENOUS: 30 MMOL/L

## 2019-09-10 PROCEDURE — 85025 COMPLETE CBC W/AUTO DIFF WBC: CPT

## 2019-09-10 PROCEDURE — 99284 EMERGENCY DEPT VISIT MOD MDM: CPT

## 2019-09-10 PROCEDURE — 80048 BASIC METABOLIC PNL TOTAL CA: CPT

## 2019-09-10 PROCEDURE — 83880 ASSAY OF NATRIURETIC PEPTIDE: CPT

## 2019-09-10 PROCEDURE — 82803 BLOOD GASES ANY COMBINATION: CPT

## 2019-09-10 PROCEDURE — 71046 X-RAY EXAM CHEST 2 VIEWS: CPT

## 2019-09-10 ASSESSMENT — PAIN DESCRIPTION - ORIENTATION: ORIENTATION: LEFT;RIGHT

## 2019-09-10 ASSESSMENT — PAIN SCALES - GENERAL: PAINLEVEL_OUTOF10: 8

## 2019-09-10 ASSESSMENT — PAIN DESCRIPTION - FREQUENCY: FREQUENCY: CONTINUOUS

## 2019-09-10 ASSESSMENT — PAIN DESCRIPTION - LOCATION: LOCATION: ANKLE

## 2019-09-10 ASSESSMENT — PAIN DESCRIPTION - DESCRIPTORS: DESCRIPTORS: ACHING

## 2019-09-10 ASSESSMENT — PAIN DESCRIPTION - PAIN TYPE: TYPE: ACUTE PAIN

## 2019-09-10 ASSESSMENT — ENCOUNTER SYMPTOMS: SHORTNESS OF BREATH: 0

## 2019-09-11 VITALS
HEART RATE: 57 BPM | WEIGHT: 315 LBS | SYSTOLIC BLOOD PRESSURE: 126 MMHG | OXYGEN SATURATION: 100 % | TEMPERATURE: 98.4 F | RESPIRATION RATE: 18 BRPM | BODY MASS INDEX: 44.94 KG/M2 | DIASTOLIC BLOOD PRESSURE: 80 MMHG

## 2019-09-11 LAB
ANION GAP SERPL CALCULATED.3IONS-SCNC: 10 MMOL/L (ref 3–16)
BASE EXCESS VENOUS: 3 (ref -3–3)
BASOPHILS ABSOLUTE: 0 K/UL (ref 0–0.2)
BASOPHILS RELATIVE PERCENT: 0.5 %
BUN BLDV-MCNC: 14 MG/DL (ref 7–20)
CALCIUM SERPL-MCNC: 8.8 MG/DL (ref 8.3–10.6)
CHLORIDE BLD-SCNC: 107 MMOL/L (ref 99–110)
CO2: 28 MMOL/L (ref 21–32)
CREAT SERPL-MCNC: 1 MG/DL (ref 0.9–1.3)
EOSINOPHILS ABSOLUTE: 0.1 K/UL (ref 0–0.6)
EOSINOPHILS RELATIVE PERCENT: 2.9 %
GFR AFRICAN AMERICAN: >60
GFR NON-AFRICAN AMERICAN: >60
GLUCOSE BLD-MCNC: 103 MG/DL (ref 70–99)
HCO3 VENOUS: 27.1 MMOL/L (ref 23–29)
HCT VFR BLD CALC: 28.5 % (ref 40.5–52.5)
HEMOGLOBIN: 9.3 G/DL (ref 13.5–17.5)
LYMPHOCYTES ABSOLUTE: 1.6 K/UL (ref 1–5.1)
LYMPHOCYTES RELATIVE PERCENT: 39.2 %
MCH RBC QN AUTO: 28.5 PG (ref 26–34)
MCHC RBC AUTO-ENTMCNC: 32.8 G/DL (ref 31–36)
MCV RBC AUTO: 86.7 FL (ref 80–100)
MONOCYTES ABSOLUTE: 0.4 K/UL (ref 0–1.3)
MONOCYTES RELATIVE PERCENT: 9.6 %
NEUTROPHILS ABSOLUTE: 1.9 K/UL (ref 1.7–7.7)
NEUTROPHILS RELATIVE PERCENT: 47.8 %
O2 SAT, VEN: 76 %
PCO2, VEN: 40.1 MM HG (ref 40–50)
PDW BLD-RTO: 20.1 % (ref 12.4–15.4)
PERFORMED ON: NORMAL
PH VENOUS: 7.44 (ref 7.35–7.45)
PLATELET # BLD: 232 K/UL (ref 135–450)
PMV BLD AUTO: 8.3 FL (ref 5–10.5)
PO2, VEN: 40 MM HG
POC SAMPLE TYPE: NORMAL
POTASSIUM REFLEX MAGNESIUM: 4.1 MMOL/L (ref 3.5–5.1)
PRO-BNP: 195 PG/ML (ref 0–124)
RBC # BLD: 3.28 M/UL (ref 4.2–5.9)
SODIUM BLD-SCNC: 145 MMOL/L (ref 136–145)
TCO2 CALC VENOUS: 28 MMOL/L
WBC # BLD: 4 K/UL (ref 4–11)

## 2019-09-11 PROCEDURE — 6360000002 HC RX W HCPCS: Performed by: EMERGENCY MEDICINE

## 2019-09-11 PROCEDURE — 96374 THER/PROPH/DIAG INJ IV PUSH: CPT

## 2019-09-11 PROCEDURE — 6360000002 HC RX W HCPCS: Performed by: PHYSICIAN ASSISTANT

## 2019-09-11 PROCEDURE — 82803 BLOOD GASES ANY COMBINATION: CPT

## 2019-09-11 PROCEDURE — 94640 AIRWAY INHALATION TREATMENT: CPT

## 2019-09-11 PROCEDURE — 6370000000 HC RX 637 (ALT 250 FOR IP): Performed by: EMERGENCY MEDICINE

## 2019-09-11 RX ORDER — IPRATROPIUM BROMIDE AND ALBUTEROL SULFATE 2.5; .5 MG/3ML; MG/3ML
1 SOLUTION RESPIRATORY (INHALATION) ONCE
Status: COMPLETED | OUTPATIENT
Start: 2019-09-11 | End: 2019-09-11

## 2019-09-11 RX ORDER — ALBUTEROL SULFATE 2.5 MG/3ML
2.5 SOLUTION RESPIRATORY (INHALATION) ONCE
Status: COMPLETED | OUTPATIENT
Start: 2019-09-11 | End: 2019-09-11

## 2019-09-11 RX ORDER — FUROSEMIDE 10 MG/ML
20 INJECTION INTRAMUSCULAR; INTRAVENOUS ONCE
Status: COMPLETED | OUTPATIENT
Start: 2019-09-11 | End: 2019-09-11

## 2019-09-11 RX ADMIN — ALBUTEROL SULFATE 2.5 MG: 2.5 SOLUTION RESPIRATORY (INHALATION) at 00:35

## 2019-09-11 RX ADMIN — FUROSEMIDE 20 MG: 10 INJECTION, SOLUTION INTRAVENOUS at 00:39

## 2019-09-11 RX ADMIN — IPRATROPIUM BROMIDE AND ALBUTEROL SULFATE 1 AMPULE: .5; 3 SOLUTION RESPIRATORY (INHALATION) at 00:35

## 2019-09-11 NOTE — ED NOTES
Pts oxygen dropped while sleeping placed on 2L of oxygen will continue to monitor.      Annamarie Neal RN  09/10/19 [id number]

## 2019-09-11 NOTE — ED PROVIDER NOTES
(Calc), [place] 30 Not Established mmol/L    Sample Type LEATHA     Performed on SEE BELOW    POCT Venous   Result Value Ref Range    pH, [place] 7.438 7.350 - 7.450    pCO2, [place] 40.1 40.0 - 50.0 mm Hg    pO2, [place] 40 Not Established mm Hg    HCO3, Venous 27.1 23.0 - 29.0 mmol/L    Base Excess, [place]atha 3 -3 - 3    O2 Sat, [place]atha 76 Not Established %    TC02 (Calc), [place] 28 Not Established mmol/L    Sample Type LEATHA     Performed on SEE BELOW        RECENT VITALS:  BP: 126/80, Temp: 98.4 °F (36.9 °C), Pulse: 57,Resp: 18, SpO2: 100 %     ED Course     Nursing Notes, Past Medical Hx, Past Surgical Hx, Social Hx, Allergies, and Family Hx were reviewed. The patient was given the followingmedications:  Orders Placed This Encounter   Medications    ipratropium-albuterol (DUONEB) nebulizer solution 1 ampule    albuterol (PROVENTIL) nebulizer solution 2.5 mg    albuterol (PROVENTIL) nebulizer solution 2.5 mg    furosemide (LASIX) injection 20 mg       CONSULTS:  None    MEDICAL DECISION MAKING / ASSESSMENT / Bruna Zeeshan is a 47 y.o. male with PMH of schizoaffective disorder, diabetes, hypertension, hyperlipidemia, hypothyroidism who presents with leg swelling. Patient has a history of psych disorder and is a poor historian. He complains of bilateral leg swelling and bilateral foot pain today. Denies chest pain, shortness of breath. States that he has not taken his lasix today because it makes him urinate. Physical exam reveals a 27-year-old male in no acute distress. He is afebrile with normal vital signs. He is fatigued but easily arousable to voice. Heart rhythm and rate are regular. Lungs are clear to auscultation. Abdomen is soft and nontender. There are venous stasis changes to the bilateral lower extremities with nonpitting edema. No open wounds or rash. No erythema. Patient has intact range of motion to bilateral lower extremities. 2+ distal pulses. Extremities with equal warmth.     IV access was

## 2020-03-31 ENCOUNTER — HOSPITAL ENCOUNTER (EMERGENCY)
Age: 55
Discharge: HOME | End: 2020-03-31
Attending: EMERGENCY MEDICINE
Payer: MEDICAID

## 2020-03-31 VITALS
HEART RATE: 50 BPM | HEIGHT: 70 IN | DIASTOLIC BLOOD PRESSURE: 86 MMHG | SYSTOLIC BLOOD PRESSURE: 170 MMHG | BODY MASS INDEX: 38.97 KG/M2 | OXYGEN SATURATION: 97 % | WEIGHT: 272.2 LBS | TEMPERATURE: 98 F | RESPIRATION RATE: 14 BRPM

## 2020-03-31 PROCEDURE — 99282 EMERGENCY DEPT VISIT SF MDM: CPT

## 2020-03-31 ASSESSMENT — PAIN DESCRIPTION - LOCATION: LOCATION: KNEE

## 2020-03-31 ASSESSMENT — PAIN DESCRIPTION - DESCRIPTORS: DESCRIPTORS: ACHING

## 2020-03-31 ASSESSMENT — PAIN DESCRIPTION - FREQUENCY: FREQUENCY: CONTINUOUS

## 2020-03-31 ASSESSMENT — PAIN SCALES - GENERAL: PAINLEVEL_OUTOF10: 9

## 2020-03-31 ASSESSMENT — PAIN DESCRIPTION - PROGRESSION: CLINICAL_PROGRESSION: NOT CHANGED

## 2020-03-31 ASSESSMENT — PAIN DESCRIPTION - ORIENTATION: ORIENTATION: RIGHT

## 2020-03-31 ASSESSMENT — PAIN DESCRIPTION - ONSET: ONSET: ON-GOING

## 2020-04-01 NOTE — ED NOTES
Reviewed dc inst with pt, pt requesting transportation to [facility] psych facility. Pt given bus pass, but refusing to take bus. Demanding that we call him a [employer]. Jace PALACIOS at [facility] talking with pt and explains that we don't have medical reason to get him transportation to psych facility. Pt wanting to stay here until SW arrives in [facility] also explained to him with pandemic he is unable to allow pt to stay in lobby. Again bus pass offered and pt to lobby. When pt gets to lobby he continues to ask registration to call cab or ambulance to take him to [facility]. Explained to pt that we are not able to do that. Pt asking about [employer] transportation. Pt given [facility] phone numbers to call for transportation. Pt in [facility] on phone trying to arrange transportation.        Zakiya Johns RN  03/31/20 9160

## 2021-01-27 ENCOUNTER — APPOINTMENT (OUTPATIENT)
Dept: GENERAL RADIOLOGY | Age: 56
End: 2021-01-27
Payer: MEDICAID

## 2021-01-27 ENCOUNTER — HOSPITAL ENCOUNTER (EMERGENCY)
Age: 56
Discharge: HOME | End: 2021-01-27
Attending: STUDENT IN AN ORGANIZED HEALTH CARE EDUCATION/TRAINING PROGRAM
Payer: MEDICAID

## 2021-01-27 VITALS
HEART RATE: 71 BPM | WEIGHT: 315 LBS | DIASTOLIC BLOOD PRESSURE: 95 MMHG | RESPIRATION RATE: 26 BRPM | OXYGEN SATURATION: 98 % | BODY MASS INDEX: 48.18 KG/M2 | SYSTOLIC BLOOD PRESSURE: 127 MMHG

## 2021-01-27 DIAGNOSIS — R07.9 ACUTE CHEST PAIN: Primary | ICD-10-CM

## 2021-01-27 LAB
ANION GAP SERPL CALCULATED.3IONS-SCNC: 9 MMOL/L (ref 3–16)
BASOPHILS ABSOLUTE: 0 K/UL (ref 0–0.2)
BASOPHILS RELATIVE PERCENT: 0.4 %
BUN BLDV-MCNC: 19 MG/DL (ref 7–20)
CALCIUM SERPL-MCNC: 9.2 MG/DL (ref 8.3–10.6)
CHLORIDE BLD-SCNC: 105 MMOL/L (ref 99–110)
CO2: 27 MMOL/L (ref 21–32)
CREAT SERPL-MCNC: 1.2 MG/DL (ref 0.9–1.3)
EOSINOPHILS ABSOLUTE: 0.1 K/UL (ref 0–0.6)
EOSINOPHILS RELATIVE PERCENT: 1.4 %
GFR AFRICAN AMERICAN: >60
GFR NON-AFRICAN AMERICAN: >60
GLUCOSE BLD-MCNC: 94 MG/DL (ref 70–99)
HCT VFR BLD CALC: 34.8 % (ref 40.5–52.5)
HEMOGLOBIN: 11.6 G/DL (ref 13.5–17.5)
LYMPHOCYTES ABSOLUTE: 2.2 K/UL (ref 1–5.1)
LYMPHOCYTES RELATIVE PERCENT: 46.9 %
MCH RBC QN AUTO: 31.4 PG (ref 26–34)
MCHC RBC AUTO-ENTMCNC: 33.3 G/DL (ref 31–36)
MCV RBC AUTO: 94.5 FL (ref 80–100)
MONOCYTES ABSOLUTE: 0.5 K/UL (ref 0–1.3)
MONOCYTES RELATIVE PERCENT: 10.7 %
NEUTROPHILS ABSOLUTE: 1.9 K/UL (ref 1.7–7.7)
NEUTROPHILS RELATIVE PERCENT: 40.6 %
PDW BLD-RTO: 16.4 % (ref 12.4–15.4)
PLATELET # BLD: 206 K/UL (ref 135–450)
PMV BLD AUTO: 8.2 FL (ref 5–10.5)
POTASSIUM REFLEX MAGNESIUM: 4.5 MMOL/L (ref 3.5–5.1)
PRO-BNP: 17 PG/ML (ref 0–124)
RBC # BLD: 3.69 M/UL (ref 4.2–5.9)
SODIUM BLD-SCNC: 141 MMOL/L (ref 136–145)
TROPONIN: <0.01 NG/ML
TROPONIN: <0.01 NG/ML
WBC # BLD: 4.8 K/UL (ref 4–11)

## 2021-01-27 PROCEDURE — 84484 ASSAY OF TROPONIN QUANT: CPT

## 2021-01-27 PROCEDURE — 93005 ELECTROCARDIOGRAM TRACING: CPT | Performed by: STUDENT IN AN ORGANIZED HEALTH CARE EDUCATION/TRAINING PROGRAM

## 2021-01-27 PROCEDURE — U0003 INFECTIOUS AGENT DETECTION BY NUCLEIC ACID (DNA OR RNA); SEVERE ACUTE RESPIRATORY SYNDROME CORONAVIRUS 2 (SARS-COV-2) (CORONAVIRUS DISEASE [COVID-19]), AMPLIFIED PROBE TECHNIQUE, MAKING USE OF HIGH THROUGHPUT TECHNOLOGIES AS DESCRIBED BY CMS-2020-01-R: HCPCS

## 2021-01-27 PROCEDURE — 99285 EMERGENCY DEPT VISIT HI MDM: CPT

## 2021-01-27 PROCEDURE — 80048 BASIC METABOLIC PNL TOTAL CA: CPT

## 2021-01-27 PROCEDURE — 71045 X-RAY EXAM CHEST 1 VIEW: CPT

## 2021-01-27 PROCEDURE — 83880 ASSAY OF NATRIURETIC PEPTIDE: CPT

## 2021-01-27 PROCEDURE — 6370000000 HC RX 637 (ALT 250 FOR IP): Performed by: STUDENT IN AN ORGANIZED HEALTH CARE EDUCATION/TRAINING PROGRAM

## 2021-01-27 PROCEDURE — 36415 COLL VENOUS BLD VENIPUNCTURE: CPT

## 2021-01-27 PROCEDURE — U0002 COVID-19 LAB TEST NON-CDC: HCPCS

## 2021-01-27 PROCEDURE — 85025 COMPLETE CBC W/AUTO DIFF WBC: CPT

## 2021-01-27 RX ORDER — ASPIRIN 81 MG/1
324 TABLET, CHEWABLE ORAL ONCE
Status: COMPLETED | OUTPATIENT
Start: 2021-01-27 | End: 2021-01-27

## 2021-01-27 RX ORDER — ACETAMINOPHEN 500 MG
1000 TABLET ORAL ONCE
Status: COMPLETED | OUTPATIENT
Start: 2021-01-27 | End: 2021-01-27

## 2021-01-27 RX ADMIN — ASPIRIN 324 MG: 81 TABLET, CHEWABLE ORAL at 16:34

## 2021-01-27 RX ADMIN — ACETAMINOPHEN 1000 MG: 500 TABLET ORAL at 16:34

## 2021-01-27 ASSESSMENT — PAIN SCALES - GENERAL
PAINLEVEL_OUTOF10: 5
PAINLEVEL_OUTOF10: 7

## 2021-01-27 ASSESSMENT — PAIN DESCRIPTION - LOCATION: LOCATION: CHEST

## 2021-01-27 ASSESSMENT — PAIN DESCRIPTION - DESCRIPTORS: DESCRIPTORS: SHARP

## 2021-01-27 ASSESSMENT — PAIN DESCRIPTION - FREQUENCY: FREQUENCY: INTERMITTENT

## 2021-01-27 ASSESSMENT — PAIN DESCRIPTION - ORIENTATION: ORIENTATION: LEFT

## 2021-01-27 NOTE — ED NOTES
Pt placed on/continued on cardiac monitor and continuous pulse ox - see flowsheet     Satnam Trinidad, LILY  01/27/21 9285

## 2021-01-28 ENCOUNTER — CARE COORDINATION (OUTPATIENT)
Dept: CARE COORDINATION | Age: 56
End: 2021-01-28

## 2021-01-28 LAB
EKG ATRIAL RATE: 63 BPM
EKG ATRIAL RATE: 69 BPM
EKG DIAGNOSIS: NORMAL
EKG DIAGNOSIS: NORMAL
EKG P AXIS: 29 DEGREES
EKG P AXIS: 36 DEGREES
EKG P-R INTERVAL: 166 MS
EKG P-R INTERVAL: 168 MS
EKG Q-T INTERVAL: 380 MS
EKG Q-T INTERVAL: 402 MS
EKG QRS DURATION: 112 MS
EKG QRS DURATION: 114 MS
EKG QTC CALCULATION (BAZETT): 407 MS
EKG QTC CALCULATION (BAZETT): 411 MS
EKG R AXIS: -20 DEGREES
EKG R AXIS: -21 DEGREES
EKG T AXIS: 0 DEGREES
EKG T AXIS: 7 DEGREES
EKG VENTRICULAR RATE: 63 BPM
EKG VENTRICULAR RATE: 69 BPM
SARS-COV-2, PCR: NOT DETECTED

## 2021-01-28 PROCEDURE — 93010 ELECTROCARDIOGRAM REPORT: CPT | Performed by: INTERNAL MEDICINE
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
## 2021-01-29 ENCOUNTER — CARE COORDINATION (OUTPATIENT)
Dept: CARE COORDINATION | Age: 56
End: 2021-01-29

## 2021-03-11 ENCOUNTER — HOSPITAL ENCOUNTER (EMERGENCY)
Age: 56
Discharge: STILL ADMITTED OR ADMINISTRATIVE | End: 2021-03-11
Payer: MEDICAID

## 2021-03-11 ENCOUNTER — APPOINTMENT (OUTPATIENT)
Dept: GENERAL RADIOLOGY | Age: 56
End: 2021-03-11
Payer: MEDICAID

## 2021-03-11 VITALS
HEART RATE: 77 BPM | BODY MASS INDEX: 49.16 KG/M2 | OXYGEN SATURATION: 98 % | RESPIRATION RATE: 16 BRPM | WEIGHT: 315 LBS | TEMPERATURE: 98.4 F

## 2021-03-11 DIAGNOSIS — M17.11 PRIMARY OSTEOARTHRITIS OF RIGHT KNEE: Primary | ICD-10-CM

## 2021-03-11 PROCEDURE — 99284 EMERGENCY DEPT VISIT MOD MDM: CPT

## 2021-03-11 PROCEDURE — 73562 X-RAY EXAM OF KNEE 3: CPT

## 2021-03-11 RX ORDER — NAPROXEN 375 MG/1
375 TABLET ORAL 2 TIMES DAILY WITH MEALS
Qty: 30 TABLET | Refills: 0 | Status: SHIPPED | OUTPATIENT
Start: 2021-03-11 | End: 2021-03-26

## 2021-03-11 ASSESSMENT — PAIN DESCRIPTION - DESCRIPTORS: DESCRIPTORS: ACHING

## 2021-03-11 ASSESSMENT — PAIN SCALES - GENERAL: PAINLEVEL_OUTOF10: 7
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
## 2021-03-11 NOTE — ED TRIAGE NOTES
States he has body pain for at least one week. States he feels hot at times and has nasal congestion.

## 2021-11-18 NOTE — PROGRESS NOTES
Addended by: Penny Contreras on: 11/18/2021 02:19 PM     Modules accepted: Orders reach; Left in chair;Nurse notified; Chair alarm in place(assist level on board and RN aware - Ax2 with walker)         Treatment Diagnosis: Decreased activity tolerance, impaired ADLs and mobility      Restrictions  Position Activity Restriction  Other position/activity restrictions: up with assistance    Subjective   General  Chart Reviewed: Yes  Patient assessed for rehabilitation services?: Yes  Additional Pertinent Hx: 47 y.o. M admitted 4/4 with hypoglycemia, fall. PMHx includes Bipolar 1 disorder, schizophrenia, DM, kidney stone, pyelonephritis, sleep apnea  Family / Caregiver Present: No  Referring Practitioner: Jared Cedillo  Diagnosis: acute on chronic respiratory failure  Subjective  Subjective: Pt in bed on entry. Pleasant and cooperative. Very talkative, tangential, with flight of ideas but difficult to understand. Pain Assessment  Pain Assessment: 0-10(reports B knee and ankle pain, vague complaints)  Social/Functional History  Social/Functional History  Lives With: Other (comment)(brother)  Type of Home: House  Home Layout: One level  Home Access: Stairs to enter without rails  Entrance Stairs - Number of Steps: 2  Bathroom Shower/Tub: Tub/Shower unit  Bathroom Toilet: Standard  Bathroom Equipment: (none)  Home Equipment: (reports brother has cane and walker that he doesn't use; unsure if patient would be able to use equipment)  ADL Assistance: Semaj: Independent  Homemaking Responsibilities: Yes  Ambulation Assistance: Independent  Transfer Assistance: Independent  Active :  Yes  Additional Comments: patient had fall at home leading to hospital admission (reports pain in B knees/legs that led to fall)     Objective  Treatment included: functional transfer training, ADL, pt education         Orientation  Overall Orientation Status: Within Functional Limits     Balance  Sitting Balance: Independent  Standing Balance: Contact guard assistance  Standing Balance  Time: 1 min  Activity: stance, transfer bed to chair  Sit to stand: Moderate assistance  Stand to sit: Contact guard assistance(min cues)  Comment: Mod assist sit to stand from raised bed with cues, CGA stance and transfer with rolling walker. Toilet Transfers  Toilet - Technique: Stand step  Equipment Used: Standard bedside commode(simulated)  Toilet Transfer: Moderate assistance  ADL  Feeding: Independent  Grooming: Modified independent ;Setup  LE Dressing: Dependent/Total  Toileting: Dependent/Total(incontinent in bed, unaware)     Transfers  Sit to stand: Moderate assistance  Stand to sit: Contact guard assistance(min cues)     Cognition  Cognition Comment: alert, oriented to self, hospital, date, partial situation; decreased insight, attention; tangential, flight of ideas       LUE Strength  Gross LUE Strength: WFL  RUE Strength  Gross RUE Strength: WFL      Plan  If pt discharges prior to next tx, this note will serve as d/c summary. Continue per POC if pt does not d/c.     Plan  Times per week: 2-5x  Times per day: Daily    AM-PAC Score   AM-PAC Inpatient Daily Activity Raw Score: 15  AM-PAC Inpatient ADL T-Scale Score : 34.69  ADL Inpatient CMS 0-100% Score: 56.46  ADL Inpatient CMS G-Code Modifier : CK    Goals  Short term goals  Time Frame for Short term goals: by D/C  Short term goal 1:  Increase standing/mobility tolerance to 5 min - Not met  Short term goal 2: Therman Net underwear/pants with min assist - Not met  Short term goal 3: Transfer to/from commode and chairs with SBA - Not met  Patient Goals   Patient goals : no goal stated       Therapy Time   Individual Concurrent Group Co-treatment   Time In 0814         Time Out 0857         Minutes 43          Timed Code Tx Min: 28  Total Tx Time: 1141 [facility], OT

## 2022-05-04 ENCOUNTER — APPOINTMENT (OUTPATIENT)
Dept: GENERAL RADIOLOGY | Age: 57
End: 2022-05-04
Payer: MEDICAID

## 2022-05-04 ENCOUNTER — APPOINTMENT (OUTPATIENT)
Dept: CT IMAGING | Age: 57
End: 2022-05-04
Payer: MEDICAID

## 2022-05-04 ENCOUNTER — HOSPITAL ENCOUNTER (EMERGENCY)
Age: 57
Discharge: HOME | End: 2022-05-04
Attending: STUDENT IN AN ORGANIZED HEALTH CARE EDUCATION/TRAINING PROGRAM
Payer: MEDICAID

## 2022-05-04 VITALS
SYSTOLIC BLOOD PRESSURE: 137 MMHG | BODY MASS INDEX: 44.1 KG/M2 | TEMPERATURE: 98.4 F | RESPIRATION RATE: 20 BRPM | OXYGEN SATURATION: 98 % | HEIGHT: 71 IN | WEIGHT: 315 LBS | DIASTOLIC BLOOD PRESSURE: 79 MMHG | HEART RATE: 79 BPM

## 2022-05-04 DIAGNOSIS — R10.9 FLANK PAIN: Primary | ICD-10-CM

## 2022-05-04 DIAGNOSIS — R07.9 CHEST PAIN, UNSPECIFIED TYPE: ICD-10-CM

## 2022-05-04 LAB
A/G RATIO: 1.5 (ref 1.1–2.2)
ALBUMIN SERPL-MCNC: 4.7 G/DL (ref 3.4–5)
ALP BLD-CCNC: 89 U/L (ref 40–129)
ALT SERPL-CCNC: 16 U/L (ref 10–40)
ANION GAP SERPL CALCULATED.3IONS-SCNC: 14 MMOL/L (ref 3–16)
AST SERPL-CCNC: 24 U/L (ref 15–37)
BASOPHILS ABSOLUTE: 0 K/UL (ref 0–0.2)
BASOPHILS RELATIVE PERCENT: 0.3 %
BILIRUB SERPL-MCNC: 0.3 MG/DL (ref 0–1)
BILIRUBIN URINE: NEGATIVE
BLOOD, URINE: NEGATIVE
BUN BLDV-MCNC: 30 MG/DL (ref 7–20)
CALCIUM SERPL-MCNC: 9.7 MG/DL (ref 8.3–10.6)
CHLORIDE BLD-SCNC: 105 MMOL/L (ref 99–110)
CLARITY: CLEAR
CO2: 22 MMOL/L (ref 21–32)
COLOR: YELLOW
CREAT SERPL-MCNC: 1.6 MG/DL (ref 0.9–1.3)
EOSINOPHILS ABSOLUTE: 0 K/UL (ref 0–0.6)
EOSINOPHILS RELATIVE PERCENT: 0.5 %
EPITHELIAL CELLS, UA: ABNORMAL /HPF (ref 0–5)
GFR AFRICAN AMERICAN: 54
GFR NON-AFRICAN AMERICAN: 45
GLUCOSE BLD-MCNC: 115 MG/DL (ref 70–99)
GLUCOSE URINE: NEGATIVE MG/DL
HCT VFR BLD CALC: 34.6 % (ref 40.5–52.5)
HEMOGLOBIN: 11.7 G/DL (ref 13.5–17.5)
HYALINE CASTS: ABNORMAL /LPF (ref 0–2)
KETONES, URINE: NEGATIVE MG/DL
LEUKOCYTE ESTERASE, URINE: NEGATIVE
LIPASE: 49 U/L (ref 13–60)
LYMPHOCYTES ABSOLUTE: 2.2 K/UL (ref 1–5.1)
LYMPHOCYTES RELATIVE PERCENT: 32 %
MCH RBC QN AUTO: 30.7 PG (ref 26–34)
MCHC RBC AUTO-ENTMCNC: 33.8 G/DL (ref 31–36)
MCV RBC AUTO: 90.9 FL (ref 80–100)
MICROSCOPIC EXAMINATION: YES
MONOCYTES ABSOLUTE: 0.7 K/UL (ref 0–1.3)
MONOCYTES RELATIVE PERCENT: 10.5 %
MUCUS: ABNORMAL /LPF
NEUTROPHILS ABSOLUTE: 3.9 K/UL (ref 1.7–7.7)
NEUTROPHILS RELATIVE PERCENT: 56.7 %
NITRITE, URINE: NEGATIVE
PDW BLD-RTO: 17.8 % (ref 12.4–15.4)
PH UA: 5.5 (ref 5–8)
PLATELET # BLD: 255 K/UL (ref 135–450)
PMV BLD AUTO: 8.2 FL (ref 5–10.5)
POTASSIUM REFLEX MAGNESIUM: 4.3 MMOL/L (ref 3.5–5.1)
PROTEIN UA: ABNORMAL MG/DL
RBC # BLD: 3.81 M/UL (ref 4.2–5.9)
RBC UA: ABNORMAL /HPF (ref 0–4)
SODIUM BLD-SCNC: 141 MMOL/L (ref 136–145)
SPECIFIC GRAVITY UA: >=1.03 (ref 1–1.03)
TOTAL PROTEIN: 7.9 G/DL (ref 6.4–8.2)
TROPONIN: <0.01 NG/ML
URINE REFLEX TO CULTURE: ABNORMAL
URINE TYPE: ABNORMAL
UROBILINOGEN, URINE: 0.2 E.U./DL
WBC # BLD: 6.9 K/UL (ref 4–11)
WBC UA: ABNORMAL /HPF (ref 0–5)

## 2022-05-04 PROCEDURE — 83690 ASSAY OF LIPASE: CPT

## 2022-05-04 PROCEDURE — 74176 CT ABD & PELVIS W/O CONTRAST: CPT

## 2022-05-04 PROCEDURE — 85025 COMPLETE CBC W/AUTO DIFF WBC: CPT

## 2022-05-04 PROCEDURE — 36415 COLL VENOUS BLD VENIPUNCTURE: CPT

## 2022-05-04 PROCEDURE — 93005 ELECTROCARDIOGRAM TRACING: CPT | Performed by: STUDENT IN AN ORGANIZED HEALTH CARE EDUCATION/TRAINING PROGRAM

## 2022-05-04 PROCEDURE — 80053 COMPREHEN METABOLIC PANEL: CPT

## 2022-05-04 PROCEDURE — 99285 EMERGENCY DEPT VISIT HI MDM: CPT

## 2022-05-04 PROCEDURE — 81001 URINALYSIS AUTO W/SCOPE: CPT

## 2022-05-04 PROCEDURE — 71045 X-RAY EXAM CHEST 1 VIEW: CPT

## 2022-05-04 PROCEDURE — 84484 ASSAY OF TROPONIN QUANT: CPT

## 2022-05-04 RX ORDER — LISINOPRIL 40 MG/1
TABLET ORAL
COMMUNITY
Start: 2022-04-13

## 2022-05-04 RX ORDER — NYSTATIN 100000 [USP'U]/G
POWDER TOPICAL
COMMUNITY
Start: 2022-01-26

## 2022-05-04 RX ORDER — CLOTRIMAZOLE AND BETAMETHASONE DIPROPIONATE 10; .64 MG/G; MG/G
CREAM TOPICAL
COMMUNITY
Start: 2022-04-25

## 2022-05-04 RX ORDER — DICLOFENAC SODIUM 75 MG/1
TABLET, DELAYED RELEASE ORAL
COMMUNITY
Start: 2022-04-13

## 2022-05-04 RX ORDER — DILTIAZEM HYDROCHLORIDE 240 MG/1
CAPSULE, COATED, EXTENDED RELEASE ORAL
COMMUNITY
Start: 2022-04-13

## 2022-05-04 RX ORDER — MULTIVITAMIN WITH FOLIC ACID 400 MCG
TABLET ORAL
COMMUNITY
Start: 2022-04-18

## 2022-05-04 RX ORDER — FUROSEMIDE 20 MG/1
TABLET ORAL
COMMUNITY
Start: 2022-04-13

## 2022-05-04 RX ORDER — QUETIAPINE FUMARATE 400 MG/1
400 TABLET, FILM COATED ORAL NIGHTLY
COMMUNITY
Start: 2022-03-20

## 2022-05-04 RX ORDER — ATORVASTATIN CALCIUM 20 MG/1
TABLET, FILM COATED ORAL
Status: ON HOLD | COMMUNITY
Start: 2022-04-13 | End: 2022-07-17 | Stop reason: HOSPADM

## 2022-05-04 RX ORDER — HALOPERIDOL 20 MG/1
20 TABLET ORAL 2 TIMES DAILY
COMMUNITY
Start: 2022-03-20

## 2022-05-04 RX ORDER — DIVALPROEX SODIUM 500 MG/1
TABLET, EXTENDED RELEASE ORAL
Status: ON HOLD | COMMUNITY
Start: 2022-03-20 | End: 2022-07-17 | Stop reason: HOSPADM

## 2022-05-04 RX ORDER — PIOGLITAZONEHYDROCHLORIDE 30 MG/1
TABLET ORAL
COMMUNITY
Start: 2022-04-13

## 2022-05-04 RX ORDER — QUETIAPINE FUMARATE 100 MG/1
100 TABLET, FILM COATED ORAL EVERY MORNING
COMMUNITY
Start: 2022-03-20

## 2022-05-04 RX ORDER — LEVOTHYROXINE SODIUM 0.15 MG/1
150 TABLET ORAL DAILY
COMMUNITY
Start: 2022-04-13

## 2022-05-04 RX ORDER — OMEPRAZOLE 20 MG/1
CAPSULE, DELAYED RELEASE ORAL
COMMUNITY
Start: 2022-04-13

## 2022-05-04 ASSESSMENT — PAIN - FUNCTIONAL ASSESSMENT: PAIN_FUNCTIONAL_ASSESSMENT: 0-10

## 2022-05-04 ASSESSMENT — PAIN DESCRIPTION - LOCATION: LOCATION: FLANK

## 2022-05-04 ASSESSMENT — PAIN SCALES - GENERAL: PAINLEVEL_OUTOF10: 7

## 2022-05-04 ASSESSMENT — PAIN DESCRIPTION - ORIENTATION: ORIENTATION: RIGHT

## 2022-05-04 NOTE — ED PROVIDER NOTES
Primary Care Physician: Josefina Roth MD   Attending Physician: No att. providers found     History   No chief complaint on file. HPI   Jenn Patrick  is a 55 y.o. male whose chart is not much with multiple ED visits who is coming complaining of chest pain associated with some right flank pain which started a few hours before he presented. Per EMS report they were called and she was evaluated. They received another call at this time he was complaining of flank pain on the right. Denies any fevers chills nausea vomiting or shortness of breath. No leg swelling. Stated that he is never had pain like this in the past.    History reviewed. No pertinent past medical history. History reviewed. No pertinent surgical history. History reviewed. No pertinent family history. Social History     Socioeconomic History    Marital status: Single     Spouse name: None    Number of children: None    Years of education: None    Highest education level: None   Occupational History    None   Tobacco Use    Smoking status: Unknown If Ever Smoked    Smokeless tobacco: Never Used   Vaping Use    Vaping Use: Never used   Substance and Sexual Activity    Alcohol use: Not Currently    Drug use: Not Currently    Sexual activity: Not Currently   Other Topics Concern    None   Social History Narrative    None     Social Determinants of Health     Financial Resource Strain:     Difficulty of Paying Living Expenses: Not on file   Food Insecurity:     Worried About Running Out of Food in the Last Year: Not on file    Bruna of Food in the Last Year: Not on file   Transportation Needs:     Lack of Transportation (Medical): Not on file    Lack of Transportation (Non-Medical):  Not on file   Physical Activity:     Days of Exercise per Week: Not on file    Minutes of Exercise per Session: Not on file   Stress:     Feeling of Stress : Not on file   Social Connections:     Frequency of Communication with Friends and Family: Not on file    Frequency of Social Gatherings with Friends and Family: Not on file    Attends Anglican Services: Not on file    Active Member of Clubs or Organizations: Not on file    Attends Club or Organization Meetings: Not on file    Marital Status: Not on file   Intimate Partner Violence:     Fear of Current or Ex-Partner: Not on file    Emotionally Abused: Not on file    Physically Abused: Not on file    Sexually Abused: Not on file   Housing Stability:     Unable to Pay for Housing in the Last Year: Not on file    Number of [place] in the Last Year: Not on file    Unstable Housing in the Last Year: Not on file        Review of Systems   10 total systems reviewed and found to be negative unless otherwise noted in HPI     Physical Exam   /79   Pulse 79   Temp 98.4 °F (36.9 °C) (Oral)   Resp 20   Ht 5' 11\" (1.803 m)   Wt (!) 389 lb 8.9 oz (176.7 kg)   SpO2 98%   BMI 54.33 kg/m²      CONSTITUTIONAL: Well appearing, in no acute distress   HEAD: atraumatic, normocephalic   EYES: PERRL, No injection, discharge or scleral icterus. ENT: Moist mucous membranes. NECK: Normal ROM, NO LAD   CARDIOVASCULAR: Regular rate and rhythm. No murmurs or gallop. PULMONARY/CHEST: Airway patent. No retractions. Breath sounds clear with good air entry bilaterally. ABDOMEN: Soft, Non-distended and non-tender, without guarding or rebound. SKIN: Acyanotic, warm, dry   MUSCULOSKELETAL: No swelling, tenderness or deformity   NEUROLOGICAL: Awake and oriented x 3. Pulses intact. Grossly nonfocal   Nursing note and vitals reviewed.      ED Course & Medical Decision Making   Medications - No data to display   Labs Reviewed   CBC WITH AUTO DIFFERENTIAL - Abnormal; Notable for the following components:       Result Value    RBC 3.81 (*)     Hemoglobin 11.7 (*)     Hematocrit 34.6 (*)     RDW 17.8 (*)     All other components within normal limits   COMPREHENSIVE METABOLIC PANEL W/ REFLEX TO MG FOR LOW K - Abnormal; Notable for the following components:    Glucose 115 (*)     BUN 30 (*)     CREATININE 1.6 (*)     GFR Non- 45 (*)     GFR  54 (*)     All other components within normal limits   URINALYSIS WITH REFLEX TO CULTURE - Abnormal; Notable for the following components:    Protein, UA TRACE (*)     All other components within normal limits   TROPONIN   LIPASE   MICROSCOPIC URINALYSIS      CT ABDOMEN PELVIS WO CONTRAST Additional Contrast? None   Final Result   1. No renal calculi, ureteral calculi or CT finding to suggest a recently   passed stone. 2. Liquid stool seen within the right colon, though without significant wall   thickening or pericolonic inflammatory changes. 3. Normal appendix. 4. No hyperdense calculi seen in the gallbladder or adjacent inflammatory   change to suggest cholecystitis. XR CHEST PORTABLE   Final Result   Mild central vascular congestion. No focal infiltrate. EKG INTERPRETATION:  EKG by my preliminary interpretation shows sinus rhythm normal axis, normal intervals, with no ST changes indicative of ischemia at this time. PROCEDURES:   Procedures    ASSESSMENT AND PLAN:  [id number], Erick Martínez is a 55 y.o. male who presents with multiple complaints including chest pain and flank pain. On exam patient appears nontoxic in no acute distress with tender to palpation right CVA as well as right upper quadrant. I did obtain imaging studies of the abdomen to evaluate for any kidney stones or abdominal Surgical pathology. Results unremarkable. I also obtain imagings study of the chest to evaluate for lung disease. EKG showed no ischemic changes. Troponin was unremarkable. Cause of his pain at this time is probably muscle skeleton. Patient is stable discharged home with recommendation to follow-up with primary care doctor. ClINICAL IMPRESSION:  1. Flank pain    2.  Chest pain, unspecified type          PATIENT REFERRED TO:  Mitchell Carrera MD  [address] [facility] [address] [facility]  [phone]    Schedule an appointment as soon as possible for a visit in 2 days        DISCHARGE MEDICATIONS:  Discharge Medication List as of 5/4/2022  2:50 AM        DISCONTINUED MEDICATIONS:  Discharge Medication List as of 5/4/2022  2:50 AM        DISPOSITION    Discharge  -We have instructed the patient, Torres Segundo) to return to the ED or call his PCP if hias pain/symptoms worsen. -Findings and recommendations explained to patient. He expressed understanding and agreed with the plan.    ___________________________________________________________________________________  _________________________________________________________________________________________  This record is transcribed utilizing voice recognition technology. There are inherent limitations in this technology. In addition, there may be limitations in editing of this report. If there are any discrepancies, please contact me directly.         Royal Baker MD  05/04/22 0045

## 2022-05-05 LAB
EKG ATRIAL RATE: 77 BPM
EKG DIAGNOSIS: NORMAL
EKG P AXIS: 28 DEGREES
EKG P-R INTERVAL: 156 MS
EKG Q-T INTERVAL: 390 MS
EKG QRS DURATION: 106 MS
EKG QTC CALCULATION (BAZETT): 441 MS
EKG R AXIS: -20 DEGREES
EKG T AXIS: 23 DEGREES
EKG VENTRICULAR RATE: 77 BPM

## 2022-05-05 PROCEDURE — 93010 ELECTROCARDIOGRAM REPORT: CPT | Performed by: INTERNAL MEDICINE

## 2022-07-15 PROCEDURE — 96374 THER/PROPH/DIAG INJ IV PUSH: CPT

## 2022-07-15 PROCEDURE — 99285 EMERGENCY DEPT VISIT HI MDM: CPT

## 2022-07-16 ENCOUNTER — APPOINTMENT (OUTPATIENT)
Dept: CT IMAGING | Age: 57
End: 2022-07-16
Payer: MEDICAID

## 2022-07-16 ENCOUNTER — APPOINTMENT (OUTPATIENT)
Dept: GENERAL RADIOLOGY | Age: 57
End: 2022-07-16
Payer: MEDICAID

## 2022-07-16 ENCOUNTER — HOSPITAL ENCOUNTER (INPATIENT)
Age: 57
LOS: 1 days | Discharge: HOME HEALTH | DRG: 139 | End: 2022-07-17
Attending: INTERNAL MEDICINE | Admitting: FAMILY MEDICINE
Payer: MEDICAID

## 2022-07-16 ENCOUNTER — HOSPITAL ENCOUNTER (EMERGENCY)
Age: 57
Discharge: ANOTHER HOSPITAL | End: 2022-07-16
Attending: EMERGENCY MEDICINE
Payer: MEDICAID

## 2022-07-16 VITALS
DIASTOLIC BLOOD PRESSURE: 77 MMHG | OXYGEN SATURATION: 98 % | WEIGHT: 315 LBS | SYSTOLIC BLOOD PRESSURE: 109 MMHG | BODY MASS INDEX: 42.66 KG/M2 | HEIGHT: 72 IN | TEMPERATURE: 98 F | HEART RATE: 66 BPM | RESPIRATION RATE: 18 BRPM

## 2022-07-16 DIAGNOSIS — E66.01 MORBID OBESITY (HCC): ICD-10-CM

## 2022-07-16 DIAGNOSIS — J10.1 INFLUENZA A: Primary | ICD-10-CM

## 2022-07-16 DIAGNOSIS — B34.9 VIRAL SYNDROME: ICD-10-CM

## 2022-07-16 DIAGNOSIS — J96.01 ACUTE HYPOXEMIC RESPIRATORY FAILURE (HCC): ICD-10-CM

## 2022-07-16 DIAGNOSIS — N17.9 AKI (ACUTE KIDNEY INJURY) (HCC): ICD-10-CM

## 2022-07-16 PROBLEM — J11.00 INFLUENZAL PNEUMONIA: Status: ACTIVE | Noted: 2022-07-16

## 2022-07-16 LAB
A/G RATIO: 1.5 (ref 1.1–2.2)
ALBUMIN SERPL-MCNC: 4.1 G/DL (ref 3.4–5)
ALP BLD-CCNC: 90 U/L (ref 40–129)
ALT SERPL-CCNC: 13 U/L (ref 10–40)
ANION GAP SERPL CALCULATED.3IONS-SCNC: 13 MMOL/L (ref 3–16)
AST SERPL-CCNC: 22 U/L (ref 15–37)
BASE EXCESS VENOUS: 4.3 MMOL/L (ref -3–3)
BASOPHILS ABSOLUTE: 0 K/UL (ref 0–0.2)
BASOPHILS RELATIVE PERCENT: 0.3 %
BILIRUB SERPL-MCNC: <0.2 MG/DL (ref 0–1)
BILIRUBIN URINE: NEGATIVE
BLOOD, URINE: ABNORMAL
BUN BLDV-MCNC: 33 MG/DL (ref 7–20)
CALCIUM SERPL-MCNC: 8.7 MG/DL (ref 8.3–10.6)
CHLORIDE BLD-SCNC: 107 MMOL/L (ref 99–110)
CLARITY: CLEAR
CO2: 25 MMOL/L (ref 21–32)
COLOR: YELLOW
CREAT SERPL-MCNC: 1.9 MG/DL (ref 0.9–1.3)
EOSINOPHILS ABSOLUTE: 0.1 K/UL (ref 0–0.6)
EOSINOPHILS RELATIVE PERCENT: 2.1 %
EPITHELIAL CELLS, UA: ABNORMAL /HPF (ref 0–5)
GFR AFRICAN AMERICAN: 45
GFR NON-AFRICAN AMERICAN: 37
GLUCOSE BLD-MCNC: 118 MG/DL (ref 70–99)
GLUCOSE BLD-MCNC: 122 MG/DL (ref 70–99)
GLUCOSE URINE: NEGATIVE MG/DL
HCO3 VENOUS: 29.5 MMOL/L (ref 23–29)
HCT VFR BLD CALC: 29.4 % (ref 40.5–52.5)
HEMOGLOBIN: 10.1 G/DL (ref 13.5–17.5)
HYALINE CASTS: ABNORMAL /LPF (ref 0–2)
KETONES, URINE: NEGATIVE MG/DL
LACTIC ACID: 1.1 MMOL/L (ref 0.4–2)
LEUKOCYTE ESTERASE, URINE: NEGATIVE
LIPASE: 48 U/L (ref 13–60)
LYMPHOCYTES ABSOLUTE: 1.8 K/UL (ref 1–5.1)
LYMPHOCYTES RELATIVE PERCENT: 36.3 %
MCH RBC QN AUTO: 31.4 PG (ref 26–34)
MCHC RBC AUTO-ENTMCNC: 34.3 G/DL (ref 31–36)
MCV RBC AUTO: 91.7 FL (ref 80–100)
MICROSCOPIC EXAMINATION: YES
MONOCYTES ABSOLUTE: 0.5 K/UL (ref 0–1.3)
MONOCYTES RELATIVE PERCENT: 9.9 %
MUCUS: ABNORMAL /LPF
NEUTROPHILS ABSOLUTE: 2.5 K/UL (ref 1.7–7.7)
NEUTROPHILS RELATIVE PERCENT: 51.4 %
NITRITE, URINE: NEGATIVE
O2 SAT, VEN: 91 %
O2 THERAPY: ABNORMAL
PCO2, VEN: 50.4 MMHG (ref 40–50)
PDW BLD-RTO: 16.2 % (ref 12.4–15.4)
PERFORMED ON: ABNORMAL
PH UA: 6 (ref 5–8)
PH VENOUS: 7.38 (ref 7.35–7.45)
PLATELET # BLD: 178 K/UL (ref 135–450)
PMV BLD AUTO: 8.3 FL (ref 5–10.5)
PO2, VEN: 62.4 MMHG (ref 25–40)
POTASSIUM REFLEX MAGNESIUM: 4 MMOL/L (ref 3.5–5.1)
PRO-BNP: 91 PG/ML (ref 0–124)
PROTEIN UA: NEGATIVE MG/DL
RAPID INFLUENZA  B AGN: NEGATIVE
RAPID INFLUENZA A AGN: POSITIVE
RBC # BLD: 3.21 M/UL (ref 4.2–5.9)
RBC UA: ABNORMAL /HPF (ref 0–4)
SARS-COV-2, NAAT: NOT DETECTED
SODIUM BLD-SCNC: 145 MMOL/L (ref 136–145)
SPECIFIC GRAVITY UA: 1.02 (ref 1–1.03)
TCO2 CALC VENOUS: 31 MMOL/L
TOTAL PROTEIN: 6.8 G/DL (ref 6.4–8.2)
TROPONIN: <0.01 NG/ML
URINE REFLEX TO CULTURE: ABNORMAL
URINE TYPE: ABNORMAL
UROBILINOGEN, URINE: 0.2 E.U./DL
WBC # BLD: 4.9 K/UL (ref 4–11)
WBC UA: ABNORMAL /HPF (ref 0–5)

## 2022-07-16 PROCEDURE — 6370000000 HC RX 637 (ALT 250 FOR IP): Performed by: EMERGENCY MEDICINE

## 2022-07-16 PROCEDURE — 85025 COMPLETE CBC W/AUTO DIFF WBC: CPT

## 2022-07-16 PROCEDURE — 6370000000 HC RX 637 (ALT 250 FOR IP): Performed by: HOSPITALIST

## 2022-07-16 PROCEDURE — 81001 URINALYSIS AUTO W/SCOPE: CPT

## 2022-07-16 PROCEDURE — 2580000003 HC RX 258: Performed by: FAMILY MEDICINE

## 2022-07-16 PROCEDURE — 83690 ASSAY OF LIPASE: CPT

## 2022-07-16 PROCEDURE — 82803 BLOOD GASES ANY COMBINATION: CPT

## 2022-07-16 PROCEDURE — 93005 ELECTROCARDIOGRAM TRACING: CPT | Performed by: EMERGENCY MEDICINE

## 2022-07-16 PROCEDURE — 87804 INFLUENZA ASSAY W/OPTIC: CPT

## 2022-07-16 PROCEDURE — 83605 ASSAY OF LACTIC ACID: CPT

## 2022-07-16 PROCEDURE — 6360000002 HC RX W HCPCS: Performed by: EMERGENCY MEDICINE

## 2022-07-16 PROCEDURE — 94640 AIRWAY INHALATION TREATMENT: CPT

## 2022-07-16 PROCEDURE — 80053 COMPREHEN METABOLIC PANEL: CPT

## 2022-07-16 PROCEDURE — 6370000000 HC RX 637 (ALT 250 FOR IP): Performed by: FAMILY MEDICINE

## 2022-07-16 PROCEDURE — 6360000002 HC RX W HCPCS: Performed by: FAMILY MEDICINE

## 2022-07-16 PROCEDURE — 84484 ASSAY OF TROPONIN QUANT: CPT

## 2022-07-16 PROCEDURE — 71045 X-RAY EXAM CHEST 1 VIEW: CPT

## 2022-07-16 PROCEDURE — 87040 BLOOD CULTURE FOR BACTERIA: CPT

## 2022-07-16 PROCEDURE — 83880 ASSAY OF NATRIURETIC PEPTIDE: CPT

## 2022-07-16 PROCEDURE — 1200000000 HC SEMI PRIVATE

## 2022-07-16 PROCEDURE — 87635 SARS-COV-2 COVID-19 AMP PRB: CPT

## 2022-07-16 PROCEDURE — 36415 COLL VENOUS BLD VENIPUNCTURE: CPT

## 2022-07-16 PROCEDURE — 94761 N-INVAS EAR/PLS OXIMETRY MLT: CPT

## 2022-07-16 RX ORDER — HALOPERIDOL 5 MG
20 TABLET ORAL 2 TIMES DAILY
Status: DISCONTINUED | OUTPATIENT
Start: 2022-07-16 | End: 2022-07-18 | Stop reason: HOSPADM

## 2022-07-16 RX ORDER — SODIUM CHLORIDE 0.9 % (FLUSH) 0.9 %
5-40 SYRINGE (ML) INJECTION PRN
Status: DISCONTINUED | OUTPATIENT
Start: 2022-07-16 | End: 2022-07-18 | Stop reason: HOSPADM

## 2022-07-16 RX ORDER — INSULIN LISPRO 100 [IU]/ML
0-6 INJECTION, SOLUTION INTRAVENOUS; SUBCUTANEOUS
Status: DISCONTINUED | OUTPATIENT
Start: 2022-07-16 | End: 2022-07-18 | Stop reason: HOSPADM

## 2022-07-16 RX ORDER — DEXTROSE MONOHYDRATE 50 MG/ML
100 INJECTION, SOLUTION INTRAVENOUS PRN
Status: DISCONTINUED | OUTPATIENT
Start: 2022-07-16 | End: 2022-07-18 | Stop reason: HOSPADM

## 2022-07-16 RX ORDER — OMEPRAZOLE 10 MG/1
20 CAPSULE, DELAYED RELEASE ORAL EVERY MORNING
Status: DISCONTINUED | OUTPATIENT
Start: 2022-07-17 | End: 2022-07-16 | Stop reason: CLARIF

## 2022-07-16 RX ORDER — POLYETHYLENE GLYCOL 3350 17 G/17G
17 POWDER, FOR SOLUTION ORAL DAILY PRN
Status: DISCONTINUED | OUTPATIENT
Start: 2022-07-16 | End: 2022-07-18 | Stop reason: HOSPADM

## 2022-07-16 RX ORDER — IPRATROPIUM BROMIDE AND ALBUTEROL SULFATE 2.5; .5 MG/3ML; MG/3ML
1 SOLUTION RESPIRATORY (INHALATION)
Status: DISCONTINUED | OUTPATIENT
Start: 2022-07-16 | End: 2022-07-16

## 2022-07-16 RX ORDER — LISINOPRIL 20 MG/1
40 TABLET ORAL DAILY
Status: DISCONTINUED | OUTPATIENT
Start: 2022-07-16 | End: 2022-07-18 | Stop reason: HOSPADM

## 2022-07-16 RX ORDER — ATORVASTATIN CALCIUM 20 MG/1
20 TABLET, FILM COATED ORAL NIGHTLY
Status: DISCONTINUED | OUTPATIENT
Start: 2022-07-16 | End: 2022-07-18 | Stop reason: HOSPADM

## 2022-07-16 RX ORDER — QUETIAPINE FUMARATE 100 MG/1
400 TABLET, FILM COATED ORAL NIGHTLY
Status: DISCONTINUED | OUTPATIENT
Start: 2022-07-16 | End: 2022-07-18 | Stop reason: HOSPADM

## 2022-07-16 RX ORDER — IPRATROPIUM BROMIDE AND ALBUTEROL SULFATE 2.5; .5 MG/3ML; MG/3ML
1 SOLUTION RESPIRATORY (INHALATION) 3 TIMES DAILY
Status: DISCONTINUED | OUTPATIENT
Start: 2022-07-16 | End: 2022-07-18 | Stop reason: HOSPADM

## 2022-07-16 RX ORDER — SODIUM CHLORIDE 9 MG/ML
INJECTION, SOLUTION INTRAVENOUS PRN
Status: DISCONTINUED | OUTPATIENT
Start: 2022-07-16 | End: 2022-07-18 | Stop reason: HOSPADM

## 2022-07-16 RX ORDER — PANTOPRAZOLE SODIUM 40 MG/1
40 TABLET, DELAYED RELEASE ORAL
Status: DISCONTINUED | OUTPATIENT
Start: 2022-07-17 | End: 2022-07-18 | Stop reason: HOSPADM

## 2022-07-16 RX ORDER — IPRATROPIUM BROMIDE AND ALBUTEROL SULFATE 2.5; .5 MG/3ML; MG/3ML
1 SOLUTION RESPIRATORY (INHALATION) ONCE
Status: COMPLETED | OUTPATIENT
Start: 2022-07-16 | End: 2022-07-16

## 2022-07-16 RX ORDER — DIVALPROEX SODIUM 500 MG/1
500 TABLET, EXTENDED RELEASE ORAL NIGHTLY
Status: DISCONTINUED | OUTPATIENT
Start: 2022-07-16 | End: 2022-07-16

## 2022-07-16 RX ORDER — ASPIRIN 81 MG/1
81 TABLET ORAL DAILY
Status: DISCONTINUED | OUTPATIENT
Start: 2022-07-17 | End: 2022-07-18 | Stop reason: HOSPADM

## 2022-07-16 RX ORDER — ONDANSETRON 4 MG/1
4 TABLET, ORALLY DISINTEGRATING ORAL EVERY 8 HOURS PRN
Status: DISCONTINUED | OUTPATIENT
Start: 2022-07-16 | End: 2022-07-18 | Stop reason: HOSPADM

## 2022-07-16 RX ORDER — ENOXAPARIN SODIUM 100 MG/ML
40 INJECTION SUBCUTANEOUS 2 TIMES DAILY
Status: DISCONTINUED | OUTPATIENT
Start: 2022-07-16 | End: 2022-07-18 | Stop reason: HOSPADM

## 2022-07-16 RX ORDER — DILTIAZEM HYDROCHLORIDE 240 MG/1
240 CAPSULE, COATED, EXTENDED RELEASE ORAL DAILY
Status: DISCONTINUED | OUTPATIENT
Start: 2022-07-16 | End: 2022-07-18 | Stop reason: HOSPADM

## 2022-07-16 RX ORDER — FUROSEMIDE 20 MG/1
20 TABLET ORAL DAILY
Status: DISCONTINUED | OUTPATIENT
Start: 2022-07-16 | End: 2022-07-18 | Stop reason: HOSPADM

## 2022-07-16 RX ORDER — DEXAMETHASONE SODIUM PHOSPHATE 4 MG/ML
6 INJECTION, SOLUTION INTRA-ARTICULAR; INTRALESIONAL; INTRAMUSCULAR; INTRAVENOUS; SOFT TISSUE ONCE
Status: COMPLETED | OUTPATIENT
Start: 2022-07-16 | End: 2022-07-16

## 2022-07-16 RX ORDER — LEVOTHYROXINE SODIUM 0.15 MG/1
150 TABLET ORAL
Status: DISCONTINUED | OUTPATIENT
Start: 2022-07-16 | End: 2022-07-18 | Stop reason: HOSPADM

## 2022-07-16 RX ORDER — SODIUM CHLORIDE 0.9 % (FLUSH) 0.9 %
5-40 SYRINGE (ML) INJECTION EVERY 12 HOURS SCHEDULED
Status: DISCONTINUED | OUTPATIENT
Start: 2022-07-16 | End: 2022-07-18 | Stop reason: HOSPADM

## 2022-07-16 RX ORDER — ACETAMINOPHEN 650 MG/1
650 SUPPOSITORY RECTAL EVERY 6 HOURS PRN
Status: DISCONTINUED | OUTPATIENT
Start: 2022-07-16 | End: 2022-07-18 | Stop reason: HOSPADM

## 2022-07-16 RX ORDER — ACETAMINOPHEN 325 MG/1
650 TABLET ORAL EVERY 6 HOURS PRN
Status: DISCONTINUED | OUTPATIENT
Start: 2022-07-16 | End: 2022-07-18 | Stop reason: HOSPADM

## 2022-07-16 RX ORDER — DIVALPROEX SODIUM 500 MG/1
1000 TABLET, EXTENDED RELEASE ORAL NIGHTLY
Status: DISCONTINUED | OUTPATIENT
Start: 2022-07-16 | End: 2022-07-18 | Stop reason: HOSPADM

## 2022-07-16 RX ORDER — DIVALPROEX SODIUM 500 MG/1
500 TABLET, EXTENDED RELEASE ORAL DAILY
Status: DISCONTINUED | OUTPATIENT
Start: 2022-07-17 | End: 2022-07-18 | Stop reason: HOSPADM

## 2022-07-16 RX ORDER — LEVOTHYROXINE SODIUM 0.15 MG/1
150 TABLET ORAL DAILY
Status: DISCONTINUED | OUTPATIENT
Start: 2022-07-16 | End: 2022-07-16 | Stop reason: SDUPTHER

## 2022-07-16 RX ORDER — OLANZAPINE 5 MG/1
5 TABLET ORAL 3 TIMES DAILY PRN
Status: DISCONTINUED | OUTPATIENT
Start: 2022-07-16 | End: 2022-07-16

## 2022-07-16 RX ORDER — ONDANSETRON 2 MG/ML
4 INJECTION INTRAMUSCULAR; INTRAVENOUS EVERY 6 HOURS PRN
Status: DISCONTINUED | OUTPATIENT
Start: 2022-07-16 | End: 2022-07-18 | Stop reason: HOSPADM

## 2022-07-16 RX ORDER — OSELTAMIVIR PHOSPHATE 75 MG/1
75 CAPSULE ORAL 2 TIMES DAILY
Status: DISCONTINUED | OUTPATIENT
Start: 2022-07-16 | End: 2022-07-18 | Stop reason: HOSPADM

## 2022-07-16 RX ORDER — OSELTAMIVIR PHOSPHATE 75 MG/1
75 CAPSULE ORAL ONCE
Status: COMPLETED | OUTPATIENT
Start: 2022-07-16 | End: 2022-07-16

## 2022-07-16 RX ORDER — IPRATROPIUM BROMIDE AND ALBUTEROL SULFATE 2.5; .5 MG/3ML; MG/3ML
1 SOLUTION RESPIRATORY (INHALATION) EVERY 4 HOURS PRN
Status: DISCONTINUED | OUTPATIENT
Start: 2022-07-16 | End: 2022-07-18 | Stop reason: HOSPADM

## 2022-07-16 RX ORDER — FUROSEMIDE 40 MG/1
40 TABLET ORAL DAILY
Status: DISCONTINUED | OUTPATIENT
Start: 2022-07-16 | End: 2022-07-16

## 2022-07-16 RX ORDER — INSULIN LISPRO 100 [IU]/ML
0-3 INJECTION, SOLUTION INTRAVENOUS; SUBCUTANEOUS NIGHTLY
Status: DISCONTINUED | OUTPATIENT
Start: 2022-07-16 | End: 2022-07-18 | Stop reason: HOSPADM

## 2022-07-16 RX ORDER — QUETIAPINE FUMARATE 100 MG/1
100 TABLET, FILM COATED ORAL EVERY MORNING
Status: DISCONTINUED | OUTPATIENT
Start: 2022-07-17 | End: 2022-07-18 | Stop reason: HOSPADM

## 2022-07-16 RX ADMIN — OSELTAMIVIR PHOSPHATE 75 MG: 75 CAPSULE ORAL at 19:14

## 2022-07-16 RX ADMIN — ATORVASTATIN CALCIUM 20 MG: 20 TABLET, FILM COATED ORAL at 22:05

## 2022-07-16 RX ADMIN — Medication 10 ML: at 22:42

## 2022-07-16 RX ADMIN — IPRATROPIUM BROMIDE AND ALBUTEROL SULFATE 1 AMPULE: 2.5; .5 SOLUTION RESPIRATORY (INHALATION) at 20:33

## 2022-07-16 RX ADMIN — DILTIAZEM HYDROCHLORIDE 240 MG: 240 CAPSULE, COATED, EXTENDED RELEASE ORAL at 19:14

## 2022-07-16 RX ADMIN — QUETIAPINE FUMARATE 400 MG: 100 TABLET ORAL at 22:04

## 2022-07-16 RX ADMIN — LEVOTHYROXINE SODIUM 150 MCG: 0.15 TABLET ORAL at 19:15

## 2022-07-16 RX ADMIN — FUROSEMIDE 20 MG: 20 TABLET ORAL at 19:14

## 2022-07-16 RX ADMIN — HALOPERIDOL 20 MG: 5 TABLET ORAL at 22:03

## 2022-07-16 RX ADMIN — ENOXAPARIN SODIUM 40 MG: 100 INJECTION SUBCUTANEOUS at 22:06

## 2022-07-16 RX ADMIN — OSELTAMIVIR PHOSPHATE 75 MG: 75 CAPSULE ORAL at 01:48

## 2022-07-16 RX ADMIN — IPRATROPIUM BROMIDE AND ALBUTEROL SULFATE 1 AMPULE: .5; 2.5 SOLUTION RESPIRATORY (INHALATION) at 17:05

## 2022-07-16 RX ADMIN — DEXAMETHASONE SODIUM PHOSPHATE 6 MG: 4 INJECTION, SOLUTION INTRAMUSCULAR; INTRAVENOUS at 01:48

## 2022-07-16 RX ADMIN — INSULIN LISPRO 1 UNITS: 100 INJECTION, SOLUTION INTRAVENOUS; SUBCUTANEOUS at 19:15

## 2022-07-16 RX ADMIN — LISINOPRIL 40 MG: 20 TABLET ORAL at 19:14

## 2022-07-16 RX ADMIN — IPRATROPIUM BROMIDE AND ALBUTEROL SULFATE 1 AMPULE: .5; 3 SOLUTION RESPIRATORY (INHALATION) at 01:49

## 2022-07-16 RX ADMIN — DIVALPROEX SODIUM 1000 MG: 500 TABLET, EXTENDED RELEASE ORAL at 22:03

## 2022-07-16 ASSESSMENT — PAIN - FUNCTIONAL ASSESSMENT
PAIN_FUNCTIONAL_ASSESSMENT: NONE - DENIES PAIN

## 2022-07-16 NOTE — ED NOTES
Spoke with Watson, he [facility] with [facility] they approved us to call and see if [facility] would be able to transport.       Bruce Krishna RN  07/16/22 8869

## 2022-07-16 NOTE — RT PROTOCOL NOTE
RT Inhaler-Nebulizer Bronchodilator Protocol Note    There is a bronchodilator order in the chart from a provider indicating to follow the RT Bronchodilator Protocol and there is an Initiate RT Inhaler-Nebulizer Bronchodilator Protocol order as well (see protocol at bottom of note). CXR Findings:  XR CHEST PORTABLE    Result Date: 7/16/2022  Underpenetration of the x-ray with the habitus/technique. The left base has limited evaluation and cannot exclude left basilar opacity. Mild pulmonary vascular congestion/volume overload. The findings from the last RT Protocol Assessment were as follows:   History Pulmonary Disease: Chronic pulmonary disease  Respiratory Pattern: Mild dyspnea at rest, irregular pattern, or RR 21-25 bpm  Breath Sounds: Slightly diminished and/or crackles  Cough: Strong, spontaneous, non-productive  Indication for Bronchodilator Therapy:    Bronchodilator Assessment Score: 8    Aerosolized bronchodilator medication orders have been revised according to the RT Inhaler-Nebulizer Bronchodilator Protocol below. Respiratory Therapist to perform RT Therapy Protocol Assessment initially then follow the protocol. Repeat RT Therapy Protocol Assessment PRN for score 0-3 or on second treatment, BID, and PRN for scores above 3. No Indications - adjust the frequency to every 6 hours PRN wheezing or bronchospasm, if no treatments needed after 48 hours then discontinue using Per Protocol order mode. If indication present, adjust the RT bronchodilator orders based on the Bronchodilator Assessment Score as indicated below. Use Inhaler orders unless patient has one or more of the following: on home nebulizer, not able to hold breath for 10 seconds, is not alert and oriented, cannot activate and use MDI correctly, or respiratory rate 25 breaths per minute or more, then use the equivalent nebulizer order(s) with same Frequency and PRN reasons based on the score.   If a patient is on this medication at home then do not decrease Frequency below that used at home. 0-3 - enter or revise RT bronchodilator order(s) to equivalent RT Bronchodilator order with Frequency of every 4 hours PRN for wheezing or increased work of breathing using Per Protocol order mode. 4-6 - enter or revise RT Bronchodilator order(s) to two equivalent RT bronchodilator orders with one order with BID Frequency and one order with Frequency of every 4 hours PRN wheezing or increased work of breathing using Per Protocol order mode. 7-10 - enter or revise RT Bronchodilator order(s) to two equivalent RT bronchodilator orders with one order with TID Frequency and one order with Frequency of every 4 hours PRN wheezing or increased work of breathing using Per Protocol order mode. 11-13 - enter or revise RT Bronchodilator order(s) to one equivalent RT bronchodilator order with QID Frequency and an Albuterol order with Frequency of every 4 hours PRN wheezing or increased work of breathing using Per Protocol order mode. Greater than 13 - enter or revise RT Bronchodilator order(s) to one equivalent RT bronchodilator order with every 4 hours Frequency and an Albuterol order with Frequency of every 2 hours PRN wheezing or increased work of breathing using Per Protocol order mode. RT to enter RT Home Evaluation for COPD & MDI Assessment order using Per Protocol order mode.     Electronically signed by Marsha Leong RCP on 7/16/2022 at 5:11 PM

## 2022-07-16 NOTE — ED PROVIDER NOTES
[facility] Emergency Department    Marcia Vazquez MD, am the primary clinician of record. CHIEF COMPLAINT  Chief Complaint   Patient presents with    Chest Pain     Rpeorts chest pain and shorntess of breath which started this morning. Reports hx of cardiac issues, does not see a cardiologist.    Abdominal Pain     Reports intermittant abdominal pain x2 weeks. States returned around one hour ago. HISTORY OF PRESENT ILLNESS  Scar Salinas is a 55 y.o. male  who presents to the ED complaining of overall several weeks of chest pain SOB and malaise, fatigue, and body aches. Tonight it got worse so he comes in for evaluation. No oxygen requirement normally except for sleep apnea but is hypoxic on room air in triage. He has a history of CHF, DM, HTN, bipolar disorder, schizophrenia, and LADONNA. He says he has nausea, dry cough, and some abdominal pains. No dysuria or hematuria. Has felt feverish subjectively. Did not measure. No sick contacts. No other complaints, modifying factors or associated symptoms. I have reviewed the following from the nursing documentation.     Past Medical History:   Diagnosis Date    Altered mental status     Bipolar 1 disorder ([address] 1984    Bowel and bladder incontinence     Bradycardia     Cellulitis     Cerebral edema (HCC)     Chest pain     Diabetes mellitus ([address]     Goiter     Goiter     Hypertension     Hypocalcemia     Hypothyroid     Kidney stone     Morbid obesity (HCC)     LADONNA (obstructive sleep apnea)     Psychosis ([address]     Pulmonary edema     Pyelonephritis     Schizophrenia ([address]      GCB    Sleep apnea      Past Surgical History:   Procedure Laterality Date    THYROIDECTOMY       Family History   Problem Relation Age of Onset    Diabetes Mother     Diabetes Maternal Grandmother      Social History     Socioeconomic History    Marital status: Single     Spouse name: Not on file    Number of children: Not on file    Years of education: Not on file    Highest education level: Not on file   Occupational History    Not on file   Tobacco Use    Smoking status: Former    Smokeless tobacco: Never   Vaping Use    Vaping Use: Never used   Substance and Sexual Activity    Alcohol use: Not Currently     Comment: rare    Drug use: Not Currently     Types: Opiates      Comment: 07/19/2019 drug screen + oxcodone    Sexual activity: Not Currently     Partners: Male, Female   Other Topics Concern    Not on file   Social History Narrative    ** Merged History Encounter **          Social Determinants of Health     Financial Resource Strain: Not on file   Food Insecurity: Not on file   Transportation Needs: Not on file   Physical Activity: Not on file   Stress: Not on file   Social Connections: Not on file   Intimate Partner Violence: Not on file   Housing Stability: Not on file     No current facility-administered medications for this encounter.      Current Outpatient Medications   Medication Sig Dispense Refill    atorvastatin (LIPITOR) 20 MG tablet       Cholecalciferol (VITAMIN D3) 125 MCG (5000 UT) CAPS       clotrimazole-betamethasone (LOTRISONE) 1-0.05 % cream       diclofenac (VOLTAREN) 75 MG EC tablet       dilTIAZem (CARDIZEM CD) 240 MG extended release capsule       divalproex (DEPAKOTE ER) 500 MG extended release tablet       furosemide (LASIX) 20 MG tablet  (Patient not taking: Reported on 7/16/2022)      haloperidol (HALDOL) 20 MG tablet       levothyroxine (SYNTHROID) 150 MCG tablet       lisinopril (PRINIVIL;ZESTRIL) 40 MG tablet       Multiple Vitamin (DAILY-JESU) TABS       NYAMYC 001329 UNIT/GM powder       omeprazole (PRILOSEC) 20 MG delayed release capsule       pioglitazone (ACTOS) 30 MG tablet       QUEtiapine (SEROQUEL) 100 MG tablet       QUEtiapine (SEROQUEL) 400 MG tablet       naproxen (NAPROSYN) 375 MG tablet Take 1 tablet by mouth 2 times daily (with meals) for 15 days 30 tablet 0    aspirin 81 MG EC tablet Take 1 tablet by mouth daily 30 tablet 0    divalproex (DEPAKOTE ER) 500 MG extended release tablet Take 1 tablet by mouth nightly 30 tablet 0    metFORMIN (GLUCOPHAGE) 500 MG tablet Take 1 tablet by mouth 2 times daily (with meals) 60 tablet 0    atorvastatin (LIPITOR) 40 MG tablet Take 1 tablet by mouth nightly 30 tablet 0    lisinopril (PRINIVIL;ZESTRIL) 20 MG tablet Take 1 tablet by mouth daily 30 tablet 0    haloperidol (HALDOL) 10 MG tablet Take 1 tablet by mouth 3 times daily 90 tablet 0    OLANZapine (ZYPREXA) 5 MG tablet Take 1 tablet by mouth 3 times daily as needed (agitation) 30 tablet 0    furosemide (LASIX) 40 MG tablet Take 1 tablet by mouth daily (Patient not taking: Reported on 7/16/2022) 30 tablet 0    levothyroxine (SYNTHROID) 125 MCG tablet Take 2 tablets by mouth Daily 60 tablet 0    pantoprazole (PROTONIX) 40 MG tablet Take 1 tablet by mouth every morning (before breakfast) 30 tablet 0     Allergies   Allergen Reactions    Carbamazepine      Per chart review 2/9/12    Chlorpromazine      Per chart review 2/9/12    Thioridazine      Per chart review 2/9/12       REVIEW OF SYSTEMS  10 systems reviewed, pertinent positives per HPI otherwise noted to be negative. PHYSICAL EXAM  BP 99/71   Pulse 83   Temp 98.5 °F (36.9 °C) (Oral)   Resp 21   Ht 6' (1.829 m)   Wt (!) 414 lb 14.5 oz (188.2 kg)   SpO2 93%   BMI 56.27 kg/m²    GENERAL APPEARANCE: Awake and alert. Cooperative. Mild distress. HENT: Normocephalic. Atraumatic. Mucous membranes are dry. NECK: Supple. EYES: PERRL. EOM's grossly intact. HEART/CHEST: RRR. No murmurs. No chest wall tenderness. LUNGS: Scattered rhonchi and bibasilar rales, mild wheezing throughout, conversational dyspnea noted, improving on NC oxygen. ABDOMEN: Mild diffusely upper abdominal tenderness. Soft. Non-distended. No masses. No organomegaly. No guarding or rebound. Normal bowel sounds throughout. MUSCULOSKELETAL: 3+ bilat lower extremity edema. Compartments soft.   No deformity. No tenderness in the extremities. All extremities neurovascularly intact. SKIN: Warm and dry. No acute rashes. NEUROLOGICAL: Alert and oriented. CN's 2-12 intact. No gross facial drooping. Strength 5/5, sensation intact. 2 plus DTR's in knees bilaterally. Gait not assessed. PSYCHIATRIC: Normal mood and affect. LABS  I have reviewed all labs for this visit.    Results for orders placed or performed during the hospital encounter of 07/16/22   COVID-19, Rapid    Specimen: Nasopharyngeal Swab   Result Value Ref Range    SARS-CoV-2, NAAT Not Detected Not Detected   Rapid influenza A/B antigens    Specimen: Nasopharyngeal   Result Value Ref Range    Rapid Influenza A Ag POSITIVE (A) Negative    Rapid Influenza B Ag Negative Negative   CBC with Auto Differential   Result Value Ref Range    WBC 4.9 4.0 - 11.0 K/uL    RBC 3.21 (L) 4.20 - 5.90 M/uL    Hemoglobin 10.1 (L) 13.5 - 17.5 g/dL    Hematocrit 29.4 (L) 40.5 - 52.5 %    MCV 91.7 80.0 - 100.0 fL    MCH 31.4 26.0 - 34.0 pg    MCHC 34.3 31.0 - 36.0 g/dL    RDW 16.2 (H) 12.4 - 15.4 %    Platelets 684 773 - 196 K/uL    MPV 8.3 5.0 - 10.5 fL    Neutrophils % 51.4 %    Lymphocytes % 36.3 %    Monocytes % 9.9 %    Eosinophils % 2.1 %    Basophils % 0.3 %    Neutrophils Absolute 2.5 1.7 - 7.7 K/uL    Lymphocytes Absolute 1.8 1.0 - 5.1 K/uL    Monocytes Absolute 0.5 0.0 - 1.3 K/uL    Eosinophils Absolute 0.1 0.0 - 0.6 K/uL    Basophils Absolute 0.0 0.0 - 0.2 K/uL   Comprehensive Metabolic Panel w/ Reflex to MG   Result Value Ref Range    Sodium 145 136 - 145 mmol/L    Potassium reflex Magnesium 4.0 3.5 - 5.1 mmol/L    Chloride 107 99 - 110 mmol/L    CO2 25 21 - 32 mmol/L    Anion Gap 13 3 - 16    Glucose 118 (H) 70 - 99 mg/dL    BUN 33 (H) 7 - 20 mg/dL    CREATININE 1.9 (H) 0.9 - 1.3 mg/dL    GFR Non- 37 (A) >60    GFR  45 (A) >60    Calcium 8.7 8.3 - 10.6 mg/dL    Total Protein 6.8 6.4 - 8.2 g/dL    Albumin 4.1 3.4 - 5.0 g/dL Albumin/Globulin Ratio 1.5 1.1 - 2.2    Total Bilirubin <0.2 0.0 - 1.0 mg/dL    Alkaline Phosphatase 90 40 - 129 U/L    ALT 13 10 - 40 U/L    AST 22 15 - 37 U/L   Troponin   Result Value Ref Range    Troponin <0.01 <0.01 ng/mL   Brain Natriuretic Peptide   Result Value Ref Range    Pro-BNP 91 0 - 124 pg/mL   Lactic Acid   Result Value Ref Range    Lactic Acid 1.1 0.4 - 2.0 mmol/L   Lipase   Result Value Ref Range    Lipase 48.0 13.0 - 60.0 U/L   Blood Gas, Venous   Result Value Ref Range    pH, Roge 7.375 7.350 - 7.450    pCO2, Roge 50.4 (H) 40.0 - 50.0 mmHg    pO2, Roge 62.4 (H) 25.0 - 40.0 mmHg    HCO3, Venous 29.5 (H) 23.0 - 29.0 mmol/L    Base Excess, Roge 4.3 (H) -3.0 - 3.0 mmol/L    O2 Sat, Roge 91 Not Established %    TC02 (Calc), Roge 31 Not Established mmol/L    O2 Therapy Unknown        The 12 lead EKG was interpreted by me as follows:  Rate: normal with a rate of 88  Rhythm: sinus  Axis: left deviation  Intervals: normal MD, narrow QRS, normal QTc  ST segments: no ST elevations or depressions  T waves: no abnormal inversions  LVH changes  Non-specific T wave changes: present  Prior EKG comparison: EKG dated 5/4/22 is not significantly different    RADIOLOGY    XR CHEST PORTABLE    Result Date: 7/16/2022  EXAMINATION: ONE XRAY VIEW OF THE CHEST 7/16/2022 1:08 am COMPARISON: 05/04/2022 HISTORY: ORDERING SYSTEM PROVIDED HISTORY: hypoxia TECHNOLOGIST PROVIDED HISTORY: Reason for exam:->hypoxia Reason for Exam: chest pain FINDINGS: Summation density the soft tissues and underpenetration of the spine/retrocardiac regions. External leads overlie the chest but no lines or tubes in the chest. Reduced sensitivity at the left base and diaphragm due to the underpenetration. The right hemidiaphragm is still visualized without a significant right pleural effusion. Hazy density of the lower lungs is likely the summation of the soft tissues. The upper lungs are clear. No pneumothorax is identified.   Cardiac silhouette is at the upper limits of normal with mild congestion. Underpenetration of the x-ray with the habitus/technique. The left base has limited evaluation and cannot exclude left basilar opacity. Mild pulmonary vascular congestion/volume overload. ED COURSE/MDM  Patient seen and evaluated. Old records reviewed. Labs and imaging reviewed and results discussed with patient. After initial evaluation, differential diagnostic considerations included: acute coronary syndrome, pulmonary embolism, COPD/asthma, pneumonia, sepsis, pericardial tamponade, pneumothorax, CHF, thoracic aortic dissection, anxiety    The patient's ED workup was notable for influenza A. Hypoxic, stable on nasal cannula oxygen. Initial concern was for COVID as well but this is negative. His symptomatology with chest and belly pain is also concerning for a viral syndrome. He does have an ELPIDIO. He will be given Decadron nebulizer and Tamiflu. We will keep him euvolemic for now considering his borderline volume overload despite reassuring BNP. Intention was to get a CT of the chest and belly as well however due to his weight greater than 400 pounds we are unable to obtain a CT at this facility. Is this patient to be included in the SEP-1 Core Measure? No   Exclusion criteria - the patient is NOT to be included for SEP-1 Core Measure due to:  Viral etiology found or highly suspected (including COVID-19) without concomitant bacterial infection      During the patient's ED course, the patient was given:  Medications   ipratropium-albuterol (DUONEB) nebulizer solution 1 ampule (1 ampule Inhalation Given 7/16/22 0149)   dexamethasone (DECADRON) injection 6 mg (6 mg IntraVENous Given 7/16/22 0148)   oseltamivir (TAMIFLU) capsule 75 mg (75 mg Oral Given 7/16/22 0148)        CLINICAL IMPRESSION  1. Influenza A    2. Acute hypoxemic respiratory failure (HCC)    3. Viral syndrome    4. ELPIDIO (acute kidney injury) ([facility]    5.  Morbid obesity ([facility]        Blood pressure 99/71, pulse 83, temperature 98.5 °F (36.9 °C), temperature source Oral, resp. rate 21, height 6' (1.829 m), weight (!) 414 lb 14.5 oz (188.2 kg), SpO2 93 %. DISPOSITION  Benjamin Barthel was transferred to [facility] due to [place] capacity issues  in fair condition. The plan is to transfer to  [facility]  at this time because  capacity and diversion at [place] .  Dr. Alley De La Torre accepted the patient and will take over the patient's care upon patient arrival.      The total critical care time I independently spent while evaluating and treating this patient was 40 minutes. This excludes time spent doing separately billable procedures. This includes time at the bedside, data interpretation, medication management, obtaining critical history from collateral sources if the patient is unable to provide it directly, and physician consultation. Specifics of interventions taken and potentially life-threatening diagnostic considerations are listed above in the medical decision making. If this was a shared visit with an ZEB, the time in this attestation is non-concurrent critical care time out of the total shared critical care time provided by the ZEB and myself. DISCLAIMER: This chart was created using Dragon dictation software. Efforts were made by me to ensure accuracy, however some errors may be present due to limitations of this technology and occasionally words are not transcribed correctly.        Brenda Arana MD  07/16/22 6612

## 2022-07-16 NOTE — PROGRESS NOTES
Call to Onelia Son, Pts , per Pharm request to clarify home meds, No answer, mail box full. Call to Georgina Traylor, sister,  [phone] no answer , left message   Call to Ned Edward sr, pts father, states pats meds and finances are managed thru [facility], pt currently lives alone in a 4 family apt complex in [place], also managed by [facility]. Call to [facility] at [phone] left message with Aisha Smalls, will have  call back .

## 2022-07-16 NOTE — ED NOTES
PT sitting up in the bed eating kelly crackers with peanut butter and 8oz of apple juice.       Mimi Moralez RN  07/16/22 0802

## 2022-07-16 NOTE — ED NOTES
Spoke with Sabrina Scales called [facility] ,  They will pick the pt up and transport with a nurse,      Katheryn Terrell RN  07/16/22 6286

## 2022-07-16 NOTE — PROGRESS NOTES
Spoke to Ruben Arredondo, [phone] manager at [facility], reviewed home meds, call  pharmacist [facility] to review home meds and confirm pt takes seroquel 100 in am and 400 mg at bedtime.  For the haldol, 20 mg b.I.d.

## 2022-07-16 NOTE — PROGRESS NOTES
07/16/22 1710   RT Protocol   History Pulmonary Disease 2   Respiratory pattern 4   Breath sounds 2   Cough 0   Bronchodilator Assessment Score 8

## 2022-07-16 NOTE — ED NOTES
Patient noted on camera to be drinking water with no issues.        Radhames Ravanna, RN  07/15/22 2153

## 2022-07-16 NOTE — PROGRESS NOTES
Transfer/acceptance note    Transferred from [facility] ER  7/16/2022     Diagnosis:  Acute respiratory failure with hypoxia  Influenza A infection  Acute kidney injury    27-year-old gentleman with history of hypertension, hypothyroidism, type 2 diabetes, hyperlipidemia, obesity with sleep apnea, schizophrenia who presented with shortness of breath and associated generalized body aches chest pains headaches. No fevers or chills. No significant cough. On presentation in the ER, oxygen saturations noted at 80% room air requiring supplementation.   Influenza A positive  COVID screening test negative  Chest x-ray with mild vascular congestion

## 2022-07-16 NOTE — H&P
\Shortness of                                                   HOSPITALISTS HISTORY AND PHYSICAL    7/16/2022 3:42 PM    Patient Information:  Doris Dickson is a 55 y.o. male [id number]  PCP:  Estanislao Spurling, MD (Tel: [phone] )    Chief complaints of shortness of    28-year-old gentleman with history of hypertension, hypothyroidism, type 2 diabetes, hyperlipidemia, obesity with sleep apnea, schizophrenia who presented with shortness of breath and associated generalized body aches chest pains headaches. No fevers or chills. No significant cough. Onset of symptoms acute over the past day or so. Patient with some chills. No fevers. Patient apparently was found to be hypoxic in the 80s on room air was placed on nasal cannula work-up was found to have some pulmonary congestion also for influenza-positive. No sick contacts that patient recalls. Patient has been transferred for further evaluation monitor evaluation patient now is on room air. Breathing okay still with cough full  History of Present Illness:   REVIEW OF SYSTEMS:   Constitutional: Negative for fever,chills or night sweats  ENT: Negative for rhinorrhea, epistaxis, hoarseness, sore throat. Respiratory: Negative for shortness of breath,wheezing  Cardiovascular: Negative for chest pain, palpitations   Gastrointestinal: Negative for nausea, vomiting, diarrhea  Genitourinary: Negative for polyuria, dysuria   Hematologic/Lymphatic: Negative for bleeding tendency, easy bruising  Musculoskeletal: Negative for myalgias and arthralgias  Neurologic: Negative for confusion,dysarthria. Skin: Negative for itching,rash, good capillary refill. Psychiatric: Negative for depression,anxiety, agitation. Endocrine: Negative for polydipsia,polyuria,heat /cold intolerance.     Past Medical History:   has a past medical history of Altered mental status, Bipolar 1 disorder ([address] Bowel and bladder incontinence, Bradycardia, Cellulitis, Cerebral edema ([address] Chest pain, Diabetes mellitus ([facility] Goiter, Goiter, Hypertension, Hypocalcemia, Hypothyroid, Kidney stone, Morbid obesity ([facility] LADONNA (obstructive sleep apnea), Psychosis ([facility] Pulmonary edema, Pyelonephritis, Schizophrenia ([facility] and Sleep apnea. Past Surgical History:   has a past surgical history that includes Thyroidectomy. Medications:  No current facility-administered medications on file prior to encounter.      Current Outpatient Medications on File Prior to Encounter   Medication Sig Dispense Refill    atorvastatin (LIPITOR) 20 MG tablet       Cholecalciferol (VITAMIN D3) 125 MCG (5000 UT) CAPS       clotrimazole-betamethasone (LOTRISONE) 1-0.05 % cream       diclofenac (VOLTAREN) 75 MG EC tablet       dilTIAZem (CARDIZEM CD) 240 MG extended release capsule       divalproex (DEPAKOTE ER) 500 MG extended release tablet       furosemide (LASIX) 20 MG tablet  (Patient not taking: Reported on 7/16/2022)      haloperidol (HALDOL) 20 MG tablet       levothyroxine (SYNTHROID) 150 MCG tablet       lisinopril (PRINIVIL;ZESTRIL) 40 MG tablet       Multiple Vitamin (DAILY-JESU) TABS       NYAMYC 123365 UNIT/GM powder       omeprazole (PRILOSEC) 20 MG delayed release capsule       pioglitazone (ACTOS) 30 MG tablet       QUEtiapine (SEROQUEL) 100 MG tablet       QUEtiapine (SEROQUEL) 400 MG tablet       naproxen (NAPROSYN) 375 MG tablet Take 1 tablet by mouth 2 times daily (with meals) for 15 days 30 tablet 0    aspirin 81 MG EC tablet Take 1 tablet by mouth daily 30 tablet 0    divalproex (DEPAKOTE ER) 500 MG extended release tablet Take 1 tablet by mouth nightly (Patient taking differently: Take 500 mg by mouth nightly Takes two tabs at night) 30 tablet 0    metFORMIN (GLUCOPHAGE) 500 MG tablet Take 1 tablet by mouth 2 times daily (with meals) 60 tablet 0    atorvastatin (LIPITOR) 40 MG tablet Take 1 tablet by mouth nightly 30 tablet 0    lisinopril (PRINIVIL;ZESTRIL) 20 MG tablet Take 1 tablet by mouth daily 30 tablet 0    haloperidol (HALDOL) 10 MG tablet Take 1 tablet by mouth 3 times daily 90 tablet 0    OLANZapine (ZYPREXA) 5 MG tablet Take 1 tablet by mouth 3 times daily as needed (agitation) 30 tablet 0    furosemide (LASIX) 40 MG tablet Take 1 tablet by mouth daily (Patient not taking: Reported on 7/16/2022) 30 tablet 0    levothyroxine (SYNTHROID) 125 MCG tablet Take 2 tablets by mouth Daily 60 tablet 0    pantoprazole (PROTONIX) 40 MG tablet Take 1 tablet by mouth every morning (before breakfast) 30 tablet 0       Allergies: Allergies   Allergen Reactions    Carbamazepine      Per chart review 2/9/12    Chlorpromazine      Per chart review 2/9/12    Thioridazine      Per chart review 2/9/12        Social History:   reports that he has quit smoking. He has never used smokeless tobacco. He reports that he does not currently use alcohol. He reports that he does not currently use drugs after having used the following drugs: Opiates . Family History:  family history includes Diabetes in his maternal grandmother and mother. ,     Physical Exam:  BP (!) 172/95   Pulse 69   Temp 97.9 °F (36.6 °C) (Axillary)   Resp 20   Wt (!) 417 lb (189.1 kg)   SpO2 94%   BMI 56.56 kg/m²     General appearance:  Appears comfortable. Well nourished  Eyes: Sclera clear, pupils equal  ENT: Moist mucus membranes, no thrush. Trachea midline. Cardiovascular: Regular rhythm, normal S1, S2. No murmur, gallop, rub. No edema in lower extremities  Respiratory: Clear to auscultation bilaterally, no wheeze, good inspiratory effort  Gastrointestinal: Abdomen soft, non-tender, not distended, normal bowel sounds  Musculoskeletal: No cyanosis in digits, neck supple  Neurology: Cranial nerves grossly intact. Alert and oriented in time, place and person. No speech or motor deficits  Psychiatry: Appropriate affect.  Not agitated  Skin: Warm, dry, normal turgor, no rash    Labs:  CBC:   Lab Results   Component Value Date/Time    WBC 4.9 07/16/2022 01:21 AM    RBC 3.21 07/16/2022 01:21 AM    HGB 10.1 07/16/2022 01:21 AM    HCT 29.4 07/16/2022 01:21 AM    MCV 91.7 07/16/2022 01:21 AM    MCH 31.4 07/16/2022 01:21 AM    MCHC 34.3 07/16/2022 01:21 AM    RDW 16.2 07/16/2022 01:21 AM     07/16/2022 01:21 AM    MPV 8.3 07/16/2022 01:21 AM     BMP:    Lab Results   Component Value Date/Time     07/16/2022 01:21 AM    K 4.0 07/16/2022 01:21 AM     07/16/2022 01:21 AM    CO2 25 07/16/2022 01:21 AM    BUN 33 07/16/2022 01:21 AM    CREATININE 1.9 07/16/2022 01:21 AM    CALCIUM 8.7 07/16/2022 01:21 AM    GFRAA 45 07/16/2022 01:21 AM    GFRAA 96 01/01/2013 08:25 AM    LABGLOM 37 07/16/2022 01:21 AM    GLUCOSE 118 07/16/2022 01:21 AM       Chest Xray:   EKG:    I visualized CXR images and EKG strips     Discussed  with     Problem List  Principal Problem:    Influenzal pneumonia  Active Problems:    Acute respiratory failure with hypoxia (HCC)  Resolved Problems:    * No resolved hospital problems. *        Assessment/Plan:   Influenza pneumonia  -Patient will be initiated on Tamiflu  -Monitor closely for worsening status-supportive care    Acute hypoxic respiratory failure  -Currently patient has been weaned down.   Could be secondary to above  -Checks x-ray due to some pulmonary congestion and holding off on any IV fluid  -Did get Lasix will monitor another breathing status improved    Diabetes  Continue sliding scale Lantus    Hyperlipidemia hypothyroidism  Schizophrenia          Michael Marie MD    7/16/2022 3:42 PM

## 2022-07-16 NOTE — ED NOTES
Pt awake, sitting up in bed. Agreeable to change his wet pants a this time. Wet pants removed and place in belongings bag. Wipes given to cleanse himself. Pt up in the chair. Would like to stay up for a little bit. Stand by assist to chair.   Bed linens also changed     Larry Fierro RN  07/16/22 3308        Larry Fierro RN  07/16/22 2889

## 2022-07-16 NOTE — ED NOTES
Pt called out. Explains that he wasn't able to use the urinal and voided on himself. Pt wearing home pj pants. I explained that I could help him change out of the wet pants, pt yelled out \"NO!\". I added \"I am not going to throw them away, I will put them in a plastic bag. Pt refused to change clothes.       Corine Baires RN  07/16/22 6614

## 2022-07-16 NOTE — ED NOTES
Spoke with the access center, there is not a bariatric crew available until Sunday , I updated [facility] ED manager.  [facility] is calling [facility] ALEKSANDER Shipman, RN  07/16/22 1002

## 2022-07-16 NOTE — ED NOTES
PT standing a the bedside, wanting to void.   Urinal provided  Urine sent     Corine Baires RN  07/16/22 7096

## 2022-07-16 NOTE — ED NOTES
Called [facility] ems to see if they could transport to [address] [place] [address], they are unable to      [employer], RN  07/16/22 LILY Martinez  07/16/22 1016

## 2022-07-16 NOTE — ED NOTES
I spoke with [facility] dispatch they are unable to transport to [place] , they suggested I speak with [facility], [facility] did not have anyone available to take him to [place] , they spoke with [facility] as well. They suggested we call [place] I spoke to the fire house the do not have any extra staff today to send out of district.   Updated Luis and Dr. Mandy Cardenas, RN  07/16/22 3820

## 2022-07-17 ENCOUNTER — APPOINTMENT (OUTPATIENT)
Dept: GENERAL RADIOLOGY | Age: 57
DRG: 139 | End: 2022-07-17
Attending: INTERNAL MEDICINE
Payer: MEDICAID

## 2022-07-17 VITALS
SYSTOLIC BLOOD PRESSURE: 121 MMHG | WEIGHT: 315 LBS | TEMPERATURE: 98.7 F | BODY MASS INDEX: 56.56 KG/M2 | RESPIRATION RATE: 18 BRPM | HEART RATE: 75 BPM | DIASTOLIC BLOOD PRESSURE: 67 MMHG | OXYGEN SATURATION: 91 %

## 2022-07-17 LAB
ANION GAP SERPL CALCULATED.3IONS-SCNC: 9 MMOL/L (ref 3–16)
BUN BLDV-MCNC: 34 MG/DL (ref 7–20)
CALCIUM SERPL-MCNC: 9 MG/DL (ref 8.3–10.6)
CHLORIDE BLD-SCNC: 106 MMOL/L (ref 99–110)
CO2: 27 MMOL/L (ref 21–32)
CREAT SERPL-MCNC: 1.2 MG/DL (ref 0.9–1.3)
EKG ATRIAL RATE: 88 BPM
EKG DIAGNOSIS: NORMAL
EKG P AXIS: 32 DEGREES
EKG P-R INTERVAL: 160 MS
EKG Q-T INTERVAL: 364 MS
EKG QRS DURATION: 118 MS
EKG QTC CALCULATION (BAZETT): 440 MS
EKG R AXIS: -18 DEGREES
EKG T AXIS: 19 DEGREES
EKG VENTRICULAR RATE: 88 BPM
GFR AFRICAN AMERICAN: >60
GFR NON-AFRICAN AMERICAN: >60
GLUCOSE BLD-MCNC: 105 MG/DL (ref 70–99)
GLUCOSE BLD-MCNC: 106 MG/DL (ref 70–99)
GLUCOSE BLD-MCNC: 133 MG/DL (ref 70–99)
GLUCOSE BLD-MCNC: 99 MG/DL (ref 70–99)
HCT VFR BLD CALC: 30.2 % (ref 40.5–52.5)
HEMOGLOBIN: 9.6 G/DL (ref 13.5–17.5)
MCH RBC QN AUTO: 30.4 PG (ref 26–34)
MCHC RBC AUTO-ENTMCNC: 31.8 G/DL (ref 31–36)
MCV RBC AUTO: 95.5 FL (ref 80–100)
PDW BLD-RTO: 16.1 % (ref 12.4–15.4)
PERFORMED ON: ABNORMAL
PLATELET # BLD: 191 K/UL (ref 135–450)
PMV BLD AUTO: 8.6 FL (ref 5–10.5)
POTASSIUM SERPL-SCNC: 4.1 MMOL/L (ref 3.5–5.1)
RBC # BLD: 3.17 M/UL (ref 4.2–5.9)
SODIUM BLD-SCNC: 142 MMOL/L (ref 136–145)
WBC # BLD: 7 K/UL (ref 4–11)

## 2022-07-17 PROCEDURE — 93010 ELECTROCARDIOGRAM REPORT: CPT | Performed by: INTERNAL MEDICINE

## 2022-07-17 PROCEDURE — 80048 BASIC METABOLIC PNL TOTAL CA: CPT

## 2022-07-17 PROCEDURE — 36415 COLL VENOUS BLD VENIPUNCTURE: CPT

## 2022-07-17 PROCEDURE — 6360000002 HC RX W HCPCS: Performed by: FAMILY MEDICINE

## 2022-07-17 PROCEDURE — 1200000000 HC SEMI PRIVATE

## 2022-07-17 PROCEDURE — 85027 COMPLETE CBC AUTOMATED: CPT

## 2022-07-17 PROCEDURE — 2580000003 HC RX 258: Performed by: FAMILY MEDICINE

## 2022-07-17 PROCEDURE — 87449 NOS EACH ORGANISM AG IA: CPT

## 2022-07-17 PROCEDURE — 83036 HEMOGLOBIN GLYCOSYLATED A1C: CPT

## 2022-07-17 PROCEDURE — 6370000000 HC RX 637 (ALT 250 FOR IP): Performed by: HOSPITALIST

## 2022-07-17 PROCEDURE — 6370000000 HC RX 637 (ALT 250 FOR IP): Performed by: FAMILY MEDICINE

## 2022-07-17 PROCEDURE — 94761 N-INVAS EAR/PLS OXIMETRY MLT: CPT

## 2022-07-17 RX ORDER — OSELTAMIVIR PHOSPHATE 75 MG/1
75 CAPSULE ORAL 2 TIMES DAILY
Qty: 4 CAPSULE | Refills: 0 | Status: SHIPPED | OUTPATIENT
Start: 2022-07-17 | End: 2022-07-19

## 2022-07-17 RX ADMIN — Medication 10 ML: at 11:42

## 2022-07-17 RX ADMIN — OSELTAMIVIR PHOSPHATE 75 MG: 75 CAPSULE ORAL at 11:41

## 2022-07-17 RX ADMIN — ENOXAPARIN SODIUM 40 MG: 100 INJECTION SUBCUTANEOUS at 11:44

## 2022-07-17 RX ADMIN — DILTIAZEM HYDROCHLORIDE 240 MG: 240 CAPSULE, COATED, EXTENDED RELEASE ORAL at 11:41

## 2022-07-17 RX ADMIN — HALOPERIDOL 20 MG: 5 TABLET ORAL at 11:40

## 2022-07-17 RX ADMIN — LEVOTHYROXINE SODIUM 150 MCG: 0.15 TABLET ORAL at 05:40

## 2022-07-17 RX ADMIN — QUETIAPINE FUMARATE 100 MG: 100 TABLET ORAL at 11:41

## 2022-07-17 RX ADMIN — PANTOPRAZOLE SODIUM 40 MG: 40 TABLET, DELAYED RELEASE ORAL at 05:40

## 2022-07-17 RX ADMIN — ASPIRIN 81 MG: 81 TABLET, COATED ORAL at 11:40

## 2022-07-17 RX ADMIN — LISINOPRIL 40 MG: 20 TABLET ORAL at 11:41

## 2022-07-17 RX ADMIN — DIVALPROEX SODIUM 500 MG: 500 TABLET, EXTENDED RELEASE ORAL at 11:40

## 2022-07-17 RX ADMIN — FUROSEMIDE 20 MG: 20 TABLET ORAL at 11:41

## 2022-07-17 NOTE — PROGRESS NOTES
Awaiting pt ride home per [facility]. Notified per company they have a secured a ride for him per [facility]. Transportation to call RN at [phone] and let Nurse know ETA so pt can be ready for discharge through main entrance. Pt. Eating dinner now awaiting ride home.

## 2022-07-17 NOTE — CARE COORDINATION
Discharge note:      CM/SW has been notified of discharge. Patient noted to have the following needs at discharge. CM/SW has coordinated the following services:    No needs at DC      Discharge Destination: Home  Transportation: Family/friend        All CM/SW needs met, will sign off.     Electronically signed by Ольга Steward RN on 7/17/2022 at 9:44 AM

## 2022-07-17 NOTE — PROGRESS NOTES
Awaiting transportation home from [facility]. Pt. Denies belongings other than clothing on his back. Masha encarnacion and no are currently re-working and expediting the process.

## 2022-07-17 NOTE — PROGRESS NOTES
Discharge paperwork reviewed. PIV removed without complication. Pt awaiting transport via [facility].

## 2022-07-17 NOTE — PROGRESS NOTES
Pt refused using urinal and refused being assisted to the bathroom and he was screaming at this RN for looking out for his safety and he said he wants to be left alone and that he is independent.

## 2022-07-17 NOTE — PROGRESS NOTES
VSS. Scheduled medications given. Pt set to DC today. Pt states he has [employer], which has provided him transport home in the past. This information has been conveyed in report to [employer].

## 2022-07-18 LAB
GLUCOSE BLD-MCNC: 125 MG/DL (ref 70–99)
GLUCOSE BLD-MCNC: 159 MG/DL (ref 70–99)
L. PNEUMOPHILA SEROGP 1 UR AG: NORMAL
PERFORMED ON: ABNORMAL
PERFORMED ON: ABNORMAL

## 2022-07-19 LAB
ESTIMATED AVERAGE GLUCOSE: 134.1 MG/DL
HBA1C MFR BLD: 6.3 %

## 2022-07-20 LAB
BLOOD CULTURE, ROUTINE: NORMAL
CULTURE, BLOOD 2: NORMAL

## 2022-07-30 NOTE — DISCHARGE SUMMARY
[facility] DISCHARGE SUMMARY    Patient Demographics    Patient. Marley Orr  Date of Birth. 1965  MRN. [id number]     Primary care provider. Val Petersen MD  (Tel: [phone])    Admit date: 7/16/2022    Discharge date (blank if same as Note Date): 7/17/2022  Note Date: 7/30/2022     Reason for Hospitalization. No chief complaint on file. [facility] Course. Influenza pneumonia with hypoxia and respiratory failure  -Respiratory Tamiflu with improving symptoms DuoNeb breathing treatment IV  -Was weaned back to room air patient was discharged on p.o. Tamiflu outpatient follow-up PCP    Consults. None    Physical examination on discharge day. /67   Pulse 75   Temp 98.7 °F (37.1 °C) (Oral)   Resp 18   Wt (!) 417 lb (189.1 kg)   SpO2 91%   BMI 56.56 kg/m²   General appearance. Alert. Looks comfortable. HEENT. Sclera clear. Moist mucus membranes. Cardiovascular. Regular rate and rhythm, normal S1, S2. No murmur. Respiratory. Not using accessory muscles. Clear to auscultation bilaterally, no wheeze. Gastrointestinal. Abdomen soft, non-tender, not distended, normal bowel sounds  Neurology. Facial symmetry. No speech deficits. Moving all extremities equally. Extremities. No edema in lower extremities. Skin. Warm, dry, normal turgor    Condition at time of discharge stable     Medication instructions provided to patient at discharge. Medication List        START taking these medications      furosemide 20 MG tablet  Commonly known as: LASIX            CHANGE how you take these medications      atorvastatin 40 MG tablet  Commonly known as: LIPITOR  Take 1 tablet by mouth nightly  What changed: Another medication with the same name was removed. Continue taking this medication, and follow the directions you see here.      lisinopril 40 MG tablet  Commonly known as: PRINIVIL;ZESTRIL  What changed: Another medication with the same name was removed. Continue taking this medication, and follow the directions you see here. CONTINUE taking these medications      aspirin 81 MG EC tablet  Take 1 tablet by mouth daily     clotrimazole-betamethasone 1-0.05 % cream  Commonly known as: LOTRISONE     Daily-Kieran Tabs     diclofenac 75 MG EC tablet  Commonly known as: VOLTAREN     dilTIAZem 240 MG extended release capsule  Commonly known as: CARDIZEM CD     haloperidol 20 MG tablet  Commonly known as: HALDOL     levothyroxine 150 MCG tablet  Commonly known as: SYNTHROID     metFORMIN 500 MG tablet  Commonly known as: GLUCOPHAGE  Take 1 tablet by mouth 2 times daily (with meals)     naproxen 375 MG tablet  Commonly known as: NAPROSYN  Take 1 tablet by mouth 2 times daily (with meals) for 15 days     Nyamyc [id number] UNIT/GM powder  Generic drug: nystatin     omeprazole 20 MG delayed release capsule  Commonly known as: PRILOSEC     pantoprazole 40 MG tablet  Commonly known as: PROTONIX  Take 1 tablet by mouth every morning (before breakfast)     pioglitazone 30 MG tablet  Commonly known as: ACTOS     * QUEtiapine 100 MG tablet  Commonly known as: SEROQUEL     * QUEtiapine 400 MG tablet  Commonly known as: SEROQUEL     vitamin D3 125 MCG (5000 UT) Caps           * This list has 2 medication(s) that are the same as other medications prescribed for you. Read the directions carefully, and ask your doctor or other care provider to review them with you. STOP taking these medications      divalproex 500 MG extended release tablet  Commonly known as: DEPAKOTE ER            ASK your doctor about these medications      divalproex 500 MG extended release tablet  Commonly known as: DEPAKOTE ER  Take 1 tablet by mouth nightly     oseltamivir 75 MG capsule  Commonly known as: TAMIFLU  Take 1 capsule by mouth in the morning and 1 capsule before bedtime.  Do all this for 4 doses.  Ask about: Should I take this medication? Where to Get Your Medications        These medications were sent to [facility] [address] [place], [place] [address]y  [phone]  [address] [facility] [address]      Phone: [phone]   oseltamivir 75 MG capsule         Discharge recommendations given to patient. Follow Up. pcp in 1 week   Disposition. home  Activity. activity as tolerated  Diet: No diet orders on file      Spent 45  minutes in discharge process.     Signed:  Tiffanie Rausch MD     7/30/2022 4:49 PM

## 2022-08-24 ENCOUNTER — APPOINTMENT (OUTPATIENT)
Dept: GENERAL RADIOLOGY | Age: 57
End: 2022-08-24
Payer: MEDICAID

## 2022-08-24 ENCOUNTER — HOSPITAL ENCOUNTER (EMERGENCY)
Age: 57
Discharge: HOME | End: 2022-08-24
Attending: STUDENT IN AN ORGANIZED HEALTH CARE EDUCATION/TRAINING PROGRAM
Payer: MEDICAID

## 2022-08-24 VITALS
RESPIRATION RATE: 14 BRPM | TEMPERATURE: 98.7 F | HEART RATE: 80 BPM | DIASTOLIC BLOOD PRESSURE: 89 MMHG | SYSTOLIC BLOOD PRESSURE: 126 MMHG | WEIGHT: 315 LBS | BODY MASS INDEX: 56.78 KG/M2 | OXYGEN SATURATION: 96 %

## 2022-08-24 DIAGNOSIS — L03.119 CELLULITIS OF LOWER EXTREMITY, UNSPECIFIED LATERALITY: ICD-10-CM

## 2022-08-24 DIAGNOSIS — I87.2 VENOUS STASIS DERMATITIS OF RIGHT LOWER EXTREMITY: Primary | ICD-10-CM

## 2022-08-24 LAB
A/G RATIO: 1.1 (ref 1.1–2.2)
ALBUMIN SERPL-MCNC: 3.8 G/DL (ref 3.4–5)
ALP BLD-CCNC: 81 U/L (ref 40–129)
ALT SERPL-CCNC: 14 U/L (ref 10–40)
ANION GAP SERPL CALCULATED.3IONS-SCNC: 14 MMOL/L (ref 3–16)
ANION GAP SERPL CALCULATED.3IONS-SCNC: 15 MMOL/L (ref 3–16)
AST SERPL-CCNC: 21 U/L (ref 15–37)
BASOPHILS ABSOLUTE: 0 K/UL (ref 0–0.2)
BASOPHILS RELATIVE PERCENT: 0.5 %
BILIRUB SERPL-MCNC: 0.3 MG/DL (ref 0–1)
BUN BLDV-MCNC: 19 MG/DL (ref 7–20)
BUN BLDV-MCNC: 20 MG/DL (ref 7–20)
CALCIUM SERPL-MCNC: 6.8 MG/DL (ref 8.3–10.6)
CALCIUM SERPL-MCNC: 9.3 MG/DL (ref 8.3–10.6)
CHLORIDE BLD-SCNC: 104 MMOL/L (ref 99–110)
CHLORIDE BLD-SCNC: 111 MMOL/L (ref 99–110)
CO2: 24 MMOL/L (ref 21–32)
CO2: 26 MMOL/L (ref 21–32)
CREAT SERPL-MCNC: 1.3 MG/DL (ref 0.9–1.3)
CREAT SERPL-MCNC: 1.3 MG/DL (ref 0.9–1.3)
EOSINOPHILS ABSOLUTE: 0.2 K/UL (ref 0–0.6)
EOSINOPHILS RELATIVE PERCENT: 4.3 %
GFR AFRICAN AMERICAN: >60
GFR AFRICAN AMERICAN: >60
GFR NON-AFRICAN AMERICAN: 57
GFR NON-AFRICAN AMERICAN: 57
GLUCOSE BLD-MCNC: 109 MG/DL (ref 70–99)
GLUCOSE BLD-MCNC: 114 MG/DL (ref 70–99)
HCT VFR BLD CALC: 29 % (ref 40.5–52.5)
HEMOGLOBIN: 9.9 G/DL (ref 13.5–17.5)
LYMPHOCYTES ABSOLUTE: 1.1 K/UL (ref 1–5.1)
LYMPHOCYTES RELATIVE PERCENT: 23.8 %
MCH RBC QN AUTO: 30.7 PG (ref 26–34)
MCHC RBC AUTO-ENTMCNC: 34.2 G/DL (ref 31–36)
MCV RBC AUTO: 89.7 FL (ref 80–100)
MONOCYTES ABSOLUTE: 0.4 K/UL (ref 0–1.3)
MONOCYTES RELATIVE PERCENT: 8.3 %
NEUTROPHILS ABSOLUTE: 3 K/UL (ref 1.7–7.7)
NEUTROPHILS RELATIVE PERCENT: 63.1 %
PDW BLD-RTO: 16.9 % (ref 12.4–15.4)
PLATELET # BLD: 252 K/UL (ref 135–450)
PMV BLD AUTO: 7.8 FL (ref 5–10.5)
POTASSIUM REFLEX MAGNESIUM: 4.2 MMOL/L (ref 3.5–5.1)
POTASSIUM SERPL-SCNC: 4 MMOL/L (ref 3.5–5.1)
PRO-BNP: 66 PG/ML (ref 0–124)
RBC # BLD: 3.23 M/UL (ref 4.2–5.9)
SODIUM BLD-SCNC: 144 MMOL/L (ref 136–145)
SODIUM BLD-SCNC: 150 MMOL/L (ref 136–145)
TOTAL PROTEIN: 7.2 G/DL (ref 6.4–8.2)
WBC # BLD: 4.7 K/UL (ref 4–11)

## 2022-08-24 PROCEDURE — 80053 COMPREHEN METABOLIC PANEL: CPT

## 2022-08-24 PROCEDURE — 6370000000 HC RX 637 (ALT 250 FOR IP): Performed by: STUDENT IN AN ORGANIZED HEALTH CARE EDUCATION/TRAINING PROGRAM

## 2022-08-24 PROCEDURE — 99284 EMERGENCY DEPT VISIT MOD MDM: CPT

## 2022-08-24 PROCEDURE — 6360000002 HC RX W HCPCS: Performed by: STUDENT IN AN ORGANIZED HEALTH CARE EDUCATION/TRAINING PROGRAM

## 2022-08-24 PROCEDURE — 83880 ASSAY OF NATRIURETIC PEPTIDE: CPT

## 2022-08-24 PROCEDURE — 96374 THER/PROPH/DIAG INJ IV PUSH: CPT

## 2022-08-24 PROCEDURE — 85025 COMPLETE CBC W/AUTO DIFF WBC: CPT

## 2022-08-24 PROCEDURE — 71045 X-RAY EXAM CHEST 1 VIEW: CPT

## 2022-08-24 RX ORDER — ASPIRIN 81 MG/1
324 TABLET, CHEWABLE ORAL ONCE
Status: DISCONTINUED | OUTPATIENT
Start: 2022-08-24 | End: 2022-08-24

## 2022-08-24 RX ORDER — DOXYCYCLINE HYCLATE 100 MG
100 TABLET ORAL 2 TIMES DAILY
Qty: 20 TABLET | Refills: 0 | Status: SHIPPED | OUTPATIENT
Start: 2022-08-24 | End: 2022-09-03

## 2022-08-24 RX ORDER — FUROSEMIDE 10 MG/ML
80 INJECTION INTRAMUSCULAR; INTRAVENOUS ONCE
Status: COMPLETED | OUTPATIENT
Start: 2022-08-24 | End: 2022-08-24

## 2022-08-24 RX ORDER — DOXYCYCLINE HYCLATE 100 MG
100 TABLET ORAL ONCE
Status: COMPLETED | OUTPATIENT
Start: 2022-08-24 | End: 2022-08-24

## 2022-08-24 RX ORDER — NITROGLYCERIN 0.4 MG/1
0.4 TABLET SUBLINGUAL EVERY 5 MIN PRN
Status: DISCONTINUED | OUTPATIENT
Start: 2022-08-24 | End: 2022-08-24

## 2022-08-24 RX ADMIN — DOXYCYCLINE HYCLATE 100 MG: 100 TABLET, COATED ORAL at 11:26

## 2022-08-24 RX ADMIN — FUROSEMIDE 80 MG: 10 INJECTION, SOLUTION INTRAMUSCULAR; INTRAVENOUS at 11:26

## 2022-08-24 ASSESSMENT — PAIN DESCRIPTION - ORIENTATION: ORIENTATION: LEFT;RIGHT

## 2022-08-24 ASSESSMENT — PAIN - FUNCTIONAL ASSESSMENT
PAIN_FUNCTIONAL_ASSESSMENT: NONE - DENIES PAIN
PAIN_FUNCTIONAL_ASSESSMENT: 0-10

## 2022-08-24 ASSESSMENT — PAIN DESCRIPTION - DESCRIPTORS: DESCRIPTORS: ACHING

## 2022-08-24 ASSESSMENT — PAIN DESCRIPTION - FREQUENCY: FREQUENCY: CONTINUOUS

## 2022-08-24 ASSESSMENT — PAIN SCALES - GENERAL: PAINLEVEL_OUTOF10: 5

## 2022-08-24 ASSESSMENT — PAIN DESCRIPTION - LOCATION: LOCATION: LEG

## 2022-08-24 NOTE — ED NOTES
Pt d/c home with avd no s.s of distress noted script x 1 in hand, he states his  will make his wound care appointment and  his medications      Jennifer Rachel RN  08/24/22 0205

## 2022-08-24 NOTE — ED PROVIDER NOTES
of lower extremity, unspecified laterality          PATIENT REFERRED TO:  Harley Ramos MD  [address] [facility] [address] [facility]  [phone]    Schedule an appointment as soon as possible for a visit in 2 days      [facility] 2 [facility] 113[facility]  Schedule an appointment as soon as possible for a visit in 2 days      [address] [facility]. [place] [address]  [phone]  In 2 weeks      DISCHARGE MEDICATIONS:  Discharge Medication List as of 8/24/2022  1:14 PM        START taking these medications    Details   doxycycline hyclate (VIBRA-TABS) 100 MG tablet Take 1 tablet by mouth 2 times daily for 10 days, Disp-20 tablet, R-0Print           DISCONTINUED MEDICATIONS:  Discharge Medication List as of 8/24/2022  1:14 PM        DISPOSITION Decision To Discharge 08/24/2022 01:13:10 PM      ___________________________________________________________________________________  _________________________________________________________________________________________  This record is transcribed utilizing voice recognition technology. There are inherent limitations in this technology. In addition, there may be limitations in editing of this report. If there are any discrepancies, please contact me directly.         Jillian Grossman MD  08/26/22 8060

## 2022-09-07 ENCOUNTER — HOSPITAL ENCOUNTER (OUTPATIENT)
Dept: WOUND CARE | Age: 57
Discharge: HOME | End: 2022-09-07
Payer: MEDICAID

## 2022-09-07 VITALS
BODY MASS INDEX: 42.66 KG/M2 | RESPIRATION RATE: 20 BRPM | HEART RATE: 69 BPM | DIASTOLIC BLOOD PRESSURE: 78 MMHG | TEMPERATURE: 97.5 F | SYSTOLIC BLOOD PRESSURE: 145 MMHG | WEIGHT: 315 LBS | HEIGHT: 72 IN

## 2022-09-07 DIAGNOSIS — I83.019 VARICOSE ULCER OF RIGHT LOWER EXTREMITY (HCC): Primary | ICD-10-CM

## 2022-09-07 DIAGNOSIS — L97.919 VARICOSE ULCER OF RIGHT LOWER EXTREMITY (HCC): Primary | ICD-10-CM

## 2022-09-07 PROCEDURE — 99213 OFFICE O/P EST LOW 20 MIN: CPT

## 2022-09-07 PROCEDURE — 97597 DBRDMT OPN WND 1ST 20 CM/<: CPT

## 2022-09-07 PROCEDURE — 97598 DBRDMT OPN WND ADDL 20CM/<: CPT

## 2022-09-07 RX ORDER — LIDOCAINE HYDROCHLORIDE 40 MG/ML
SOLUTION TOPICAL ONCE
Status: COMPLETED | OUTPATIENT
Start: 2022-09-07 | End: 2022-09-07

## 2022-09-07 RX ORDER — LIDOCAINE HYDROCHLORIDE 20 MG/ML
JELLY TOPICAL ONCE
Status: CANCELLED | OUTPATIENT
Start: 2022-09-07 | End: 2022-09-07

## 2022-09-07 RX ORDER — BETAMETHASONE DIPROPIONATE 0.05 %
OINTMENT (GRAM) TOPICAL ONCE
Status: CANCELLED | OUTPATIENT
Start: 2022-09-07 | End: 2022-09-07

## 2022-09-07 RX ORDER — BACITRACIN ZINC AND POLYMYXIN B SULFATE 500; 1000 [USP'U]/G; [USP'U]/G
OINTMENT TOPICAL ONCE
Status: CANCELLED | OUTPATIENT
Start: 2022-09-07 | End: 2022-09-07

## 2022-09-07 RX ORDER — LIDOCAINE 40 MG/G
CREAM TOPICAL ONCE
Status: CANCELLED | OUTPATIENT
Start: 2022-09-07 | End: 2022-09-07

## 2022-09-07 RX ORDER — GENTAMICIN SULFATE 1 MG/G
OINTMENT TOPICAL ONCE
Status: CANCELLED | OUTPATIENT
Start: 2022-09-07 | End: 2022-09-07

## 2022-09-07 RX ORDER — LIDOCAINE 50 MG/G
OINTMENT TOPICAL ONCE
Status: CANCELLED | OUTPATIENT
Start: 2022-09-07 | End: 2022-09-07

## 2022-09-07 RX ORDER — GINSENG 100 MG
CAPSULE ORAL ONCE
Status: CANCELLED | OUTPATIENT
Start: 2022-09-07 | End: 2022-09-07

## 2022-09-07 RX ORDER — BACITRACIN, NEOMYCIN, POLYMYXIN B 400; 3.5; 5 [USP'U]/G; MG/G; [USP'U]/G
OINTMENT TOPICAL ONCE
Status: CANCELLED | OUTPATIENT
Start: 2022-09-07 | End: 2022-09-07

## 2022-09-07 RX ORDER — CLOBETASOL PROPIONATE 0.5 MG/G
OINTMENT TOPICAL ONCE
Status: CANCELLED | OUTPATIENT
Start: 2022-09-07 | End: 2022-09-07

## 2022-09-07 RX ORDER — LIDOCAINE HYDROCHLORIDE 40 MG/ML
SOLUTION TOPICAL ONCE
Status: CANCELLED | OUTPATIENT
Start: 2022-09-07 | End: 2022-09-07

## 2022-09-07 RX ADMIN — LIDOCAINE HYDROCHLORIDE 5 ML: 40 SOLUTION TOPICAL at 13:13

## 2022-09-07 ASSESSMENT — PAIN DESCRIPTION - ORIENTATION: ORIENTATION: RIGHT

## 2022-09-07 ASSESSMENT — PAIN DESCRIPTION - LOCATION: LOCATION: LEG

## 2022-09-07 ASSESSMENT — PAIN DESCRIPTION - ONSET: ONSET: ON-GOING

## 2022-09-07 ASSESSMENT — PAIN DESCRIPTION - FREQUENCY: FREQUENCY: INTERMITTENT

## 2022-09-07 ASSESSMENT — PAIN DESCRIPTION - PAIN TYPE: TYPE: ACUTE PAIN

## 2022-09-07 ASSESSMENT — PAIN SCALES - GENERAL
PAINLEVEL_OUTOF10: 0
PAINLEVEL_OUTOF10: 7

## 2022-09-07 ASSESSMENT — PAIN DESCRIPTION - DESCRIPTORS: DESCRIPTORS: SHARP

## 2022-09-07 ASSESSMENT — PAIN - FUNCTIONAL ASSESSMENT: PAIN_FUNCTIONAL_ASSESSMENT: ACTIVITIES ARE NOT PREVENTED

## 2022-09-07 NOTE — LETTER
[facility] 2 [address]  [phone]  Dept: [phone]        Thank you Mr. Beauchamp for choosing [facility] for your Wound Care needs. We hope you found your first visit both encouraging and educational.  We look forward to serving you throughout the healing process. Before your next visit please review the information you received in your [employer]. The first visit can be overwhelming and this is a useful tool to refresh what information you may have been given. If you find yourself with any questions prior to your upcoming appointment, please feel free to contact us. Often wounds can be challenging and it is our goal to see you through the healing process with as much support possible. Again, thank you for choosing [facility]!     Sincerely,      The Staff of [address] [place] [address] Pkwy and [facility]

## 2022-09-07 NOTE — PLAN OF CARE
Multilayer Compression Wrap   (Not Unna) Below the Knee    NAME:  Sera Nunez  YOB: 1965  MEDICAL RECORD NUMBER:  [id number]  DATE:  9/7/2022    Multilayer compression wrap: Removed old Multilayer wrap if indicated and wash leg with mild soap/water. Applied moisturizing agent to dry skin as needed. Applied primary and secondary dressing as ordered. Applied multilayered dressing below the knee to right lower leg. Applied multilayered dressing below the knee to left lower leg. Instructed patient/caregiver not to remove dressing and to keep it clean and dry. Instructed patient/caregiver on complications to report to provider, such as pain, numbness in toes, heavy drainage, and slippage of dressing. Instructed patient on purpose of compression dressing and on activity and exercise recommendations.     Coban 2 lite      Electronically signed by Tin Burns RN on 9/7/2022 at 2:40 PM

## 2022-09-07 NOTE — DISCHARGE INSTRUCTIONS
[address] Physician Orders and Discharge Instructions  [address], [address] [place] [address]  Telephone: [phone] [address] 8:00 am - 4:30 pm and Friday 8:00 am - 12:00 pm.        NAME:  Yenni Mcknight  YOB: 1965  MEDICAL RECORD NUMBER:  [id number]  DATE:  9/7/2022    Important Reminders:   Please wash hands with soap and water before and after every dressing change. Do not scrub wounds. Keep wounds dry in shower unless otherwise instructed by the physician. SMOKING can slow would healing. Stop smoking as soon as possible to improve healing and prevent further complications associated with smoking. Mahsa-Wound Topical Treatments:  Do not apply lotions, creams, or ointments to wound bed unless directed. [x] Apply moisturizing lotion to skin surrounding the wound prior to dressing change.  [] Apply antifungal ointment to skin surrounding the wound prior to dressing change.  [] Apply thin film of no sting moisture barrier ointment to skin immediately around      wound. [] Other:       Wound Location: RIGHT LATERAL LOWER LEG    Wound Cleansing: Vashe soak for 5 minutes prior to dressing change    Primary Dressing:  [x] [facility]  []     Secondary Dressing:  [x] DRAWTEX   [x] OPTILOCK      Dressing Frequency:  []   [x] Do Not Change Dressing      Compression and Edema Control:  [x] Multilayer Compression Wrap: Coban 2 Lite to Right Leg   Do not get leg(s) with wrap wet. If wraps become too tight call the center or completely remove the wrap.  [] Wear Home Compression Stockings   [] Spandagrip to:   Size: []Low compression 5-10 mm/Hg      []Medium compression 10-20 mm/Hg           []High compression  20-30 mm/Hg  [] Ace Wrap Toes to Knee to       Activity: Activity as Tolerated      Dietary:   Continue your diet as tolerated.   Protein is a key nutrient in helping to repair damaged tissue and promote new tissue growth. Good sources of protein include milk, yogurt, cheese, fish, lean meat and beans. If you are DIABETIC, having diabetes can make it hard for wounds to heal. Try to keep your blood sugar within it's target range. Limit Sodium, Alcohol and Sugar. Pain:   Please Note some pain, drainage and/or bleeding might be expected after seeing the provider. TO HELP ALLEVIATE PAIN WE RECOMMEND THE FOLLOWING  Elevate the affected limb. Use over the counter medications as permitted by your family doctor. For Persistent Pain not relieved by the above interventions, please notify your family doctor. Return Appointment:  [x] Return Appointment: With DR. SEALS  in  [address] [place] [address])  [] Wound and dressing supply provider:   [] ECF or Home Healthcare:  [] Wound Assessment: [] Physician or NP scheduled for Wound Assessment:   [] Orders placed during your visit:      : An Mcclure     Electronically signed by Jacqueline Capone RN on 9/7/2022 at 1:50 PM       [address] Information: Should you experience any significant changes in your wound(s) or have questions about your wound care, please contact the [address] [place] [address] at [address] 8:00 am - 4:30 pm and Friday 8:00 am - 12:30 pm.  If you need help with your wound outside these hours and cannot wait until we are again available, contact your PCP or go to the hospital emergency room. PLEASE NOTE: IF YOU ARE UNABLE TO OBTAIN WOUND SUPPLIES, CONTINUE TO USE THE SUPPLIES YOU HAVE AVAILABLE UNTIL YOU ARE ABLE TO REACH US. IT IS MOST IMPORTANT TO KEEP THE WOUND COVERED AT ALL TIMES.      Physician Signature:_______________________    Date: ___________ Time:  ____________          Dr Annika Erickson

## 2022-09-07 NOTE — LETTER
[address]  [address] OFFICE [address]  [facility] [address]  [phone]  Dept: [phone]   TODAYS DATE: 8/29/2022    [address] [place] [address],4Th Floor Wound Care   Appointment Treatment Guidelines  Welcome Mr. Mao Reyes to the [address] [place] [address] Pkwy. We appreciate the confidence you have shown in choosing us as your wound care provider. Our goal is to heal your wound(s) as quickly as possible. Please read the items below regarding the nature of your appointments. 1. We will make every effort to schedule appointments that are convenient for you. Certain days and times may not be available, depending on your providers office hours and details of your care. 2. Patients will not necessarily be brought to an exam room in the order in which they arrive. Many providers work out of this office and patients are here for different procedures. Our goal is to serve you as quickly as possible. 3. We acknowledge that your time is valuable. Please remember that wound healing takes time and we appreciate your understanding that the length of each patients appointment will vary depending upon their need. 4. It is for your protection that we ask for insurance cards, photo ID, and new consent forms on your first visit and periodically throughout your treatment at all our facilities. 5. Wound Care treatment is known to be most effective when provided on a regular basis. Missed appointments, and not following the recommended plan of care can result in ineffective treatment and a poor outcome. If you find it difficult to keep appointments or to follow the recommended plan of care, it is your responsibility to let the staff know, so that we can work with you toward a solution. 6. If you need to miss an appointment, please call to let us know. We expect 24 hours notice for all cancellations.  We also expect missed visits to be rescheduled as soon as possible, preferably within the same week to promote the most effective healing time for your wound(s). 7. If you will be late for an appointment, please call our center to be sure that the provider can still see you when you arrive. If you are more than 15 minutes late your appointment may need to be rescheduled. 8. If two (2) appointments are missed without notifying us, your care plan may be discontinued. The same may happen if multiple visits are cancelled or rescheduled, even with notice. A missed visit is time when another patient, who also needs care, could have been seen. Thank you for your understanding and consideration.

## 2022-09-07 NOTE — PROGRESS NOTES
[address]   Progress Note and Procedure Note      Scar Salinas  MEDICAL RECORD NUMBER:  [id number]  AGE: 55 y.o. GENDER: male  : 1965  EPISODE DATE:  2022    Subjective:     Chief Complaint   Patient presents with    Wound Check     Initial visit - right leg wound. States started as a \"big bubble\" about 8/15/2022. Has been using honey from [place] on wound. HISTORY of PRESENT ILLNESS HPI     Scar Salinas is a 55 y.o. male who presents today for wound/ulcer evaluation. History of Wound Context: Patient presents as a new patient complaining of a wound on his right shin of six weeks duration. Patient states the wound started out as a blister, and he picked off the dead skin when the blister popped. Patient denies drainage or redness from the wound. Patient states he has very little pain from the wound. Patient states he is applying Vaseline and store bought honey to the wound. Patient lives in a group home and his  was present for today's encounter.      Wound/Ulcer Pain Timing/Severity: mild  Quality of pain: aching  Severity:  2 / 10   Modifying Factors: Pain worsens with pressure to the wound  Associated Signs/Symptoms: edema and pain    Ulcer Identification:  Ulcer Type: venous    Contributing Factors: edema, obesity     Acute Wound: N/A    PAST MEDICAL HISTORY        Diagnosis Date    Altered mental status     Bipolar 1 disorder ([address] 1984    Bowel and bladder incontinence     Bradycardia     Cellulitis     Cerebral edema (HCC)     Chest pain     Diabetes mellitus ([address]     Edema     BLE    Goiter     Goiter     Hyperlipidemia     Hypertension     Hypocalcemia     Hypothyroid     Kidney stone     Morbid obesity (HCC)     LADONNA (obstructive sleep apnea)     Psychosis ([address]     Pulmonary edema     Pyelonephritis     Schizophrenia ([address]      GCB    Sleep apnea        PAST SURGICAL HISTORY    Past Surgical History:   Procedure Laterality Date    THYROIDECTOMY         FAMILY HISTORY    Family History   Problem Relation Age of Onset    Diabetes Mother     No Known Problems Father     Diabetes Maternal Grandmother        SOCIAL HISTORY    Social History     Tobacco Use    Smoking status: Former    Smokeless tobacco: Never   Vaping Use    Vaping Use: Never used   Substance Use Topics    Alcohol use: Not Currently     Comment: rare    Drug use: Not Currently     Types: Opiates      Comment: 07/19/2019 drug screen + oxcodone       ALLERGIES    Allergies   Allergen Reactions    Carbamazepine      Per chart review 2/9/12    Chlorpromazine      Per chart review 2/9/12    Thioridazine      Per chart review 2/9/12       MEDICATIONS    Current Outpatient Medications on File Prior to Encounter   Medication Sig Dispense Refill    cariprazine hcl (VRAYLAR) 3 MG CAPS capsule Take 3 mg by mouth at bedtime      Cholecalciferol (VITAMIN D3) 125 MCG (5000 UT) CAPS       clotrimazole-betamethasone (LOTRISONE) 1-0.05 % cream       diclofenac (VOLTAREN) 75 MG EC tablet       dilTIAZem (CARDIZEM CD) 240 MG extended release capsule       furosemide (LASIX) 20 MG tablet       haloperidol (HALDOL) 20 MG tablet Take 20 mg by mouth in the morning and 20 mg before bedtime. levothyroxine (SYNTHROID) 150 MCG tablet Take 150 mcg by mouth in the morning.       lisinopril (PRINIVIL;ZESTRIL) 40 MG tablet       Multiple Vitamin (DAILY-JESU) TABS       NYAMYC 931442 UNIT/GM powder       omeprazole (PRILOSEC) 20 MG delayed release capsule       pioglitazone (ACTOS) 30 MG tablet       QUEtiapine (SEROQUEL) 100 MG tablet Take 100 mg by mouth every morning      QUEtiapine (SEROQUEL) 400 MG tablet Take 400 mg by mouth at bedtime      naproxen (NAPROSYN) 375 MG tablet Take 1 tablet by mouth 2 times daily (with meals) for 15 days 30 tablet 0    aspirin 81 MG EC tablet Take 1 tablet by mouth daily 30 tablet 0    divalproex (DEPAKOTE ER) 500 MG extended release tablet Take 1 Lidocaine Liquid Topical       Debridement: Selective Debridement/Non-Excisional Debridement    Using #15 blade scalpel and forceps the wound(s)/ulcer(s) was/were debrided down through and including the removal of epidermis and dermis. Devitalized Tissue Debrided:  fibrin and biofilm    Pre Debridement Measurements:  Are located in the Wound/Ulcer Documentation Flow Sheet    Diabetic/Pressure/Non Pressure Ulcers only:  Ulcer: Non-Pressure ulcer, limited to breakdown of skin     Wound/Ulcer #: 1    Post Debridement Measurements:  Wound/Ulcer Descriptions are Pre Debridement except measurements:    Wound 09/07/22 Leg Right; Anterior; Lateral;Lower #1 ( states since about 8/15/2022) (Active)   Wound Image   09/07/22 1313   Wound Etiology Venous 09/07/22 1313   Wound Length (cm) 8.5 cm 09/07/22 1313   Wound Width (cm) 10.4 cm 09/07/22 1313   Wound Depth (cm) 0.1 cm 09/07/22 1313   Wound Surface Area (cm^2) 88.4 cm^2 09/07/22 1313   Wound Volume (cm^3) 8.84 cm^3 09/07/22 1313   Post-Procedure Length (cm) 8.6 cm 09/07/22 1349   Post-Procedure Width (cm) 10.5 cm 09/07/22 1349   Post-Procedure Depth (cm) 0.2 cm 09/07/22 1349   Post-Procedure Surface Area (cm^2) 90.3 cm^2 09/07/22 1349   Post-Procedure Volume (cm^3) 18.06 cm^3 09/07/22 1349   Wound Assessment Granulation tissue;Slough 09/07/22 1313   Drainage Amount Moderate 09/07/22 1313   Drainage Description Serous 09/07/22 1313   Odor Mild 09/07/22 1313   Mahsa-wound Assessment Dry/flaky 09/07/22 1313   Margins Undefined edges 09/07/22 1313   Wound Thickness Description not for Pressure Injury Full thickness 09/07/22 1313   Number of days: 0          Total Surface Area Debrided:  80 sq cm     Estimated Blood Loss:  Minimal    Hemostasis Achieved:  by pressure    Procedural Pain:  2  / 10     Post Procedural Pain:  0 / 10     Response to treatment:  Well tolerated by patient. Plan:   - Patient lives in a group home and has diagnosis of bipolar and schizophrenia. Discussed patient's treatment plan with his , Terry Butts. Treatment Note please see attached Discharge Instructions    Written patient dismissal instructions given to patient and signed by patient or POA. Discharge [id number] [facility] Physician Orders and Discharge [address]  [address], [address] [place] [address]  Telephone: [phone]  [address] 8:00 am - 4:30 pm and Friday 8:00 am - 12:00 pm.        NAME:  Tierra Costa  YOB: 1965  MEDICAL RECORD NUMBER:  [id number]  DATE:  9/7/2022    Important Reminders:   Please wash hands with soap and water before and after every dressing change. Do not scrub wounds. Keep wounds dry in shower unless otherwise instructed by the physician. SMOKING can slow would healing. Stop smoking as soon as possible to improve healing and prevent further complications associated with smoking. Mahsa-Wound Topical Treatments:  Do not apply lotions, creams, or ointments to wound bed unless directed. [x] Apply moisturizing lotion to skin surrounding the wound prior to dressing change.  [] Apply antifungal ointment to skin surrounding the wound prior to dressing change.  [] Apply thin film of no sting moisture barrier ointment to skin immediately around      wound. [] Other:       Wound Location: RIGHT LATERAL LOWER LEG    Wound Cleansing: Vashe soak for 5 minutes prior to dressing change    Primary Dressing:  [x] [address]  []     Secondary Dressing:  [x] DRAWTEX   [x] OPTILOCK      Dressing Frequency:  []   [x] Do Not Change Dressing      Compression and Edema Control:  [x] Multilayer Compression Wrap: Coban 2 Lite to Right Leg   Do not get leg(s) with wrap wet.   If wraps become too tight call the center or completely remove the wrap.  [] Wear Home Compression Stockings   [] Spandagrip to:   Size: []Low compression 5-10 mm/Hg      []Medium compression 10-20 mm/Hg           []High compression  20-30 mm/Hg  [] Ace Wrap Toes to Knee to       Activity: Activity as Tolerated      Dietary:   Continue your diet as tolerated. Protein is a key nutrient in helping to repair damaged tissue and promote new tissue growth. Good sources of protein include milk, yogurt, cheese, fish, lean meat and beans. If you are DIABETIC, having diabetes can make it hard for wounds to heal. Try to keep your blood sugar within it's target range. Limit Sodium, Alcohol and Sugar. Pain:   Please Note some pain, drainage and/or bleeding might be expected after seeing the provider. TO HELP ALLEVIATE PAIN WE RECOMMEND THE FOLLOWING  Elevate the affected limb. Use over the counter medications as permitted by your family doctor. For Persistent Pain not relieved by the above interventions, please notify your family doctor. Return Appointment:  [x] Return Appointment: With DR. SEALS  in  [address] [place] [address])  [] Wound and dressing supply provider:   [] ECF or Home Healthcare:  [] Wound Assessment: [] Physician or NP scheduled for Wound Assessment:   [] Orders placed during your visit:      : Tanvir Dunlap     Electronically signed by Greg Myers RN on 9/7/2022 at 1:50 PM       [address] [facility] [address] Information: Should you experience any significant changes in your wound(s) or have questions about your wound care, please contact the [address] [place] [address] at [address] 8:00 am - 4:30 pm and Friday 8:00 am - 12:30 pm.  If you need help with your wound outside these hours and cannot wait until we are again available, contact your PCP or go to the hospital emergency room. PLEASE NOTE: IF YOU ARE UNABLE TO OBTAIN WOUND SUPPLIES, CONTINUE TO USE THE SUPPLIES YOU HAVE AVAILABLE UNTIL YOU ARE ABLE TO REACH US. IT IS MOST IMPORTANT TO KEEP THE WOUND COVERED AT ALL TIMES.      Physician Signature:_______________________    Date: ___________ Time:  ____________          Dr Norbert Bill                  Electronically signed by Shae Alejandre DPM on 9/7/2022 at 1:57 PM

## 2022-09-08 NOTE — DISCHARGE INSTRUCTIONS
[address] Physician Orders and Discharge Instructions  [place] [address] [facility], [address] [place] [address]  Telephone: [phone]  [address] 8:00 am - 4:30 pm and Friday 8:00 am - 12:00 pm.          NAME:  Rolo Newton  YOB: 1965  MEDICAL RECORD NUMBER:  [id number]  DATE:  9/14/2022     Important Reminders:   Please wash hands with soap and water before and after every dressing change. Do not scrub wounds. Keep wounds dry in shower unless otherwise instructed by the physician. SMOKING can slow would healing. Stop smoking as soon as possible to improve healing and prevent further complications associated with smoking. Mahsa-Wound Topical Treatments:  Do not apply lotions, creams, or ointments to wound bed unless directed. [x] Apply moisturizing lotion to skin surrounding the wound prior to dressing change.  [] Apply antifungal ointment to skin surrounding the wound prior to dressing change.  [] Apply thin film of no sting moisture barrier ointment to skin immediately around      wound. [] Other:         Wound Location: RIGHT LATERAL and LEFT MEDIAL LOWER LEG     Wound Cleansing: Vashe soak for 5 minutes prior to dressing change     Primary Dressing:  [x] [address]  []      Secondary Dressing:  [x] DRAWTEX   [x] OPTILOCK        Dressing Frequency:  []   [x] Do Not Change Dressing        Compression and Edema Control:  [x] Multilayer Compression Wrap: Coban 2 to Right AND Left Leg              Do not get leg(s) with wrap wet.   If wraps become too tight call the center or completely remove the wrap.  [] Wear Home Compression Stockings   [] Spandagrip to:   Size: []Low compression 5-10 mm/Hg                 []Medium compression 10-20 mm/Hg           []High compression  20-30 mm/Hg  [] Ace Wrap Toes to Knee to         Activity: Activity as Tolerated        Dietary:   Continue your diet as tolerated. Protein is a key nutrient in helping to repair damaged tissue and promote new tissue growth. Good sources of protein include milk, yogurt, cheese, fish, lean meat and beans. If you are DIABETIC, having diabetes can make it hard for wounds to heal. Try to keep your blood sugar within it's target range. Limit Sodium, Alcohol and Sugar. Pain:   Please Note some pain, drainage and/or bleeding might be expected after seeing the provider. TO HELP ALLEVIATE PAIN WE RECOMMEND THE FOLLOWING  Elevate the affected limb. Use over the counter medications as permitted by your family doctor. For Persistent Pain not relieved by the above interventions, please notify your family doctor. Return Appointment:  [x] Return Appointment: With DR. SEALS  in  [address] [place] [address])  [] Wound and dressing supply provider:   [] ECF or Home Healthcare:  [] Wound Assessment:         [] Physician or NP scheduled for Wound Assessment:   [] Orders placed during your visit:        : Michelle Briggs    Electronically signed by Olga Victor RN on 9/14/2022 at 2:48 PM          [address] [facility] Information: Should you experience any significant changes in your wound(s) or have questions about your wound care, please contact the [address] [place] [address] at [address] 8:00 am - 4:30 pm and Friday 8:00 am - 12:30 pm.  If you need help with your wound outside these hours and cannot wait until we are again available, contact your PCP or go to the hospital emergency room. PLEASE NOTE: IF YOU ARE UNABLE TO OBTAIN WOUND SUPPLIES, CONTINUE TO USE THE SUPPLIES YOU HAVE AVAILABLE UNTIL YOU ARE ABLE TO REACH US. IT IS MOST IMPORTANT TO KEEP THE WOUND COVERED AT ALL TIMES.      Physician Signature:_______________________     Date: ___________ Time:  ____________                                  Dr Rupal Linares

## 2022-09-14 ENCOUNTER — HOSPITAL ENCOUNTER (OUTPATIENT)
Dept: WOUND CARE | Age: 57
Discharge: HOME | End: 2022-09-14
Payer: MEDICAID

## 2022-09-14 VITALS
TEMPERATURE: 97.3 F | RESPIRATION RATE: 20 BRPM | HEART RATE: 104 BPM | SYSTOLIC BLOOD PRESSURE: 145 MMHG | DIASTOLIC BLOOD PRESSURE: 88 MMHG

## 2022-09-14 DIAGNOSIS — I83.019 VARICOSE ULCER OF RIGHT LOWER EXTREMITY (HCC): Primary | ICD-10-CM

## 2022-09-14 DIAGNOSIS — L97.919 VARICOSE ULCER OF RIGHT LOWER EXTREMITY (HCC): Primary | ICD-10-CM

## 2022-09-14 PROCEDURE — 97597 DBRDMT OPN WND 1ST 20 CM/<: CPT

## 2022-09-14 PROCEDURE — 97598 DBRDMT OPN WND ADDL 20CM/<: CPT

## 2022-09-14 RX ORDER — BETAMETHASONE DIPROPIONATE 0.05 %
OINTMENT (GRAM) TOPICAL ONCE
Status: CANCELLED | OUTPATIENT
Start: 2022-09-14 | End: 2022-09-14

## 2022-09-14 RX ORDER — BACITRACIN ZINC AND POLYMYXIN B SULFATE 500; 1000 [USP'U]/G; [USP'U]/G
OINTMENT TOPICAL ONCE
Status: CANCELLED | OUTPATIENT
Start: 2022-09-14 | End: 2022-09-14

## 2022-09-14 RX ORDER — LIDOCAINE HYDROCHLORIDE 40 MG/ML
SOLUTION TOPICAL ONCE
Status: COMPLETED | OUTPATIENT
Start: 2022-09-14 | End: 2022-09-14

## 2022-09-14 RX ORDER — BACITRACIN, NEOMYCIN, POLYMYXIN B 400; 3.5; 5 [USP'U]/G; MG/G; [USP'U]/G
OINTMENT TOPICAL ONCE
Status: CANCELLED | OUTPATIENT
Start: 2022-09-14 | End: 2022-09-14

## 2022-09-14 RX ORDER — LIDOCAINE 50 MG/G
OINTMENT TOPICAL ONCE
Status: CANCELLED | OUTPATIENT
Start: 2022-09-14 | End: 2022-09-14

## 2022-09-14 RX ORDER — LIDOCAINE HYDROCHLORIDE 20 MG/ML
JELLY TOPICAL ONCE
Status: CANCELLED | OUTPATIENT
Start: 2022-09-14 | End: 2022-09-14

## 2022-09-14 RX ORDER — LIDOCAINE HYDROCHLORIDE 40 MG/ML
SOLUTION TOPICAL ONCE
Status: CANCELLED | OUTPATIENT
Start: 2022-09-14 | End: 2022-09-14

## 2022-09-14 RX ORDER — GINSENG 100 MG
CAPSULE ORAL ONCE
Status: CANCELLED | OUTPATIENT
Start: 2022-09-14 | End: 2022-09-14

## 2022-09-14 RX ORDER — GENTAMICIN SULFATE 1 MG/G
OINTMENT TOPICAL ONCE
Status: CANCELLED | OUTPATIENT
Start: 2022-09-14 | End: 2022-09-14

## 2022-09-14 RX ORDER — LIDOCAINE 40 MG/G
CREAM TOPICAL ONCE
Status: CANCELLED | OUTPATIENT
Start: 2022-09-14 | End: 2022-09-14

## 2022-09-14 RX ORDER — CLOBETASOL PROPIONATE 0.5 MG/G
OINTMENT TOPICAL ONCE
Status: CANCELLED | OUTPATIENT
Start: 2022-09-14 | End: 2022-09-14

## 2022-09-14 RX ADMIN — LIDOCAINE HYDROCHLORIDE 5 ML: 40 SOLUTION TOPICAL at 14:48

## 2022-09-14 ASSESSMENT — PAIN SCALES - GENERAL
PAINLEVEL_OUTOF10: 0
PAINLEVEL_OUTOF10: 0

## 2022-09-14 NOTE — PLAN OF CARE
Multilayer Compression Wrap   (Not Unna) Below the Knee    NAME:  Nic Troy  YOB: 1965  MEDICAL RECORD NUMBER:  [id number]  DATE:  9/14/2022    Multilayer compression wrap: Removed old Multilayer wrap if indicated and wash leg with mild soap/water. Applied moisturizing agent to dry skin as needed. Applied primary and secondary dressing as ordered. Applied multilayered dressing below the knee to right lower leg. Applied multilayered dressing below the knee to left lower leg. Instructed patient/caregiver not to remove dressing and to keep it clean and dry. Instructed patient/caregiver on complications to report to provider, such as pain, numbness in toes, heavy drainage, and slippage of dressing. Instructed patient on purpose of compression dressing and on activity and exercise recommendations.       Electronically signed by Carlene Treadwell RN on 9/14/2022 at 3:40 PM

## 2022-09-14 NOTE — PROGRESS NOTES
[address]   Progress Note and Procedure Note      Cary Runner  MEDICAL RECORD NUMBER:  [id number]  AGE: 55 y.o. GENDER: male  : 1965  EPISODE DATE:  2022    Subjective:     Chief Complaint   Patient presents with    Wound Check     Follow up right leg          HISTORY of PRESENT ILLNESS HPI     Cary Runner is a 55 y.o. male who presents today for wound/ulcer evaluation. History of Wound Context: Patient presents complaining of a wound on his right shin of seven weeks duration. Patient states the wound started out as a blister, and he picked off the dead skin when the blister popped. Patient denies drainage or redness from the wound. Patient states he has very little pain from the wound. Patient kept the dressings on his legs clean, dry and intact without any problems. Patient lives in a group home and his  was present for today's encounter.      Wound/Ulcer Pain Timing/Severity: intermittent, mild  Quality of pain: aching  Severity:  2 / 10   Modifying Factors: Pain worsens with walking and Pain is relieved/improved with rest  Associated Signs/Symptoms: edema and pain    Ulcer Identification:  Ulcer Type: venous    Contributing Factors: edema, diabetes, and obesity    Acute Wound: N/A    PAST MEDICAL HISTORY        Diagnosis Date    Altered mental status     Bipolar 1 disorder ([address] 1984    Bowel and bladder incontinence     Bradycardia     Cellulitis     Cerebral edema (HCC)     Chest pain     Diabetes mellitus ([address]     Edema     BLE    Goiter     Goiter     Hyperlipidemia     Hypertension     Hypocalcemia     Hypothyroid     Kidney stone     Morbid obesity (HCC)     LADONNA (obstructive sleep apnea)     Psychosis ([address]     Pulmonary edema     Pyelonephritis     Sch[facility]ia [facility]      GCB    Sleep apnea        PAST SURGICAL HISTORY    Past Surgical History:   Procedure Laterality Date    THYROIDECTOMY         FAMILY HISTORY    Family History   Problem Relation Age of Onset    Diabetes Mother     No Known Problems Father     Diabetes Maternal Grandmother        SOCIAL HISTORY    Social History     Tobacco Use    Smoking status: Former    Smokeless tobacco: Never   Vaping Use    Vaping Use: Never used   Substance Use Topics    Alcohol use: Not Currently     Comment: rare    Drug use: Not Currently     Types: Opiates      Comment: 07/19/2019 drug screen + oxcodone       ALLERGIES    Allergies   Allergen Reactions    Carbamazepine      Per chart review 2/9/12    Chlorpromazine      Per chart review 2/9/12    Thioridazine      Per chart review 2/9/12       MEDICATIONS    Current Outpatient Medications on File Prior to Encounter   Medication Sig Dispense Refill    cariprazine hcl (VRAYLAR) 3 MG CAPS capsule Take 3 mg by mouth at bedtime      Cholecalciferol (VITAMIN D3) 125 MCG (5000 UT) CAPS       clotrimazole-betamethasone (LOTRISONE) 1-0.05 % cream       diclofenac (VOLTAREN) 75 MG EC tablet       dilTIAZem (CARDIZEM CD) 240 MG extended release capsule       furosemide (LASIX) 20 MG tablet       haloperidol (HALDOL) 20 MG tablet Take 20 mg by mouth in the morning and 20 mg before bedtime. levothyroxine (SYNTHROID) 150 MCG tablet Take 150 mcg by mouth in the morning.       lisinopril (PRINIVIL;ZESTRIL) 40 MG tablet       Multiple Vitamin (DAILY-JESU) TABS       NYAMYC 175548 UNIT/GM powder       omeprazole (PRILOSEC) 20 MG delayed release capsule       pioglitazone (ACTOS) 30 MG tablet       QUEtiapine (SEROQUEL) 100 MG tablet Take 100 mg by mouth every morning      QUEtiapine (SEROQUEL) 400 MG tablet Take 400 mg by mouth at bedtime      naproxen (NAPROSYN) 375 MG tablet Take 1 tablet by mouth 2 times daily (with meals) for 15 days 30 tablet 0    aspirin 81 MG EC tablet Take 1 tablet by mouth daily 30 tablet 0    divalproex (DEPAKOTE ER) 500 MG extended release tablet Take 1 tablet by mouth nightly (Patient taking differently: Take 500 mg by mouth nightly Takes two tabs at night) 30 tablet 0    metFORMIN (GLUCOPHAGE) 500 MG tablet Take 1 tablet by mouth 2 times daily (with meals) 60 tablet 0    atorvastatin (LIPITOR) 40 MG tablet Take 1 tablet by mouth nightly 30 tablet 0    pantoprazole (PROTONIX) 40 MG tablet Take 1 tablet by mouth every morning (before breakfast) 30 tablet 0     No current facility-administered medications on file prior to encounter. REVIEW OF SYSTEMS  Review of Systems    Pertinent items are noted in HPI. Objective:      BP (!) 145/88   Pulse (!) 104   Temp 97.3 °F (36.3 °C) (Temporal)   Resp 20     Wt Readings from Last 3 Encounters:   09/07/22 (!) 421 lb 15.4 oz (191.4 kg)   08/24/22 (!) 418 lb 10.5 oz (189.9 kg)   07/16/22 (!) 417 lb (189.1 kg)       PHYSICAL EXAM  Physical Exam    Patient is alert and oriented x 3, and is in no acute distress. Vascular: DP and PT pulses palpable bilateral. CRT less than 3 seconds to toes 1-5 bilateral. Pedal hair noted bilateral. Edema noted to lower extremity bilateral.   Derm:  Skin is warm to warm from proximal leg to distal foot bilateral.  Neuro:  Protective sensation intact to 10/10 sites bilateral via a 5.07 Pavo-Lorelei monofilament. Musculoskeletal: Muscle strength 5/5 with PF/DF/I and E of both feet. Full and stable ROM of 1st metatarsophalangeal joint bilateral without pain or crepitus. Assessment:        Problem List Items Addressed This Visit          Circulatory    Varicose ulcer of right lower extremity ([address] - Primary    Relevant Orders    Initiate Outpatient Wound Care Protocol        Procedure Note  Indications:  Based on my examination of this patient's wound(s)/ulcer(s) today, debridement is required to promote healing and evaluate the wound base.     Performed by: Oswaldo Rios DPM    Consent obtained:  Yes    Time out taken:  Yes    Pain Control: Anesthetic  Anesthetic: 4% Lidocaine Liquid Topical       Debridement: Selective Debridement/Non-Excisional Debridement    Using curette the wound(s)/ulcer(s) was/were debrided down through and including the removal of epidermis and dermis. Devitalized Tissue Debrided:  fibrin and biofilm    Pre Debridement Measurements:  Are located in the Wound/Ulcer Documentation Flow Sheet    Diabetic/Pressure/Non Pressure Ulcers only:  Ulcer: Non-Pressure ulcer, limited to breakdown of skin     Wound/Ulcer #: 1 and 2    Post Debridement Measurements:  Wound/Ulcer Descriptions are Pre Debridement except measurements:    Wound 09/07/22 Leg Right; Anterior; Lateral;Lower #1 ( states since about 8/15/2022) (Active)   Wound Image   09/07/22 1313   Wound Etiology Venous 09/07/22 1313   Wound Cleansed Vashe 09/07/22 1400   Dressing/Treatment Hydrofera blue; Other (comment) 09/07/22 1400   Wound Length (cm) 6 cm 09/14/22 1433   Wound Width (cm) 6.5 cm 09/14/22 1433   Wound Depth (cm) 0.1 cm 09/14/22 1433   Wound Surface Area (cm^2) 39 cm^2 09/14/22 1433   Change in Wound Size % (l*w) 55.88 09/14/22 1433   Wound Volume (cm^3) 3.9 cm^3 09/14/22 1433   Wound Healing % 56 09/14/22 1433   Post-Procedure Length (cm) 6.1 cm 09/14/22 1449   Post-Procedure Width (cm) 6.6 cm 09/14/22 1449   Post-Procedure Depth (cm) 0.2 cm 09/14/22 1449   Post-Procedure Surface Area (cm^2) 40.26 cm^2 09/14/22 1449   Post-Procedure Volume (cm^3) 8.052 cm^3 09/14/22 1449   Wound Assessment Granulation tissue;Slough; Hyper granulation tissue 09/14/22 1433   Drainage Amount Moderate 09/14/22 1433   Drainage Description Serosanguinous 09/14/22 1433   Odor Mild 09/14/22 1433   Mahsa-wound Assessment Dry/flaky 09/14/22 1433   Margins Undefined edges 09/14/22 1433   Wound Thickness Description not for Pressure Injury Full thickness 09/14/22 1433   Number of days: 7       Wound 09/14/22 Leg Left;Medial #2  noted 9/13/22 (Active)   Wound Image   09/14/22 1433   Wound Length (cm) 4 cm 09/14/22 1433   Wound Width (cm) 4.2 cm 09/14/22 1433   Wound Depth (cm) 0.1 cm 09/14/22 1433   Wound Surface Area (cm^2) 16.8 cm^2 09/14/22 1433   Wound Volume (cm^3) 1.68 cm^3 09/14/22 1433   Post-Procedure Length (cm) 4.1 cm 09/14/22 1449   Post-Procedure Width (cm) 4.3 cm 09/14/22 1449   Post-Procedure Depth (cm) 0.2 cm 09/14/22 1449   Post-Procedure Surface Area (cm^2) 17.63 cm^2 09/14/22 1449   Post-Procedure Volume (cm^3) 3.526 cm^3 09/14/22 1449   Wound Assessment Granulation tissue;Slough;Ruptured blister;Fluid filled blister 09/14/22 1433   Drainage Amount Small 09/14/22 1433   Drainage Description Serosanguinous 09/14/22 1433   Odor None 09/14/22 1433   Mahsa-wound Assessment Dry/flaky 09/14/22 1433   Margins Undefined edges 09/14/22 1433   Wound Thickness Description not for Pressure Injury Full thickness 09/14/22 1433   Number of days: 0          Total Surface Area Debrided:  40 sq cm     Estimated Blood Loss:  Minimal    Hemostasis Achieved:  by pressure    Procedural Pain:  0  / 10     Post Procedural Pain:  0 / 10     Response to treatment:  Well tolerated by patient. Plan:   - Patient lives in a group home and has diagnosis of bipolar and schizophrenia. Discussed patient's treatment plan with his , Salina Lutz. Treatment Note please see attached Discharge Instructions    Written patient dismissal instructions given to patient and signed by patient or POA. Discharge [id number] [facility] Physician Orders and Discharge Instructions  [place] [address]  Telephone: [phone]  [address] 8:00 am - 4:30 pm and Friday 8:00 am - 12:00 pm.          NAME:  Scar Salinas  YOB: 1965  MEDICAL RECORD NUMBER:  [id number]  DATE:  9/14/2022     Important Reminders:   Please wash hands with soap and water before and after every dressing change. Do not scrub wounds.   Keep wounds dry in shower unless otherwise instructed by the physician. SMOKING can slow would healing. Stop smoking as soon as possible to improve healing and prevent further complications associated with smoking. Mahsa-Wound Topical Treatments:  Do not apply lotions, creams, or ointments to wound bed unless directed. [x] Apply moisturizing lotion to skin surrounding the wound prior to dressing change.  [] Apply antifungal ointment to skin surrounding the wound prior to dressing change.  [] Apply thin film of no sting moisture barrier ointment to skin immediately around      wound. [] Other:         Wound Location: RIGHT LATERAL and LEFT MEDIAL LOWER LEG     Wound Cleansing: Vashe soak for 5 minutes prior to dressing change     Primary Dressing:  [x] [facility]  []      Secondary Dressing:  [x] DRAWTEX   [x] OPTILOCK        Dressing Frequency:  []   [x] Do Not Change Dressing        Compression and Edema Control:  [x] Multilayer Compression Wrap: Coban 2 to Right AND Left Leg              Do not get leg(s) with wrap wet. If wraps become too tight call the center or completely remove the wrap.  [] Wear Home Compression Stockings   [] Spandagrip to:   Size: []Low compression 5-10 mm/Hg                 []Medium compression 10-20 mm/Hg           []High compression  20-30 mm/Hg  [] Ace Wrap Toes to Knee to         Activity: Activity as Tolerated        Dietary:   Continue your diet as tolerated. Protein is a key nutrient in helping to repair damaged tissue and promote new tissue growth. Good sources of protein include milk, yogurt, cheese, fish, lean meat and beans. If you are DIABETIC, having diabetes can make it hard for wounds to heal. Try to keep your blood sugar within it's target range. Limit Sodium, Alcohol and Sugar. Pain:   Please Note some pain, drainage and/or bleeding might be expected after seeing the provider. TO HELP ALLEVIATE PAIN WE RECOMMEND THE FOLLOWING  Elevate the affected limb.   Use over the counter medications as permitted by your family doctor. For Persistent Pain not relieved by the above interventions, please notify your family doctor. Return Appointment:  [x] Return Appointment: With DR. SEALS  in  [address] [place] [address])  [] Wound and dressing supply provider:   [] ECF or Home Healthcare:  [] Wound Assessment:         [] Physician or NP scheduled for Wound Assessment:   [] Orders placed during your visit:        : Rimma Jim    Electronically signed by Gabriel Rothman RN on 9/14/2022 at 2:48 PM          [address] [place] [address] Information: Should you experience any significant changes in your wound(s) or have questions about your wound care, please contact the [address] [place] [address] at [address] 8:00 am - 4:30 pm and Friday 8:00 am - 12:30 pm.  If you need help with your wound outside these hours and cannot wait until we are again available, contact your PCP or go to the hospital emergency room. PLEASE NOTE: IF YOU ARE UNABLE TO OBTAIN WOUND SUPPLIES, CONTINUE TO USE THE SUPPLIES YOU HAVE AVAILABLE UNTIL YOU ARE ABLE TO REACH US. IT IS MOST IMPORTANT TO KEEP THE WOUND COVERED AT ALL TIMES.      Physician Signature:_______________________     Date: ___________ Time:  ____________                                  Dr Zena Johns        Electronically signed by Liz Smith DPM on 9/14/2022 at 2:51 PM

## 2022-09-15 NOTE — DISCHARGE INSTRUCTIONS
[address] Physician Orders and Discharge Instructions  [address], [address] [place] [address]  Telephone: [phone]  [address] 8:00 am - 4:30 pm and Friday 8:00 am - 12:00 pm.          NAME:  Celina Gilbert  YOB: 1965  MEDICAL RECORD NUMBER:  [id number]  DATE:  9/21/2022     Important Reminders:   Please wash hands with soap and water before and after every dressing change. Do not scrub wounds. Keep wounds dry in shower unless otherwise instructed by the physician. SMOKING can slow would healing. Stop smoking as soon as possible to improve healing and prevent further complications associated with smoking. Mahsa-Wound Topical Treatments:  Do not apply lotions, creams, or ointments to wound bed unless directed. [x] Apply moisturizing lotion to skin surrounding the wound prior to dressing change.  [] Apply antifungal ointment to skin surrounding the wound prior to dressing change.  [] Apply thin film of no sting moisture barrier ointment to skin immediately around      wound. [] Other:         Wound Location: RIGHT LATERAL and LEFT MEDIAL LOWER LEG     Wound Cleansing: Vashe soak for 5 minutes prior to dressing change     Primary Dressing:  [x] [facility]  []      Secondary Dressing:  [x] DRAWTEX   [x] OPTILOCK        Dressing Frequency:  []   [x] Do Not Change Dressing        Compression and Edema Control:  [x] Multilayer Compression Wrap: Coban 2 to Right AND Left Leg              Do not get leg(s) with wrap wet.   If wraps become too tight call the center or completely remove the wrap.  [] Wear Home Compression Stockings   [] Spandagrip to:   Size: []Low compression 5-10 mm/Hg                 []Medium compression 10-20 mm/Hg           []High compression  20-30 mm/Hg  [] Ace Wrap Toes to Knee to         Activity: Activity as Tolerated        Dietary:   Continue your diet

## 2022-09-21 ENCOUNTER — HOSPITAL ENCOUNTER (OUTPATIENT)
Dept: WOUND CARE | Age: 57
Discharge: HOME | End: 2022-09-21
Payer: MEDICAID

## 2022-09-21 VITALS
DIASTOLIC BLOOD PRESSURE: 68 MMHG | HEART RATE: 88 BPM | RESPIRATION RATE: 18 BRPM | TEMPERATURE: 97 F | SYSTOLIC BLOOD PRESSURE: 119 MMHG

## 2022-09-21 DIAGNOSIS — L97.919 VARICOSE ULCER OF RIGHT LOWER EXTREMITY (HCC): Primary | ICD-10-CM

## 2022-09-21 DIAGNOSIS — I83.019 VARICOSE ULCER OF RIGHT LOWER EXTREMITY (HCC): Primary | ICD-10-CM

## 2022-09-21 PROCEDURE — 29581 APPL MULTLAYER CMPRN SYS LEG: CPT

## 2022-09-21 RX ORDER — BACITRACIN ZINC AND POLYMYXIN B SULFATE 500; 1000 [USP'U]/G; [USP'U]/G
OINTMENT TOPICAL ONCE
Status: CANCELLED | OUTPATIENT
Start: 2022-09-21 | End: 2022-09-21

## 2022-09-21 RX ORDER — LIDOCAINE HYDROCHLORIDE 20 MG/ML
JELLY TOPICAL ONCE
Status: CANCELLED | OUTPATIENT
Start: 2022-09-21 | End: 2022-09-21

## 2022-09-21 RX ORDER — LIDOCAINE 40 MG/G
CREAM TOPICAL ONCE
Status: CANCELLED | OUTPATIENT
Start: 2022-09-21 | End: 2022-09-21

## 2022-09-21 RX ORDER — GENTAMICIN SULFATE 1 MG/G
OINTMENT TOPICAL ONCE
Status: CANCELLED | OUTPATIENT
Start: 2022-09-21 | End: 2022-09-21

## 2022-09-21 RX ORDER — LIDOCAINE HYDROCHLORIDE 40 MG/ML
SOLUTION TOPICAL ONCE
Status: COMPLETED | OUTPATIENT
Start: 2022-09-21 | End: 2022-09-21

## 2022-09-21 RX ORDER — LIDOCAINE HYDROCHLORIDE 40 MG/ML
SOLUTION TOPICAL ONCE
Status: CANCELLED | OUTPATIENT
Start: 2022-09-21 | End: 2022-09-21

## 2022-09-21 RX ORDER — BACITRACIN, NEOMYCIN, POLYMYXIN B 400; 3.5; 5 [USP'U]/G; MG/G; [USP'U]/G
OINTMENT TOPICAL ONCE
Status: CANCELLED | OUTPATIENT
Start: 2022-09-21 | End: 2022-09-21

## 2022-09-21 RX ORDER — GINSENG 100 MG
CAPSULE ORAL ONCE
Status: CANCELLED | OUTPATIENT
Start: 2022-09-21 | End: 2022-09-21

## 2022-09-21 RX ORDER — LIDOCAINE 50 MG/G
OINTMENT TOPICAL ONCE
Status: CANCELLED | OUTPATIENT
Start: 2022-09-21 | End: 2022-09-21

## 2022-09-21 RX ORDER — CLOBETASOL PROPIONATE 0.5 MG/G
OINTMENT TOPICAL ONCE
Status: CANCELLED | OUTPATIENT
Start: 2022-09-21 | End: 2022-09-21

## 2022-09-21 RX ORDER — BETAMETHASONE DIPROPIONATE 0.05 %
OINTMENT (GRAM) TOPICAL ONCE
Status: CANCELLED | OUTPATIENT
Start: 2022-09-21 | End: 2022-09-21

## 2022-09-21 RX ADMIN — LIDOCAINE HYDROCHLORIDE: 40 SOLUTION TOPICAL at 13:20

## 2022-09-21 ASSESSMENT — PAIN SCALES - GENERAL
PAINLEVEL_OUTOF10: 0
PAINLEVEL_OUTOF10: 0

## 2022-09-21 NOTE — PROGRESS NOTES
[address]   Progress Note and Procedure Note      Marcial Saab  MEDICAL RECORD NUMBER:  [id number]  AGE: 55 y.o. GENDER: male  : 1965  EPISODE DATE:  2022    Subjective:     Chief Complaint   Patient presents with    Wound Check     Follow up for bilateral lower legs. HISTORY of PRESENT ILLNESS HPI     Marcial Saab is a 55 y.o. male who presents today for wound/ulcer evaluation. History of Wound Context: Patient presents complaining of a wound on his right shin of eight weeks duration. Patient states the wound started out as a blister, and he picked off the dead skin when the blister popped. Patient denies drainage or redness from the wound. Patient states he has very little pain from the wound. Patient kept the dressings on his legs clean, dry and intact without any problems. Patient lives in a group home and his  was present for today's encounter.      Wound/Ulcer Pain Timing/Severity: mild  Quality of pain: aching  Severity:  2 / 10   Modifying Factors: Pain worsens with walking and Pain is relieved/improved with rest  Associated Signs/Symptoms: edema and pain    Ulcer Identification:  Ulcer Type: venous    Contributing Factors: edema, diabetes, obesity    Acute Wound: N/A    PAST MEDICAL HISTORY        Diagnosis Date    Altered mental status     Bipolar 1 disorder ([address] 1984    Bowel and bladder incontinence     Bradycardia     Cellulitis     Cerebral edema (HCC)     Chest pain     Diabetes mellitus ([address]     Edema     BLE    Goiter     Goiter     Hyperlipidemia     Hypertension     Hypocalcemia     Hypothyroid     Kidney stone     Morbid obesity (HCC)     LADONNA (obstructive sleep apnea)     Psychosis ([address]     Pulmonary edema     Pyelonephritis     Schizophrenia ([facility]      GCB    Sleep apnea        PAST SURGICAL HISTORY    Past Surgical History:   Procedure Laterality Date    THYROIDECTOMY         FAMILY HISTORY    Family History   Problem Relation Age of Onset    Diabetes Mother     No Known Problems Father     Diabetes Maternal Grandmother        SOCIAL HISTORY    Social History     Tobacco Use    Smoking status: Former    Smokeless tobacco: Never   Vaping Use    Vaping Use: Never used   Substance Use Topics    Alcohol use: Not Currently     Comment: rare    Drug use: Not Currently     Types: Opiates      Comment: 07/19/2019 drug screen + oxcodone       ALLERGIES    Allergies   Allergen Reactions    Carbamazepine      Per chart review 2/9/12    Chlorpromazine      Per chart review 2/9/12    Thioridazine      Per chart review 2/9/12       MEDICATIONS    Current Outpatient Medications on File Prior to Encounter   Medication Sig Dispense Refill    cariprazine hcl (VRAYLAR) 3 MG CAPS capsule Take 3 mg by mouth at bedtime      Cholecalciferol (VITAMIN D3) 125 MCG (5000 UT) CAPS       clotrimazole-betamethasone (LOTRISONE) 1-0.05 % cream       diclofenac (VOLTAREN) 75 MG EC tablet       dilTIAZem (CARDIZEM CD) 240 MG extended release capsule       furosemide (LASIX) 20 MG tablet       haloperidol (HALDOL) 20 MG tablet Take 20 mg by mouth in the morning and 20 mg before bedtime. levothyroxine (SYNTHROID) 150 MCG tablet Take 150 mcg by mouth in the morning.       lisinopril (PRINIVIL;ZESTRIL) 40 MG tablet       Multiple Vitamin (DAILY-JESU) TABS       NYAMYC 927443 UNIT/GM powder       omeprazole (PRILOSEC) 20 MG delayed release capsule       pioglitazone (ACTOS) 30 MG tablet       QUEtiapine (SEROQUEL) 100 MG tablet Take 100 mg by mouth every morning      QUEtiapine (SEROQUEL) 400 MG tablet Take 400 mg by mouth at bedtime      naproxen (NAPROSYN) 375 MG tablet Take 1 tablet by mouth 2 times daily (with meals) for 15 days 30 tablet 0    aspirin 81 MG EC tablet Take 1 tablet by mouth daily 30 tablet 0    divalproex (DEPAKOTE ER) 500 MG extended release tablet Take 1 tablet by mouth nightly (Patient taking differently: Take 500 mg by mouth nightly Takes two tabs at night) 30 tablet 0    metFORMIN (GLUCOPHAGE) 500 MG tablet Take 1 tablet by mouth 2 times daily (with meals) 60 tablet 0    atorvastatin (LIPITOR) 40 MG tablet Take 1 tablet by mouth nightly 30 tablet 0    pantoprazole (PROTONIX) 40 MG tablet Take 1 tablet by mouth every morning (before breakfast) 30 tablet 0     No current facility-administered medications on file prior to encounter. REVIEW OF SYSTEMS  Review of Systems    Pertinent items are noted in HPI. Objective:      /68   Pulse 88   Temp 97 °F (36.1 °C) (Infrared)   Resp 18     Wt Readings from Last 3 Encounters:   09/07/22 (!) 421 lb 15.4 oz (191.4 kg)   08/24/22 (!) 418 lb 10.5 oz (189.9 kg)   07/16/22 (!) 417 lb (189.1 kg)       PHYSICAL EXAM  Physical Exam    Patient is alert and oriented x 3, and is in no acute distress. Vascular: DP and PT pulses palpable bilateral. CRT less than 3 seconds to toes 1-5 bilateral. Pedal hair noted bilateral. Edema noted to lower extremity bilateral.   Derm:  Skin is warm to warm from proximal leg to distal foot bilateral.  Neuro:  Protective sensation intact to 10/10 sites bilateral via a 5.07 [place] monofilament. Musculoskeletal: Muscle strength 5/5 with PF/DF/I and E of both feet. Full and stable ROM of 1st metatarsophalangeal joint bilateral without pain or crepitus. Assessment:        Problem List Items Addressed This Visit          Circulatory    Varicose ulcer of right lower extremity ([address] - Primary    Relevant Orders    Initiate Outpatient Wound Care Protocol        Procedure Note  Indications:  Based on my examination of this patient's wound(s)/ulcer(s) today, debridement is required to promote healing and evaluate the wound base, BUT THE PATIENT REFUSED DEBRIDEMENT OF WOUNDS TODAY.     Performed by: Xavier Wing DPM    Consent obtained:  Yes    Time out taken:  Yes    Pain Control: Anesthetic  Anesthetic: 4% Lidocaine Liquid Topical     Pre Debridement Measurements:  Are located in the [place]  Documentation Flow Sheet    Diabetic/Pressure/Non Pressure Ulcers only:  Ulcer:  Non-Pressure ulcer, limited to breakdown of skin     Wound/Ulcer #: 1 and 2    Post Debridement Measurements:  Wound/Ulcer Descriptions are Pre Debridement except measurements:    Wound 09/07/22 Leg Right; Anterior; Lateral;Lower #1 ( states since about 8/15/2022) (Active)   Wound Image   09/07/22 1313   Wound Etiology Venous 09/07/22 1313   Wound Cleansed Vashe 09/14/22 1515   Dressing/Treatment Hydrofera blue; Other (comment) 09/14/22 1515   Wound Length (cm) 5.2 cm 09/21/22 1315   Wound Width (cm) 5.7 cm 09/21/22 1315   Wound Depth (cm) 0.1 cm 09/21/22 1315   Wound Surface Area (cm^2) 29.64 cm^2 09/21/22 1315   Change in Wound Size % (l*w) 66.47 09/21/22 1315   Wound Volume (cm^3) 2.964 cm^3 09/21/22 1315   Wound Healing % 66 09/21/22 1315   Post-Procedure Length (cm) 5.2 cm 09/21/22 1339   Post-Procedure Width (cm) 5.7 cm 09/21/22 1339   Post-Procedure Depth (cm) 0.1 cm 09/21/22 1339   Post-Procedure Surface Area (cm^2) 29.64 cm^2 09/21/22 1339   Post-Procedure Volume (cm^3) 2.964 cm^3 09/21/22 1339   Wound Assessment Granulation tissue;Slough; Hyper granulation tissue 09/21/22 1315   Drainage Amount Moderate 09/21/22 1315   Drainage Description Serosanguinous 09/21/22 1315   Odor None 09/21/22 1315   Mahsa-wound Assessment Fragile 09/21/22 1315   Margins Undefined edges 09/21/22 1315   Wound Thickness Description not for Pressure Injury Full thickness 09/21/22 1315   Number of days: 14       Wound 09/14/22 Leg Left;Medial #2  noted 9/13/22 (Active)   Wound Image   09/14/22 1433   Wound Cleansed Vashe 09/14/22 1515   Dressing/Treatment Hydrofera blue; Other (comment) 09/14/22 1515   Wound Length (cm) 3.4 cm 09/21/22 1315   Wound Width (cm) 3.1 cm 09/21/22 1315   Wound Depth (cm) 0.1 cm 09/21/22 1315   Wound Surface Area (cm^2) 10.54 cm^2 09/21/22 1315   Change in Wound Size % (l*w) 37.26 09/21/22 1315   Wound Volume (cm^3) 1. 054 cm^3 09/21/22 1315   Wound Healing % 37 09/21/22 1315   Post-Procedure Length (cm) 3.4 cm 09/21/22 1339   Post-Procedure Width (cm) 3.1 cm 09/21/22 1339   Post-Procedure Depth (cm) 0.1 cm 09/21/22 1339   Post-Procedure Surface Area (cm^2) 10.54 cm^2 09/21/22 1339   Post-Procedure Volume (cm^3) 1. 054 cm^3 09/21/22 1339   Wound Assessment Granulation tissue;Slough;Ruptured blister;Fluid filled blister 09/21/22 1315   Drainage Amount Large 09/21/22 1315   Drainage Description Serosanguinous 09/21/22 1315   Odor None 09/21/22 1315   Mahsa-wound Assessment Dry/flaky 09/21/22 1315   Margins Undefined edges 09/21/22 1315   Wound Thickness Description not for Pressure Injury Full thickness 09/21/22 1315   Number of days: 6          Total Surface Area Debrided:  0 sq cm     Estimated Blood Loss:  None    Hemostasis Achieved:  not needed    Procedural Pain:  0  / 10     Post Procedural Pain:  0 / 10     Response to treatment:  Well tolerated by patient. Plan:   - Patient lives in a group home and has diagnosis of bipolar and schizophrenia. Discussed patient's treatment plan with his , [place]. Treatment Note please see attached Discharge Instructions    Written patient dismissal instructions given to patient and signed by patient or POA. Discharge [id number] [facility] Physician Orders and Discharge Instructions  [place] [address]  Telephone: [phone]  [address] 8:00 am - 4:30 pm and Friday 8:00 am - 12:00 pm.          NAME:  Gaby Vital  YOB: 1965  MEDICAL RECORD NUMBER:  [id number]  DATE:  9/21/2022     Important Reminders:   Please wash hands with soap and water before and after every dressing change. Do not scrub wounds.   Keep wounds dry in shower unless otherwise instructed by the physician. SMOKING can slow would healing. Stop smoking as soon as possible to improve healing and prevent further complications associated with smoking. Mahsa-Wound Topical Treatments:  Do not apply lotions, creams, or ointments to wound bed unless directed. [x] Apply moisturizing lotion to skin surrounding the wound prior to dressing change.  [] Apply antifungal ointment to skin surrounding the wound prior to dressing change.  [] Apply thin film of no sting moisture barrier ointment to skin immediately around      wound. [] Other:         Wound Location: RIGHT LATERAL and LEFT MEDIAL LOWER LEG     Wound Cleansing: Vashe soak for 5 minutes prior to dressing change     Primary Dressing:  [x] [facility]  []      Secondary Dressing:  [x] DRAWTEX   [x] OPTILOCK        Dressing Frequency:  []   [x] Do Not Change Dressing        Compression and Edema Control:  [x] Multilayer Compression Wrap: Coban 2 to Right AND Left Leg              Do not get leg(s) with wrap wet. If wraps become too tight call the center or completely remove the wrap.  [] Wear Home Compression Stockings   [] Spandagrip to:   Size: []Low compression 5-10 mm/Hg                 []Medium compression 10-20 mm/Hg           []High compression  20-30 mm/Hg  [] Ace Wrap Toes to Knee to         Activity: Activity as Tolerated        Dietary:   Continue your diet as tolerated. Protein is a key nutrient in helping to repair damaged tissue and promote new tissue growth. Good sources of protein include milk, yogurt, cheese, fish, lean meat and beans. If you are DIABETIC, having diabetes can make it hard for wounds to heal. Try to keep your blood sugar within it's target range. Limit Sodium, Alcohol and Sugar. Pain:   Please Note some pain, drainage and/or bleeding might be expected after seeing the provider. TO HELP ALLEVIATE PAIN WE RECOMMEND THE FOLLOWING  Elevate the affected limb.   Use over the counter medications as permitted by your family doctor. For Persistent Pain not relieved by the above interventions, please notify your family doctor. Return Appointment:  [x] Return Appointment: With DR. SEALS  in  [address] [place] [address])  [] Wound and dressing supply provider:   [] ECF or Home Healthcare:  [] Wound Assessment:         [] Physician or NP scheduled for Wound Assessment:   [] Orders placed during your visit:        : Frank Mas   Electronically signed by Kristofer Stephenson RN on 9/21/2022 at [address] Information: Should you experience any significant changes in your wound(s) or have questions about your wound care, please contact the [address] [place] [address] at [address] 8:00 am - 4:30 pm and Friday 8:00 am - 12:30 pm.  If you need help with your wound outside these hours and cannot wait until we are again available, contact your PCP or go to the hospital emergency room. PLEASE NOTE: IF YOU ARE UNABLE TO OBTAIN WOUND SUPPLIES, CONTINUE TO USE THE SUPPLIES YOU HAVE AVAILABLE UNTIL YOU ARE ABLE TO REACH US. IT IS MOST IMPORTANT TO KEEP THE WOUND COVERED AT ALL TIMES.      Physician Signature:_______________________     Date: ___________ Time:  ____________                                  Dr Harshil Baugh        Electronically signed by Nicki Sanchez DPM on 9/21/2022 at 1:52 PM

## 2022-09-21 NOTE — PLAN OF CARE
Multilayer Compression Wrap   (Not Unna) Below the Knee    NAME:  Tang Rich  YOB: 1965  MEDICAL RECORD NUMBER:  [id number]  DATE:  9/21/2022    Multilayer compression wrap: Removed old Multilayer wrap if indicated and wash leg with mild soap/water. Applied moisturizing agent to dry skin as needed. Applied primary and secondary dressing as ordered. Applied multilayered dressing below the knee to right lower leg. Applied multilayered dressing below the knee to left lower leg. Instructed patient/caregiver not to remove dressing and to keep it clean and dry. Instructed patient/caregiver on complications to report to provider, such as pain, numbness in toes, heavy drainage, and slippage of dressing. Instructed patient on purpose of compression dressing and on activity and exercise recommendations.       Electronically signed by Liz Hollins RN on 9/21/2022 at 2:13 PM

## 2022-09-22 NOTE — DISCHARGE INSTRUCTIONS
[address] Physician Orders and Discharge Instructions  [place] [address], [address] [facility], [place] [address]  Telephone: [phone] [address] 8:00 am - 4:30 pm and Friday 8:00 am - 12:00 pm.          NAME:  Keri Reeves  YOB: 1965  MEDICAL RECORD NUMBER:  [id number]  DATE:  9/28/2022     Important Reminders:   Please wash hands with soap and water before and after every dressing change. Do not scrub wounds. Keep wounds dry in shower unless otherwise instructed by the physician. SMOKING can slow would healing. Stop smoking as soon as possible to improve healing and prevent further complications associated with smoking. Mahsa-Wound Topical Treatments:  Do not apply lotions, creams, or ointments to wound bed unless directed. [x] Apply moisturizing lotion to skin surrounding the wound prior to dressing change.  [] Apply antifungal ointment to skin surrounding the wound prior to dressing change.  [] Apply thin film of no sting moisture barrier ointment to skin immediately around      wound. [] Other:         Wound Location: RIGHT LATERAL and LEFT MEDIAL LOWER LEG     Wound Cleansing: Vashe soak for 5 minutes prior to dressing change     Primary Dressing:  [x] [address] [facility] [address]  []      Secondary Dressing:  [x] DRAWTEX   [x] OPTILOCK        Dressing Frequency:  []   [x] Do Not Change Dressing        Compression and Edema Control:  [x] Multilayer Compression Wrap: Coban 2 to Right AND Left Leg              Do not get leg(s) with wrap wet.   If wraps become too tight call the center or completely remove the wrap.  [] Wear Home Compression Stockings   [] Spandagrip to:   Size: []Low compression 5-10 mm/Hg                 []Medium compression 10-20 mm/Hg           []High compression  20-30 mm/Hg  [] Ace Wrap Toes to Knee to         Activity: Activity as Tolerated        Dietary:   Continue your diet as tolerated. Protein is a key nutrient in helping to repair damaged tissue and promote new tissue growth. Good sources of protein include milk, yogurt, cheese, fish, lean meat and beans. If you are DIABETIC, having diabetes can make it hard for wounds to heal. Try to keep your blood sugar within it's target range. Limit Sodium, Alcohol and Sugar. Pain:   Please Note some pain, drainage and/or bleeding might be expected after seeing the provider. TO HELP ALLEVIATE PAIN WE RECOMMEND THE FOLLOWING  Elevate the affected limb. Use over the counter medications as permitted by your family doctor. For Persistent Pain not relieved by the above interventions, please notify your family doctor. Return Appointment:  [x] Return Appointment: With DR. SEALS  in  [address] [place] [address])  [] Wound and dressing supply provider:   [] ECF or Home Healthcare:  [] Wound Assessment:         [] Physician or NP scheduled for Wound Assessment:   [] Orders placed during your visit:        : Kaia Mera      Electronically signed by Yesica Nicholson RN on 9/28/2022 at 3:00 PM        [address] [facility] Information: Should you experience any significant changes in your wound(s) or have questions about your wound care, please contact the [address] [place] [address] at [address] 8:00 am - 4:30 pm and Friday 8:00 am - 12:30 pm.  If you need help with your wound outside these hours and cannot wait until we are again available, contact your PCP or go to the hospital emergency room. PLEASE NOTE: IF YOU ARE UNABLE TO OBTAIN WOUND SUPPLIES, CONTINUE TO USE THE SUPPLIES YOU HAVE AVAILABLE UNTIL YOU ARE ABLE TO REACH US. IT IS MOST IMPORTANT TO KEEP THE WOUND COVERED AT ALL TIMES.      Physician Signature:_______________________     Date: ___________ Time:  ____________                                  Dr Alex Tracy

## 2022-09-28 ENCOUNTER — HOSPITAL ENCOUNTER (OUTPATIENT)
Dept: WOUND CARE | Age: 57
Discharge: HOME | End: 2022-09-28
Payer: MEDICAID

## 2022-09-28 VITALS
HEART RATE: 61 BPM | DIASTOLIC BLOOD PRESSURE: 88 MMHG | SYSTOLIC BLOOD PRESSURE: 136 MMHG | RESPIRATION RATE: 20 BRPM | TEMPERATURE: 97.5 F

## 2022-09-28 DIAGNOSIS — I83.019 VARICOSE ULCER OF RIGHT LOWER EXTREMITY (HCC): Primary | ICD-10-CM

## 2022-09-28 DIAGNOSIS — L97.919 VARICOSE ULCER OF RIGHT LOWER EXTREMITY (HCC): Primary | ICD-10-CM

## 2022-09-28 PROCEDURE — 97598 DBRDMT OPN WND ADDL 20CM/<: CPT

## 2022-09-28 PROCEDURE — 97597 DBRDMT OPN WND 1ST 20 CM/<: CPT

## 2022-09-28 RX ORDER — LIDOCAINE HYDROCHLORIDE 20 MG/ML
JELLY TOPICAL ONCE
Status: CANCELLED | OUTPATIENT
Start: 2022-09-28 | End: 2022-09-28

## 2022-09-28 RX ORDER — LIDOCAINE HYDROCHLORIDE 20 MG/ML
5 INJECTION, SOLUTION INFILTRATION; PERINEURAL ONCE
Status: COMPLETED | OUTPATIENT
Start: 2022-09-28 | End: 2022-09-28

## 2022-09-28 RX ORDER — GINSENG 100 MG
CAPSULE ORAL ONCE
Status: CANCELLED | OUTPATIENT
Start: 2022-09-28 | End: 2022-09-28

## 2022-09-28 RX ORDER — LIDOCAINE HYDROCHLORIDE 40 MG/ML
SOLUTION TOPICAL ONCE
Status: CANCELLED | OUTPATIENT
Start: 2022-09-28 | End: 2022-09-28

## 2022-09-28 RX ORDER — BACITRACIN ZINC AND POLYMYXIN B SULFATE 500; 1000 [USP'U]/G; [USP'U]/G
OINTMENT TOPICAL ONCE
Status: CANCELLED | OUTPATIENT
Start: 2022-09-28 | End: 2022-09-28

## 2022-09-28 RX ORDER — LIDOCAINE 40 MG/G
CREAM TOPICAL ONCE
Status: CANCELLED | OUTPATIENT
Start: 2022-09-28 | End: 2022-09-28

## 2022-09-28 RX ORDER — BETAMETHASONE DIPROPIONATE 0.05 %
OINTMENT (GRAM) TOPICAL ONCE
Status: CANCELLED | OUTPATIENT
Start: 2022-09-28 | End: 2022-09-28

## 2022-09-28 RX ORDER — GENTAMICIN SULFATE 1 MG/G
OINTMENT TOPICAL ONCE
Status: CANCELLED | OUTPATIENT
Start: 2022-09-28 | End: 2022-09-28

## 2022-09-28 RX ORDER — LIDOCAINE 50 MG/G
OINTMENT TOPICAL ONCE
Status: CANCELLED | OUTPATIENT
Start: 2022-09-28 | End: 2022-09-28

## 2022-09-28 RX ORDER — CLOBETASOL PROPIONATE 0.5 MG/G
OINTMENT TOPICAL ONCE
Status: CANCELLED | OUTPATIENT
Start: 2022-09-28 | End: 2022-09-28

## 2022-09-28 RX ORDER — BACITRACIN, NEOMYCIN, POLYMYXIN B 400; 3.5; 5 [USP'U]/G; MG/G; [USP'U]/G
OINTMENT TOPICAL ONCE
Status: CANCELLED | OUTPATIENT
Start: 2022-09-28 | End: 2022-09-28

## 2022-09-28 RX ORDER — LIDOCAINE HYDROCHLORIDE 40 MG/ML
SOLUTION TOPICAL ONCE
Status: COMPLETED | OUTPATIENT
Start: 2022-09-28 | End: 2022-09-28

## 2022-09-28 RX ADMIN — LIDOCAINE HYDROCHLORIDE 10 ML: 40 SOLUTION TOPICAL at 14:45

## 2022-09-28 RX ADMIN — LIDOCAINE HYDROCHLORIDE 5 ML: 20 INJECTION, SOLUTION INFILTRATION; PERINEURAL at 15:26

## 2022-09-28 ASSESSMENT — PAIN SCALES - GENERAL
PAINLEVEL_OUTOF10: 0
PAINLEVEL_OUTOF10: 0

## 2022-09-28 NOTE — PROGRESS NOTES
FAMILY HISTORY    Family History   Problem Relation Age of Onset    Diabetes Mother     No Known Problems Father     Diabetes Maternal Grandmother        SOCIAL HISTORY    Social History     Tobacco Use    Smoking status: Former    Smokeless tobacco: Never   Vaping Use    Vaping Use: Never used   Substance Use Topics    Alcohol use: Not Currently     Comment: rare    Drug use: Not Currently     Types: Opiates      Comment: 07/19/2019 drug screen + oxcodone       ALLERGIES    Allergies   Allergen Reactions    Carbamazepine      Per chart review 2/9/12    Chlorpromazine      Per chart review 2/9/12    Thioridazine      Per chart review 2/9/12       MEDICATIONS    Current Outpatient Medications on File Prior to Encounter   Medication Sig Dispense Refill    cariprazine hcl (VRAYLAR) 3 MG CAPS capsule Take 3 mg by mouth at bedtime      Cholecalciferol (VITAMIN D3) 125 MCG (5000 UT) CAPS       clotrimazole-betamethasone (LOTRISONE) 1-0.05 % cream       diclofenac (VOLTAREN) 75 MG EC tablet       dilTIAZem (CARDIZEM CD) 240 MG extended release capsule       furosemide (LASIX) 20 MG tablet       haloperidol (HALDOL) 20 MG tablet Take 20 mg by mouth in the morning and 20 mg before bedtime. levothyroxine (SYNTHROID) 150 MCG tablet Take 150 mcg by mouth in the morning.       lisinopril (PRINIVIL;ZESTRIL) 40 MG tablet       Multiple Vitamin (DAILY-JESU) TABS       NYAMYC 610729 UNIT/GM powder       omeprazole (PRILOSEC) 20 MG delayed release capsule       pioglitazone (ACTOS) 30 MG tablet       QUEtiapine (SEROQUEL) 100 MG tablet Take 100 mg by mouth every morning      QUEtiapine (SEROQUEL) 400 MG tablet Take 400 mg by mouth at bedtime      naproxen (NAPROSYN) 375 MG tablet Take 1 tablet by mouth 2 times daily (with meals) for 15 days 30 tablet 0    aspirin 81 MG EC tablet Take 1 tablet by mouth daily 30 tablet 0    divalproex (DEPAKOTE ER) 500 MG extended release tablet Take 1 tablet by mouth nightly (Patient taking differently: Take 500 mg by mouth nightly Takes two tabs at night) 30 tablet 0    metFORMIN (GLUCOPHAGE) 500 MG tablet Take 1 tablet by mouth 2 times daily (with meals) 60 tablet 0    atorvastatin (LIPITOR) 40 MG tablet Take 1 tablet by mouth nightly 30 tablet 0    pantoprazole (PROTONIX) 40 MG tablet Take 1 tablet by mouth every morning (before breakfast) 30 tablet 0     No current facility-administered medications on file prior to encounter. REVIEW OF SYSTEMS  Review of Systems    Pertinent items are noted in HPI. Objective:      /88   Pulse 61   Temp 97.5 °F (36.4 °C) (Temporal)   Resp 20     Wt Readings from Last 3 Encounters:   09/07/22 (!) 421 lb 15.4 oz (191.4 kg)   08/24/22 (!) 418 lb 10.5 oz (189.9 kg)   07/16/22 (!) 417 lb (189.1 kg)       PHYSICAL EXAM  Physical Exam    Patient is alert and oriented x 3, and is in no acute distress. Vascular: DP and PT pulses palpable bilateral. CRT less than 3 seconds to toes 1-5 bilateral. Pedal hair noted bilateral. Edema noted to lower extremity bilateral.   Derm:  Skin is warm to warm from proximal leg to distal foot bilateral. Right hallux nail is  from the nail bed at the distal 2/3 of the nail plate, dried blood noted under the nail without drainage, pain on palpation of the nail. Neuro:  Protective sensation intact to 10/10 sites bilateral via a 5.07 [place] monofilament. Musculoskeletal: Muscle strength 5/5 with PF/DF/I and E of both feet. Full and stable ROM of 1st metatarsophalangeal joint bilateral without pain or crepitus. Assessment:        Problem List Items Addressed This Visit          Circulatory    Varicose ulcer of right lower extremity ([address] - Primary    Relevant Orders    Initiate Outpatient Wound Care Protocol        Procedure Note  Indications:  Based on my examination of this patient's wound(s)/ulcer(s) today, debridement is required to promote healing and evaluate the wound base.     Performed by: [place], [place]    Consent obtained:  Yes    Time out taken:  Yes    Pain Control: Anesthetic  Anesthetic: 2% Lidocaine Injectable (5ML'S)       Debridement: Selective Debridement/Non-Excisional Debridement    Using curette the wound(s)/ulcer(s) was/were debrided down through and including the removal of epidermis and dermis. Devitalized Tissue Debrided:  fibrin and biofilm    Pre Debridement Measurements:  Are located in the Wound/Ulcer Documentation Flow Sheet    Diabetic/Pressure/Non Pressure Ulcers only:  Ulcer: Non-Pressure ulcer, limited to breakdown of skin     Wound/Ulcer #: 1 and 2    Post Debridement Measurements:  Wound/Ulcer Descriptions are Pre Debridement except measurements:    Wound 09/07/22 Leg Right; Anterior; Lateral;Lower #1 ( states since about 8/15/2022) (Active)   Wound Image   09/07/22 1313   Wound Etiology Venous 09/07/22 1313   Wound Cleansed Vashe 09/28/22 1527   Dressing/Treatment Hydrofera blue; Other (comment) 09/28/22 1527   Wound Length (cm) 5.4 cm 09/28/22 1432   Wound Width (cm) 5 cm 09/28/22 1432   Wound Depth (cm) 0.1 cm 09/28/22 1432   Wound Surface Area (cm^2) 27 cm^2 09/28/22 1432   Change in Wound Size % (l*w) 69.46 09/28/22 1432   Wound Volume (cm^3) 2.7 cm^3 09/28/22 1432   Wound Healing % 69 09/28/22 1432   Post-Procedure Length (cm) 5.5 cm 09/28/22 1459   Post-Procedure Width (cm) 5.1 cm 09/28/22 1459   Post-Procedure Depth (cm) 0.2 cm 09/28/22 1459   Post-Procedure Surface Area (cm^2) 28.05 cm^2 09/28/22 1459   Post-Procedure Volume (cm^3) 5.61 cm^3 09/28/22 1459   Wound Assessment Granulation tissue;Slough 09/28/22 1432   Drainage Amount Moderate 09/28/22 1432   Drainage Description Serosanguinous 09/28/22 1432   Odor Mild 09/28/22 1432   Mahsa-wound Assessment Dry/flaky 09/28/22 1432   Margins Undefined edges 09/28/22 1432   Wound Thickness Description not for Pressure Injury Full thickness 09/28/22 1432   Number of days: 21       Wound 09/14/22 Leg Left;Medial Instructions    Written patient dismissal instructions given to patient and signed by patient or POA. Discharge [address] Physician Orders and Discharge Instructions  [address], [address] [place] [address]  Telephone: [phone]  [address] 8:00 am - 4:30 pm and Friday 8:00 am - 12:00 pm.          NAME:  Suzette Aguirre  YOB: 1965  MEDICAL RECORD NUMBER:  [id number]  DATE:  9/28/2022     Important Reminders:   Please wash hands with soap and water before and after every dressing change. Do not scrub wounds. Keep wounds dry in shower unless otherwise instructed by the physician. SMOKING can slow would healing. Stop smoking as soon as possible to improve healing and prevent further complications associated with smoking. Mahsa-Wound Topical Treatments:  Do not apply lotions, creams, or ointments to wound bed unless directed. [x] Apply moisturizing lotion to skin surrounding the wound prior to dressing change.  [] Apply antifungal ointment to skin surrounding the wound prior to dressing change.  [] Apply thin film of no sting moisture barrier ointment to skin immediately around      wound. [] Other:         Wound Location: RIGHT LATERAL and LEFT MEDIAL LOWER LEG     Wound Cleansing: Vashe soak for 5 minutes prior to dressing change     Primary Dressing:  [x] [address] [facility] [address]  []      Secondary Dressing:  [x] DRAWTEX   [x] OPTILOCK        Dressing Frequency:  []   [x] Do Not Change Dressing        Compression and Edema Control:  [x] Multilayer Compression Wrap: Coban 2 to Right AND Left Leg              Do not get leg(s) with wrap wet.   If wraps become too tight call the center or completely remove the wrap.  [] Wear Home Compression Stockings   [] Spandagrip to:   Size: []Low compression 5-10 mm/Hg                 []Medium compression 10-20 mm/Hg Dr Lynette Nissen        Electronically signed by Cheo Boston DPM on 9/28/2022 at 4:56 PM

## 2022-09-28 NOTE — PLAN OF CARE
Multilayer Compression Wrap   (Not Unna) Below the Knee    NAME:  Irene Celeste  YOB: 1965  MEDICAL RECORD NUMBER:  [id number]  DATE:  9/28/2022    Multilayer compression wrap: Removed old Multilayer wrap if indicated and wash leg with mild soap/water. Applied moisturizing agent to dry skin as needed. Applied primary and secondary dressing as ordered. Applied multilayered dressing below the knee to right lower leg. Applied multilayered dressing below the knee to left lower leg. Instructed patient/caregiver not to remove dressing and to keep it clean and dry. Instructed patient/caregiver on complications to report to provider, such as pain, numbness in toes, heavy drainage, and slippage of dressing. Instructed patient on purpose of compression dressing and on activity and exercise recommendations.       Electronically signed by Rosaline Ramirez RN on 9/28/2022 at 3:29 PM

## 2022-09-30 NOTE — DISCHARGE INSTRUCTIONS
[address] Physician Orders and Discharge Instructions  [address], [address] [address]  Telephone: [phone]  [address] 8:00 am - 4:30 pm and Friday 8:00 am - 12:00 pm.          NAME:  Froilan Land  YOB: 1965  MEDICAL RECORD NUMBER:  [id number]  DATE:  10/5/2022     Important Reminders:   Please wash hands with soap and water before and after every dressing change. Do not scrub wounds. Keep wounds dry in shower unless otherwise instructed by the physician. SMOKING can slow would healing. Stop smoking as soon as possible to improve healing and prevent further complications associated with smoking. Mahsa-Wound Topical Treatments:  Do not apply lotions, creams, or ointments to wound bed unless directed. [x] Apply moisturizing lotion to skin surrounding the wound prior to dressing change.  [] Apply antifungal ointment to skin surrounding the wound prior to dressing change.  [] Apply thin film of no sting moisture barrier ointment to skin immediately around      wound. [] Other:       **APPLY POLYSPORIN TO RIGHT GREAT TOE AND LEAVE OPEN TO AIR DAILY**    Wound Location: RIGHT LATERAL LOWER LEG     Wound Cleansing: Vashe soak for 5 minutes prior to dressing change     Primary Dressing:  [x] [address] [facility]  []      Secondary Dressing:  [x] DRAWTEX   [x] OPTILOCK        Dressing Frequency:  []   [x] Do Not Change Dressing        Compression and Edema Control:  [x] Multilayer Compression Wrap: Coban 2 to Right AND Left Leg              Do not get leg(s) with wrap wet.   If wraps become too tight call the center or completely remove the wrap.  [] Wear Home Compression Stockings   [] Spandagrip to:   Size: []Low compression 5-10 mm/Hg                 []Medium compression 10-20 mm/Hg           []High compression  20-30 mm/Hg  [] Ace Wrap Toes to Knee to         Activity: Activity as Tolerated        Dietary:   Continue your diet as tolerated. Protein is a key nutrient in helping to repair damaged tissue and promote new tissue growth. Good sources of protein include milk, yogurt, cheese, fish, lean meat and beans. If you are DIABETIC, having diabetes can make it hard for wounds to heal. Try to keep your blood sugar within it's target range. Limit Sodium, Alcohol and Sugar. Pain:   Please Note some pain, drainage and/or bleeding might be expected after seeing the provider. TO HELP ALLEVIATE PAIN WE RECOMMEND THE FOLLOWING  Elevate the affected limb. Use over the counter medications as permitted by your family doctor. For Persistent Pain not relieved by the above interventions, please notify your family doctor. Return Appointment:  [x] Return Appointment: With DR. SEALS  in  [address] [place] [address])  [] Wound and dressing supply provider:   [] ECF or Home Healthcare:  [] Wound Assessment:         [] Physician or NP scheduled for Wound Assessment:   [] Orders placed during your visit:        : Padmini Massey    Electronically signed by Shey Newman RN on 10/5/2022 at [address] [place] [address] Information: Should you experience any significant changes in your wound(s) or have questions about your wound care, please contact the [address] [place] [address] at [address] 8:00 am - 4:30 pm and Friday 8:00 am - 12:30 pm.  If you need help with your wound outside these hours and cannot wait until we are again available, contact your PCP or go to the hospital emergency room. PLEASE NOTE: IF YOU ARE UNABLE TO OBTAIN WOUND SUPPLIES, CONTINUE TO USE THE SUPPLIES YOU HAVE AVAILABLE UNTIL YOU ARE ABLE TO REACH US. IT IS MOST IMPORTANT TO KEEP THE WOUND COVERED AT ALL TIMES.      Physician Signature:_______________________     Date: ___________ Time:  ____________                                  Dr Suman Reina

## 2022-10-05 ENCOUNTER — HOSPITAL ENCOUNTER (OUTPATIENT)
Dept: WOUND CARE | Age: 57
Discharge: HOME | End: 2022-10-05
Payer: MEDICAID

## 2022-10-05 VITALS
TEMPERATURE: 97.9 F | SYSTOLIC BLOOD PRESSURE: 169 MMHG | RESPIRATION RATE: 18 BRPM | HEART RATE: 72 BPM | DIASTOLIC BLOOD PRESSURE: 79 MMHG

## 2022-10-05 DIAGNOSIS — L97.919 VARICOSE ULCER OF RIGHT LOWER EXTREMITY (HCC): Primary | ICD-10-CM

## 2022-10-05 DIAGNOSIS — I83.019 VARICOSE ULCER OF RIGHT LOWER EXTREMITY (HCC): Primary | ICD-10-CM

## 2022-10-05 PROCEDURE — 97597 DBRDMT OPN WND 1ST 20 CM/<: CPT

## 2022-10-05 PROCEDURE — 11042 DBRDMT SUBQ TIS 1ST 20SQCM/<: CPT

## 2022-10-05 PROCEDURE — 29581 APPL MULTLAYER CMPRN SYS LEG: CPT

## 2022-10-05 RX ORDER — LIDOCAINE HYDROCHLORIDE 20 MG/ML
JELLY TOPICAL ONCE
Status: CANCELLED | OUTPATIENT
Start: 2022-10-05 | End: 2022-10-05

## 2022-10-05 RX ORDER — LIDOCAINE HYDROCHLORIDE 40 MG/ML
SOLUTION TOPICAL ONCE
Status: CANCELLED | OUTPATIENT
Start: 2022-10-05 | End: 2022-10-05

## 2022-10-05 RX ORDER — CLOBETASOL PROPIONATE 0.5 MG/G
OINTMENT TOPICAL ONCE
Status: CANCELLED | OUTPATIENT
Start: 2022-10-05 | End: 2022-10-05

## 2022-10-05 RX ORDER — LIDOCAINE 40 MG/G
CREAM TOPICAL ONCE
Status: CANCELLED | OUTPATIENT
Start: 2022-10-05 | End: 2022-10-05

## 2022-10-05 RX ORDER — LIDOCAINE 50 MG/G
OINTMENT TOPICAL ONCE
Status: CANCELLED | OUTPATIENT
Start: 2022-10-05 | End: 2022-10-05

## 2022-10-05 RX ORDER — GINSENG 100 MG
CAPSULE ORAL ONCE
Status: CANCELLED | OUTPATIENT
Start: 2022-10-05 | End: 2022-10-05

## 2022-10-05 RX ORDER — BACITRACIN ZINC AND POLYMYXIN B SULFATE 500; 1000 [USP'U]/G; [USP'U]/G
OINTMENT TOPICAL ONCE
Status: CANCELLED | OUTPATIENT
Start: 2022-10-05 | End: 2022-10-05

## 2022-10-05 RX ORDER — BACITRACIN, NEOMYCIN, POLYMYXIN B 400; 3.5; 5 [USP'U]/G; MG/G; [USP'U]/G
OINTMENT TOPICAL ONCE
Status: CANCELLED | OUTPATIENT
Start: 2022-10-05 | End: 2022-10-05

## 2022-10-05 RX ORDER — LIDOCAINE HYDROCHLORIDE 40 MG/ML
SOLUTION TOPICAL ONCE
Status: COMPLETED | OUTPATIENT
Start: 2022-10-05 | End: 2022-10-05

## 2022-10-05 RX ORDER — GENTAMICIN SULFATE 1 MG/G
OINTMENT TOPICAL ONCE
Status: CANCELLED | OUTPATIENT
Start: 2022-10-05 | End: 2022-10-05

## 2022-10-05 RX ORDER — BETAMETHASONE DIPROPIONATE 0.05 %
OINTMENT (GRAM) TOPICAL ONCE
Status: CANCELLED | OUTPATIENT
Start: 2022-10-05 | End: 2022-10-05

## 2022-10-05 RX ADMIN — LIDOCAINE HYDROCHLORIDE: 40 SOLUTION TOPICAL at 13:27

## 2022-10-05 ASSESSMENT — PAIN SCALES - GENERAL: PAINLEVEL_OUTOF10: 0

## 2022-10-05 NOTE — PROGRESS NOTES
[address]   Progress Note and Procedure Note      Kianna Carson  MEDICAL RECORD NUMBER:  [id number]  AGE: 55 y.o. GENDER: male  : 1965  EPISODE DATE:  10/5/2022    Subjective:     Chief Complaint   Patient presents with    Wound Check     Follow Up on Bilateral Lower Legs and Right Great Toe         HISTORY of PRESENT ILLNESS HPI     Kianna Carson is a 55 y.o. male who presents today for wound/ulcer evaluation. History of Wound Context: Patient presents complaining of a wound on his right shin of ten weeks duration. Patient states the wound started out as a blister, and he picked off the dead skin when the blister popped. Patient denies drainage or redness from the wound. Patient states he has very little pain from the wound. Patient kept the dressings on his legs clean, dry and intact without any problems. Patient lives in a group home. Patient is also s/p total nail avulsion of the right big toenail, DOS: 2022, as treatment for nail injury. Patient states he did not perform daily band aid changes with application of antibiotic ointment to the toe, because he could not reach his feet.      Wound/Ulcer Pain Timing/Severity: none  Quality of pain: N/A  Severity:  0 / 10   Modifying Factors: None  Associated Signs/Symptoms: edema    Ulcer Identification:  Ulcer Type: venous    Contributing Factors: edema, diabetes, and obesity    Acute Wound: N/A    PAST MEDICAL HISTORY        Diagnosis Date    Altered mental status     Bipolar 1 disorder ([facility] 1984    Bowel and bladder incontinence     Bradycardia     Cellulitis     Cerebral edema (HCC)     Chest pain     Diabetes mellitus ([facility] [address] [facility]     Edema     BLE    Goiter     Goiter     Hyperlipidemia     Hypertension     Hypocalcemia     Hypothyroid     Kidney stone     Morbid obesity (HCC)     LADONNA (obstructive sleep apnea)     Psychosis ([facility] [address]     Pulmonary edema     Pyelonephritis     Schizophrenia ([facility] [address]  ALOK    Sleep apnea        PAST SURGICAL HISTORY    Past Surgical History:   Procedure Laterality Date    THYROIDECTOMY         FAMILY HISTORY    Family History   Problem Relation Age of Onset    Diabetes Mother     No Known Problems Father     Diabetes Maternal Grandmother        SOCIAL HISTORY    Social History     Tobacco Use    Smoking status: Former    Smokeless tobacco: Never   Vaping Use    Vaping Use: Never used   Substance Use Topics    Alcohol use: Not Currently     Comment: rare    Drug use: Not Currently     Types: Opiates      Comment: 07/19/2019 drug screen + oxcodone       ALLERGIES    Allergies   Allergen Reactions    Carbamazepine      Per chart review 2/9/12    Chlorpromazine      Per chart review 2/9/12    Thioridazine      Per chart review 2/9/12       MEDICATIONS    Current Outpatient Medications on File Prior to Encounter   Medication Sig Dispense Refill    cariprazine hcl (VRAYLAR) 3 MG CAPS capsule Take 3 mg by mouth at bedtime      Cholecalciferol (VITAMIN D3) 125 MCG (5000 UT) CAPS       clotrimazole-betamethasone (LOTRISONE) 1-0.05 % cream       diclofenac (VOLTAREN) 75 MG EC tablet       dilTIAZem (CARDIZEM CD) 240 MG extended release capsule       furosemide (LASIX) 20 MG tablet       haloperidol (HALDOL) 20 MG tablet Take 20 mg by mouth in the morning and 20 mg before bedtime. levothyroxine (SYNTHROID) 150 MCG tablet Take 150 mcg by mouth in the morning.       lisinopril (PRINIVIL;ZESTRIL) 40 MG tablet       Multiple Vitamin (DAILY-JESU) TABS       NYAMYC 700121 UNIT/GM powder       omeprazole (PRILOSEC) 20 MG delayed release capsule       pioglitazone (ACTOS) 30 MG tablet       QUEtiapine (SEROQUEL) 100 MG tablet Take 100 mg by mouth every morning      QUEtiapine (SEROQUEL) 400 MG tablet Take 400 mg by mouth at bedtime      naproxen (NAPROSYN) 375 MG tablet Take 1 tablet by mouth 2 times daily (with meals) for 15 days 30 tablet 0    aspirin 81 MG EC tablet Take 1 tablet by mouth daily 30 tablet 0    divalproex (DEPAKOTE ER) 500 MG extended release tablet Take 1 tablet by mouth nightly (Patient taking differently: Take 500 mg by mouth nightly Takes two tabs at night) 30 tablet 0    metFORMIN (GLUCOPHAGE) 500 MG tablet Take 1 tablet by mouth 2 times daily (with meals) 60 tablet 0    atorvastatin (LIPITOR) 40 MG tablet Take 1 tablet by mouth nightly 30 tablet 0    pantoprazole (PROTONIX) 40 MG tablet Take 1 tablet by mouth every morning (before breakfast) 30 tablet 0     No current facility-administered medications on file prior to encounter. REVIEW OF SYSTEMS  Review of Systems    Pertinent items are noted in HPI. Objective:      BP (!) 169/79   Pulse 72   Temp 97.9 °F (36.6 °C) (Temporal)   Resp 18     Wt Readings from Last 3 Encounters:   09/07/22 (!) 421 lb 15.4 oz (191.4 kg)   08/24/22 (!) 418 lb 10.5 oz (189.9 kg)   07/16/22 (!) 417 lb (189.1 kg)       PHYSICAL EXAM  Physical Exam    Patient is alert and oriented x 3, and is in no acute distress. Vascular: DP and PT pulses palpable bilateral. CRT less than 3 seconds to toes 1-5 bilateral. Pedal hair noted bilateral. Edema noted to lower extremity bilateral.   Derm:  Skin is warm to warm from proximal leg to distal foot bilateral. Total nail avulsion of the right hallux: nail bed is clean with mixture of granular and new epithelialized tissue. Neuro:  Protective sensation intact to 10/10 sites bilateral via a 5.07 Blanch-Lorelei monofilament. Musculoskeletal: Muscle strength 5/5 with PF/DF/I and E of both feet. Full and stable ROM of 1st metatarsophalangeal joint bilateral without pain or crepitus.       Assessment:        Problem List Items Addressed This Visit          Circulatory    Varicose ulcer of right lower extremity ([address] - Primary    Relevant Orders    Initiate Outpatient Wound Care Protocol        Procedure Note  Indications:  Based on my examination of this patient's wound(s)/ulcer(s) today, debridement is required to promote healing and evaluate the wound base. Performed by: Bill Barrientos DPM    Consent obtained:  Yes    Time out taken:  Yes    Pain Control: Anesthetic  Anesthetic: 4% Lidocaine Liquid Topical       Debridement: Selective Debridement/Non-Excisional Debridement    Using curette the wound(s)/ulcer(s) was/were debrided down through and including the removal of epidermis and dermis. Devitalized Tissue Debrided:  fibrin and biofilm    Pre Debridement Measurements:  Are located in the Wound/Ulcer Documentation Flow Sheet    Diabetic/Pressure/Non Pressure Ulcers only:  Ulcer: Non-Pressure ulcer, limited to breakdown of skin     Wound/Ulcer #: 1    Post Debridement Measurements:  Wound/Ulcer Descriptions are Pre Debridement except measurements:    Wound 09/07/22 Leg Right; Anterior; Bales Oiler #1 ( states since about 8/15/2022) (Active)   Wound Image   10/05/22 1328   Wound Etiology Venous 09/07/22 1313   Dressing Status Old drainage noted 10/05/22 1328   Wound Cleansed Vashe 09/28/22 1527   Dressing/Treatment Hydrofera blue; Other (comment) 09/28/22 1527   Wound Length (cm) 5 cm 10/05/22 1328   Wound Width (cm) 4.5 cm 10/05/22 1328   Wound Depth (cm) 0.2 cm 10/05/22 1328   Wound Surface Area (cm^2) 22.5 cm^2 10/05/22 1328   Change in Wound Size % (l*w) 74.55 10/05/22 1328   Wound Volume (cm^3) 4.5 cm^3 10/05/22 1328   Wound Healing % 49 10/05/22 1328   Post-Procedure Length (cm) 5.1 cm 10/05/22 1355   Post-Procedure Width (cm) 4.6 cm 10/05/22 1355   Post-Procedure Depth (cm) 0.3 cm 10/05/22 1355   Post-Procedure Surface Area (cm^2) 23.46 cm^2 10/05/22 1355   Post-Procedure Volume (cm^3) 7.038 cm^3 10/05/22 1355   Wound Assessment Granulation tissue;Slough 10/05/22 1328   Drainage Amount Small 10/05/22 1328   Drainage Description Serosanguinous 10/05/22 1328   Odor None 10/05/22 1328   Mahsa-wound Assessment Dry/flaky 10/05/22 1328   Margins Undefined edges 10/05/22 1328   Wound Thickness Description not for Pressure Injury Full thickness 10/05/22 1328   Number of days: 28       Wound 09/14/22 Leg Left;Medial #2  noted 9/13/22 (Active)   Wound Image   10/05/22 1328   Wound Etiology Venous 09/21/22 1315   Wound Cleansed Vashe 09/28/22 1527   Dressing/Treatment Hydrofera blue; Other (comment) 09/28/22 1527   Wound Length (cm) 0 cm 10/05/22 1328   Wound Width (cm) 0 cm 10/05/22 1328   Wound Depth (cm) 0 cm 10/05/22 1328   Wound Surface Area (cm^2) 0 cm^2 10/05/22 1328   Change in Wound Size % (l*w) 100 10/05/22 1328   Wound Volume (cm^3) 0 cm^3 10/05/22 1328   Wound Healing % 100 10/05/22 1328   Post-Procedure Length (cm) 0 cm 10/05/22 1355   Post-Procedure Width (cm) 0 cm 10/05/22 1355   Post-Procedure Depth (cm) 0 cm 10/05/22 1355   Post-Procedure Surface Area (cm^2) 0 cm^2 10/05/22 1355   Post-Procedure Volume (cm^3) 0 cm^3 10/05/22 1355   Wound Assessment Epithelialization 10/05/22 1328   Drainage Amount Moderate 09/28/22 1432   Drainage Description Serosanguinous 09/28/22 1432   Odor Mild 09/28/22 1432   Mahsa-wound Assessment Dry/flaky 09/28/22 1432   Margins Undefined edges 09/28/22 1432   Wound Thickness Description not for Pressure Injury Full thickness 09/28/22 1432   Number of days: 20          Total Surface Area Debrided:  23 sq cm     Estimated Blood Loss:  Minimal    Hemostasis Achieved:  by pressure    Procedural Pain:  0  / 10     Post Procedural Pain:  0 / 10     Response to treatment:  Well tolerated by patient. Plan:   - Patient lives in a group home and has diagnosis of bipolar and schizophrenia. Treatment Note please see attached Discharge Instructions    Written patient dismissal instructions given to patient and signed by patient or POA.          Discharge [id number] [facility] Physician Orders and Discharge Instructions  [address], [place] [address]  Telephone: [phone] FAX [phone]  [address] 8:00 am - 4:30 pm and Friday 8:00 am - 12:00 pm.          NAME:  Judith Garcia  YOB: 1965  MEDICAL RECORD NUMBER:  [id number]  DATE:  10/5/2022     Important Reminders:   Please wash hands with soap and water before and after every dressing change. Do not scrub wounds. Keep wounds dry in shower unless otherwise instructed by the physician. SMOKING can slow would healing. Stop smoking as soon as possible to improve healing and prevent further complications associated with smoking. Mahsa-Wound Topical Treatments:  Do not apply lotions, creams, or ointments to wound bed unless directed. [x] Apply moisturizing lotion to skin surrounding the wound prior to dressing change.  [] Apply antifungal ointment to skin surrounding the wound prior to dressing change.  [] Apply thin film of no sting moisture barrier ointment to skin immediately around      wound. [] Other:       **APPLY POLYSPORIN TO RIGHT GREAT TOE AND LEAVE OPEN TO AIR DAILY**    Wound Location: RIGHT LATERAL LOWER LEG     Wound Cleansing: Vashe soak for 5 minutes prior to dressing change     Primary Dressing:  [x] [address] [facility]  []      Secondary Dressing:  [x] DRAWTEX   [x] OPTILOCK        Dressing Frequency:  []   [x] Do Not Change Dressing        Compression and Edema Control:  [x] Multilayer Compression Wrap: Coban 2 to Right AND Left Leg              Do not get leg(s) with wrap wet. If wraps become too tight call the center or completely remove the wrap.  [] Wear Home Compression Stockings   [] Spandagrip to:   Size: []Low compression 5-10 mm/Hg                 []Medium compression 10-20 mm/Hg           []High compression  20-30 mm/Hg  [] Ace Wrap Toes to Knee to         Activity: Activity as Tolerated        Dietary:   Continue your diet as tolerated. Protein is a key nutrient in helping to repair damaged tissue and promote new tissue growth.  Good sources of protein include milk, yogurt, cheese, fish, lean meat and beans. If you are DIABETIC, having diabetes can make it hard for wounds to heal. Try to keep your blood sugar within it's target range. Limit Sodium, Alcohol and Sugar. Pain:   Please Note some pain, drainage and/or bleeding might be expected after seeing the provider. TO HELP ALLEVIATE PAIN WE RECOMMEND THE FOLLOWING  Elevate the affected limb. Use over the counter medications as permitted by your family doctor. For Persistent Pain not relieved by the above interventions, please notify your family doctor. Return Appointment:  [x] Return Appointment: With DR. SEALS  in  [facility])  [] Wound and dressing supply provider:   [] ECF or Home Healthcare:  [] Wound Assessment:         [] Physician or NP scheduled for Wound Assessment:   [] Orders placed during your visit:        : Aaron Marti    Electronically signed by Maria M Coy RN on 10/5/2022 at [address] Information: Should you experience any significant changes in your wound(s) or have questions about your wound care, please contact the [address] [place] [address] at [address]  8:00 am - 4:30 pm and Friday 8:00 am - 12:30 pm.  If you need help with your wound outside these hours and cannot wait until we are again available, contact your PCP or go to the hospital emergency room. PLEASE NOTE: IF YOU ARE UNABLE TO OBTAIN WOUND SUPPLIES, CONTINUE TO USE THE SUPPLIES YOU HAVE AVAILABLE UNTIL YOU ARE ABLE TO REACH US. IT IS MOST IMPORTANT TO KEEP THE WOUND COVERED AT ALL TIMES.      Physician Signature:_______________________     Date: ___________ Time:  ____________                                  Dr Donna Mckee        Electronically signed by Kim Nunez DPM on 10/5/2022 at 2:23 PM

## 2022-10-05 NOTE — PLAN OF CARE
Multilayer Compression Wrap   (Not Unna) Below the Knee    NAME:  Dez Colunga  YOB: 1965  MEDICAL RECORD NUMBER:  [id number]  DATE:  10/5/2022    Multilayer compression wrap: Removed old Multilayer wrap if indicated and wash leg with mild soap/water. Applied moisturizing agent to dry skin as needed. Applied primary and secondary dressing as ordered. Applied multilayered dressing below the knee to right lower leg. Applied multilayered dressing below the knee to left lower leg. Instructed patient/caregiver not to remove dressing and to keep it clean and dry. Instructed patient/caregiver on complications to report to provider, such as pain, numbness in toes, heavy drainage, and slippage of dressing. Instructed patient on purpose of compression dressing and on activity and exercise recommendations.       Electronically signed by Darra Meckel, RN on 10/5/2022 at 2:26 PM

## 2022-10-11 NOTE — DISCHARGE INSTRUCTIONS
[address] Physician Orders and Discharge Instructions  [place] [address], [address] [place] [address]  Telephone: [phone]  [address] 8:00 am - 4:30 pm and Friday 8:00 am - 12:00 pm.          NAME:  Patricia Shin  YOB: 1965  MEDICAL RECORD NUMBER:  [id number]  DATE:  10/12/2022     Important Reminders:   Please wash hands with soap and water before and after every dressing change. Do not scrub wounds. Keep wounds dry in shower unless otherwise instructed by the physician. SMOKING can slow would healing. Stop smoking as soon as possible to improve healing and prevent further complications associated with smoking. Mahsa-Wound Topical Treatments:  Do not apply lotions, creams, or ointments to wound bed unless directed. [x] Apply moisturizing lotion to skin surrounding the wound prior to dressing change.  [] Apply antifungal ointment to skin surrounding the wound prior to dressing change.  [] Apply thin film of no sting moisture barrier ointment to skin immediately around      wound. [] Other:       **APPLY POLYSPORIN TO RIGHT GREAT TOE AND LEAVE OPEN TO AIR DAILY**     Wound Location: RIGHT LATERAL LOWER LEG     Wound Cleansing: Vashe soak for 5 minutes prior to dressing change     Primary Dressing:  [x] [address] [facility]  []      Secondary Dressing:  [x] DRAWTEX   [x] OPTILOCK        Dressing Frequency:  []   [x] Do Not Change Dressing        Compression and Edema Control:  [x] Multilayer Compression Wrap: Coban 2 to Right AND Left Leg              Do not get leg(s) with wrap wet.   If wraps become too tight call the center or completely remove the wrap.  [] Wear Home Compression Stockings   [] Spandagrip to:   Size: []Low compression 5-10 mm/Hg                 []Medium compression 10-20 mm/Hg           []High compression  20-30 mm/Hg  [] Ace Wrap Toes to Knee to         Activity: Activity as Tolerated        Dietary:   Continue your diet as tolerated. Protein is a key nutrient in helping to repair damaged tissue and promote new tissue growth. Good sources of protein include milk, yogurt, cheese, fish, lean meat and beans. If you are DIABETIC, having diabetes can make it hard for wounds to heal. Try to keep your blood sugar within it's target range. Limit Sodium, Alcohol and Sugar. Pain:   Please Note some pain, drainage and/or bleeding might be expected after seeing the provider. TO HELP ALLEVIATE PAIN WE RECOMMEND THE FOLLOWING  Elevate the affected limb. Use over the counter medications as permitted by your family doctor. For Persistent Pain not relieved by the above interventions, please notify your family doctor. Return Appointment:  [x] Return Appointment: With DR. SEALS  in  [address] [place] [address])  [] Wound and dressing supply provider:   [] ECF or Home Healthcare:  [] Wound Assessment:         [] Physician or NP scheduled for Wound Assessment:   [] Orders placed during your visit:        : Vivien Jack   Electronically signed by Natalia Bhatti RN on 10/12/2022 at 1:47 PM        [facility] Information: Should you experience any significant changes in your wound(s) or have questions about your wound care, please contact the [address] [place] [address] at [address] 8:00 am - 4:30 pm and Friday 8:00 am - 12:30 pm.  If you need help with your wound outside these hours and cannot wait until we are again available, contact your PCP or go to the hospital emergency room. PLEASE NOTE: IF YOU ARE UNABLE TO OBTAIN WOUND SUPPLIES, CONTINUE TO USE THE SUPPLIES YOU HAVE AVAILABLE UNTIL YOU ARE ABLE TO REACH US. IT IS MOST IMPORTANT TO KEEP THE WOUND COVERED AT ALL TIMES.      Physician Signature:_______________________     Date: ___________ Time:  ____________                                  Dr Kush Sousa

## 2022-10-12 ENCOUNTER — HOSPITAL ENCOUNTER (OUTPATIENT)
Dept: WOUND CARE | Age: 57
Discharge: HOME | End: 2022-10-12
Payer: MEDICAID

## 2022-10-12 VITALS
RESPIRATION RATE: 18 BRPM | HEART RATE: 74 BPM | SYSTOLIC BLOOD PRESSURE: 165 MMHG | DIASTOLIC BLOOD PRESSURE: 92 MMHG | TEMPERATURE: 98.2 F

## 2022-10-12 DIAGNOSIS — L97.919 VARICOSE ULCER OF RIGHT LOWER EXTREMITY (HCC): Primary | ICD-10-CM

## 2022-10-12 DIAGNOSIS — I83.019 VARICOSE ULCER OF RIGHT LOWER EXTREMITY (HCC): Primary | ICD-10-CM

## 2022-10-12 PROCEDURE — 97597 DBRDMT OPN WND 1ST 20 CM/<: CPT

## 2022-10-12 RX ORDER — LIDOCAINE HYDROCHLORIDE 40 MG/ML
SOLUTION TOPICAL ONCE
Status: COMPLETED | OUTPATIENT
Start: 2022-10-12 | End: 2022-10-12

## 2022-10-12 RX ORDER — BACITRACIN ZINC AND POLYMYXIN B SULFATE 500; 1000 [USP'U]/G; [USP'U]/G
OINTMENT TOPICAL ONCE
Status: CANCELLED | OUTPATIENT
Start: 2022-10-12 | End: 2022-10-12

## 2022-10-12 RX ORDER — LIDOCAINE HYDROCHLORIDE 40 MG/ML
SOLUTION TOPICAL ONCE
Status: CANCELLED | OUTPATIENT
Start: 2022-10-12 | End: 2022-10-12

## 2022-10-12 RX ORDER — BACITRACIN, NEOMYCIN, POLYMYXIN B 400; 3.5; 5 [USP'U]/G; MG/G; [USP'U]/G
OINTMENT TOPICAL ONCE
Status: CANCELLED | OUTPATIENT
Start: 2022-10-12 | End: 2022-10-12

## 2022-10-12 RX ORDER — GINSENG 100 MG
CAPSULE ORAL ONCE
Status: CANCELLED | OUTPATIENT
Start: 2022-10-12 | End: 2022-10-12

## 2022-10-12 RX ORDER — GENTAMICIN SULFATE 1 MG/G
OINTMENT TOPICAL ONCE
Status: CANCELLED | OUTPATIENT
Start: 2022-10-12 | End: 2022-10-12

## 2022-10-12 RX ORDER — LIDOCAINE 40 MG/G
CREAM TOPICAL ONCE
Status: CANCELLED | OUTPATIENT
Start: 2022-10-12 | End: 2022-10-12

## 2022-10-12 RX ORDER — LIDOCAINE HYDROCHLORIDE 20 MG/ML
JELLY TOPICAL ONCE
Status: CANCELLED | OUTPATIENT
Start: 2022-10-12 | End: 2022-10-12

## 2022-10-12 RX ORDER — CLOBETASOL PROPIONATE 0.5 MG/G
OINTMENT TOPICAL ONCE
Status: CANCELLED | OUTPATIENT
Start: 2022-10-12 | End: 2022-10-12

## 2022-10-12 RX ORDER — LIDOCAINE 50 MG/G
OINTMENT TOPICAL ONCE
Status: CANCELLED | OUTPATIENT
Start: 2022-10-12 | End: 2022-10-12

## 2022-10-12 RX ORDER — BETAMETHASONE DIPROPIONATE 0.05 %
OINTMENT (GRAM) TOPICAL ONCE
Status: CANCELLED | OUTPATIENT
Start: 2022-10-12 | End: 2022-10-12

## 2022-10-12 RX ADMIN — LIDOCAINE HYDROCHLORIDE: 40 SOLUTION TOPICAL at 13:24

## 2022-10-12 ASSESSMENT — PAIN SCALES - GENERAL
PAINLEVEL_OUTOF10: 0
PAINLEVEL_OUTOF10: 0

## 2022-10-12 NOTE — PROGRESS NOTES
[address]   Progress Note and Procedure Note      Lakia Abraham  MEDICAL RECORD NUMBER:  [id number]  AGE: 55 y.o. GENDER: male  : 1965  EPISODE DATE:  10/12/2022    Subjective:     Chief Complaint   Patient presents with    Wound Check     fOLLOW UP FOR BILATERAL LOWER LEGS. HISTORY of PRESENT ILLNESS HPI     Lakia Abraham is a 55 y.o. male who presents today for wound/ulcer evaluation. History of Wound Context:  Patient presents complaining of a wound on his right shin of eleven weeks duration. Patient states the wound started out as a blister, and he picked off the dead skin when the blister popped. Patient denies drainage or redness from the wound. Patient states he has very little pain from the wound. Patient kept the dressings on his legs clean, dry and intact without any problems. Patient lives in a group home. Patient is also s/p total nail avulsion of the right big toenail, DOS: 2022, as treatment for nail injury.      Wound/Ulcer Pain Timing/Severity: none  Quality of pain: N/A  Severity:  0 / 10   Modifying Factors: None  Associated Signs/Symptoms: edema    Ulcer Identification:  Ulcer Type: venous    Contributing Factors: edema, diabetes, and obesity    Acute Wound: N/A    PAST MEDICAL HISTORY        Diagnosis Date    Altered mental status     Bipolar 1 disorder ([address] 1984    Bowel and bladder incontinence     Bradycardia     Cellulitis     Cerebral edema (HCC)     Chest pain     Diabetes mellitus ([address]     Edema     BLE    Goiter     Goiter     Hyperlipidemia     Hypertension     Hypocalcemia     Hypothyroid     Kidney stone     Morbid obesity (HCC)     LADONNA (obstructive sleep apnea)     Psychosis ([address]     Pulmonary edema     Pyelonephritis     Schizophrenia ([facility]      GCB    Sleep apnea        PAST SURGICAL HISTORY    Past Surgical History:   Procedure Laterality Date    THYROIDECTOMY         FAMILY HISTORY    Family History   Problem Relation Age of Onset    Diabetes Mother     No Known Problems Father     Diabetes Maternal Grandmother        SOCIAL HISTORY    Social History     Tobacco Use    Smoking status: Former    Smokeless tobacco: Never   Vaping Use    Vaping Use: Never used   Substance Use Topics    Alcohol use: Not Currently     Comment: rare    Drug use: Not Currently     Types: Opiates      Comment: 07/19/2019 drug screen + oxcodone       ALLERGIES    Allergies   Allergen Reactions    Carbamazepine      Per chart review 2/9/12    Chlorpromazine      Per chart review 2/9/12    Thioridazine      Per chart review 2/9/12       MEDICATIONS    Current Outpatient Medications on File Prior to Encounter   Medication Sig Dispense Refill    cariprazine hcl (VRAYLAR) 3 MG CAPS capsule Take 3 mg by mouth at bedtime      Cholecalciferol (VITAMIN D3) 125 MCG (5000 UT) CAPS       clotrimazole-betamethasone (LOTRISONE) 1-0.05 % cream       diclofenac (VOLTAREN) 75 MG EC tablet       dilTIAZem (CARDIZEM CD) 240 MG extended release capsule       furosemide (LASIX) 20 MG tablet       haloperidol (HALDOL) 20 MG tablet Take 20 mg by mouth in the morning and 20 mg before bedtime. levothyroxine (SYNTHROID) 150 MCG tablet Take 150 mcg by mouth in the morning.       lisinopril (PRINIVIL;ZESTRIL) 40 MG tablet       Multiple Vitamin (DAILY-JESU) TABS       NYAMYC 842520 UNIT/GM powder       omeprazole (PRILOSEC) 20 MG delayed release capsule       pioglitazone (ACTOS) 30 MG tablet       QUEtiapine (SEROQUEL) 100 MG tablet Take 100 mg by mouth every morning      QUEtiapine (SEROQUEL) 400 MG tablet Take 400 mg by mouth at bedtime      naproxen (NAPROSYN) 375 MG tablet Take 1 tablet by mouth 2 times daily (with meals) for 15 days 30 tablet 0    aspirin 81 MG EC tablet Take 1 tablet by mouth daily 30 tablet 0    divalproex (DEPAKOTE ER) 500 MG extended release tablet Take 1 tablet by mouth nightly (Patient taking differently: Take 500 mg by mouth nightly Takes two tabs at night) 30 tablet 0    metFORMIN (GLUCOPHAGE) 500 MG tablet Take 1 tablet by mouth 2 times daily (with meals) 60 tablet 0    atorvastatin (LIPITOR) 40 MG tablet Take 1 tablet by mouth nightly 30 tablet 0    pantoprazole (PROTONIX) 40 MG tablet Take 1 tablet by mouth every morning (before breakfast) 30 tablet 0     No current facility-administered medications on file prior to encounter. REVIEW OF SYSTEMS  Review of Systems    Pertinent items are noted in HPI. Objective:      BP (!) 165/92   Pulse 74   Temp 98.2 °F (36.8 °C) (Infrared)   Resp 18     Wt Readings from Last 3 Encounters:   09/07/22 (!) 421 lb 15.4 oz (191.4 kg)   08/24/22 (!) 418 lb 10.5 oz (189.9 kg)   07/16/22 (!) 417 lb (189.1 kg)       PHYSICAL EXAM  Physical Exam    Patient is alert and oriented x 3, and is in no acute distress. Vascular: DP and PT pulses palpable bilateral. CRT less than 3 seconds to toes 1-5 bilateral. Pedal hair noted bilateral. Edema noted to lower extremity bilateral.   Derm:  Skin is warm to warm from proximal leg to distal foot bilateral. Total nail avulsion of the right hallux: nail bed is clean and dry with fully epithelialized tissue. Neuro:  Protective sensation intact to 10/10 sites bilateral via a 5.07 [place] monofilament. Musculoskeletal: Muscle strength 5/5 with PF/DF/I and E of both feet. Full and stable ROM of 1st metatarsophalangeal joint bilateral without pain or crepitus. Assessment:        Problem List Items Addressed This Visit          Circulatory    Varicose ulcer of right lower extremity ([address] - Primary    Relevant Orders    Initiate Outpatient Wound Care Protocol        Procedure Note  Indications:  Based on my examination of this patient's wound(s)/ulcer(s) today, debridement is required to promote healing and evaluate the wound base.     Performed by: Estefani Monte DPM    Consent obtained:  Yes    Time out taken:  Yes    Pain Control: Anesthetic  Anesthetic: 4% Lidocaine Liquid Topical       Debridement: Non-Excisional Debridement/Selective Debridement    Using curette the wound(s)/ulcer(s) was/were debrided down through and including the removal of epidermis and dermis. Devitalized Tissue Debrided:  fibrin and biofilm    Pre Debridement Measurements:  Are located in the Wound/Ulcer Documentation Flow Sheet    Diabetic/Pressure/Non Pressure Ulcers only:  Ulcer: Non-Pressure ulcer, limited to breakdown of skin     Wound/Ulcer #: 1    Post Debridement Measurements:  Wound/Ulcer Descriptions are Pre Debridement except measurements:    Wound 09/07/22 Leg Right; Anterior; Jen Rosenberg #1 ( states since about 8/15/2022) (Active)   Wound Image   10/05/22 1328   Wound Etiology Venous 09/07/22 1313   Dressing Status Old drainage noted 10/12/22 1320   Wound Cleansed Vashe 10/05/22 1424   Dressing/Treatment Hydrofera blue; Other (comment) 10/05/22 1424   Wound Length (cm) 2.6 cm 10/12/22 1320   Wound Width (cm) 0.9 cm 10/12/22 1320   Wound Depth (cm) 0.1 cm 10/12/22 1320   Wound Surface Area (cm^2) 2.34 cm^2 10/12/22 1320   Change in Wound Size % (l*w) 97.35 10/12/22 1320   Wound Volume (cm^3) 0.234 cm^3 10/12/22 1320   Wound Healing % 97 10/12/22 1320   Post-Procedure Length (cm) 2.7 cm 10/12/22 1346   Post-Procedure Width (cm) 1 cm 10/12/22 1346   Post-Procedure Depth (cm) 0.2 cm 10/12/22 1346   Post-Procedure Surface Area (cm^2) 2.7 cm^2 10/12/22 1346   Post-Procedure Volume (cm^3) 0.54 cm^3 10/12/22 1346   Wound Assessment Granulation tissue;Slough 10/12/22 1320   Drainage Amount Small 10/12/22 1320   Drainage Description Serosanguinous 10/12/22 1320   Odor None 10/12/22 1320   Mahsa-wound Assessment Dry/flaky 10/12/22 1320   Margins Undefined edges 10/12/22 1320   Wound Thickness Description not for Pressure Injury Full thickness 10/12/22 1320   Number of days: 35          Total Surface Area Debrided:  2.7 sq cm     Estimated Blood Loss: Minimal    Hemostasis Achieved:  by pressure    Procedural Pain:  0  / 10     Post Procedural Pain:  0 / 10     Response to treatment:  Well tolerated by patient. Plan:   - Patient lives independently, but he has a / due to diagnosis of bipolar and schizophrenia. Treatment Note please see attached Discharge Instructions    Written patient dismissal instructions given to patient and signed by patient or POA. Discharge [id number] [facility] Physician Orders and Discharge Instructions  [address], [address] [place] [address]  Telephone: [phone]  [address] 8:00 am - 4:30 pm and Friday 8:00 am - 12:00 pm.          NAME:  Bryce Greene  YOB: 1965  MEDICAL RECORD NUMBER:  [id number]  DATE:  10/12/2022     Important Reminders:   Please wash hands with soap and water before and after every dressing change. Do not scrub wounds. Keep wounds dry in shower unless otherwise instructed by the physician. SMOKING can slow would healing. Stop smoking as soon as possible to improve healing and prevent further complications associated with smoking. Mahsa-Wound Topical Treatments:  Do not apply lotions, creams, or ointments to wound bed unless directed. [x] Apply moisturizing lotion to skin surrounding the wound prior to dressing change.  [] Apply antifungal ointment to skin surrounding the wound prior to dressing change.  [] Apply thin film of no sting moisture barrier ointment to skin immediately around      wound.   [] Other:       **APPLY POLYSPORIN TO RIGHT GREAT TOE AND LEAVE OPEN TO AIR DAILY**     Wound Location: RIGHT LATERAL LOWER LEG     Wound Cleansing: Vashe soak for 5 minutes prior to dressing change     Primary Dressing:  [x] HYDROFERA BLUE TRANSFER  []      Secondary Dressing:  [x] DRAWTEX   [x] OPTILOCK        Dressing Frequency:  [] [x] Do Not Change Dressing        Compression and Edema Control:  [x] Multilayer Compression Wrap: Coban 2 to Right AND Left Leg              Do not get leg(s) with wrap wet. If wraps become too tight call the center or completely remove the wrap.  [] Wear Home Compression Stockings   [] Spandagrip to:   Size: []Low compression 5-10 mm/Hg                 []Medium compression 10-20 mm/Hg           []High compression  20-30 mm/Hg  [] Ace Wrap Toes to Knee to         Activity: Activity as Tolerated        Dietary:   Continue your diet as tolerated. Protein is a key nutrient in helping to repair damaged tissue and promote new tissue growth. Good sources of protein include milk, yogurt, cheese, fish, lean meat and beans. If you are DIABETIC, having diabetes can make it hard for wounds to heal. Try to keep your blood sugar within it's target range. Limit Sodium, Alcohol and Sugar. Pain:   Please Note some pain, drainage and/or bleeding might be expected after seeing the provider. TO HELP ALLEVIATE PAIN WE RECOMMEND THE FOLLOWING  Elevate the affected limb. Use over the counter medications as permitted by your family doctor. For Persistent Pain not relieved by the above interventions, please notify your family doctor. Return Appointment:  [x] Return Appointment: With DR. SEALS  in  [address] [place] [address])  [] Wound and dressing supply provider:   [] ECF or Home Healthcare:  [] Wound Assessment:         [] Physician or NP scheduled for Wound Assessment:   [] Orders placed during your visit:        : Flor Cm   Electronically signed by Walt Arrington RN on 10/12/2022 at 1:47 PM        [facility] Information: Should you experience any significant changes in your wound(s) or have questions about your wound care, please contact the [address] [place], [place] [address]ie [place] at [address] 8:00 am - 4:30 pm and Friday 8:00 am - 12:30 pm.  If you need help with your wound outside these hours and cannot wait until we are again available, contact your PCP or go to the hospital emergency room. PLEASE NOTE: IF YOU ARE UNABLE TO OBTAIN WOUND SUPPLIES, CONTINUE TO USE THE SUPPLIES YOU HAVE AVAILABLE UNTIL YOU ARE ABLE TO REACH US. IT IS MOST IMPORTANT TO KEEP THE WOUND COVERED AT ALL TIMES.      Physician Signature:_______________________     Date: ___________ Time:  ____________                                  Dr Roy Massed        Electronically signed by Pao Thao DPM on 10/12/2022 at 1:55 PM

## 2022-10-12 NOTE — PLAN OF CARE
Multilayer Compression Wrap   (Not Unna) Below the Knee    NAME:  Gilda Birch  YOB: 1965  MEDICAL RECORD NUMBER:  [id number]  DATE:  10/12/2022    Multilayer compression wrap: Removed old Multilayer wrap if indicated and wash leg with mild soap/water. Applied moisturizing agent to dry skin as needed. Applied primary and secondary dressing as ordered. Applied multilayered dressing below the knee to right lower leg. Applied multilayered dressing below the knee to left lower leg. Instructed patient/caregiver not to remove dressing and to keep it clean and dry. Instructed patient/caregiver on complications to report to provider, such as pain, numbness in toes, heavy drainage, and slippage of dressing. Instructed patient on purpose of compression dressing and on activity and exercise recommendations.       Electronically signed by Earline Lira RN on 10/12/2022 at 2:34 PM

## 2022-10-19 ENCOUNTER — HOSPITAL ENCOUNTER (OUTPATIENT)
Dept: WOUND CARE | Age: 57
Discharge: HOME | End: 2022-10-19

## 2022-10-19 ENCOUNTER — TELEPHONE (OUTPATIENT)
Dept: WOUND CARE | Age: 57
End: 2022-10-19

## 2022-10-19 DIAGNOSIS — I83.019 VARICOSE ULCER OF RIGHT LOWER EXTREMITY (HCC): Primary | ICD-10-CM

## 2022-10-19 DIAGNOSIS — L97.919 VARICOSE ULCER OF RIGHT LOWER EXTREMITY (HCC): Primary | ICD-10-CM

## 2022-10-19 NOTE — TELEPHONE ENCOUNTER
Patient /  called to cancel Nona Kenny called to cancel appointment for patient today, stating patient is not feeling well. Patient rescheduled for next week on Wednesday with Dr. Klarissa Mcpherson, [address] however is wrapped in Coban 2 wraps that will need to be removed prior to this visit. Discussed with Dr. Klarissa Mcpherson who orders polysporin daily with dry dressing and ace wraps in the event that patient is unable to come Thursday or Friday for RN Assessment visit. Discussed need to remove wraps and begin dressing changes or schedule appointment for RN visit with Nona Kenny, verbalized understanding and tentative appointment made for RN visit. She states she will call the patient to ensure his is agreeable to RN assessment. No other needs expressed at this time.   Electronically signed by Chloé Liang RN on 10/19/2022 at 2:55 PM

## 2022-10-19 NOTE — DISCHARGE INSTRUCTIONS
[address] Physician Orders and Discharge Instructions  [address], [address] [place] [address], [place] [address]  Telephone: [phone]  [address] 8:00 am - 4:30 pm and Friday 8:00 am - 12:00 pm.          NAME:  Julio César Bennett  YOB: 1965  MEDICAL RECORD NUMBER:  [id number]  DATE:  10/19/2022     Important Reminders:   Please wash hands with soap and water before and after every dressing change. Do not scrub wounds. Keep wounds dry in shower unless otherwise instructed by the physician. SMOKING can slow would healing. Stop smoking as soon as possible to improve healing and prevent further complications associated with smoking. Mahsa-Wound Topical Treatments:  Do not apply lotions, creams, or ointments to wound bed unless directed. [x] Apply moisturizing lotion to skin surrounding the wound prior to dressing change.  [] Apply antifungal ointment to skin surrounding the wound prior to dressing change.  [] Apply thin film of no sting moisture barrier ointment to skin immediately around      wound. [] Other:       **APPLY POLYSPORIN TO RIGHT GREAT TOE AND LEAVE OPEN TO AIR DAILY**     Wound Location: RIGHT LATERAL LOWER LEG     Wound Cleansing: Vashe soak for 5 minutes prior to dressing change     Primary Dressing:  [x] [facility]  []      Secondary Dressing:  [x] DRAWTEX   [x] OPTILOCK        Dressing Frequency:  []   [x] Do Not Change Dressing        Compression and Edema Control:  [x] Multilayer Compression Wrap: Coban 2 to Right AND Left Leg              Do not get leg(s) with wrap wet.   If wraps become too tight call the center or completely remove the wrap.  [] Wear Home Compression Stockings   [] Spandagrip to:   Size: []Low compression 5-10 mm/Hg                 []Medium compression 10-20 mm/Hg           []High compression  20-30 mm/Hg  [] Ace Wrap Toes to Knee to         Activity:

## 2022-10-20 ENCOUNTER — TELEPHONE (OUTPATIENT)
Dept: WOUND CARE | Age: 57
End: 2022-10-20

## 2022-10-20 ENCOUNTER — HOSPITAL ENCOUNTER (OUTPATIENT)
Dept: WOUND CARE | Age: 57
Discharge: HOME | End: 2022-10-20

## 2022-10-20 DIAGNOSIS — L97.919 VARICOSE ULCER OF RIGHT LOWER EXTREMITY (HCC): Primary | ICD-10-CM

## 2022-10-20 DIAGNOSIS — I83.019 VARICOSE ULCER OF RIGHT LOWER EXTREMITY (HCC): Primary | ICD-10-CM

## 2022-10-20 NOTE — TELEPHONE ENCOUNTER
Call received from [facility] patient is refusing to come to Wound Care for RN Visit this week but will relay instructions for patient to change dressing, no other needs at this time. Patient to follow up with Dr. Jim Link, [address] on 10/26/22.   Electronically signed by Jessika Loja RN on 10/20/2022 at 12:12 PM

## 2022-10-26 ENCOUNTER — TELEPHONE (OUTPATIENT)
Dept: WOUND CARE | Age: 57
End: 2022-10-26

## 2022-10-26 ENCOUNTER — HOSPITAL ENCOUNTER (OUTPATIENT)
Dept: WOUND CARE | Age: 57
Discharge: HOME | End: 2022-10-26

## 2022-10-26 DIAGNOSIS — I83.019 VARICOSE ULCER OF RIGHT LOWER EXTREMITY (HCC): Primary | ICD-10-CM

## 2022-10-26 DIAGNOSIS — L97.919 VARICOSE ULCER OF RIGHT LOWER EXTREMITY (HCC): Primary | ICD-10-CM

## 2022-10-26 NOTE — DISCHARGE INSTRUCTIONS
[address] Physician Orders and Discharge Instructions  [facility]  [address] [facility] [address], [address] [place] [address]  Telephone: [phone]  [address] 8:00 am - 4:30 pm and Friday 8:00 am - 12:00 pm.          NAME:  Julio César Bennett  YOB: 1965  MEDICAL RECORD NUMBER:  [id number]  DATE:  10/26/2022     Important Reminders:   Please wash hands with soap and water before and after every dressing change. Do not scrub wounds. Keep wounds dry in shower unless otherwise instructed by the physician. SMOKING can slow would healing. Stop smoking as soon as possible to improve healing and prevent further complications associated with smoking. Mahsa-Wound Topical Treatments:  Do not apply lotions, creams, or ointments to wound bed unless directed. [x] Apply moisturizing lotion to skin surrounding the wound prior to dressing change.  [] Apply antifungal ointment to skin surrounding the wound prior to dressing change.  [] Apply thin film of no sting moisture barrier ointment to skin immediately around      wound. [] Other:       **APPLY POLYSPORIN TO RIGHT GREAT TOE AND LEAVE OPEN TO AIR DAILY**     Wound Location: RIGHT LATERAL LOWER LEG     Wound Cleansing: Vashe soak for 5 minutes prior to dressing change     Primary Dressing:  [x] [address] [facility]  []      Secondary Dressing:  [x] DRAWTEX   [x] OPTILOCK        Dressing Frequency:  []   [x] Do Not Change Dressing        Compression and Edema Control:  [x] Multilayer Compression Wrap: Coban 2 to Right AND Left Leg              Do not get leg(s) with wrap wet.   If wraps become too tight call the center or completely remove the wrap.  [] Wear Home Compression Stockings   [] Spandagrip to:   Size: []Low compression 5-10 mm/Hg                 []Medium compression 10-20 mm/Hg           []High compression  20-30 mm/Hg  [] Ace Wrap Toes to Knee to         Activity: Activity as Tolerated        Dietary:   Continue your diet as tolerated. Protein is a key nutrient in helping to repair damaged tissue and promote new tissue growth. Good sources of protein include milk, yogurt, cheese, fish, lean meat and beans. If you are DIABETIC, having diabetes can make it hard for wounds to heal. Try to keep your blood sugar within it's target range. Limit Sodium, Alcohol and Sugar. Pain:   Please Note some pain, drainage and/or bleeding might be expected after seeing the provider. TO HELP ALLEVIATE PAIN WE RECOMMEND THE FOLLOWING  Elevate the affected limb. Use over the counter medications as permitted by your family doctor. For Persistent Pain not relieved by the above interventions, please notify your family doctor. Return Appointment:  [x] Return Appointment: With DR. SEALS  in  [address] [place] [address])  [] Wound and dressing supply provider:   [] ECF or Home Healthcare:  [] Wound Assessment:         [] Physician or NP scheduled for Wound Assessment:   [] Orders placed during your visit:        : [address] [place] [address] Information: Should you experience any significant changes in your wound(s) or have questions about your wound care, please contact the [address] [place] [address] at [address] 8:00 am - 4:30 pm and Friday 8:00 am - 12:30 pm.  If you need help with your wound outside these hours and cannot wait until we are again available, contact your PCP or go to the hospital emergency room. PLEASE NOTE: IF YOU ARE UNABLE TO OBTAIN WOUND SUPPLIES, CONTINUE TO USE THE SUPPLIES YOU HAVE AVAILABLE UNTIL YOU ARE ABLE TO REACH US. IT IS MOST IMPORTANT TO KEEP THE WOUND COVERED AT ALL TIMES.      Physician Signature:_______________________     Date: ___________ Time:  ____________                                  Dr Peter Milton

## 2022-10-26 NOTE — TELEPHONE ENCOUNTER
Patient has appointment scheduled with Dr. Justin Medina DPM for 1300 on 10/26/22, at [phone] patient has not yet arrived.  called to  [facility], message left by  staff to please call back regarding the patient's appointment. Calls made at 1400 to both patient, who did not answer, message left to please return call regarding his appointment and second called made to his , [facility] Will await return calls from either Case Management or patient.   Electronically signed by Abran Hallman RN on 10/26/2022 at 2:11 PM

## 2022-10-26 NOTE — TELEPHONE ENCOUNTER
Patient returned call, states he removed his wraps last week and the wound is healed. States he did note a new blister on his other leg but that this has resolved to a \"pink scar\", inquired if patient would like to make a follow up appointment with Dr. Francesca Palomares to access this area and patient stated \"what for\", is not agreeable to follow up at this time. Patient states he is healed at this time and has no further need for wound care. Encouraged patient to call if anything changed and he should need to be seen again. Follow up call made to [facility], , no answer, HIPPA compliant message left with brief summary of patient's statement he no longer had need of Wound Care and to call back if this changed, otherwise Wound Care will d/c patient at this time. Dr. Francesca Palomares notified, no further orders at this time.   Electronically signed by Betsy Narayan RN on 10/26/2022 at 2:41 PM

## 2022-11-23 ENCOUNTER — HOSPITAL ENCOUNTER (OUTPATIENT)
Dept: WOUND CARE | Age: 57
Discharge: HOME | End: 2022-11-23

## 2023-04-05 ENCOUNTER — APPOINTMENT (OUTPATIENT)
Dept: GENERAL RADIOLOGY | Age: 58
End: 2023-04-05
Payer: MEDICAID

## 2023-04-05 ENCOUNTER — HOSPITAL ENCOUNTER (EMERGENCY)
Age: 58
Discharge: NURSING, REHAB OR LONG-TERM CARE | End: 2023-04-06
Attending: EMERGENCY MEDICINE
Payer: MEDICAID

## 2023-04-05 ENCOUNTER — APPOINTMENT (OUTPATIENT)
Dept: CT IMAGING | Age: 58
End: 2023-04-05
Payer: MEDICAID

## 2023-04-05 DIAGNOSIS — R52 PAIN OF LEFT SIDE OF BODY: ICD-10-CM
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
## 2023-04-05 PROCEDURE — 73700 CT LOWER EXTREMITY W/O DYE: CPT

## 2023-04-05 PROCEDURE — 71045 X-RAY EXAM CHEST 1 VIEW: CPT

## 2023-04-05 PROCEDURE — 72125 CT NECK SPINE W/O DYE: CPT

## 2023-04-05 NOTE — ED TRIAGE NOTES
Pt had mechanical fall at facility in bathroom. Pt states he was ambulating from wheelchair and slipped. Pt c/o L sided pain and neck pain. Denies hitting head, LOC or thinners.

## 2023-04-05 NOTE — ED PROVIDER NOTES
[address]          ATTENDING PHYSICIAN NOTE       Date of evaluation: 4/5/2023    Chief Complaint     Fall (Pt had fall at nursing home in bathroom and now is having L sided pain. Denies thinners, or hitting head. )      History of Present Illness     Gabriel Ruiz is a 65 y.o. male with a history of bipolar, schizophrenia, diabetes, and morbid obesity who presents from his nursing home where he fell in the bathroom onto his left side. He complains of left hip pain, left side pain, and neck pain. He seems offended when attempting to get history from him and is not very forthcoming. He denies hitting his head. There was no apparent loss of consciousness. Nursing home staff were not contributory with regard to history. Review of Systems     Denies numbness, paresthesia, weakness. See HPI for further details. Review of systems otherwise negative. Past Medical, Surgical, Family, and Social History     Nursing Notes, Past Medical Hx, Past Surgical Hx, Social Hx, Allergies, and Family Hx were all reviewed in the electronic record and agreed with, or any disagreements were addressed in the HPI or corrected in the EMR. Medications     Current Discharge Medication List        CONTINUE these medications which have NOT CHANGED    Details   cariprazine hcl (VRAYLAR) 3 MG CAPS capsule Take 3 mg by mouth at bedtime      Cholecalciferol (VITAMIN D3) 125 MCG (5000 UT) CAPS       clotrimazole-betamethasone (LOTRISONE) 1-0.05 % cream       diclofenac (VOLTAREN) 75 MG EC tablet       dilTIAZem (CARDIZEM CD) 240 MG extended release capsule       furosemide (LASIX) 20 MG tablet       haloperidol (HALDOL) 20 MG tablet Take 20 mg by mouth in the morning and 20 mg before bedtime. levothyroxine (SYNTHROID) 150 MCG tablet Take 150 mcg by mouth in the morning.       lisinopril (PRINIVIL;ZESTRIL) 40 MG tablet       Multiple Vitamin (DAILY-JESU) TABS       NYAMYC [id number] UNIT/GM

## 2023-04-06 VITALS
SYSTOLIC BLOOD PRESSURE: 166 MMHG | TEMPERATURE: 98.1 F | BODY MASS INDEX: 40.43 KG/M2 | RESPIRATION RATE: 15 BRPM | HEART RATE: 74 BPM | DIASTOLIC BLOOD PRESSURE: 78 MMHG | HEIGHT: 74 IN | WEIGHT: 315 LBS | OXYGEN SATURATION: 98 %

## 2023-04-06 RX ORDER — CARVEDILOL 6.25 MG/1
6.25 TABLET ORAL 2 TIMES DAILY WITH MEALS
COMMUNITY

## 2023-04-06 RX ORDER — VALPROIC ACID 250 MG/5ML
500 SOLUTION ORAL 2 TIMES DAILY
Status: DISCONTINUED | OUTPATIENT
Start: 2023-04-06 | End: 2023-04-06 | Stop reason: HOSPADM

## 2023-04-06 RX ORDER — FERROUS SULFATE 325(65) MG
325 TABLET ORAL
COMMUNITY

## 2023-04-06 RX ORDER — VALPROIC ACID 250 MG/5ML
500 SOLUTION ORAL 2 TIMES DAILY
COMMUNITY

## 2023-04-06 RX ORDER — RISPERIDONE 1 MG/1
3 TABLET ORAL DAILY
Status: DISCONTINUED | OUTPATIENT
Start: 2023-04-06 | End: 2023-04-06 | Stop reason: HOSPADM

## 2023-04-06 RX ORDER — RISPERIDONE 1 MG/ML
2 SOLUTION ORAL NIGHTLY
COMMUNITY

## 2023-04-06 RX ORDER — HALOPERIDOL 2 MG/ML
2 SOLUTION ORAL DAILY PRN
COMMUNITY

## 2023-04-06 RX ORDER — LEVOTHYROXINE SODIUM 0.2 MG/1
200 TABLET ORAL DAILY
COMMUNITY

## 2023-04-06 RX ORDER — BUMETANIDE 2 MG/1
2 TABLET ORAL DAILY
COMMUNITY

## 2023-04-06 RX ORDER — RISPERIDONE 1 MG/ML
3 SOLUTION ORAL EVERY MORNING
COMMUNITY

## 2023-04-06 RX ORDER — LEVOTHYROXINE SODIUM 0.2 MG/1
200 TABLET ORAL DAILY
Status: DISCONTINUED | OUTPATIENT
Start: 2023-04-06 | End: 2023-04-06 | Stop reason: HOSPADM

## 2023-04-06 RX ORDER — LISINOPRIL 10 MG/1
10 TABLET ORAL DAILY
Status: DISCONTINUED | OUTPATIENT
Start: 2023-04-06 | End: 2023-04-06 | Stop reason: HOSPADM

## 2023-04-06 RX ORDER — INSULIN GLARGINE 100 [IU]/ML
10 INJECTION, SOLUTION SUBCUTANEOUS NIGHTLY
COMMUNITY

## 2023-04-06 RX ORDER — CARVEDILOL 6.25 MG/1
6.25 TABLET ORAL 2 TIMES DAILY WITH MEALS
Status: DISCONTINUED | OUTPATIENT
Start: 2023-04-06 | End: 2023-04-06 | Stop reason: HOSPADM

## 2023-04-06 RX ORDER — LISINOPRIL 10 MG/1
10 TABLET ORAL DAILY
COMMUNITY

## 2024-01-19 ENCOUNTER — HOSPITAL ENCOUNTER (INPATIENT)
Age: 59
LOS: 6 days | Discharge: NURSING, REHAB OR LONG-TERM CARE | DRG: 137 | End: 2024-01-25
Attending: EMERGENCY MEDICINE | Admitting: STUDENT IN AN ORGANIZED HEALTH CARE EDUCATION/TRAINING PROGRAM
Payer: MEDICAID

## 2024-01-19 ENCOUNTER — APPOINTMENT (OUTPATIENT)
Dept: GENERAL RADIOLOGY | Age: 59
DRG: 137 | End: 2024-01-19
Payer: MEDICAID

## 2024-01-19 ENCOUNTER — APPOINTMENT (OUTPATIENT)
Dept: CT IMAGING | Age: 59
DRG: 137 | End: 2024-01-19
Payer: MEDICAID

## 2024-01-19 DIAGNOSIS — J96.01 ACUTE RESPIRATORY FAILURE WITH HYPOXIA AND HYPERCAPNIA (HCC): Primary | ICD-10-CM

## 2024-01-19 DIAGNOSIS — R00.1 BRADYCARDIA: ICD-10-CM

## 2024-01-19 DIAGNOSIS — J96.02 ACUTE RESPIRATORY FAILURE WITH HYPOXIA AND HYPERCAPNIA (HCC): Primary | ICD-10-CM

## 2024-01-19 DIAGNOSIS — E87.5 HYPERKALEMIA: ICD-10-CM

## 2024-01-19 DIAGNOSIS — R40.2432 GLASGOW COMA SCALE TOTAL SCORE 3-8, AT ARRIVAL TO EMERGENCY DEPARTMENT (HCC): ICD-10-CM

## 2024-01-19 LAB
ALBUMIN SERPL-MCNC: 4.1 G/DL (ref 3.4–5)
ALBUMIN SERPL-MCNC: 4.3 G/DL (ref 3.4–5)
ALBUMIN/GLOB SERPL: 1 {RATIO} (ref 1.1–2.2)
ALBUMIN/GLOB SERPL: 1 {RATIO} (ref 1.1–2.2)
ALP SERPL-CCNC: 111 U/L (ref 40–129)
ALP SERPL-CCNC: 96 U/L (ref 40–129)
ALT SERPL-CCNC: 13 U/L (ref 10–40)
ALT SERPL-CCNC: 16 U/L (ref 10–40)
AMPHETAMINES UR QL SCN>1000 NG/ML: NORMAL
ANION GAP SERPL CALCULATED.3IONS-SCNC: 12 MMOL/L (ref 3–16)
ANION GAP SERPL CALCULATED.3IONS-SCNC: 7 MMOL/L (ref 3–16)
AST SERPL-CCNC: 22 U/L (ref 15–37)
AST SERPL-CCNC: 23 U/L (ref 15–37)
BACTERIA URNS QL MICRO: NORMAL /HPF
BARBITURATES UR QL SCN>200 NG/ML: NORMAL
BASE EXCESS BLDV CALC-SCNC: 0.6 MMOL/L
BASOPHILS # BLD: 0 K/UL (ref 0–0.2)
BASOPHILS NFR BLD: 0.1 %
BENZODIAZ UR QL SCN>200 NG/ML: NORMAL
BILIRUB SERPL-MCNC: <0.2 MG/DL (ref 0–1)
BILIRUB SERPL-MCNC: <0.2 MG/DL (ref 0–1)
BILIRUB UR QL STRIP.AUTO: NEGATIVE
BUN SERPL-MCNC: 45 MG/DL (ref 7–20)
BUN SERPL-MCNC: 46 MG/DL (ref 7–20)
CALCIUM SERPL-MCNC: 9.1 MG/DL (ref 8.3–10.6)
CALCIUM SERPL-MCNC: 9.2 MG/DL (ref 8.3–10.6)
CANNABINOIDS UR QL SCN>50 NG/ML: NORMAL
CHLORIDE SERPL-SCNC: 100 MMOL/L (ref 99–110)
CHLORIDE SERPL-SCNC: 98 MMOL/L (ref 99–110)
CK SERPL-CCNC: 485 U/L (ref 39–308)
CLARITY UR: CLEAR
CO2 BLDV-SCNC: 38 MMOL/L
CO2 SERPL-SCNC: 29 MMOL/L (ref 21–32)
CO2 SERPL-SCNC: 29 MMOL/L (ref 21–32)
COCAINE UR QL SCN: NORMAL
COHGB MFR BLDV: 1.5 %
COLOR UR: YELLOW
CREAT SERPL-MCNC: 1.1 MG/DL (ref 0.9–1.3)
CREAT SERPL-MCNC: 1.3 MG/DL (ref 0.9–1.3)
DEPRECATED RDW RBC AUTO: 19.2 % (ref 12.4–15.4)
DRUG SCREEN COMMENT UR-IMP: NORMAL
EKG ATRIAL RATE: 56 BPM
EKG DIAGNOSIS: NORMAL
EKG P AXIS: 27 DEGREES
EKG P-R INTERVAL: 208 MS
EKG Q-T INTERVAL: 492 MS
EKG QRS DURATION: 154 MS
EKG QTC CALCULATION (BAZETT): 474 MS
EKG R AXIS: -28 DEGREES
EKG T AXIS: 96 DEGREES
EKG VENTRICULAR RATE: 56 BPM
EOSINOPHIL # BLD: 0 K/UL (ref 0–0.6)
EOSINOPHIL NFR BLD: 0.1 %
EPI CELLS #/AREA URNS AUTO: 0 /HPF (ref 0–5)
ETHANOLAMINE SERPL-MCNC: NORMAL MG/DL (ref 0–0.08)
FENTANYL SCREEN, URINE: NORMAL
FLUAV RNA UPPER RESP QL NAA+PROBE: NEGATIVE
FLUBV AG NPH QL: NEGATIVE
GFR SERPLBLD CREATININE-BSD FMLA CKD-EPI: >60 ML/MIN/{1.73_M2}
GFR SERPLBLD CREATININE-BSD FMLA CKD-EPI: >60 ML/MIN/{1.73_M2}
GLUCOSE BLD-MCNC: 134 MG/DL (ref 70–99)
GLUCOSE BLD-MCNC: 143 MG/DL (ref 70–99)
GLUCOSE BLD-MCNC: 188 MG/DL (ref 70–99)
GLUCOSE BLD-MCNC: 202 MG/DL (ref 70–99)
GLUCOSE SERPL-MCNC: 139 MG/DL (ref 70–99)
GLUCOSE SERPL-MCNC: 180 MG/DL (ref 70–99)
GLUCOSE UR STRIP.AUTO-MCNC: >=1000 MG/DL
HCO3 BLDV-SCNC: 34 MMOL/L (ref 23–29)
HCT VFR BLD AUTO: 40.1 % (ref 40.5–52.5)
HGB BLD-MCNC: 12.9 G/DL (ref 13.5–17.5)
HGB UR QL STRIP.AUTO: NEGATIVE
HYALINE CASTS #/AREA URNS AUTO: 3 /LPF (ref 0–8)
INR PPP: 1.03 (ref 0.84–1.16)
KETONES UR STRIP.AUTO-MCNC: NEGATIVE MG/DL
LACTATE BLDV-SCNC: 0.9 MMOL/L (ref 0.4–2)
LACTATE BLDV-SCNC: 1.5 MMOL/L (ref 0.4–2)
LEUKOCYTE ESTERASE UR QL STRIP.AUTO: NEGATIVE
LYMPHOCYTES # BLD: 0.4 K/UL (ref 1–5.1)
LYMPHOCYTES NFR BLD: 10.4 %
MCH RBC QN AUTO: 27.7 PG (ref 26–34)
MCHC RBC AUTO-ENTMCNC: 32.3 G/DL (ref 31–36)
MCV RBC AUTO: 85.8 FL (ref 80–100)
METHADONE UR QL SCN>300 NG/ML: NORMAL
METHGB MFR BLDV: 0.4 %
MONOCYTES # BLD: 0.1 K/UL (ref 0–1.3)
MONOCYTES NFR BLD: 1.9 %
NEUTROPHILS # BLD: 3.5 K/UL (ref 1.7–7.7)
NEUTROPHILS NFR BLD: 87.5 %
NITRITE UR QL STRIP.AUTO: NEGATIVE
NT-PROBNP SERPL-MCNC: 46 PG/ML (ref 0–124)
O2 THERAPY: ABNORMAL
OPIATES UR QL SCN>300 NG/ML: NORMAL
OXYCODONE UR QL SCN: NORMAL
PCO2 BLDV: 118 MMHG (ref 40–50)
PCP UR QL SCN>25 NG/ML: NORMAL
PERFORMED ON: ABNORMAL
PH BLDV: 7.07 [PH] (ref 7.35–7.45)
PH UR STRIP.AUTO: 5 [PH] (ref 5–8)
PH UR STRIP: 6 [PH]
PLATELET # BLD AUTO: 194 K/UL (ref 135–450)
PMV BLD AUTO: 8.7 FL (ref 5–10.5)
PO2 BLDV: 78 MMHG
POTASSIUM SERPL-SCNC: 4.5 MMOL/L (ref 3.5–5.1)
POTASSIUM SERPL-SCNC: 5 MMOL/L (ref 3.5–5.1)
POTASSIUM SERPL-SCNC: 6.6 MMOL/L (ref 3.5–5.1)
PROT SERPL-MCNC: 8.1 G/DL (ref 6.4–8.2)
PROT SERPL-MCNC: 8.5 G/DL (ref 6.4–8.2)
PROT UR STRIP.AUTO-MCNC: 30 MG/DL
PROTHROMBIN TIME: 13.5 SEC (ref 11.5–14.8)
RBC # BLD AUTO: 4.67 M/UL (ref 4.2–5.9)
RBC CLUMPS #/AREA URNS AUTO: 1 /HPF (ref 0–4)
REASON FOR REJECTION: NORMAL
REJECTED TEST: NORMAL
SAO2 % BLDV: 90 %
SARS-COV-2 RDRP RESP QL NAA+PROBE: NOT DETECTED
SODIUM SERPL-SCNC: 134 MMOL/L (ref 136–145)
SODIUM SERPL-SCNC: 141 MMOL/L (ref 136–145)
SP GR UR STRIP.AUTO: 1.03 (ref 1–1.03)
T4 FREE SERPL-MCNC: 1.2 NG/DL (ref 0.9–1.8)
TROPONIN, HIGH SENSITIVITY: 12 NG/L (ref 0–22)
TROPONIN, HIGH SENSITIVITY: 20 NG/L (ref 0–22)
TSH SERPL DL<=0.005 MIU/L-ACNC: 28.55 UIU/ML (ref 0.27–4.2)
UA COMPLETE W REFLEX CULTURE PNL UR: ABNORMAL
UA DIPSTICK W REFLEX MICRO PNL UR: YES
URN SPEC COLLECT METH UR: ABNORMAL
UROBILINOGEN UR STRIP-ACNC: 0.2 E.U./DL
VALPROATE SERPL-MCNC: 35.8 UG/ML (ref 50–100)
WBC # BLD AUTO: 4 K/UL (ref 4–11)
WBC #/AREA URNS AUTO: 0 /HPF (ref 0–5)

## 2024-01-19 PROCEDURE — 36415 COLL VENOUS BLD VENIPUNCTURE: CPT

## 2024-01-19 PROCEDURE — 87641 MR-STAPH DNA AMP PROBE: CPT

## 2024-01-19 PROCEDURE — 6360000002 HC RX W HCPCS: Performed by: NURSE PRACTITIONER

## 2024-01-19 PROCEDURE — 0BH17EZ INSERTION OF ENDOTRACHEAL AIRWAY INTO TRACHEA, VIA NATURAL OR ARTIFICIAL OPENING: ICD-10-PCS | Performed by: STUDENT IN AN ORGANIZED HEALTH CARE EDUCATION/TRAINING PROGRAM

## 2024-01-19 PROCEDURE — 93005 ELECTROCARDIOGRAM TRACING: CPT | Performed by: INTERNAL MEDICINE

## 2024-01-19 PROCEDURE — 80164 ASSAY DIPROPYLACETIC ACD TOT: CPT

## 2024-01-19 PROCEDURE — 87635 SARS-COV-2 COVID-19 AMP PRB: CPT

## 2024-01-19 PROCEDURE — 96375 TX/PRO/DX INJ NEW DRUG ADDON: CPT

## 2024-01-19 PROCEDURE — 6370000000 HC RX 637 (ALT 250 FOR IP): Performed by: EMERGENCY MEDICINE

## 2024-01-19 PROCEDURE — 6360000002 HC RX W HCPCS

## 2024-01-19 PROCEDURE — 6360000002 HC RX W HCPCS: Performed by: EMERGENCY MEDICINE

## 2024-01-19 PROCEDURE — 82803 BLOOD GASES ANY COMBINATION: CPT

## 2024-01-19 PROCEDURE — 84132 ASSAY OF SERUM POTASSIUM: CPT

## 2024-01-19 PROCEDURE — 87804 INFLUENZA ASSAY W/OPTIC: CPT

## 2024-01-19 PROCEDURE — 80307 DRUG TEST PRSMV CHEM ANLYZR: CPT

## 2024-01-19 PROCEDURE — 31500 INSERT EMERGENCY AIRWAY: CPT

## 2024-01-19 PROCEDURE — 6360000004 HC RX CONTRAST MEDICATION: Performed by: EMERGENCY MEDICINE

## 2024-01-19 PROCEDURE — 2500000003 HC RX 250 WO HCPCS: Performed by: EMERGENCY MEDICINE

## 2024-01-19 PROCEDURE — 71045 X-RAY EXAM CHEST 1 VIEW: CPT

## 2024-01-19 PROCEDURE — 96365 THER/PROPH/DIAG IV INF INIT: CPT

## 2024-01-19 PROCEDURE — 80053 COMPREHEN METABOLIC PANEL: CPT

## 2024-01-19 PROCEDURE — 2580000003 HC RX 258: Performed by: STUDENT IN AN ORGANIZED HEALTH CARE EDUCATION/TRAINING PROGRAM

## 2024-01-19 PROCEDURE — 83880 ASSAY OF NATRIURETIC PEPTIDE: CPT

## 2024-01-19 PROCEDURE — 99291 CRITICAL CARE FIRST HOUR: CPT

## 2024-01-19 PROCEDURE — 70450 CT HEAD/BRAIN W/O DYE: CPT

## 2024-01-19 PROCEDURE — 82550 ASSAY OF CK (CPK): CPT

## 2024-01-19 PROCEDURE — 87040 BLOOD CULTURE FOR BACTERIA: CPT

## 2024-01-19 PROCEDURE — 2000000000 HC ICU R&B

## 2024-01-19 PROCEDURE — 2700000000 HC OXYGEN THERAPY PER DAY

## 2024-01-19 PROCEDURE — 5A1945Z RESPIRATORY VENTILATION, 24-96 CONSECUTIVE HOURS: ICD-10-PCS | Performed by: STUDENT IN AN ORGANIZED HEALTH CARE EDUCATION/TRAINING PROGRAM

## 2024-01-19 PROCEDURE — 70496 CT ANGIOGRAPHY HEAD: CPT

## 2024-01-19 PROCEDURE — 85610 PROTHROMBIN TIME: CPT

## 2024-01-19 PROCEDURE — 99285 EMERGENCY DEPT VISIT HI MDM: CPT

## 2024-01-19 PROCEDURE — 85025 COMPLETE CBC W/AUTO DIFF WBC: CPT

## 2024-01-19 PROCEDURE — 71250 CT THORAX DX C-: CPT

## 2024-01-19 PROCEDURE — 96366 THER/PROPH/DIAG IV INF ADDON: CPT

## 2024-01-19 PROCEDURE — 02HV33Z INSERTION OF INFUSION DEVICE INTO SUPERIOR VENA CAVA, PERCUTANEOUS APPROACH: ICD-10-PCS | Performed by: STUDENT IN AN ORGANIZED HEALTH CARE EDUCATION/TRAINING PROGRAM

## 2024-01-19 PROCEDURE — 84443 ASSAY THYROID STIM HORMONE: CPT

## 2024-01-19 PROCEDURE — 84439 ASSAY OF FREE THYROXINE: CPT

## 2024-01-19 PROCEDURE — 51702 INSERT TEMP BLADDER CATH: CPT

## 2024-01-19 PROCEDURE — 96374 THER/PROPH/DIAG INJ IV PUSH: CPT

## 2024-01-19 PROCEDURE — 93010 ELECTROCARDIOGRAM REPORT: CPT | Performed by: INTERNAL MEDICINE

## 2024-01-19 PROCEDURE — 81001 URINALYSIS AUTO W/SCOPE: CPT

## 2024-01-19 PROCEDURE — 82077 ASSAY SPEC XCP UR&BREATH IA: CPT

## 2024-01-19 PROCEDURE — 94002 VENT MGMT INPAT INIT DAY: CPT

## 2024-01-19 PROCEDURE — 36556 INSERT NON-TUNNEL CV CATH: CPT

## 2024-01-19 PROCEDURE — 83605 ASSAY OF LACTIC ACID: CPT

## 2024-01-19 PROCEDURE — 93005 ELECTROCARDIOGRAM TRACING: CPT | Performed by: EMERGENCY MEDICINE

## 2024-01-19 PROCEDURE — 2580000003 HC RX 258: Performed by: EMERGENCY MEDICINE

## 2024-01-19 PROCEDURE — 2500000003 HC RX 250 WO HCPCS: Performed by: STUDENT IN AN ORGANIZED HEALTH CARE EDUCATION/TRAINING PROGRAM

## 2024-01-19 PROCEDURE — 6360000002 HC RX W HCPCS: Performed by: STUDENT IN AN ORGANIZED HEALTH CARE EDUCATION/TRAINING PROGRAM

## 2024-01-19 PROCEDURE — 94761 N-INVAS EAR/PLS OXIMETRY MLT: CPT

## 2024-01-19 PROCEDURE — 84484 ASSAY OF TROPONIN QUANT: CPT

## 2024-01-19 RX ORDER — ATROPINE SULFATE 0.1 MG/ML
INJECTION INTRAVENOUS DAILY PRN
Status: COMPLETED | OUTPATIENT
Start: 2024-01-19 | End: 2024-01-19

## 2024-01-19 RX ORDER — LEVOTHYROXINE SODIUM 0.1 MG/1
200 TABLET ORAL DAILY
Status: DISCONTINUED | OUTPATIENT
Start: 2024-01-19 | End: 2024-01-25 | Stop reason: HOSPADM

## 2024-01-19 RX ORDER — ACETAMINOPHEN 650 MG/1
650 SUPPOSITORY RECTAL EVERY 6 HOURS PRN
Status: DISCONTINUED | OUTPATIENT
Start: 2024-01-19 | End: 2024-01-25 | Stop reason: HOSPADM

## 2024-01-19 RX ORDER — POTASSIUM CHLORIDE 29.8 MG/ML
20 INJECTION INTRAVENOUS PRN
Status: DISCONTINUED | OUTPATIENT
Start: 2024-01-19 | End: 2024-01-25 | Stop reason: HOSPADM

## 2024-01-19 RX ORDER — MAGNESIUM SULFATE IN WATER 40 MG/ML
2000 INJECTION, SOLUTION INTRAVENOUS PRN
Status: DISCONTINUED | OUTPATIENT
Start: 2024-01-19 | End: 2024-01-25 | Stop reason: HOSPADM

## 2024-01-19 RX ORDER — KETAMINE HYDROCHLORIDE 50 MG/ML
INJECTION, SOLUTION INTRAMUSCULAR; INTRAVENOUS DAILY PRN
Status: COMPLETED | OUTPATIENT
Start: 2024-01-19 | End: 2024-01-19

## 2024-01-19 RX ORDER — NOREPINEPHRINE BITARTRATE 0.06 MG/ML
1-100 INJECTION, SOLUTION INTRAVENOUS CONTINUOUS
Status: DISCONTINUED | OUTPATIENT
Start: 2024-01-19 | End: 2024-01-22

## 2024-01-19 RX ORDER — SODIUM CHLORIDE 0.9 % (FLUSH) 0.9 %
5-40 SYRINGE (ML) INJECTION EVERY 12 HOURS SCHEDULED
Status: DISCONTINUED | OUTPATIENT
Start: 2024-01-19 | End: 2024-01-25 | Stop reason: HOSPADM

## 2024-01-19 RX ORDER — BUMETANIDE 0.25 MG/ML
2 INJECTION INTRAMUSCULAR; INTRAVENOUS 2 TIMES DAILY
Status: DISCONTINUED | OUTPATIENT
Start: 2024-01-19 | End: 2024-01-20

## 2024-01-19 RX ORDER — ROCURONIUM BROMIDE 10 MG/ML
INJECTION, SOLUTION INTRAVENOUS DAILY PRN
Status: COMPLETED | OUTPATIENT
Start: 2024-01-19 | End: 2024-01-19

## 2024-01-19 RX ORDER — SERTRALINE HYDROCHLORIDE 100 MG/1
100 TABLET, FILM COATED ORAL DAILY
COMMUNITY

## 2024-01-19 RX ORDER — POTASSIUM CHLORIDE 7.45 MG/ML
10 INJECTION INTRAVENOUS PRN
Status: DISCONTINUED | OUTPATIENT
Start: 2024-01-19 | End: 2024-01-25 | Stop reason: HOSPADM

## 2024-01-19 RX ORDER — ACETAMINOPHEN 325 MG/1
650 TABLET ORAL EVERY 6 HOURS PRN
Status: DISCONTINUED | OUTPATIENT
Start: 2024-01-19 | End: 2024-01-25 | Stop reason: HOSPADM

## 2024-01-19 RX ORDER — WATER 10 ML/10ML
INJECTION INTRAMUSCULAR; INTRAVENOUS; SUBCUTANEOUS
Status: DISCONTINUED
Start: 2024-01-19 | End: 2024-01-20

## 2024-01-19 RX ORDER — ONDANSETRON 2 MG/ML
4 INJECTION INTRAMUSCULAR; INTRAVENOUS EVERY 6 HOURS PRN
Status: DISCONTINUED | OUTPATIENT
Start: 2024-01-19 | End: 2024-01-25 | Stop reason: HOSPADM

## 2024-01-19 RX ORDER — ATROPINE SULFATE 0.1 MG/ML
1 INJECTION INTRAVENOUS ONCE
Status: COMPLETED | OUTPATIENT
Start: 2024-01-19 | End: 2024-01-19

## 2024-01-19 RX ORDER — DEXTROSE MONOHYDRATE 100 MG/ML
INJECTION, SOLUTION INTRAVENOUS CONTINUOUS PRN
Status: DISCONTINUED | OUTPATIENT
Start: 2024-01-19 | End: 2024-01-25 | Stop reason: HOSPADM

## 2024-01-19 RX ORDER — CALCIUM GLUCONATE 94 MG/ML
1000 INJECTION, SOLUTION INTRAVENOUS ONCE
Status: COMPLETED | OUTPATIENT
Start: 2024-01-19 | End: 2024-01-19

## 2024-01-19 RX ORDER — 0.9 % SODIUM CHLORIDE 0.9 %
1000 INTRAVENOUS SOLUTION INTRAVENOUS ONCE
Status: COMPLETED | OUTPATIENT
Start: 2024-01-19 | End: 2024-01-19

## 2024-01-19 RX ORDER — DOPAMINE HYDROCHLORIDE 160 MG/100ML
1-20 INJECTION, SOLUTION INTRAVENOUS CONTINUOUS
Status: DISCONTINUED | OUTPATIENT
Start: 2024-01-19 | End: 2024-01-20

## 2024-01-19 RX ORDER — VANCOMYCIN 2 G/400ML
2000 INJECTION, SOLUTION INTRAVENOUS ONCE
Status: COMPLETED | OUTPATIENT
Start: 2024-01-19 | End: 2024-01-20

## 2024-01-19 RX ORDER — PROPOFOL 10 MG/ML
INJECTION, EMULSION INTRAVENOUS
Status: COMPLETED
Start: 2024-01-19 | End: 2024-01-19

## 2024-01-19 RX ORDER — ONDANSETRON 4 MG/1
4 TABLET, ORALLY DISINTEGRATING ORAL EVERY 8 HOURS PRN
Status: DISCONTINUED | OUTPATIENT
Start: 2024-01-19 | End: 2024-01-25 | Stop reason: HOSPADM

## 2024-01-19 RX ORDER — FUROSEMIDE 10 MG/ML
40 INJECTION INTRAMUSCULAR; INTRAVENOUS ONCE
Status: COMPLETED | OUTPATIENT
Start: 2024-01-19 | End: 2024-01-19

## 2024-01-19 RX ORDER — POLYETHYLENE GLYCOL 3350 17 G/17G
17 POWDER, FOR SOLUTION ORAL DAILY PRN
Status: DISCONTINUED | OUTPATIENT
Start: 2024-01-19 | End: 2024-01-25 | Stop reason: HOSPADM

## 2024-01-19 RX ORDER — ACETAMINOPHEN 500 MG
500 TABLET ORAL EVERY 8 HOURS PRN
COMMUNITY

## 2024-01-19 RX ORDER — OLANZAPINE 5 MG/1
15 TABLET ORAL NIGHTLY
COMMUNITY

## 2024-01-19 RX ORDER — OLANZAPINE 5 MG/1
5 TABLET ORAL EVERY MORNING
COMMUNITY

## 2024-01-19 RX ORDER — PROPOFOL 10 MG/ML
5-50 INJECTION, EMULSION INTRAVENOUS CONTINUOUS
Status: DISCONTINUED | OUTPATIENT
Start: 2024-01-19 | End: 2024-01-21

## 2024-01-19 RX ORDER — NALOXONE HYDROCHLORIDE 0.4 MG/ML
0.4 INJECTION, SOLUTION INTRAMUSCULAR; INTRAVENOUS; SUBCUTANEOUS ONCE
Status: DISCONTINUED | OUTPATIENT
Start: 2024-01-19 | End: 2024-01-20

## 2024-01-19 RX ORDER — ENOXAPARIN SODIUM 100 MG/ML
60 INJECTION SUBCUTANEOUS 2 TIMES DAILY
Status: DISCONTINUED | OUTPATIENT
Start: 2024-01-19 | End: 2024-01-25 | Stop reason: HOSPADM

## 2024-01-19 RX ORDER — SODIUM CHLORIDE 9 MG/ML
INJECTION, SOLUTION INTRAVENOUS PRN
Status: DISCONTINUED | OUTPATIENT
Start: 2024-01-19 | End: 2024-01-25 | Stop reason: HOSPADM

## 2024-01-19 RX ORDER — SODIUM CHLORIDE 0.9 % (FLUSH) 0.9 %
5-40 SYRINGE (ML) INJECTION PRN
Status: DISCONTINUED | OUTPATIENT
Start: 2024-01-19 | End: 2024-01-25 | Stop reason: HOSPADM

## 2024-01-19 RX ORDER — ALBUTEROL SULFATE 2.5 MG/3ML
2.5 SOLUTION RESPIRATORY (INHALATION) EVERY 4 HOURS PRN
Status: DISCONTINUED | OUTPATIENT
Start: 2024-01-19 | End: 2024-01-25 | Stop reason: HOSPADM

## 2024-01-19 RX ADMIN — Medication 5 MCG/MIN: at 21:32

## 2024-01-19 RX ADMIN — FUROSEMIDE 40 MG: 10 INJECTION, SOLUTION INTRAMUSCULAR; INTRAVENOUS at 14:38

## 2024-01-19 RX ADMIN — SODIUM BICARBONATE 50 MEQ: 84 INJECTION, SOLUTION INTRAVENOUS at 14:39

## 2024-01-19 RX ADMIN — KETAMINE HYDROCHLORIDE 200 MG: 50 INJECTION, SOLUTION INTRAMUSCULAR; INTRAVENOUS at 13:21

## 2024-01-19 RX ADMIN — VANCOMYCIN 2000 MG: 2 INJECTION, SOLUTION INTRAVENOUS at 23:19

## 2024-01-19 RX ADMIN — Medication 5 MCG/MIN: at 13:19

## 2024-01-19 RX ADMIN — DEXTROSE MONOHYDRATE 250 ML: 100 INJECTION, SOLUTION INTRAVENOUS at 14:41

## 2024-01-19 RX ADMIN — ENOXAPARIN SODIUM 60 MG: 100 INJECTION SUBCUTANEOUS at 20:34

## 2024-01-19 RX ADMIN — SODIUM CHLORIDE, PRESERVATIVE FREE 20 MG: 5 INJECTION INTRAVENOUS at 20:33

## 2024-01-19 RX ADMIN — GLUCAGON HYDROCHLORIDE: KIT at 14:27

## 2024-01-19 RX ADMIN — PROPOFOL 5 MCG/KG/MIN: 10 INJECTION, EMULSION INTRAVENOUS at 20:34

## 2024-01-19 RX ADMIN — PROPOFOL 10 MCG/KG/MIN: 10 INJECTION, EMULSION INTRAVENOUS at 13:30

## 2024-01-19 RX ADMIN — DOPAMINE HYDROCHLORIDE 2.5 MCG/KG/MIN: 160 INJECTION, SOLUTION INTRAVENOUS at 22:13

## 2024-01-19 RX ADMIN — GLUCAGON HYDROCHLORIDE 1 MG: KIT at 13:43

## 2024-01-19 RX ADMIN — ALBUTEROL SULFATE 10 MG: 2.5 SOLUTION RESPIRATORY (INHALATION) at 14:26

## 2024-01-19 RX ADMIN — ATROPINE SULFATE 1 MG: 0.1 INJECTION, SOLUTION INTRAVENOUS at 13:39

## 2024-01-19 RX ADMIN — BUMETANIDE 2 MG: 0.25 INJECTION INTRAMUSCULAR; INTRAVENOUS at 20:34

## 2024-01-19 RX ADMIN — ATROPINE SULFATE 1 MG: 0.1 INJECTION, SOLUTION INTRAVENOUS at 13:43

## 2024-01-19 RX ADMIN — CALCIUM GLUCONATE 1000 MG: 98 INJECTION, SOLUTION INTRAVENOUS at 14:40

## 2024-01-19 RX ADMIN — IOPAMIDOL 75 ML: 755 INJECTION, SOLUTION INTRAVENOUS at 13:55

## 2024-01-19 RX ADMIN — SODIUM CHLORIDE, PRESERVATIVE FREE 10 ML: 5 INJECTION INTRAVENOUS at 20:38

## 2024-01-19 RX ADMIN — INSULIN HUMAN 10 UNITS: 100 INJECTION, SOLUTION PARENTERAL at 14:37

## 2024-01-19 RX ADMIN — CEFEPIME 2000 MG: 2 INJECTION, POWDER, FOR SOLUTION INTRAVENOUS at 22:45

## 2024-01-19 RX ADMIN — ROCURONIUM BROMIDE 120 MG: 10 INJECTION INTRAVENOUS at 13:22

## 2024-01-19 RX ADMIN — SODIUM CHLORIDE 1000 ML: 9 INJECTION, SOLUTION INTRAVENOUS at 16:27

## 2024-01-19 ASSESSMENT — PULMONARY FUNCTION TESTS
PIF_VALUE: 25
PIF_VALUE: 26
PIF_VALUE: 25
PIF_VALUE: 25
PIF_VALUE: 26
PIF_VALUE: 25
PIF_VALUE: 26
PIF_VALUE: 25
PIF_VALUE: 26
PIF_VALUE: 25
PIF_VALUE: 26
PIF_VALUE: 25
PIF_VALUE: 26
PIF_VALUE: 26
PIF_VALUE: 25
PIF_VALUE: 26
PIF_VALUE: 25
PIF_VALUE: 26
PIF_VALUE: 25
PIF_VALUE: 26

## 2024-01-19 NOTE — ED PROVIDER NOTES
01/19/2024  13:46, by NEOSO   URINE DRUG SCREEN   ETHANOL   PROTIME-INR   MICROSCOPIC URINALYSIS   SPECIMEN REJECTION    Narrative:     CALL  Stuart FELICIANO tel. [phone],  Rejected Test CBCWD/Called to: Pam Ojeda RN, 01/19/2024 13:27, by VLAD   POTASSIUM   TROPONIN   POCT GLUCOSE   POCT GLUCOSE   POCT GLUCOSE   POCT GLUCOSE   POCT GLUCOSE   POCT GLUCOSE       When ordered only abnormal lab results are displayed. All other labs were within normal range or not returned as of this dictation.    EKG:     EKG Interpretation    Interpreted by emergency department physician    Rhythm: sinus bradycardia  Rate: 50-60  Axis: left  Ectopy: none  Conduction: left bundle branch block (complete)  ST Segments: nonspecific changes  T Waves: non specific changes  Q Waves: none    Clinical Impression: Sinus bradycardia with left bundle branch block with WV interval 208 normal QRS duration normal QT QTc nonspecific ST and T wave changes relatively unchanged from previous EKG dated July 16, 2022.  Interpreted by myself    RADIOLOGY:   Non-plain film images such as CT, Ultrasound and MRI are read by the radiologist. Plain radiographic images are visualized and preliminarily interpreted by the ED Provider with the below findings:    Cardiomegaly with pulmonary vascular congestion.  Central line in place.  Interpreted by myself.    Interpretation per the Radiologist below, if available at the time of this note:    XR CHEST PORTABLE   Final Result   1. Cardiomegaly with pulmonary vascular congestion.   2. Supporting lines and tubes as above.         CT HEAD WO CONTRAST   Final Result   Addendum (preliminary) 1 of 1   ADDENDUM:   Results were reported to Dr. Hinkle by radiology results communication at   2:19 p.m. on January 19, 2024.         Final   No acute intracranial abnormality.      Moderate chronic microvascular disease.         CTA HEAD NECK W CONTRAST   Final Result   No acute abnormality or flow-limiting stenosis of the major

## 2024-01-19 NOTE — ED NOTES
Patient HR stated to drop 35 bpm while in the hallway to CT, patient was place on zol pad while moving into room 14 and patient remain w/ a pulse.   EDMD was made aware and en route to patient; RN diverted from being en route to CT and moved to ED room 14 to stabilized HR before moving to CT.

## 2024-01-19 NOTE — ED TRIAGE NOTES
Patient arrived to the ED EMS from [facility] w/ complaints of unresponsive .     Patient/EMS reports states   EMS reports that patient is at [facility] for psyc issues. Nurse reports that patient was unresponsive but had a pulse; patient LKW 0800 today per EMS report. Normal A&O x4. EMS gave a total of 6mg of narcan d/t puples being pin point but the narcan had no improvement to patient status. Patient's EKG en route showed sinus rodriguez w/ HR @ 30bmp, EMS gave 1mg atropine and reported HR improving to 62bpm.  en route to hospital       Snoring respirations on Non re-breather @ 15L w/ nasal trumpet upon arrival but responds to sternal rub and cough but does not answer questions of have approprate verbal response.     Patient coughing but not alert but does responded to painful stimuli (ex sternal rub) does have a pulse

## 2024-01-19 NOTE — SEDATION DOCUMENTATION
ETT placed by Dr. Hinkle   Size 7.5   25 Lip  Bilateral breath sounds auscultated   Positive color change

## 2024-01-19 NOTE — SEDATION DOCUMENTATION
Dr. Hinkle just activated code stroke now that patient airway is better w/ mechanical ventilation

## 2024-01-19 NOTE — ED NOTES
Handoff report given to LILY Stokes all questions and concerns answered; LILY Stokes to assume care at this time

## 2024-01-20 PROBLEM — I50.22 CHRONIC SYSTOLIC HEART FAILURE (HCC): Status: ACTIVE | Noted: 2024-01-20

## 2024-01-20 LAB
ALBUMIN SERPL-MCNC: 4 G/DL (ref 3.4–5)
ALBUMIN/GLOB SERPL: 0.9 {RATIO} (ref 1.1–2.2)
ALP SERPL-CCNC: 93 U/L (ref 40–129)
ALT SERPL-CCNC: 16 U/L (ref 10–40)
ANION GAP SERPL CALCULATED.3IONS-SCNC: 14 MMOL/L (ref 3–16)
AST SERPL-CCNC: 25 U/L (ref 15–37)
BASE EXCESS BLDV CALC-SCNC: 6.7 MMOL/L
BASOPHILS # BLD: 0 K/UL (ref 0–0.2)
BASOPHILS NFR BLD: 0.1 %
BILIRUB SERPL-MCNC: 0.3 MG/DL (ref 0–1)
BUN SERPL-MCNC: 41 MG/DL (ref 7–20)
CALCIUM SERPL-MCNC: 9.2 MG/DL (ref 8.3–10.6)
CHLORIDE SERPL-SCNC: 97 MMOL/L (ref 99–110)
CO2 BLDV-SCNC: 34 MMOL/L
CO2 SERPL-SCNC: 29 MMOL/L (ref 21–32)
COHGB MFR BLDV: 1 %
CREAT SERPL-MCNC: 1.3 MG/DL (ref 0.9–1.3)
DEPRECATED RDW RBC AUTO: 18.7 % (ref 12.4–15.4)
EOSINOPHIL # BLD: 0 K/UL (ref 0–0.6)
EOSINOPHIL NFR BLD: 0.1 %
GFR SERPLBLD CREATININE-BSD FMLA CKD-EPI: >60 ML/MIN/{1.73_M2}
GLUCOSE BLD-MCNC: 108 MG/DL (ref 70–99)
GLUCOSE BLD-MCNC: 127 MG/DL (ref 70–99)
GLUCOSE BLD-MCNC: 153 MG/DL (ref 70–99)
GLUCOSE BLD-MCNC: 99 MG/DL (ref 70–99)
GLUCOSE SERPL-MCNC: 92 MG/DL (ref 70–99)
HCO3 BLDV-SCNC: 33 MMOL/L (ref 23–29)
HCT VFR BLD AUTO: 39.4 % (ref 40.5–52.5)
HGB BLD-MCNC: 12.8 G/DL (ref 13.5–17.5)
LYMPHOCYTES # BLD: 0.7 K/UL (ref 1–5.1)
LYMPHOCYTES NFR BLD: 9.2 %
MAGNESIUM SERPL-MCNC: 1.9 MG/DL (ref 1.8–2.4)
MCH RBC QN AUTO: 27.3 PG (ref 26–34)
MCHC RBC AUTO-ENTMCNC: 32.5 G/DL (ref 31–36)
MCV RBC AUTO: 84 FL (ref 80–100)
METHGB MFR BLDV: 0.4 %
MONOCYTES # BLD: 0.7 K/UL (ref 0–1.3)
MONOCYTES NFR BLD: 8.6 %
MRSA DNA SPEC QL NAA+PROBE: ABNORMAL
NEUTROPHILS # BLD: 6.6 K/UL (ref 1.7–7.7)
NEUTROPHILS NFR BLD: 82 %
O2 THERAPY: ABNORMAL
ORGANISM: ABNORMAL
PCO2 BLDV: 50.5 MMHG (ref 40–50)
PERFORMED ON: ABNORMAL
PERFORMED ON: NORMAL
PH BLDV: 7.42 [PH] (ref 7.35–7.45)
PHOSPHATE SERPL-MCNC: 2.9 MG/DL (ref 2.5–4.9)
PLATELET # BLD AUTO: 253 K/UL (ref 135–450)
PMV BLD AUTO: 8.7 FL (ref 5–10.5)
PO2 BLDV: 30 MMHG
POTASSIUM SERPL-SCNC: 3.9 MMOL/L (ref 3.5–5.1)
PROCALCITONIN SERPL IA-MCNC: 0.26 NG/ML (ref 0–0.15)
PROT SERPL-MCNC: 8.4 G/DL (ref 6.4–8.2)
RBC # BLD AUTO: 4.69 M/UL (ref 4.2–5.9)
REPORT: NORMAL
RESP PATH DNA+RNA PNL L RESP NAA+NON-PRB: ABNORMAL
RESP PATH DNA+RNA PNL L RESP NAA+NON-PRB: ABNORMAL
SAO2 % BLDV: 58 %
SODIUM SERPL-SCNC: 140 MMOL/L (ref 136–145)
TROPONIN, HIGH SENSITIVITY: 15 NG/L (ref 0–22)
VANCOMYCIN TROUGH SERPL-MCNC: 19 UG/ML (ref 10–20)
WBC # BLD AUTO: 8 K/UL (ref 4–11)

## 2024-01-20 PROCEDURE — 87070 CULTURE OTHR SPECIMN AEROBIC: CPT

## 2024-01-20 PROCEDURE — 83735 ASSAY OF MAGNESIUM: CPT

## 2024-01-20 PROCEDURE — 6360000002 HC RX W HCPCS: Performed by: STUDENT IN AN ORGANIZED HEALTH CARE EDUCATION/TRAINING PROGRAM

## 2024-01-20 PROCEDURE — 85025 COMPLETE CBC W/AUTO DIFF WBC: CPT

## 2024-01-20 PROCEDURE — 2580000003 HC RX 258: Performed by: STUDENT IN AN ORGANIZED HEALTH CARE EDUCATION/TRAINING PROGRAM

## 2024-01-20 PROCEDURE — 99291 CRITICAL CARE FIRST HOUR: CPT | Performed by: INTERNAL MEDICINE

## 2024-01-20 PROCEDURE — 94003 VENT MGMT INPAT SUBQ DAY: CPT

## 2024-01-20 PROCEDURE — 80202 ASSAY OF VANCOMYCIN: CPT

## 2024-01-20 PROCEDURE — 36600 WITHDRAWAL OF ARTERIAL BLOOD: CPT

## 2024-01-20 PROCEDURE — C8929 TTE W OR WO FOL WCON,DOPPLER: HCPCS

## 2024-01-20 PROCEDURE — 87633 RESP VIRUS 12-25 TARGETS: CPT

## 2024-01-20 PROCEDURE — 94761 N-INVAS EAR/PLS OXIMETRY MLT: CPT

## 2024-01-20 PROCEDURE — 84145 PROCALCITONIN (PCT): CPT

## 2024-01-20 PROCEDURE — 6360000002 HC RX W HCPCS: Performed by: NURSE PRACTITIONER

## 2024-01-20 PROCEDURE — 2700000000 HC OXYGEN THERAPY PER DAY

## 2024-01-20 PROCEDURE — 6360000004 HC RX CONTRAST MEDICATION

## 2024-01-20 PROCEDURE — 6360000002 HC RX W HCPCS: Performed by: EMERGENCY MEDICINE

## 2024-01-20 PROCEDURE — 80053 COMPREHEN METABOLIC PANEL: CPT

## 2024-01-20 PROCEDURE — APPNB15 APP NON BILLABLE TIME 0-15 MINS: Performed by: NURSE PRACTITIONER

## 2024-01-20 PROCEDURE — 87077 CULTURE AEROBIC IDENTIFY: CPT

## 2024-01-20 PROCEDURE — 6370000000 HC RX 637 (ALT 250 FOR IP): Performed by: STUDENT IN AN ORGANIZED HEALTH CARE EDUCATION/TRAINING PROGRAM

## 2024-01-20 PROCEDURE — 2000000000 HC ICU R&B

## 2024-01-20 PROCEDURE — 82803 BLOOD GASES ANY COMBINATION: CPT

## 2024-01-20 PROCEDURE — 99223 1ST HOSP IP/OBS HIGH 75: CPT | Performed by: INTERNAL MEDICINE

## 2024-01-20 PROCEDURE — 2500000003 HC RX 250 WO HCPCS: Performed by: STUDENT IN AN ORGANIZED HEALTH CARE EDUCATION/TRAINING PROGRAM

## 2024-01-20 PROCEDURE — 84100 ASSAY OF PHOSPHORUS: CPT

## 2024-01-20 PROCEDURE — 87205 SMEAR GRAM STAIN: CPT

## 2024-01-20 RX ORDER — MAGNESIUM SULFATE IN WATER 40 MG/ML
2000 INJECTION, SOLUTION INTRAVENOUS ONCE
Status: COMPLETED | OUTPATIENT
Start: 2024-01-20 | End: 2024-01-20

## 2024-01-20 RX ORDER — DOPAMINE HYDROCHLORIDE 160 MG/100ML
1-20 INJECTION, SOLUTION INTRAVENOUS CONTINUOUS
Status: DISCONTINUED | OUTPATIENT
Start: 2024-01-20 | End: 2024-01-22

## 2024-01-20 RX ADMIN — VANCOMYCIN HYDROCHLORIDE 1000 MG: 1 INJECTION, POWDER, LYOPHILIZED, FOR SOLUTION INTRAVENOUS at 12:49

## 2024-01-20 RX ADMIN — SODIUM CHLORIDE, PRESERVATIVE FREE 10 ML: 5 INJECTION INTRAVENOUS at 09:00

## 2024-01-20 RX ADMIN — SODIUM CHLORIDE, PRESERVATIVE FREE 20 MG: 5 INJECTION INTRAVENOUS at 21:20

## 2024-01-20 RX ADMIN — DOPAMINE HYDROCHLORIDE 2 MCG/KG/MIN: 160 INJECTION, SOLUTION INTRAVENOUS at 16:27

## 2024-01-20 RX ADMIN — BUMETANIDE 2 MG: 0.25 INJECTION INTRAMUSCULAR; INTRAVENOUS at 08:33

## 2024-01-20 RX ADMIN — PROPOFOL 15 MCG/KG/MIN: 10 INJECTION, EMULSION INTRAVENOUS at 11:38

## 2024-01-20 RX ADMIN — ENOXAPARIN SODIUM 60 MG: 100 INJECTION SUBCUTANEOUS at 21:20

## 2024-01-20 RX ADMIN — PROPOFOL 15 MCG/KG/MIN: 10 INJECTION, EMULSION INTRAVENOUS at 16:29

## 2024-01-20 RX ADMIN — CEFEPIME 2000 MG: 2 INJECTION, POWDER, FOR SOLUTION INTRAVENOUS at 06:06

## 2024-01-20 RX ADMIN — ENOXAPARIN SODIUM 60 MG: 100 INJECTION SUBCUTANEOUS at 08:32

## 2024-01-20 RX ADMIN — MAGNESIUM SULFATE HEPTAHYDRATE 2000 MG: 40 INJECTION, SOLUTION INTRAVENOUS at 08:41

## 2024-01-20 RX ADMIN — SODIUM CHLORIDE, PRESERVATIVE FREE 20 MG: 5 INJECTION INTRAVENOUS at 08:32

## 2024-01-20 RX ADMIN — PROPOFOL 20 MCG/KG/MIN: 10 INJECTION, EMULSION INTRAVENOUS at 21:19

## 2024-01-20 RX ADMIN — PROPOFOL 15 MCG/KG/MIN: 10 INJECTION, EMULSION INTRAVENOUS at 04:30

## 2024-01-20 RX ADMIN — LEVOTHYROXINE SODIUM 200 MCG: 0.1 TABLET ORAL at 06:06

## 2024-01-20 RX ADMIN — SODIUM CHLORIDE, PRESERVATIVE FREE 10 ML: 5 INJECTION INTRAVENOUS at 21:20

## 2024-01-20 RX ADMIN — CEFEPIME 2000 MG: 2 INJECTION, POWDER, FOR SOLUTION INTRAVENOUS at 14:22

## 2024-01-20 RX ADMIN — CEFEPIME 2000 MG: 2 INJECTION, POWDER, FOR SOLUTION INTRAVENOUS at 22:38

## 2024-01-20 RX ADMIN — PERFLUTREN 1.5 ML: 6.52 INJECTION, SUSPENSION INTRAVENOUS at 09:30

## 2024-01-20 ASSESSMENT — PULMONARY FUNCTION TESTS
PIF_VALUE: 25
PIF_VALUE: 26
PIF_VALUE: 25
PIF_VALUE: 25
PIF_VALUE: 26
PIF_VALUE: 28
PIF_VALUE: 25
PIF_VALUE: 26
PIF_VALUE: 25
PIF_VALUE: 26
PIF_VALUE: 25
PIF_VALUE: 27
PIF_VALUE: 25
PIF_VALUE: 25
PIF_VALUE: 26
PIF_VALUE: 25
PIF_VALUE: 26
PIF_VALUE: 25
PIF_VALUE: 26
PIF_VALUE: 25
PIF_VALUE: 26
PIF_VALUE: 25
PIF_VALUE: 26
PIF_VALUE: 25
PIF_VALUE: 26
PIF_VALUE: 25

## 2024-01-20 ASSESSMENT — PAIN SCALES - GENERAL: PAINLEVEL_OUTOF10: 0

## 2024-01-20 NOTE — H&P
posterior cerebral arteries. No aneurysm. OTHER: No dural venous sinus thrombosis on this non-dedicated study. BRAIN: No mass effect or midline shift. No extra-axial fluid collection. The gray-white differentiation is maintained.     No acute abnormality or flow-limiting stenosis of the major arteries of the head and neck. Ground-glass attenuation at the lung apices, likely related to mild pulmonary vascular congestion versus pneumonitis.     CT HEAD WO CONTRAST    Addendum Date: 1/19/2024    ADDENDUM: Results were reported to Dr. Hinkle by radiology results communication at 2:19 p.m. on January 19, 2024.     Result Date: 1/19/2024  EXAMINATION: CT OF THE HEAD WITHOUT CONTRAST  1/19/2024 1:13 pm TECHNIQUE: CT of the head was performed without the administration of intravenous contrast. Automated exposure control, iterative reconstruction, and/or weight based adjustment of the mA/kV was utilized to reduce the radiation dose to as low as reasonably achievable. COMPARISON: None. HISTORY: ORDERING SYSTEM PROVIDED HISTORY: Stroke Symptoms TECHNOLOGIST PROVIDED HISTORY: Has a \"code stroke\" or \"stroke alert\" been called?->Yes Reason for exam:->Stroke Symptoms Decision Support Exception - unselect if not a suspected or confirmed emergency medical condition->Emergency Medical Condition (MA) Reason for Exam: stroke symptoms FINDINGS: BRAIN/VENTRICLES: There is no parenchymal volume loss.  There is periventricular white matter low attenuation, likely related to moderate chronic microvascular disease.  There is no acute intracranial hemorrhage, mass effect or midline shift.  No abnormal extra-axial fluid collection.  The gray-white differentiation is maintained without evidence of an acute infarct.  There is no evidence of hydrocephalus. ORBITS: The visualized portion of the orbits demonstrate no acute abnormality. SINUSES: The visualized paranasal sinuses and mastoid air cells demonstrate no acute abnormality. SOFT

## 2024-01-20 NOTE — CONSULTS
REASON FOR CONSULTATION/CC: Respiratory failure      Consult at request of Mat Garza MD     PCP: Anshu Padilla MD  Established Pulmonologist:  None    Chief Complaint   Patient presents with    Altered Mental Status       HISTORY OF PRESENT ILLNESS: Fabio Beauchamp is a 58 y.o. year old male with a history of morbid obesity, bipolar/schizophrenia, sleep apnea who presents with altered mental status.  During my visit patient is intubated sedated unable for HPI is obtained from bedside nurse report EMR.  On arrival patient had somnolence and hypercapnia so was intubated for airway protection and hypercapnia.  Patient had significant bradycardia overnight was started on dopamine.  Was complicated by hypotension as well.  During my visit echocardiogram is being performed.      Past Medical History:   Diagnosis Date    Altered mental status     Bipolar 1 disorder (HCC) 1984    Bowel and bladder incontinence     Bradycardia     Cellulitis     Cerebral edema (HCC)     Chest pain     Diabetes mellitus (HCC)     Edema     BLE    Goiter     Goiter     Hyperlipidemia     Hypertension     Hypocalcemia     Hypothyroid     Kidney stone     Morbid obesity (HCC)     LADONNA (obstructive sleep apnea)     Psychosis (HCC)     Pulmonary edema     Pyelonephritis     Schizophrenia (HCC)      GCB    Sleep apnea          Past Surgical History:   Procedure Laterality Date    THYROIDECTOMY         Family Hx  family history includes Diabetes in his maternal grandmother and mother; No Known Problems in his father.    Social Hx   reports that he has quit smoking. He has never used smokeless tobacco.    Scheduled Meds:   magnesium sulfate  2,000 mg IntraVENous Once    sodium chloride flush  5-40 mL IntraVENous 2 times per day    enoxaparin  60 mg SubCUTAneous BID    famotidine (PEPCID) injection  20 mg IntraVENous BID    bumetanide  2 mg IntraVENous BID    levothyroxine  200 mcg Oral Daily    cefepime  2,000 mg IntraVENous 
father.    Home Medications:  Were reviewed and are listed in nursing record and/or below  Prior to Admission medications    Medication Sig Start Date End Date Taking? Authorizing Provider   dapagliflozin (FARXIGA) 10 MG tablet Take 1 tablet by mouth every morning   Yes Oelksandr Zaman MD   acetaminophen (TYLENOL) 500 MG tablet Take 1 tablet by mouth every 8 hours as needed for Pain   Yes Oleksandr Zaman MD   sertraline (ZOLOFT) 100 MG tablet Take 1 tablet by mouth daily   Yes Oleksandr Zaman MD   OLANZapine (ZYPREXA) 5 MG tablet Take 3 tablets by mouth nightly   Yes Oleksandr Zaman MD   OLANZapine (ZYPREXA) 5 MG tablet Take 1 tablet by mouth every morning   Yes Oleksandr Zaman MD   levothyroxine (SYNTHROID) 200 MCG tablet Take 1 tablet by mouth Daily    Oleksandr Zaman MD   lisinopril (PRINIVIL;ZESTRIL) 10 MG tablet Take 1 tablet by mouth daily    Oleksandr Zaman MD   valproic acid (DEPAKENE) 250 MG/5ML SOLN oral solution Take 10 mLs by mouth 2 times daily    Oleksandr Zaman MD   bumetanide (BUMEX) 2 MG tablet Take 1 tablet by mouth daily    Oleksandr Zaman MD   carvedilol (COREG) 6.25 MG tablet Take 1 tablet by mouth 2 times daily (with meals) Hold for BP < 90/60 or HR < 60    Oleksandr Zaman MD   ferrous sulfate (IRON 325) 325 (65 Fe) MG tablet Take 1 tablet by mouth daily (with breakfast)    Oleksandr Zaman MD   insulin glargine (LANTUS) 100 UNIT/ML injection vial Inject 10 Units into the skin nightly    Oleksandr Zaman MD   loperamide (IMODIUM) 1 MG/5ML solution Take 10 mLs by mouth daily as needed for Diarrhea    Oleksandr Zaman MD   paliperidone palmitate ER (INVEGA SUSTENNA) 234 MG/1.5ML WALLY IM injection Inject 234 mg into the muscle every 30 days 14th of month    Oleksandr Zaman MD   atorvastatin (LIPITOR) 40 MG tablet Take 1 tablet by mouth nightly  Patient taking differently: Take 0.5 tablets by mouth nightly 8/6/19   
01/19/2024 01:10 PM    GLUCOSEU >=1000 01/19/2024 01:10 PM         IMPRESSION/RECOMMENDATIONS:      Active Problems:    * No active hospital problems. *  Resolved Problems:    * No resolved hospital problems. *    Hyperkalemia - Likely erroneous value given that sample was hemolyzed - repeat labs  - Making adequate amount of urine  - HyperK treated medically   - monitor  - Hold ACE    2. CKD II - Baseline creatinine 1.2-1.3 mg/dl  - Creatinine stable at baseline    3. Acute Hypercapnic respiratory failure   - Ventilator support   - Plan per admitting    4. Morbid Obesity     5. HTN - can use Nicardipine gtt since patient is bradycardic

## 2024-01-20 NOTE — ED NOTES
EDMD at bedside and requesting set up for intubation to maintain a better airway as patient is only alert w/ sternal rub. Patient is not following commands at this time.     EDMD request that we will have to call a code stroke at this time but states patient will not be able to give accurate response at this time d/t not responding to only painful stimuli w/ a sternal rub.

## 2024-01-21 ENCOUNTER — APPOINTMENT (OUTPATIENT)
Dept: CT IMAGING | Age: 59
DRG: 137 | End: 2024-01-21
Payer: MEDICAID

## 2024-01-21 LAB
ALBUMIN SERPL-MCNC: 3.6 G/DL (ref 3.4–5)
ALBUMIN/GLOB SERPL: 0.9 {RATIO} (ref 1.1–2.2)
ALP SERPL-CCNC: 78 U/L (ref 40–129)
ALT SERPL-CCNC: 12 U/L (ref 10–40)
ANION GAP SERPL CALCULATED.3IONS-SCNC: 11 MMOL/L (ref 3–16)
AST SERPL-CCNC: 22 U/L (ref 15–37)
BASE EXCESS BLDA CALC-SCNC: 5.9 MMOL/L (ref -3–3)
BASOPHILS # BLD: 0 K/UL (ref 0–0.2)
BASOPHILS NFR BLD: 0.4 %
BILIRUB SERPL-MCNC: <0.2 MG/DL (ref 0–1)
BUN SERPL-MCNC: 32 MG/DL (ref 7–20)
CALCIUM SERPL-MCNC: 8.8 MG/DL (ref 8.3–10.6)
CHLORIDE SERPL-SCNC: 104 MMOL/L (ref 99–110)
CO2 BLDA-SCNC: 32.6 MMOL/L
CO2 SERPL-SCNC: 29 MMOL/L (ref 21–32)
COHGB MFR BLDA: 0.7 % (ref 0–1.5)
CREAT SERPL-MCNC: 1.3 MG/DL (ref 0.9–1.3)
CREAT SERPL-MCNC: 1.6 MG/DL (ref 0.9–1.3)
DEPRECATED RDW RBC AUTO: 19.4 % (ref 12.4–15.4)
EKG ATRIAL RATE: 40 BPM
EKG DIAGNOSIS: NORMAL
EKG P AXIS: 59 DEGREES
EKG P-R INTERVAL: 226 MS
EKG Q-T INTERVAL: 570 MS
EKG QRS DURATION: 166 MS
EKG QTC CALCULATION (BAZETT): 464 MS
EKG R AXIS: -5 DEGREES
EKG T AXIS: 142 DEGREES
EKG VENTRICULAR RATE: 40 BPM
EOSINOPHIL # BLD: 0 K/UL (ref 0–0.6)
EOSINOPHIL NFR BLD: 0.3 %
GFR SERPLBLD CREATININE-BSD FMLA CKD-EPI: 50 ML/MIN/{1.73_M2}
GFR SERPLBLD CREATININE-BSD FMLA CKD-EPI: >60 ML/MIN/{1.73_M2}
GLUCOSE BLD-MCNC: 105 MG/DL (ref 70–99)
GLUCOSE BLD-MCNC: 106 MG/DL (ref 70–99)
GLUCOSE BLD-MCNC: 120 MG/DL (ref 70–99)
GLUCOSE BLD-MCNC: 121 MG/DL (ref 70–99)
GLUCOSE SERPL-MCNC: 124 MG/DL (ref 70–99)
HCO3 BLDA-SCNC: 31.1 MMOL/L (ref 21–29)
HCT VFR BLD AUTO: 34.7 % (ref 40.5–52.5)
HGB BLD-MCNC: 11.7 G/DL (ref 13.5–17.5)
HGB BLDA-MCNC: 12.2 G/DL (ref 13.5–17.5)
LYMPHOCYTES # BLD: 1.3 K/UL (ref 1–5.1)
LYMPHOCYTES NFR BLD: 18.1 %
MAGNESIUM SERPL-MCNC: 2.2 MG/DL (ref 1.8–2.4)
MCH RBC QN AUTO: 27.8 PG (ref 26–34)
MCHC RBC AUTO-ENTMCNC: 33.7 G/DL (ref 31–36)
MCV RBC AUTO: 82.5 FL (ref 80–100)
METHGB MFR BLDA: 0.4 %
MONOCYTES # BLD: 0.9 K/UL (ref 0–1.3)
MONOCYTES NFR BLD: 12.9 %
NEUTROPHILS # BLD: 5 K/UL (ref 1.7–7.7)
NEUTROPHILS NFR BLD: 68.3 %
O2 THERAPY: ABNORMAL
PCO2 BLDA: 46.6 MMHG (ref 35–45)
PERFORMED ON: ABNORMAL
PH BLDA: 7.43 [PH] (ref 7.35–7.45)
PHOSPHATE SERPL-MCNC: 3.1 MG/DL (ref 2.5–4.9)
PLATELET # BLD AUTO: 222 K/UL (ref 135–450)
PMV BLD AUTO: 8.6 FL (ref 5–10.5)
PO2 BLDA: 109 MMHG (ref 75–108)
POTASSIUM SERPL-SCNC: 3.7 MMOL/L (ref 3.5–5.1)
PROT SERPL-MCNC: 7.6 G/DL (ref 6.4–8.2)
RBC # BLD AUTO: 4.21 M/UL (ref 4.2–5.9)
SAO2 % BLDA: 98 %
SODIUM SERPL-SCNC: 144 MMOL/L (ref 136–145)
VANCOMYCIN TROUGH SERPL-MCNC: 17.4 UG/ML (ref 10–20)
WBC # BLD AUTO: 7.3 K/UL (ref 4–11)

## 2024-01-21 PROCEDURE — 94761 N-INVAS EAR/PLS OXIMETRY MLT: CPT

## 2024-01-21 PROCEDURE — 2000000000 HC ICU R&B

## 2024-01-21 PROCEDURE — 2580000003 HC RX 258: Performed by: STUDENT IN AN ORGANIZED HEALTH CARE EDUCATION/TRAINING PROGRAM

## 2024-01-21 PROCEDURE — 2500000003 HC RX 250 WO HCPCS: Performed by: STUDENT IN AN ORGANIZED HEALTH CARE EDUCATION/TRAINING PROGRAM

## 2024-01-21 PROCEDURE — 6360000002 HC RX W HCPCS: Performed by: STUDENT IN AN ORGANIZED HEALTH CARE EDUCATION/TRAINING PROGRAM

## 2024-01-21 PROCEDURE — 36600 WITHDRAWAL OF ARTERIAL BLOOD: CPT

## 2024-01-21 PROCEDURE — 70498 CT ANGIOGRAPHY NECK: CPT

## 2024-01-21 PROCEDURE — 6360000002 HC RX W HCPCS: Performed by: EMERGENCY MEDICINE

## 2024-01-21 PROCEDURE — 99291 CRITICAL CARE FIRST HOUR: CPT | Performed by: INTERNAL MEDICINE

## 2024-01-21 PROCEDURE — 84100 ASSAY OF PHOSPHORUS: CPT

## 2024-01-21 PROCEDURE — APPNB15 APP NON BILLABLE TIME 0-15 MINS: Performed by: NURSE PRACTITIONER

## 2024-01-21 PROCEDURE — 6360000002 HC RX W HCPCS: Performed by: NURSE PRACTITIONER

## 2024-01-21 PROCEDURE — 2580000003 HC RX 258: Performed by: NURSE PRACTITIONER

## 2024-01-21 PROCEDURE — 6360000004 HC RX CONTRAST MEDICATION: Performed by: STUDENT IN AN ORGANIZED HEALTH CARE EDUCATION/TRAINING PROGRAM

## 2024-01-21 PROCEDURE — 80202 ASSAY OF VANCOMYCIN: CPT

## 2024-01-21 PROCEDURE — 83735 ASSAY OF MAGNESIUM: CPT

## 2024-01-21 PROCEDURE — 94003 VENT MGMT INPAT SUBQ DAY: CPT

## 2024-01-21 PROCEDURE — 93010 ELECTROCARDIOGRAM REPORT: CPT | Performed by: INTERNAL MEDICINE

## 2024-01-21 PROCEDURE — 2700000000 HC OXYGEN THERAPY PER DAY

## 2024-01-21 PROCEDURE — 80053 COMPREHEN METABOLIC PANEL: CPT

## 2024-01-21 PROCEDURE — 85025 COMPLETE CBC W/AUTO DIFF WBC: CPT

## 2024-01-21 PROCEDURE — 93005 ELECTROCARDIOGRAM TRACING: CPT | Performed by: STUDENT IN AN ORGANIZED HEALTH CARE EDUCATION/TRAINING PROGRAM

## 2024-01-21 PROCEDURE — 82565 ASSAY OF CREATININE: CPT

## 2024-01-21 PROCEDURE — 82803 BLOOD GASES ANY COMBINATION: CPT

## 2024-01-21 PROCEDURE — 70450 CT HEAD/BRAIN W/O DYE: CPT

## 2024-01-21 RX ORDER — POTASSIUM CHLORIDE 29.8 MG/ML
20 INJECTION INTRAVENOUS ONCE
Status: COMPLETED | OUTPATIENT
Start: 2024-01-21 | End: 2024-01-21

## 2024-01-21 RX ORDER — ASPIRIN 81 MG/1
81 TABLET ORAL DAILY
Status: DISCONTINUED | OUTPATIENT
Start: 2024-01-21 | End: 2024-01-25 | Stop reason: HOSPADM

## 2024-01-21 RX ORDER — ATORVASTATIN CALCIUM 40 MG/1
40 TABLET, FILM COATED ORAL NIGHTLY
Status: DISCONTINUED | OUTPATIENT
Start: 2024-01-21 | End: 2024-01-25 | Stop reason: HOSPADM

## 2024-01-21 RX ORDER — MIDODRINE HYDROCHLORIDE 5 MG/1
2.5 TABLET ORAL
Status: DISCONTINUED | OUTPATIENT
Start: 2024-01-21 | End: 2024-01-25 | Stop reason: HOSPADM

## 2024-01-21 RX ADMIN — SODIUM CHLORIDE, PRESERVATIVE FREE 20 MG: 5 INJECTION INTRAVENOUS at 21:45

## 2024-01-21 RX ADMIN — CEFTRIAXONE SODIUM 2000 MG: 2 INJECTION, POWDER, FOR SOLUTION INTRAMUSCULAR; INTRAVENOUS at 14:01

## 2024-01-21 RX ADMIN — ENOXAPARIN SODIUM 60 MG: 100 INJECTION SUBCUTANEOUS at 08:38

## 2024-01-21 RX ADMIN — POTASSIUM CHLORIDE 20 MEQ: 29.8 INJECTION, SOLUTION INTRAVENOUS at 09:31

## 2024-01-21 RX ADMIN — SODIUM CHLORIDE, PRESERVATIVE FREE 10 ML: 5 INJECTION INTRAVENOUS at 21:45

## 2024-01-21 RX ADMIN — SODIUM CHLORIDE, PRESERVATIVE FREE 10 ML: 5 INJECTION INTRAVENOUS at 08:31

## 2024-01-21 RX ADMIN — PROPOFOL 25 MCG/KG/MIN: 10 INJECTION, EMULSION INTRAVENOUS at 09:24

## 2024-01-21 RX ADMIN — VANCOMYCIN HYDROCHLORIDE 750 MG: 750 INJECTION, POWDER, LYOPHILIZED, FOR SOLUTION INTRAVENOUS at 11:47

## 2024-01-21 RX ADMIN — IOPAMIDOL 75 ML: 755 INJECTION, SOLUTION INTRAVENOUS at 17:59

## 2024-01-21 RX ADMIN — CEFEPIME 2000 MG: 2 INJECTION, POWDER, FOR SOLUTION INTRAVENOUS at 06:29

## 2024-01-21 RX ADMIN — SODIUM CHLORIDE, PRESERVATIVE FREE 20 MG: 5 INJECTION INTRAVENOUS at 08:32

## 2024-01-21 RX ADMIN — ENOXAPARIN SODIUM 60 MG: 100 INJECTION SUBCUTANEOUS at 21:45

## 2024-01-21 RX ADMIN — VANCOMYCIN HYDROCHLORIDE 1000 MG: 1 INJECTION, POWDER, LYOPHILIZED, FOR SOLUTION INTRAVENOUS at 00:16

## 2024-01-21 RX ADMIN — PROPOFOL 20 MCG/KG/MIN: 10 INJECTION, EMULSION INTRAVENOUS at 01:40

## 2024-01-21 RX ADMIN — DOPAMINE HYDROCHLORIDE 3.5 MCG/KG/MIN: 160 INJECTION, SOLUTION INTRAVENOUS at 10:25

## 2024-01-21 RX ADMIN — PROPOFOL 25 MCG/KG/MIN: 10 INJECTION, EMULSION INTRAVENOUS at 06:37

## 2024-01-21 ASSESSMENT — PAIN SCALES - GENERAL
PAINLEVEL_OUTOF10: 0

## 2024-01-21 ASSESSMENT — PULMONARY FUNCTION TESTS
PIF_VALUE: 25
PIF_VALUE: 22
PIF_VALUE: 11

## 2024-01-21 NOTE — SIGNIFICANT EVENT
Pt seen and examined right away as Code Stroke was called.    Patient's RN states patient was extubated after 12 PM today and she was taking care of patient.    She states before calling code stroke she felt patient possibly more altered.  Recent medical records reviewed.    Vitals reviewed: /55, pulse 70/min, respiration 16/min, afebrile    HEENT: EMMANUEL  Neck: Supple  Chest: Bilateral air entry  CVS: S1-S2 regular  Abdomen: Morbidly obese, soft, nontender  Extremity: Soft boots in place, range of motion restricted due to pain--chronic as per him  CNS: He is alert and orient x 3, no facial droop, moves upper extremities, range of motion of lower extremities is limited due to pain--chronic as per him    NIHSS score is essentially 0      Acute encephalopathy vs TIA, rule out CVA  Acute hypoxic/hypercapnic respiratory failure, s/p intubation, extubated today   Chronic systolic CHF  ELPIDIO on CKD stage II  Chronic pain in both legs with decreased range of motion  History of schizophrenia/bipolar disorder  Morbid obesity  Nursing home resident      I ordered CT scan of head without contrast stat and it showed no acute intracranial abnormality per preliminary report.  Case discussed with  stroke team Ms Kaye and based on information she recommended patient is not a candidate for thrombolytics and she will review the CT scan.  CTA head/neck was also done, awaiting report.  Ordered neurochecks every 4 hourly x 24 hours  Start aspirin 81 mg daily if CTA head/neck is normal  Started atorvastatin 40 mg nightly, LFT in a.m.  Lipid panel/hemoglobin A1c in a.m.  Otherwise rest of the management will continue  Hospitalist team will continue to follow.    Critical care: Total time spent 37 minutes    Ana Kahn MD  [facility] medicine

## 2024-01-22 LAB
ALBUMIN SERPL-MCNC: 3.7 G/DL (ref 3.4–5)
ALP SERPL-CCNC: 78 U/L (ref 40–129)
ALT SERPL-CCNC: 12 U/L (ref 10–40)
ANION GAP SERPL CALCULATED.3IONS-SCNC: 12 MMOL/L (ref 3–16)
ANION GAP SERPL CALCULATED.3IONS-SCNC: 8 MMOL/L (ref 3–16)
AST SERPL-CCNC: 23 U/L (ref 15–37)
BACTERIA SPEC RESP CULT: ABNORMAL
BACTERIA SPEC RESP CULT: ABNORMAL
BASOPHILS # BLD: 0 K/UL (ref 0–0.2)
BASOPHILS NFR BLD: 0.4 %
BILIRUB DIRECT SERPL-MCNC: <0.2 MG/DL (ref 0–0.3)
BILIRUB INDIRECT SERPL-MCNC: NORMAL MG/DL (ref 0–1)
BILIRUB SERPL-MCNC: 0.3 MG/DL (ref 0–1)
BUN SERPL-MCNC: 23 MG/DL (ref 7–20)
BUN SERPL-MCNC: 27 MG/DL (ref 7–20)
CALCIUM SERPL-MCNC: 8.6 MG/DL (ref 8.3–10.6)
CALCIUM SERPL-MCNC: 9 MG/DL (ref 8.3–10.6)
CHLORIDE SERPL-SCNC: 105 MMOL/L (ref 99–110)
CHLORIDE SERPL-SCNC: 107 MMOL/L (ref 99–110)
CHOLEST SERPL-MCNC: 140 MG/DL (ref 0–199)
CO2 SERPL-SCNC: 27 MMOL/L (ref 21–32)
CO2 SERPL-SCNC: 28 MMOL/L (ref 21–32)
CREAT SERPL-MCNC: 1.2 MG/DL (ref 0.9–1.3)
CREAT SERPL-MCNC: 1.3 MG/DL (ref 0.9–1.3)
DEPRECATED RDW RBC AUTO: 19.9 % (ref 12.4–15.4)
EKG ATRIAL RATE: 67 BPM
EKG DIAGNOSIS: NORMAL
EKG P AXIS: 44 DEGREES
EKG P-R INTERVAL: 194 MS
EKG Q-T INTERVAL: 406 MS
EKG QRS DURATION: 142 MS
EKG QTC CALCULATION (BAZETT): 429 MS
EKG R AXIS: -26 DEGREES
EKG T AXIS: 114 DEGREES
EKG VENTRICULAR RATE: 67 BPM
EOSINOPHIL # BLD: 0.1 K/UL (ref 0–0.6)
EOSINOPHIL NFR BLD: 1.5 %
EST. AVERAGE GLUCOSE BLD GHB EST-MCNC: 137 MG/DL
GFR SERPLBLD CREATININE-BSD FMLA CKD-EPI: >60 ML/MIN/{1.73_M2}
GFR SERPLBLD CREATININE-BSD FMLA CKD-EPI: >60 ML/MIN/{1.73_M2}
GLUCOSE SERPL-MCNC: 151 MG/DL (ref 70–99)
GLUCOSE SERPL-MCNC: 87 MG/DL (ref 70–99)
GRAM STN SPEC: ABNORMAL
HBA1C MFR BLD: 6.4 %
HCT VFR BLD AUTO: 34.3 % (ref 40.5–52.5)
HDLC SERPL-MCNC: 44 MG/DL (ref 40–60)
HGB BLD-MCNC: 11.2 G/DL (ref 13.5–17.5)
LDLC SERPL CALC-MCNC: 73 MG/DL
LYMPHOCYTES # BLD: 1.6 K/UL (ref 1–5.1)
LYMPHOCYTES NFR BLD: 20.4 %
MAGNESIUM SERPL-MCNC: 2 MG/DL (ref 1.8–2.4)
MAGNESIUM SERPL-MCNC: 2.2 MG/DL (ref 1.8–2.4)
MCH RBC QN AUTO: 27.5 PG (ref 26–34)
MCHC RBC AUTO-ENTMCNC: 32.6 G/DL (ref 31–36)
MCV RBC AUTO: 84.2 FL (ref 80–100)
MONOCYTES # BLD: 1 K/UL (ref 0–1.3)
MONOCYTES NFR BLD: 12.1 %
NEUTROPHILS # BLD: 5.3 K/UL (ref 1.7–7.7)
NEUTROPHILS NFR BLD: 65.6 %
ORGANISM: ABNORMAL
PHOSPHATE SERPL-MCNC: 3.9 MG/DL (ref 2.5–4.9)
PHOSPHATE SERPL-MCNC: 3.9 MG/DL (ref 2.5–4.9)
PLATELET # BLD AUTO: 203 K/UL (ref 135–450)
PMV BLD AUTO: 8.3 FL (ref 5–10.5)
POTASSIUM SERPL-SCNC: 4.1 MMOL/L (ref 3.5–5.1)
POTASSIUM SERPL-SCNC: 4.3 MMOL/L (ref 3.5–5.1)
PROT SERPL-MCNC: 7.2 G/DL (ref 6.4–8.2)
RBC # BLD AUTO: 4.08 M/UL (ref 4.2–5.9)
SODIUM SERPL-SCNC: 141 MMOL/L (ref 136–145)
SODIUM SERPL-SCNC: 146 MMOL/L (ref 136–145)
TRIGL SERPL-MCNC: 115 MG/DL (ref 0–150)
VLDLC SERPL CALC-MCNC: 23 MG/DL
WBC # BLD AUTO: 8.1 K/UL (ref 4–11)

## 2024-01-22 PROCEDURE — 6360000002 HC RX W HCPCS: Performed by: STUDENT IN AN ORGANIZED HEALTH CARE EDUCATION/TRAINING PROGRAM

## 2024-01-22 PROCEDURE — 84100 ASSAY OF PHOSPHORUS: CPT

## 2024-01-22 PROCEDURE — 36415 COLL VENOUS BLD VENIPUNCTURE: CPT

## 2024-01-22 PROCEDURE — 94760 N-INVAS EAR/PLS OXIMETRY 1: CPT

## 2024-01-22 PROCEDURE — 6370000000 HC RX 637 (ALT 250 FOR IP): Performed by: HOSPITALIST

## 2024-01-22 PROCEDURE — 2580000003 HC RX 258: Performed by: INTERNAL MEDICINE

## 2024-01-22 PROCEDURE — 83735 ASSAY OF MAGNESIUM: CPT

## 2024-01-22 PROCEDURE — 2580000003 HC RX 258: Performed by: STUDENT IN AN ORGANIZED HEALTH CARE EDUCATION/TRAINING PROGRAM

## 2024-01-22 PROCEDURE — 99233 SBSQ HOSP IP/OBS HIGH 50: CPT | Performed by: INTERNAL MEDICINE

## 2024-01-22 PROCEDURE — 80061 LIPID PANEL: CPT

## 2024-01-22 PROCEDURE — 6370000000 HC RX 637 (ALT 250 FOR IP): Performed by: NURSE PRACTITIONER

## 2024-01-22 PROCEDURE — 2700000000 HC OXYGEN THERAPY PER DAY

## 2024-01-22 PROCEDURE — 1200000000 HC SEMI PRIVATE

## 2024-01-22 PROCEDURE — 93010 ELECTROCARDIOGRAM REPORT: CPT | Performed by: INTERNAL MEDICINE

## 2024-01-22 PROCEDURE — 6360000002 HC RX W HCPCS: Performed by: NURSE PRACTITIONER

## 2024-01-22 PROCEDURE — 2580000003 HC RX 258: Performed by: NURSE PRACTITIONER

## 2024-01-22 PROCEDURE — 80076 HEPATIC FUNCTION PANEL: CPT

## 2024-01-22 PROCEDURE — 83036 HEMOGLOBIN GLYCOSYLATED A1C: CPT

## 2024-01-22 PROCEDURE — 2500000003 HC RX 250 WO HCPCS: Performed by: STUDENT IN AN ORGANIZED HEALTH CARE EDUCATION/TRAINING PROGRAM

## 2024-01-22 PROCEDURE — 92610 EVALUATE SWALLOWING FUNCTION: CPT

## 2024-01-22 PROCEDURE — 80048 BASIC METABOLIC PNL TOTAL CA: CPT

## 2024-01-22 PROCEDURE — 85025 COMPLETE CBC W/AUTO DIFF WBC: CPT

## 2024-01-22 PROCEDURE — APPNB15 APP NON BILLABLE TIME 0-15 MINS: Performed by: NURSE PRACTITIONER

## 2024-01-22 PROCEDURE — 6370000000 HC RX 637 (ALT 250 FOR IP): Performed by: STUDENT IN AN ORGANIZED HEALTH CARE EDUCATION/TRAINING PROGRAM

## 2024-01-22 RX ORDER — OLANZAPINE 5 MG/1
5 TABLET ORAL DAILY
Status: DISCONTINUED | OUTPATIENT
Start: 2024-01-22 | End: 2024-01-25 | Stop reason: HOSPADM

## 2024-01-22 RX ORDER — SODIUM CHLORIDE 450 MG/100ML
INJECTION, SOLUTION INTRAVENOUS CONTINUOUS
Status: DISCONTINUED | OUTPATIENT
Start: 2024-01-22 | End: 2024-01-23

## 2024-01-22 RX ORDER — VALPROIC ACID 250 MG/5ML
500 SOLUTION ORAL EVERY 12 HOURS SCHEDULED
Status: DISCONTINUED | OUTPATIENT
Start: 2024-01-22 | End: 2024-01-25 | Stop reason: HOSPADM

## 2024-01-22 RX ADMIN — CEFTRIAXONE SODIUM 2000 MG: 2 INJECTION, POWDER, FOR SOLUTION INTRAMUSCULAR; INTRAVENOUS at 14:42

## 2024-01-22 RX ADMIN — VANCOMYCIN HYDROCHLORIDE 750 MG: 750 INJECTION, POWDER, LYOPHILIZED, FOR SOLUTION INTRAVENOUS at 12:34

## 2024-01-22 RX ADMIN — SODIUM CHLORIDE: 4.5 INJECTION, SOLUTION INTRAVENOUS at 11:02

## 2024-01-22 RX ADMIN — SODIUM CHLORIDE, PRESERVATIVE FREE 20 MG: 5 INJECTION INTRAVENOUS at 10:15

## 2024-01-22 RX ADMIN — VANCOMYCIN HYDROCHLORIDE 750 MG: 750 INJECTION, POWDER, LYOPHILIZED, FOR SOLUTION INTRAVENOUS at 00:19

## 2024-01-22 RX ADMIN — ASPIRIN 81 MG: 81 TABLET, COATED ORAL at 10:15

## 2024-01-22 RX ADMIN — SODIUM CHLORIDE, PRESERVATIVE FREE 20 MG: 5 INJECTION INTRAVENOUS at 20:59

## 2024-01-22 RX ADMIN — ATORVASTATIN CALCIUM 40 MG: 40 TABLET, FILM COATED ORAL at 20:59

## 2024-01-22 RX ADMIN — OLANZAPINE 15 MG: 5 TABLET, FILM COATED ORAL at 20:59

## 2024-01-22 RX ADMIN — SODIUM CHLORIDE, PRESERVATIVE FREE 10 ML: 5 INJECTION INTRAVENOUS at 21:01

## 2024-01-22 RX ADMIN — ENOXAPARIN SODIUM 60 MG: 100 INJECTION SUBCUTANEOUS at 20:59

## 2024-01-22 RX ADMIN — VALPROIC ACID 500 MG: 250 SOLUTION ORAL at 10:15

## 2024-01-22 RX ADMIN — OLANZAPINE 5 MG: 5 TABLET, FILM COATED ORAL at 10:15

## 2024-01-22 RX ADMIN — LEVOTHYROXINE SODIUM 200 MCG: 0.1 TABLET ORAL at 10:14

## 2024-01-22 RX ADMIN — VALPROIC ACID 500 MG: 250 SOLUTION ORAL at 21:00

## 2024-01-22 RX ADMIN — ENOXAPARIN SODIUM 60 MG: 100 INJECTION SUBCUTANEOUS at 10:15

## 2024-01-22 RX ADMIN — VANCOMYCIN HYDROCHLORIDE 750 MG: 750 INJECTION, POWDER, LYOPHILIZED, FOR SOLUTION INTRAVENOUS at 23:37

## 2024-01-22 ASSESSMENT — PAIN SCALES - GENERAL
PAINLEVEL_OUTOF10: 0
PAINLEVEL_OUTOF10: 0

## 2024-01-23 LAB
BACTERIA BLD CULT ORG #2: NORMAL
BACTERIA BLD CULT: NORMAL
GLUCOSE BLD-MCNC: 112 MG/DL (ref 70–99)
GLUCOSE BLD-MCNC: 141 MG/DL (ref 70–99)
GLUCOSE BLD-MCNC: 146 MG/DL (ref 70–99)
GLUCOSE BLD-MCNC: 91 MG/DL (ref 70–99)
PERFORMED ON: ABNORMAL
PERFORMED ON: NORMAL

## 2024-01-23 PROCEDURE — 2500000003 HC RX 250 WO HCPCS: Performed by: STUDENT IN AN ORGANIZED HEALTH CARE EDUCATION/TRAINING PROGRAM

## 2024-01-23 PROCEDURE — 2580000003 HC RX 258: Performed by: INTERNAL MEDICINE

## 2024-01-23 PROCEDURE — 2580000003 HC RX 258: Performed by: STUDENT IN AN ORGANIZED HEALTH CARE EDUCATION/TRAINING PROGRAM

## 2024-01-23 PROCEDURE — 92526 ORAL FUNCTION THERAPY: CPT

## 2024-01-23 PROCEDURE — 1200000000 HC SEMI PRIVATE

## 2024-01-23 PROCEDURE — 9990000010 HC NO CHARGE VISIT

## 2024-01-23 PROCEDURE — 6370000000 HC RX 637 (ALT 250 FOR IP): Performed by: HOSPITALIST

## 2024-01-23 PROCEDURE — 94760 N-INVAS EAR/PLS OXIMETRY 1: CPT

## 2024-01-23 PROCEDURE — APPNB15 APP NON BILLABLE TIME 0-15 MINS: Performed by: NURSE PRACTITIONER

## 2024-01-23 PROCEDURE — 6360000002 HC RX W HCPCS: Performed by: STUDENT IN AN ORGANIZED HEALTH CARE EDUCATION/TRAINING PROGRAM

## 2024-01-23 PROCEDURE — 6370000000 HC RX 637 (ALT 250 FOR IP): Performed by: STUDENT IN AN ORGANIZED HEALTH CARE EDUCATION/TRAINING PROGRAM

## 2024-01-23 PROCEDURE — 2580000003 HC RX 258: Performed by: NURSE PRACTITIONER

## 2024-01-23 PROCEDURE — 99232 SBSQ HOSP IP/OBS MODERATE 35: CPT | Performed by: INTERNAL MEDICINE

## 2024-01-23 PROCEDURE — 6370000000 HC RX 637 (ALT 250 FOR IP): Performed by: NURSE PRACTITIONER

## 2024-01-23 PROCEDURE — 2700000000 HC OXYGEN THERAPY PER DAY

## 2024-01-23 PROCEDURE — 6360000002 HC RX W HCPCS: Performed by: NURSE PRACTITIONER

## 2024-01-23 RX ADMIN — VALPROIC ACID 500 MG: 250 SOLUTION ORAL at 21:47

## 2024-01-23 RX ADMIN — SODIUM CHLORIDE, PRESERVATIVE FREE 20 MG: 5 INJECTION INTRAVENOUS at 21:46

## 2024-01-23 RX ADMIN — OLANZAPINE 15 MG: 5 TABLET, FILM COATED ORAL at 21:47

## 2024-01-23 RX ADMIN — ASPIRIN 81 MG: 81 TABLET, COATED ORAL at 08:14

## 2024-01-23 RX ADMIN — SODIUM CHLORIDE, PRESERVATIVE FREE 10 ML: 5 INJECTION INTRAVENOUS at 21:46

## 2024-01-23 RX ADMIN — SODIUM CHLORIDE: 4.5 INJECTION, SOLUTION INTRAVENOUS at 06:12

## 2024-01-23 RX ADMIN — ATORVASTATIN CALCIUM 40 MG: 40 TABLET, FILM COATED ORAL at 21:47

## 2024-01-23 RX ADMIN — OLANZAPINE 5 MG: 5 TABLET, FILM COATED ORAL at 08:14

## 2024-01-23 RX ADMIN — ENOXAPARIN SODIUM 60 MG: 100 INJECTION SUBCUTANEOUS at 21:46

## 2024-01-23 RX ADMIN — VANCOMYCIN HYDROCHLORIDE 750 MG: 750 INJECTION, POWDER, LYOPHILIZED, FOR SOLUTION INTRAVENOUS at 11:11

## 2024-01-23 RX ADMIN — ENOXAPARIN SODIUM 60 MG: 100 INJECTION SUBCUTANEOUS at 08:21

## 2024-01-23 RX ADMIN — CEFTRIAXONE SODIUM 2000 MG: 2 INJECTION, POWDER, FOR SOLUTION INTRAMUSCULAR; INTRAVENOUS at 13:24

## 2024-01-23 RX ADMIN — VALPROIC ACID 500 MG: 250 SOLUTION ORAL at 08:15

## 2024-01-23 RX ADMIN — LEVOTHYROXINE SODIUM 200 MCG: 0.1 TABLET ORAL at 05:59

## 2024-01-23 RX ADMIN — SODIUM CHLORIDE, PRESERVATIVE FREE 20 MG: 5 INJECTION INTRAVENOUS at 08:21

## 2024-01-23 RX ADMIN — SODIUM CHLORIDE, PRESERVATIVE FREE 10 ML: 5 INJECTION INTRAVENOUS at 08:35

## 2024-01-23 NOTE — FLOWSHEET NOTE
01/22/24 2038   Assessment   Charting Type Shift assessment   Psychosocial   Psychosocial (WDL) X   Patient Behaviors Hyper-verbal   Neurological   Neuro (WDL) X   Level of Consciousness 0   Orientation Level Oriented to person;Oriented to place;Disoriented to time;Disoriented to situation   Cognition Follows commands   Speech Delayed responses   R Pupil Size (mm) 2   R Pupil Shape Round   R Pupil Reaction Brisk   L Pupil Size (mm) 2   L Pupil Shape Round   L Pupil Reaction Brisk   [place] Coma Scale   Eye Opening 4   Best Verbal Response 5   Best Motor Response 6   [place] Coma Scale Score 15   HEENT (Head, Ears, Eyes, Nose, & Throat)   HEENT (WDL) X   Teeth Missing teeth   Respiratory   Respiratory (WDL) X   Respiratory Interventions H.O.B. elevated   Respiratory Pattern Regular   Respiratory Depth Normal   Respiratory Quality/Effort Unlabored   Chest Assessment Chest expansion symmetrical;Trachea midline   L Breath Sounds Diminished   R Breath Sounds Diminished   Level of Activity/Mobility 0   Breath Sounds   Breath Sounds Bilateral Diminished   Right Upper Lobe Diminished   Right Middle Lobe Diminished   Right Lower Lobe Diminished   Left Upper Lobe Diminished   Left Lower Lobe Diminished   Cardiac   Cardiac (WDL) X   Cardiac Regularity Regular   Heart Sounds S1, S2   Cardiac Rhythm Sinus rhythm   Cardiac Monitor   Cardiac/Telemetry Monitor On Bedside monitor in use   Telemetry Box Number ICU bedside 2124   Pacemaker   Pacemaker No   Gastrointestinal   Abdominal (WDL) X   Abdomen Inspection Soft;Rounded;Obese;Pannus   RUQ Bowel Sounds Hypoactive   LUQ Bowel Sounds Hypoactive   RLQ Bowel Sounds Hypoactive   LLQ Bowel Sounds Hypoactive   Genitourinary   Genitourinary (WDL) X  ([place])   Urine Assessment   Urinary Status [place];Draining   Peripheral Vascular   Peripheral Vascular (WDL) X   Edema Generalized;Right upper extremity;Left upper extremity;Right lower extremity;Left lower extremity   RLE Edema Trace

## 2024-01-24 LAB
ANION GAP SERPL CALCULATED.3IONS-SCNC: 9 MMOL/L (ref 3–16)
BASOPHILS # BLD: 0 K/UL (ref 0–0.2)
BASOPHILS NFR BLD: 0.3 %
BUN SERPL-MCNC: 20 MG/DL (ref 7–20)
CALCIUM SERPL-MCNC: 8.9 MG/DL (ref 8.3–10.6)
CHLORIDE SERPL-SCNC: 106 MMOL/L (ref 99–110)
CO2 SERPL-SCNC: 28 MMOL/L (ref 21–32)
CREAT SERPL-MCNC: 1.3 MG/DL (ref 0.9–1.3)
DEPRECATED RDW RBC AUTO: 19.5 % (ref 12.4–15.4)
EOSINOPHIL # BLD: 0.3 K/UL (ref 0–0.6)
EOSINOPHIL NFR BLD: 4.6 %
GFR SERPLBLD CREATININE-BSD FMLA CKD-EPI: >60 ML/MIN/{1.73_M2}
GLUCOSE BLD-MCNC: 115 MG/DL (ref 70–99)
GLUCOSE BLD-MCNC: 161 MG/DL (ref 70–99)
GLUCOSE BLD-MCNC: 288 MG/DL (ref 70–99)
GLUCOSE SERPL-MCNC: 94 MG/DL (ref 70–99)
HCT VFR BLD AUTO: 33.4 % (ref 40.5–52.5)
HGB BLD-MCNC: 10.9 G/DL (ref 13.5–17.5)
LYMPHOCYTES # BLD: 1.4 K/UL (ref 1–5.1)
LYMPHOCYTES NFR BLD: 25.3 %
MCH RBC QN AUTO: 27.6 PG (ref 26–34)
MCHC RBC AUTO-ENTMCNC: 32.6 G/DL (ref 31–36)
MCV RBC AUTO: 84.6 FL (ref 80–100)
MONOCYTES # BLD: 0.8 K/UL (ref 0–1.3)
MONOCYTES NFR BLD: 14.4 %
NEUTROPHILS # BLD: 3.2 K/UL (ref 1.7–7.7)
NEUTROPHILS NFR BLD: 55.4 %
PERFORMED ON: ABNORMAL
PLATELET # BLD AUTO: 215 K/UL (ref 135–450)
PMV BLD AUTO: 8.8 FL (ref 5–10.5)
POTASSIUM SERPL-SCNC: 4.4 MMOL/L (ref 3.5–5.1)
RBC # BLD AUTO: 3.95 M/UL (ref 4.2–5.9)
SODIUM SERPL-SCNC: 143 MMOL/L (ref 136–145)
VANCOMYCIN SERPL-MCNC: 15.9 UG/ML
WBC # BLD AUTO: 5.7 K/UL (ref 4–11)

## 2024-01-24 PROCEDURE — 6360000002 HC RX W HCPCS: Performed by: STUDENT IN AN ORGANIZED HEALTH CARE EDUCATION/TRAINING PROGRAM

## 2024-01-24 PROCEDURE — 2580000003 HC RX 258: Performed by: STUDENT IN AN ORGANIZED HEALTH CARE EDUCATION/TRAINING PROGRAM

## 2024-01-24 PROCEDURE — 80048 BASIC METABOLIC PNL TOTAL CA: CPT

## 2024-01-24 PROCEDURE — 9990000010 HC NO CHARGE VISIT

## 2024-01-24 PROCEDURE — 85025 COMPLETE CBC W/AUTO DIFF WBC: CPT

## 2024-01-24 PROCEDURE — 6370000000 HC RX 637 (ALT 250 FOR IP): Performed by: STUDENT IN AN ORGANIZED HEALTH CARE EDUCATION/TRAINING PROGRAM

## 2024-01-24 PROCEDURE — 94760 N-INVAS EAR/PLS OXIMETRY 1: CPT

## 2024-01-24 PROCEDURE — 6370000000 HC RX 637 (ALT 250 FOR IP): Performed by: NURSE PRACTITIONER

## 2024-01-24 PROCEDURE — 1200000000 HC SEMI PRIVATE

## 2024-01-24 PROCEDURE — 36415 COLL VENOUS BLD VENIPUNCTURE: CPT

## 2024-01-24 PROCEDURE — 92526 ORAL FUNCTION THERAPY: CPT

## 2024-01-24 PROCEDURE — 2500000003 HC RX 250 WO HCPCS: Performed by: STUDENT IN AN ORGANIZED HEALTH CARE EDUCATION/TRAINING PROGRAM

## 2024-01-24 PROCEDURE — 80202 ASSAY OF VANCOMYCIN: CPT

## 2024-01-24 PROCEDURE — 6370000000 HC RX 637 (ALT 250 FOR IP): Performed by: HOSPITALIST

## 2024-01-24 RX ADMIN — ENOXAPARIN SODIUM 60 MG: 100 INJECTION SUBCUTANEOUS at 11:17

## 2024-01-24 RX ADMIN — VALPROIC ACID 500 MG: 250 SOLUTION ORAL at 21:25

## 2024-01-24 RX ADMIN — LEVOTHYROXINE SODIUM 200 MCG: 0.1 TABLET ORAL at 05:33

## 2024-01-24 RX ADMIN — ENOXAPARIN SODIUM 60 MG: 100 INJECTION SUBCUTANEOUS at 21:20

## 2024-01-24 RX ADMIN — SODIUM CHLORIDE, PRESERVATIVE FREE 20 MG: 5 INJECTION INTRAVENOUS at 21:16

## 2024-01-24 RX ADMIN — VANCOMYCIN HYDROCHLORIDE 750 MG: 750 INJECTION, POWDER, LYOPHILIZED, FOR SOLUTION INTRAVENOUS at 00:55

## 2024-01-24 RX ADMIN — VALPROIC ACID 500 MG: 250 SOLUTION ORAL at 11:11

## 2024-01-24 RX ADMIN — OLANZAPINE 5 MG: 5 TABLET, FILM COATED ORAL at 11:20

## 2024-01-24 RX ADMIN — SODIUM CHLORIDE, PRESERVATIVE FREE 20 MG: 5 INJECTION INTRAVENOUS at 11:10

## 2024-01-24 RX ADMIN — OLANZAPINE 15 MG: 5 TABLET, FILM COATED ORAL at 21:16

## 2024-01-24 RX ADMIN — SODIUM CHLORIDE, PRESERVATIVE FREE 10 ML: 5 INJECTION INTRAVENOUS at 11:14

## 2024-01-24 RX ADMIN — ATORVASTATIN CALCIUM 40 MG: 40 TABLET, FILM COATED ORAL at 21:16

## 2024-01-24 RX ADMIN — ASPIRIN 81 MG: 81 TABLET, COATED ORAL at 11:10

## 2024-01-24 NOTE — CARE COORDINATION
Current Nursing Home Information:  Patient admitted from:  [facility]    Call to Alicja, [phone], at [facility] who confirmed the patient is: Long Term Care, no Precert required for return.      Patient Covid vaccination status:    Internal Administration   First Dose COVID-19, PFIZER PURPLE top, DILUTE for use, (age 12 y+), 30mcg/0.3mL  04/23/2021   Second Dose COVID-19, PFIZER PURPLE top, DILUTE for use, (age 12 y+), 30mcg/0.3mL   05/14/2021       Last COVID Lab SARS-CoV-2, PCR (no units)   Date Value   01/27/2021 Not Detected     SARS-CoV-2, NAAT (no units)   Date Value   01/19/2024 Not Detected     POC Anion Gap (no units)   Date Value   02/19/2019 8 (L)     POC Base Excess (no units)   Date Value   01/04/2012 12 (H)     POC BUN (mg/dL)   Date Value   02/19/2019 14     POC Chloride (mmol/L)   Date Value   02/19/2019 99     POC Creatinine (mg/dL)   Date Value   02/19/2019 0.6 (L)     POC Glucose (mg/dl)   Date Value   01/24/2024 288 (H)     POC Ionized Calcium (mg/dL)   Date Value   12/31/2012 4.5     POC O2 SAT (%)   Date Value   01/04/2012 64 (L)     POC pCO2 (mm Hg)   Date Value   01/04/2012 54 (H)     POC pH (no units)   Date Value   01/04/2012 7.44     POC PO2 (mm Hg)   Date Value   01/04/2012 33     POC Potassium (mmol/L)   Date Value   02/19/2019 3.7     Sample Type (no units)   Date Value   09/11/2019 LEATHA     POC Sodium (mmol/L)   Date Value   02/19/2019 138     POC TCO2 Measured (mmol/L)   Date Value   12/31/2012 27     POC Troponin I (ng/mL)   Date Value   02/19/2019 0.00            Covid Test requirement for return: No Rapid/PCR Covid test needed before return    Spoke with patients father Dion Beauchamp (Guardian)  @ [phone] who reports plan is for patient to return to [facility] at d/c.   Patient has been at the facility since 1/9/24 from [place].

## 2024-01-25 VITALS
TEMPERATURE: 98 F | WEIGHT: 315 LBS | DIASTOLIC BLOOD PRESSURE: 95 MMHG | OXYGEN SATURATION: 91 % | SYSTOLIC BLOOD PRESSURE: 136 MMHG | HEART RATE: 67 BPM | HEIGHT: 74 IN | RESPIRATION RATE: 18 BRPM | BODY MASS INDEX: 40.43 KG/M2

## 2024-01-25 LAB
ALBUMIN SERPL-MCNC: 3.4 G/DL (ref 3.4–5)
ANION GAP SERPL CALCULATED.3IONS-SCNC: 10 MMOL/L (ref 3–16)
BASOPHILS # BLD: 0 K/UL (ref 0–0.2)
BASOPHILS NFR BLD: 0.5 %
BUN SERPL-MCNC: 21 MG/DL (ref 7–20)
CALCIUM SERPL-MCNC: 8.7 MG/DL (ref 8.3–10.6)
CHLORIDE SERPL-SCNC: 107 MMOL/L (ref 99–110)
CO2 SERPL-SCNC: 26 MMOL/L (ref 21–32)
CREAT SERPL-MCNC: 1.1 MG/DL (ref 0.9–1.3)
DEPRECATED RDW RBC AUTO: 19.4 % (ref 12.4–15.4)
EOSINOPHIL # BLD: 0.2 K/UL (ref 0–0.6)
EOSINOPHIL NFR BLD: 4.4 %
GFR SERPLBLD CREATININE-BSD FMLA CKD-EPI: >60 ML/MIN/{1.73_M2}
GLUCOSE BLD-MCNC: 119 MG/DL (ref 70–99)
GLUCOSE SERPL-MCNC: 95 MG/DL (ref 70–99)
HCT VFR BLD AUTO: 33.6 % (ref 40.5–52.5)
HGB BLD-MCNC: 10.9 G/DL (ref 13.5–17.5)
LYMPHOCYTES # BLD: 1.7 K/UL (ref 1–5.1)
LYMPHOCYTES NFR BLD: 31.2 %
MCH RBC QN AUTO: 27.5 PG (ref 26–34)
MCHC RBC AUTO-ENTMCNC: 32.6 G/DL (ref 31–36)
MCV RBC AUTO: 84.4 FL (ref 80–100)
MONOCYTES # BLD: 0.7 K/UL (ref 0–1.3)
MONOCYTES NFR BLD: 13.1 %
NEUTROPHILS # BLD: 2.8 K/UL (ref 1.7–7.7)
NEUTROPHILS NFR BLD: 50.8 %
PERFORMED ON: ABNORMAL
PHOSPHATE SERPL-MCNC: 3.7 MG/DL (ref 2.5–4.9)
PLATELET # BLD AUTO: 215 K/UL (ref 135–450)
PMV BLD AUTO: 8.3 FL (ref 5–10.5)
POTASSIUM SERPL-SCNC: 4.7 MMOL/L (ref 3.5–5.1)
RBC # BLD AUTO: 3.97 M/UL (ref 4.2–5.9)
SODIUM SERPL-SCNC: 143 MMOL/L (ref 136–145)
WBC # BLD AUTO: 5.5 K/UL (ref 4–11)

## 2024-01-25 PROCEDURE — 2580000003 HC RX 258: Performed by: STUDENT IN AN ORGANIZED HEALTH CARE EDUCATION/TRAINING PROGRAM

## 2024-01-25 PROCEDURE — 6360000002 HC RX W HCPCS: Performed by: STUDENT IN AN ORGANIZED HEALTH CARE EDUCATION/TRAINING PROGRAM

## 2024-01-25 PROCEDURE — 85025 COMPLETE CBC W/AUTO DIFF WBC: CPT

## 2024-01-25 PROCEDURE — 6360000002 HC RX W HCPCS: Performed by: NURSE PRACTITIONER

## 2024-01-25 PROCEDURE — 2500000003 HC RX 250 WO HCPCS: Performed by: STUDENT IN AN ORGANIZED HEALTH CARE EDUCATION/TRAINING PROGRAM

## 2024-01-25 PROCEDURE — 6370000000 HC RX 637 (ALT 250 FOR IP): Performed by: NURSE PRACTITIONER

## 2024-01-25 PROCEDURE — 80069 RENAL FUNCTION PANEL: CPT

## 2024-01-25 PROCEDURE — 36415 COLL VENOUS BLD VENIPUNCTURE: CPT

## 2024-01-25 PROCEDURE — 2580000003 HC RX 258: Performed by: NURSE PRACTITIONER

## 2024-01-25 PROCEDURE — 6370000000 HC RX 637 (ALT 250 FOR IP): Performed by: HOSPITALIST

## 2024-01-25 PROCEDURE — 6370000000 HC RX 637 (ALT 250 FOR IP): Performed by: STUDENT IN AN ORGANIZED HEALTH CARE EDUCATION/TRAINING PROGRAM

## 2024-01-25 RX ORDER — ASPIRIN 81 MG/1
81 TABLET ORAL DAILY
Qty: 30 TABLET | Refills: 3 | Status: SHIPPED | OUTPATIENT
Start: 2024-01-26

## 2024-01-25 RX ORDER — ATORVASTATIN CALCIUM 40 MG/1
20 TABLET, FILM COATED ORAL NIGHTLY
Qty: 15 TABLET | Refills: 0 | Status: SHIPPED | OUTPATIENT
Start: 2024-01-25 | End: 2024-02-24

## 2024-01-25 RX ADMIN — ACETAMINOPHEN 325MG 650 MG: 325 TABLET ORAL at 07:01

## 2024-01-25 RX ADMIN — VANCOMYCIN HYDROCHLORIDE 750 MG: 750 INJECTION, POWDER, LYOPHILIZED, FOR SOLUTION INTRAVENOUS at 13:07

## 2024-01-25 RX ADMIN — CEFTRIAXONE SODIUM 2000 MG: 2 INJECTION, POWDER, FOR SOLUTION INTRAMUSCULAR; INTRAVENOUS at 14:40

## 2024-01-25 RX ADMIN — SODIUM CHLORIDE, PRESERVATIVE FREE 20 MG: 5 INJECTION INTRAVENOUS at 10:30

## 2024-01-25 RX ADMIN — VALPROIC ACID 500 MG: 250 SOLUTION ORAL at 10:30

## 2024-01-25 RX ADMIN — SODIUM CHLORIDE, PRESERVATIVE FREE 10 ML: 5 INJECTION INTRAVENOUS at 10:30

## 2024-01-25 RX ADMIN — ASPIRIN 81 MG: 81 TABLET, COATED ORAL at 10:30

## 2024-01-25 RX ADMIN — ENOXAPARIN SODIUM 60 MG: 100 INJECTION SUBCUTANEOUS at 10:30

## 2024-01-25 RX ADMIN — VANCOMYCIN HYDROCHLORIDE 750 MG: 750 INJECTION, POWDER, LYOPHILIZED, FOR SOLUTION INTRAVENOUS at 00:54

## 2024-01-25 RX ADMIN — OLANZAPINE 5 MG: 5 TABLET, FILM COATED ORAL at 10:30

## 2024-01-25 RX ADMIN — LEVOTHYROXINE SODIUM 200 MCG: 0.1 TABLET ORAL at 07:01

## 2024-01-25 ASSESSMENT — PAIN SCALES - GENERAL
PAINLEVEL_OUTOF10: 0
PAINLEVEL_OUTOF10: 3

## 2024-01-25 ASSESSMENT — PAIN DESCRIPTION - ORIENTATION: ORIENTATION: MID;LOWER

## 2024-01-25 ASSESSMENT — PAIN DESCRIPTION - DESCRIPTORS: DESCRIPTORS: ACHING

## 2024-01-25 ASSESSMENT — PAIN DESCRIPTION - LOCATION: LOCATION: BACK

## 2024-01-25 NOTE — CARE COORDINATION
DISCHARGE SUMMARY     DATE OF DISCHARGE: 1/25/24    DISCHARGE DESTINATION: SNF (LTC)     FACILITY: [facility]    PHONE NUMBER: [phone]    FAX NUMBER: [phone]    INSURANCE PRECERT OBTAINED: ZAHIRA HUMPHREYS/DANIEL COMPLETED: ZAHIRA    COVID RESULT: NA      TRANSPORTATION:     Company Name:  [employer]     Time: 5:15    Phone Number:       COMMENTS: Father notified of d/c

## 2024-01-25 NOTE — DISCHARGE SUMMARY
Addendum (preliminary) 1 of 1   ADDENDUM:   Results were reported to Dr. Hinkle by radiology results communication at   2:19 p.m. on January 19, 2024.         Final   No acute intracranial abnormality.      Moderate chronic microvascular disease.         CTA HEAD NECK W CONTRAST   Final Result   No acute abnormality or flow-limiting stenosis of the major arteries of the   head and neck.      Ground-glass attenuation at the lung apices, likely related to mild pulmonary   vascular congestion versus pneumonitis.                Consults:     IP CONSULT TO STROKE TEAM  IP CONSULT TO NEPHROLOGY  IP CONSULT TO HOSPITALIST  IP CONSULT TO CARDIOLOGY  IP CONSULT TO CRITICAL CARE  PHARMACY TO DOSE VANCOMYCIN  IP CONSULT TO DIETITIAN  IP CONSULT TO SOCIAL WORK    Disposition:  SFN      Condition at Discharge: Stable    Discharge Instructions/Follow-up:    - would repeat TSH in 2-3 weeks.  - follow up with PCP.  - would repeat A1C in 4 weeks.  - continue off insulin.   - follow up with psych  - completed IVAB inpatient ( 7 days of ceftriaxine and vancomycin) .  - start on aspirin ( history of heart failure (     Code Status:  Full Code     Activity: activity as tolerated    Diet: regular diet      Discharge Medications:     Current Discharge Medication List             Details   aspirin 81 MG EC tablet Take 1 tablet by mouth daily  Qty: 30 tablet, Refills: 3                Details   atorvastatin (LIPITOR) 40 MG tablet Take 0.5 tablets by mouth nightly  Qty: 15 tablet, Refills: 0                Details   dapagliflozin (FARXIGA) 10 MG tablet Take 1 tablet by mouth every morning      acetaminophen (TYLENOL) 500 MG tablet Take 1 tablet by mouth every 8 hours as needed for Pain      sertraline (ZOLOFT) 100 MG tablet Take 1 tablet by mouth daily      !! OLANZapine (ZYPREXA) 5 MG tablet Take 3 tablets by mouth nightly      !! OLANZapine (ZYPREXA) 5 MG tablet Take 1 tablet by mouth every morning      levothyroxine (SYNTHROID) 200 MCG

## 2024-01-25 NOTE — DISCHARGE INSTR - COC
would repeat TSH in 2-3 weeks.  - follow up with PCP.  - would repeat A1C in 4 weeks.  - continue off insulin.   - follow up with psych     Physician Certification: I certify the above information and transfer of Fabio Beauchamp  is necessary for the continuing treatment of the diagnosis listed and that he requires Skilled Nursing Facility for greater 30 days.     Update Admission H&P: No change in H&P    PHYSICIAN SIGNATURE:  Electronically signed by Mat Garza MD on 1/25/24 at 2:38 PM EST

## 2024-01-25 NOTE — PLAN OF CARE
Problem: Discharge Planning  Goal: Discharge to home or other facility with appropriate resources  1/24/2024 0948 by Ailin Mccurdy, RN  Outcome: Progressing  Flowsheets (Taken 1/24/2024 0948)  Discharge to home or other facility with appropriate resources:   Identify barriers to discharge with patient and caregiver   Identify discharge learning needs (meds, wound care, etc)   Refer to discharge planning if patient needs post-hospital services based on physician order or complex needs related to functional status, cognitive ability or social support system   Arrange for needed discharge resources and transportation as appropriate  1/24/2024 0117 by Nichole Osman, RN  Outcome: Progressing  1/24/2024 0116 by Nichole Osman, RN  Outcome: Progressing  Flowsheets  Taken 1/24/2024 0116  Discharge to home or other facility with appropriate resources:   Identify barriers to discharge with patient and caregiver   Arrange for needed discharge resources and transportation as appropriate   Identify discharge learning needs (meds, wound care, etc)  Taken 1/23/2024 2100  Discharge to home or other facility with appropriate resources:   Identify barriers to discharge with patient and caregiver   Arrange for needed discharge resources and transportation as appropriate   Identify discharge learning needs (meds, wound care, etc)     Problem: Pain  Goal: Verbalizes/displays adequate comfort level or baseline comfort level  1/24/2024 0948 by Ailin Mccurdy, RN  Outcome: Progressing  Flowsheets (Taken 1/24/2024 0948)  Verbalizes/displays adequate comfort level or baseline comfort level:   Encourage patient to monitor pain and request assistance   Administer analgesics based on type and severity of pain and evaluate response   Consider cultural and social influences on pain and pain management   Assess pain using appropriate pain scale   Implement non-pharmacological measures as appropriate and evaluate response   Notify Licensed 
  Problem: Discharge Planning  Goal: Discharge to home or other facility with appropriate resources  Outcome: Progressing  Flowsheets (Taken 1/19/2024 2000)  Discharge to home or other facility with appropriate resources:   Identify barriers to discharge with patient and caregiver   Arrange for needed discharge resources and transportation as appropriate   Identify discharge learning needs (meds, wound care, etc)   Arrange for interpreters to assist at discharge as needed   Refer to discharge planning if patient needs post-hospital services based on physician order or complex needs related to functional status, cognitive ability or social support system     Problem: Pain  Goal: Verbalizes/displays adequate comfort level or baseline comfort level  Outcome: Progressing     Problem: Safety - Medical Restraint  Goal: Remains free of injury from restraints (Restraint for Interference with Medical Device)  Description: INTERVENTIONS:  1. Determine that other, less restrictive measures have been tried or would not be effective before applying the restraint  2. Evaluate the patient's condition at the time of restraint application  3. Inform patient/family regarding the reason for restraint  4. Q2H: Monitor safety, psychosocial status, comfort, nutrition and hydration  Outcome: Progressing  Flowsheets  Taken 1/20/2024 0200 by Roma Bearden RN  Remains free of injury from restraints (restraint for interference with medical device):   Determine that other, less restrictive measures have been tried or would not be effective before applying the restraint   Evaluate the patient's condition at the time of restraint application   Inform patient/family regarding the reason for restraint   Every 2 hours: Monitor safety, psychosocial status, comfort, nutrition and hydration  Taken 1/20/2024 0000 by Roma Bearden RN  Remains free of injury from restraints (restraint for interference with medical device):   Determine that other, 
  Problem: Safety - Adult  Goal: Free from fall injury  Outcome: Progressing     Problem: Nutrition Deficit:  Goal: Optimize nutritional status  Outcome: Progressing     Problem: Discharge Planning  Goal: Discharge to home or other facility with appropriate resources  Outcome: Progressing     Problem: Pain  Goal: Verbalizes/displays adequate comfort level or baseline comfort level  Outcome: Progressing     Problem: Respiratory - Adult  Goal: Achieves optimal ventilation and oxygenation  Outcome: Progressing     Problem: Cardiovascular - Adult  Goal: Maintains optimal cardiac output and hemodynamic stability  Outcome: Progressing     Problem: Skin/Tissue Integrity - Adult  Goal: Skin integrity remains intact  Outcome: Progressing     Problem: Genitourinary - Adult  Goal: Urinary catheter remains patent  Outcome: Progressing     
restraints (restraint for interference with medical device):   Determine that other, less restrictive measures have been tried or would not be effective before applying the restraint   Evaluate the patient's condition at the time of restraint application   Inform patient/family regarding the reason for restraint   Every 2 hours: Monitor safety, psychosocial status, comfort, nutrition and hydration     Problem: Safety - Adult  Goal: Free from fall injury  Outcome: Progressing  Flowsheets (Taken 1/21/2024 0430)  Free From Fall Injury: Instruct family/caregiver on patient safety     Problem: Skin/Tissue Integrity  Goal: Absence of new skin breakdown  Description: 1.  Monitor for areas of redness and/or skin breakdown  2.  Assess vascular access sites hourly  3.  Every 4-6 hours minimum:  Change oxygen saturation probe site  4.  Every 4-6 hours:  If on nasal continuous positive airway pressure, respiratory therapy assess nares and determine need for appliance change or resting period.  Outcome: Progressing     Problem: Nutrition Deficit:  Goal: Optimize nutritional status  Outcome: Progressing  Flowsheets (Taken 1/21/2024 0430)  Nutrient intake appropriate for improving, restoring, or maintaining nutritional needs:   Assess nutritional status and recommend course of action   Monitor oral intake, labs, and treatment plans     Problem: Infection - Adult  Goal: Absence of infection at discharge  Outcome: Progressing  Flowsheets (Taken 1/21/2024 0430)  Absence of infection at discharge:   Assess and monitor for signs and symptoms of infection   Monitor lab/diagnostic results   Monitor all insertion sites i.e., indwelling lines, tubes and drains   Monitor endotracheal (as able) and nasal secretions for changes in amount and color   [place] appropriate cooling/warming therapies per order   Administer medications as ordered  Goal: Absence of infection during hospitalization  Outcome: Progressing  Flowsheets (Taken 1/21/2024 
sites hourly  1/23/2024 0103 by Critsa Yee, RN  Outcome: Progressing     Problem: Genitourinary - Adult  Goal: Urinary catheter remains patent  1/23/2024 0103 by Crista Yee, RN  Outcome: Progressing     
pain/suffering and initiate Spiritual Care, Psychosocial Clinical Specialist consults as needed  Outcome: Progressing     Problem: Decision Making  Goal: Pt/Family able to effectively weigh alternatives and participate in decision making related to treatment and care  Description: INTERVENTIONS:  1. Determine when there are differences between patient's view, family's view, and healthcare provider's view of condition  2. Facilitate patient and family articulation of goals for care  3. Help patient and family identify pros/cons of alternative solutions  4. Provide information as requested by patient/family  5. Respect patient/family right to receive or not to receive information  6. Serve as a liaison between patient and family and health care team  7. Initiate Consults from Ethics, Palliative Care or initiate Family Care Conference as is appropriate  Outcome: Progressing     Problem: ABCDS Injury Assessment  Goal: Absence of physical injury  Outcome: Progressing     Problem: Chronic Conditions and Co-morbidities  Goal: Patient's chronic conditions and co-morbidity symptoms are monitored and maintained or improved  Outcome: Progressing     
stability: Assess for signs and symptoms of bleeding or hemorrhage     Problem: Anxiety  Goal: Will report anxiety at manageable levels  Description: INTERVENTIONS:  1. Administer medication as ordered  2. Teach and rehearse alternative coping skills  3. Provide emotional support with 1:1 interaction with staff  Outcome: Progressing  Flowsheets (Taken 1/21/2024 0800)  Will report anxiety at manageable levels:   Administer medication as ordered   Teach and rehearse alternative coping skills   Provide emotional support with 1:1 interaction with staff     Problem: Coping  Goal: Pt/Family able to verbalize concerns and demonstrate effective coping strategies  Description: INTERVENTIONS:  1. Assist patient/family to identify coping skills, available support systems and cultural and spiritual values  2. Provide emotional support, including active listening and acknowledgement of concerns of patient and caregivers  3. Reduce environmental stimuli, as able  4. Instruct patient/family in relaxation techniques, as appropriate  5. Assess for spiritual pain/suffering and initiate Spiritual Care, Psychosocial Clinical Specialist consults as needed  Outcome: Progressing  Flowsheets (Taken 1/21/2024 0800)  Patient/family able to verbalize anxieties, fears, and concerns, and demonstrate effective coping:   Assist patient/family to identify coping skills, available support systems and cultural and spiritual values   Provide emotional support, including active listening and acknowledgement of concerns of patient and caregivers   Reduce environmental stimuli, as able   Instruct patient/family in relaxation techniques, as appropriate     Problem: Decision Making  Goal: Pt/Family able to effectively weigh alternatives and participate in decision making related to treatment and care  Description: INTERVENTIONS:  1. Determine when there are differences between patient's view, family's view, and healthcare provider's view of condition  2. 
and spiritual values   Provide emotional support, including active listening and acknowledgement of concerns of patient and caregivers   Reduce environmental stimuli, as able   Instruct patient/family in relaxation techniques, as appropriate   Assess for spiritual pain/suffering and initiate Spiritual Care, Psychosocial Clinical Specialist consults as needed     Problem: Decision Making  Goal: Pt/Family able to effectively weigh alternatives and participate in decision making related to treatment and care  Description: INTERVENTIONS:  1. Determine when there are differences between patient's view, family's view, and healthcare provider's view of condition  2. Facilitate patient and family articulation of goals for care  3. Help patient and family identify pros/cons of alternative solutions  4. Provide information as requested by patient/family  5. Respect patient/family right to receive or not to receive information  6. Serve as a liaison between patient and family and health care team  7. Initiate Consults from Ethics, Palliative Care or initiate Family Care Conference as is appropriate  Recent Flowsheet Documentation  Taken 1/21/2024 2000 by Maria E Grider RN  Patient/family able to effectively weigh alternatives and participate in decision making related to treatment and care:   Determine when there are differences between patient's view, family's view, and healthcare provider's view of condition   Facilitate patient and family articulation of goals for care   Help patient and family identify pros/cons of alternative solutions   Provide information as requested by patient/family   Respect patient/family right to receive or not to receive information     Problem: ABCDS Injury Assessment  Goal: Absence of physical injury  Outcome: Progressing     
between patient and family and health care team  7. Initiate Consults from Ethics, Palliative Care or initiate Family Care Conference as is appropriate  1/24/2024 0117 by Nichole Osman RN  Outcome: Progressing  Flowsheets (Taken 1/24/2024 0117)  Patient/family able to effectively weigh alternatives and participate in decision making related to treatment and care:   Determine when there are differences between patient's view, family's view, and healthcare provider's view of condition   Facilitate patient and family articulation of goals for care   Help patient and family identify pros/cons of alternative solutions   Provide information as requested by patient/family  1/24/2024 0116 by Nichole Osman, RN  Outcome: Progressing     Problem: ABCDS Injury Assessment  Goal: Absence of physical injury  1/24/2024 0117 by Nichole Osman RN  Outcome: Progressing  Flowsheets (Taken 1/24/2024 0117)  Absence of Physical Injury: Implement safety measures based on patient assessment  1/24/2024 0116 by Nichole Osman RN  Outcome: Progressing  Flowsheets (Taken 1/24/2024 0112)  Absence of Physical Injury: Implement safety measures based on patient assessment     Problem: Chronic Conditions and Co-morbidities  Goal: Patient's chronic conditions and co-morbidity symptoms are monitored and maintained or improved  1/24/2024 0117 by Nichole Osman RN  Outcome: Progressing  Flowsheets (Taken 1/24/2024 0117)  Care Plan - Patient's Chronic Conditions and Co-Morbidity Symptoms are Monitored and Maintained or Improved:   Monitor and assess patient's chronic conditions and comorbid symptoms for stability, deterioration, or improvement   Collaborate with multidisciplinary team to address chronic and comorbid conditions and prevent exacerbation or deterioration   Update acute care plan with appropriate goals if chronic or comorbid symptoms are exacerbated and prevent overall improvement and discharge  1/24/2024 0116 by Nichole Osman, RN  Outcome: 
patient and family articulation of goals for care   Help patient and family identify pros/cons of alternative solutions   Provide information as requested by patient/family     Problem: ABCDS Injury Assessment  Goal: Absence of physical injury  1/24/2024 2028 by Suri Phipps RN  Outcome: Progressing  Flowsheets (Taken 1/24/2024 0948 by Ailin Mccurdy RN)  Absence of Physical Injury: Implement safety measures based on patient assessment  1/24/2024 0948 by Ailin Mccurdy RN  Outcome: Progressing  Flowsheets (Taken 1/24/2024 0948)  Absence of Physical Injury: Implement safety measures based on patient assessment     Problem: Chronic Conditions and Co-morbidities  Goal: Patient's chronic conditions and co-morbidity symptoms are monitored and maintained or improved  1/24/2024 2028 by Suri Phipps RN  Outcome: Progressing  Flowsheets (Taken 1/24/2024 0948 by Ailin Mccurdy RN)  Care Plan - Patient's Chronic Conditions and Co-Morbidity Symptoms are Monitored and Maintained or Improved:   Monitor and assess patient's chronic conditions and comorbid symptoms for stability, deterioration, or improvement   Collaborate with multidisciplinary team to address chronic and comorbid conditions and prevent exacerbation or deterioration  1/24/2024 0948 by Ailin Mccurdy RN  Outcome: Progressing  Flowsheets (Taken 1/24/2024 0948)  Care Plan - Patient's Chronic Conditions and Co-Morbidity Symptoms are Monitored and Maintained or Improved:   Monitor and assess patient's chronic conditions and comorbid symptoms for stability, deterioration, or improvement   Collaborate with multidisciplinary team to address chronic and comorbid conditions and prevent exacerbation or deterioration

## 2024-01-26 NOTE — PROGRESS NOTES
Pulmonary Critical Care Progress Note     Patient's name:  Fabio Beauchamp  Medical Record Number: [id number]  Patient's account/billing number: [id number]  Patient's YOB: 1965  Age: 58 y.o.  Date of Admission: 1/19/2024 12:35 PM  Date of Consult: 1/22/2024      Primary Care Physician: Anshu Padilla MD      Code Status: Full Code    Chief complaint: Acute respiratory failure with hypoxia    Assessment and Plan     Acute respiratory failure with hypoxia and hypercapnia, status post extubation  Moraxella and MRSA pneumonia  Chronic systolic heart failure  Sinus bradycardia  LADONNA/OHS  ELPIDIO     Plan:  Hydration per nephrology   Midodrine  Ceftriaxone and vancomycin for 7 days  DVT prophylaxis  Resume home Zyprexa and valproic acid        Overnight:  Slurred speech overnight  CT head negative      REVIEW OF SYSTEMS:  Review of Systems -   General ROS: negative  Psychological ROS: negative  Ophthalmic ROS: negative  ENT ROS: negative  Allergy and Immunology ROS: negative  Hematological and Lymphatic ROS: negative  Endocrine ROS: negative  Breast ROS: negative  Respiratory ROS: no cough, shortness of breath, or wheezing  Cardiovascular ROS: no chest pain or dyspnea on exertion  Gastrointestinal ROS:negative  Genito-Urinary ROS: negative  Musculoskeletal ROS: negative  Neurological ROS: negative  Dermatological ROS: negative        Physical Exam:    Vitals: /61   Pulse 74   Temp 98.3 °F (36.8 °C) (Oral)   Resp 20   Ht 1.88 m (6' 2\")   Wt (!) 181.1 kg (399 lb 4.1 oz)   SpO2 99%   BMI 51.26 kg/m²     Last Body weight:   Wt Readings from Last 3 Encounters:   01/22/24 (!) 181.1 kg (399 lb 4.1 oz)   04/05/23 (!) 187 kg (412 lb 3.2 oz)   09/07/22 (!) 191.4 kg (421 lb 15.4 oz)       Body Mass Index : Body mass index is 51.26 kg/m².      Intake and Output summary:   Intake/Output Summary (Last 24 hours) at 1/22/2024 1405  Last data filed at 1/22/2024 1242  Gross per 24 hour   Intake 351.6 ml 
    V2.0    [facility] [place] Progress Note      Name:  Fabio Beauchamp /Age/Sex: 1965  (58 y.o. male)   MRN & CSN:  [id number] & [id number] Encounter Date/Time: 2024 8:57 AM EDT   Location:  [id number] PCP: Anshu Padilla MD     Attending:Mat Garza MD       Hospital Day: 5      HPI :   Chief Complaint: AMS     Fabio Beauchamp is a 58 y.o. male who presents with past medical history of hypertension, schizoaffective disorder, diabetes mellitus who presents to the ED due to unresponsiveness  - Patient was brought to the ED via EMS after being found nonresponsive, last known normal was earlier the morning of admission .  On EMS arrival, patient was noted to have pinpoint pupils, given Narcan with no improvement or response.  On arrival in the ED he was noted to be in sinus bradycardia with a heart rate in the 30s, he was given 1 mg of atropine with minimal improvement, patient was placed on Levophed, intubated for airway protection.  He has been admitted for further evaluation and management.        Subjective:   Denies any complaints, being weaned off oxygen.     Review of Systems:      Pertinent positives and negatives discussed in HPI    Objective:     Intake/Output Summary (Last 24 hours) at 2024 1533  Last data filed at 2024 1520  Gross per 24 hour   Intake 2929.99 ml   Output 2860 ml   Net 69.99 ml        Vitals:   Vitals:    24 0606 24 0800 24 0825 24 1200   BP: 132/74 114/71  134/68   Pulse: 69 56 74 67   Resp:  16 13 14   Temp: 98.3 °F (36.8 °C) 99.5 °F (37.5 °C)  97.7 °F (36.5 °C)   TempSrc: Oral Axillary  Axillary   SpO2: 95% 97% 96% 96%   Weight:       Height:             Physical Exam:      Physical Exam Performed:    /68   Pulse 67   Temp 97.7 °F (36.5 °C) (Axillary)   Resp 14   Ht 1.88 m (6' 2\")   Wt (!) 180.8 kg (398 lb 9.5 oz)   SpO2 96%   BMI 51.18 kg/m²     Declined exam today.     Medications:   Medications:    OLANZapine  15 mg 
    V2.0    [facility] [place]      Name:  Fabio Beauchamp /Age/Sex: 1965  (58 y.o. male)   MRN & CSN:  [id number] & [id number] Encounter Date/Time: 2024 8:57 AM EDT   Location:  [id number] PCP: Anshu Padilla MD     Attending:Mat Garza MD       Hospital Day: 2      HPI :   Chief Complaint: AMS     Fabio Beauchamp is a 58 y.o. male who presents with past medical history of hypertension, schizoaffective disorder, diabetes mellitus who presents to the ED due to unresponsiveness  - Patient was brought to the ED via EMS after being found nonresponsive, last known normal was earlier the morning of admission .  On EMS arrival, patient was noted to have pinpoint pupils, given Narcan with no improvement or response.  On arrival in the ED he was noted to be in sinus bradycardia with a heart rate in the 30s, he was given 1 mg of atropine with minimal improvement, patient was placed on Levophed, intubated for airway protection.  He has been admitted for further evaluation and management.        Subjective:   Intubated and sedated   Opening eyes and following commands.   Continues to be on dopamine. HR 60s    Review of Systems:      Pertinent positives and negatives discussed in HPI    Objective:     Intake/Output Summary (Last 24 hours) at 2024 0735  Last data filed at 2024 0600  Gross per 24 hour   Intake 730.89 ml   Output 6750 ml   Net -6019.11 ml      Vitals:   Vitals:    24 0630 24 0645 24 0700 24 0705   BP: (!) 88/55 (!) 95/51 95/61    Pulse: 56 58 59    Resp: 22 22 22    Temp:       TempSrc:       SpO2: 100% 100% 100%    Weight:    (!) 178 kg (392 lb 6.7 oz)   Height:             Physical Exam:      Physical Exam Performed:    BP 95/61   Pulse 59   Temp 97.5 °F (36.4 °C) (Esophageal)   Resp 22   Ht 1.88 m (6' 2\")   Wt (!) 178 kg (392 lb 6.7 oz)   SpO2 100%   BMI 50.38 kg/m²     General appearance: intubated, following commands.   HEENT: Pupils 
  Nephrology Progress Note   [facility]      Chief Complaint: Altered mental status    History of Present Illness: History obtained from the chart since patient presented with altered mental status and is currently intubated  Mr Beauchamp is a 59 yo morbidly obese male with a past medical history significant for hypertension, hypothyroidism, DM II, hyperlipidemia, obesity with sleep apnea, schizophrenia that was brought in from [facility] because of unresponsiveness. EMS administered Narcan upon arrival with no improvement in mental status. Blood gases revealed a pH of 7.071/PCO2 118 - patient was intubated. Currently has a cornell catheter with > 2.5 L of urine in it. Also noted was a K of 6.6 meq/L (however it is reported as a hemolyzed sample). Creatinine is 1.3 mg/dl    Subjective:    Resting   Events noted  .  ROS No complaints     Scheduled Meds:   OLANZapine  15 mg Oral Nightly    OLANZapine  5 mg Oral Daily    valproic acid  500 mg Oral 2 times per day    vancomycin  750 mg IntraVENous Q12H    cefTRIAXone (ROCEPHIN) IV  2,000 mg IntraVENous Q24H    midodrine  2.5 mg Oral TID WC    atorvastatin  40 mg Oral Nightly    aspirin  81 mg Oral Daily    sodium chloride flush  5-40 mL IntraVENous 2 times per day    enoxaparin  60 mg SubCUTAneous BID    famotidine (PEPCID) injection  20 mg IntraVENous BID    levothyroxine  200 mcg Oral Daily    vancomycin (VANCOCIN) intermittent dosing (placeholder)   Other RX Placeholder        dextrose      sodium chloride         PRN Meds:.glucose, dextrose bolus **OR** dextrose bolus, dextrose, sodium chloride flush, sodium chloride, potassium chloride **OR** potassium chloride, magnesium sulfate, ondansetron **OR** ondansetron, polyethylene glycol, acetaminophen **OR** acetaminophen, albuterol    Physical Exam:    TEMPERATURE:  Current - Temp: 97.9 °F (36.6 °C); Max - Temp  Av.4 °F (36.9 °C)  Min: 97.9 °F (36.6 °C)  Max: 99.1 °F (37.3 °C)  RESPIRATIONS RANGE: Resp  Avg: 
  Nephrology Progress Note   [facility]      Chief Complaint: Altered mental status    History of Present Illness: History obtained from the chart since patient presented with altered mental status and is currently intubated  Mr Beauchamp is a 57 yo morbidly obese male with a past medical history significant for hypertension, hypothyroidism, DM II, hyperlipidemia, obesity with sleep apnea, schizophrenia that was brought in from [facility] because of unresponsiveness. EMS administered Narcan upon arrival with no improvement in mental status. Blood gases revealed a pH of 7.071/PCO2 118 - patient was intubated. Currently has a cornell catheter with > 2.5 L of urine in it. Also noted was a K of 6.6 meq/L (however it is reported as a hemolyzed sample). Creatinine is 1.3 mg/dl    Subjective:    Resting   Events noted  .  ROS No complaints     Scheduled Meds:   OLANZapine  15 mg Oral Nightly    OLANZapine  5 mg Oral Daily    valproic acid  500 mg Oral 2 times per day    vancomycin  750 mg IntraVENous Q12H    cefTRIAXone (ROCEPHIN) IV  2,000 mg IntraVENous Q24H    midodrine  2.5 mg Oral TID WC    atorvastatin  40 mg Oral Nightly    aspirin  81 mg Oral Daily    sodium chloride flush  5-40 mL IntraVENous 2 times per day    enoxaparin  60 mg SubCUTAneous BID    famotidine (PEPCID) injection  20 mg IntraVENous BID    levothyroxine  200 mcg Oral Daily    vancomycin (VANCOCIN) intermittent dosing (placeholder)   Other RX Placeholder        dextrose      sodium chloride         PRN Meds:.glucose, dextrose bolus **OR** dextrose bolus, dextrose, sodium chloride flush, sodium chloride, potassium chloride **OR** potassium chloride, magnesium sulfate, ondansetron **OR** ondansetron, polyethylene glycol, acetaminophen **OR** acetaminophen, albuterol    Physical Exam:    TEMPERATURE:  Current - Temp: 98.4 °F (36.9 °C); Max - Temp  Av.4 °F (36.9 °C)  Min: 98.2 °F (36.8 °C)  Max: 98.6 °F (37 °C)  RESPIRATIONS RANGE: Resp  Av  
  Nephrology Progress Note   [facility]      Chief Complaint: Altered mental status    History of Present Illness: History obtained from the chart since patient presented with altered mental status and is currently intubated  Mr Beauchamp is a 59 yo morbidly obese male with a past medical history significant for hypertension, hypothyroidism, DM II, hyperlipidemia, obesity with sleep apnea, schizophrenia that was brought in from [facility] because of unresponsiveness. EMS administered Narcan upon arrival with no improvement in mental status. Blood gases revealed a pH of 7.071/PCO2 118 - patient was intubated. Currently has a cornell catheter with > 2.5 L of urine in it. Also noted was a K of 6.6 meq/L (however it is reported as a hemolyzed sample). Creatinine is 1.3 mg/dl    Subjective:    Refusing labs .  Events noted  .  ROS No CP/SOB .     Scheduled Meds:   OLANZapine  15 mg Oral Nightly    OLANZapine  5 mg Oral Daily    valproic acid  500 mg Oral 2 times per day    vancomycin  750 mg IntraVENous Q12H    cefTRIAXone (ROCEPHIN) IV  2,000 mg IntraVENous Q24H    midodrine  2.5 mg Oral TID WC    atorvastatin  40 mg Oral Nightly    aspirin  81 mg Oral Daily    sodium chloride flush  5-40 mL IntraVENous 2 times per day    enoxaparin  60 mg SubCUTAneous BID    famotidine (PEPCID) injection  20 mg IntraVENous BID    levothyroxine  200 mcg Oral Daily    vancomycin (VANCOCIN) intermittent dosing (placeholder)   Other RX Placeholder        dextrose      sodium chloride         PRN Meds:.glucose, dextrose bolus **OR** dextrose bolus, dextrose, sodium chloride flush, sodium chloride, potassium chloride **OR** potassium chloride, magnesium sulfate, ondansetron **OR** ondansetron, polyethylene glycol, acetaminophen **OR** acetaminophen, albuterol    Physical Exam:    TEMPERATURE:  Current - Temp: 99.5 °F (37.5 °C); Max - Temp  Av.6 °F (37 °C)  Min: 98.3 °F (36.8 °C)  Max: 99.5 °F (37.5 °C)  RESPIRATIONS RANGE: Resp  Avg: 
  Nephrology Progress Note   [facility]      Chief Complaint: Altered mental status    History of Present Illness: History obtained from the chart since patient presented with altered mental status and is currently intubated  Mr Beauchamp is a 57 yo morbidly obese male with a past medical history significant for hypertension, hypothyroidism, DM II, hyperlipidemia, obesity with sleep apnea, schizophrenia that was brought in from [facility] because of unresponsiveness. EMS administered Narcan upon arrival with no improvement in mental status. Blood gases revealed a pH of 7.071/PCO2 118 - patient was intubated. Currently has a cornell catheter with > 2.5 L of urine in it. Also noted was a K of 6.6 meq/L (however it is reported as a hemolyzed sample). Creatinine is 1.3 mg/dl    Subjective:    Extubated; awake; slow speech    Scheduled Meds:   vancomycin  750 mg IntraVENous Q12H    cefTRIAXone (ROCEPHIN) IV  2,000 mg IntraVENous Q24H    midodrine  2.5 mg Oral TID WC    sodium chloride flush  5-40 mL IntraVENous 2 times per day    enoxaparin  60 mg SubCUTAneous BID    famotidine (PEPCID) injection  20 mg IntraVENous BID    levothyroxine  200 mcg Oral Daily    vancomycin (VANCOCIN) intermittent dosing (placeholder)   Other RX Placeholder        DOPamine 1.5 mcg/kg/min (24 1540)    norepinephrine Stopped (24 2212)    dextrose      sodium chloride         PRN Meds:.glucose, dextrose bolus **OR** dextrose bolus, dextrose, sodium chloride flush, sodium chloride, potassium chloride **OR** potassium chloride, magnesium sulfate, ondansetron **OR** ondansetron, polyethylene glycol, acetaminophen **OR** acetaminophen, albuterol    Physical Exam:    TEMPERATURE:  Current - Temp: 99.2 °F (37.3 °C); Max - Temp  Av.3 °F (36.8 °C)  Min: 97.3 °F (36.3 °C)  Max: 99.2 °F (37.3 °C)  RESPIRATIONS RANGE: Resp  Av.9  Min: 11  Max: 29  PULSE RANGE: Pulse  Av.6  Min: 44  Max: 91  BLOOD PRESSURE RANGE:  Systolic 
  Nephrology Progress Note   [facility]      Chief Complaint: Altered mental status    History of Present Illness: History obtained from the chart since patient presented with altered mental status and is currently intubated  Mr Beauchamp is a 57 yo morbidly obese male with a past medical history significant for hypertension, hypothyroidism, DM II, hyperlipidemia, obesity with sleep apnea, schizophrenia that was brought in from [facility] because of unresponsiveness. EMS administered Narcan upon arrival with no improvement in mental status. Blood gases revealed a pH of 7.071/PCO2 118 - patient was intubated. Currently has a cornell catheter with > 2.5 L of urine in it. Also noted was a K of 6.6 meq/L (however it is reported as a hemolyzed sample). Creatinine is 1.3 mg/dl    Subjective:    In bed; sedated; intubated    ROS : unobtainable     Scheduled Meds:   perflutren lipid microspheres        sodium chloride flush  5-40 mL IntraVENous 2 times per day    enoxaparin  60 mg SubCUTAneous BID    famotidine (PEPCID) injection  20 mg IntraVENous BID    levothyroxine  200 mcg Oral Daily    cefepime  2,000 mg IntraVENous Q8H    vancomycin (VANCOCIN) intermittent dosing (placeholder)   Other RX Placeholder    vancomycin  1,000 mg IntraVENous Q12H        DOPamine 2 mcg/kg/min (24 0130)    norepinephrine Stopped (24 2212)    propofol 15 mcg/kg/min (24 1138)    dextrose      sodium chloride         PRN Meds:.perflutren lipid microspheres, glucose, dextrose bolus **OR** dextrose bolus, dextrose, perflutren lipid microspheres, sodium chloride flush, sodium chloride, potassium chloride **OR** potassium chloride, magnesium sulfate, ondansetron **OR** ondansetron, polyethylene glycol, acetaminophen **OR** acetaminophen, albuterol    Physical Exam:    TEMPERATURE:  Current - Temp: 97.3 °F (36.3 °C); Max - Temp  Av.8 °F (33.2 °C)  Min: 87 °F (30.6 °C)  Max: 97.5 °F (36.4 °C)  RESPIRATIONS RANGE: Resp  Avg: 
  Physician Progress Note      PATIENT:               OTF WALLACE  CSN #:                  [id number]  :                       1965  ADMIT DATE:       2024 12:35 PM  DISCH DATE:  RESPONDING  PROVIDER #:        Mat Garza MD          QUERY TEXT:    Patient arrived on NRB, intubated on arrival, and admitted with acute   respiratory failure and pneumonia.  Per ED provider \"Tachypnea and respiratory   distress present.\"  Per Chief Complaint in attending progress notes   \"intubated for airway protection\" documented and \"Acute hypoxic/hypercapnic   respiratory failure\" documented in Assessment and Recommendations.  Please   indicate one of the following and document in the medical record:    The medical record reflects the following:  Risk Factors: pneumonia  Clinical Indicators: Tachypnea and respiratory distress documented per ED   provider; intubated on arrival  Treatment: intubation, vent  Options provided:  -- Intubated for Acute Respiratory Failure  -- Intubated for airway protection only, Acute Respiratory Failure ruled out   after study  -- Other - I will add my own diagnosis  -- Disagree - Not applicable / Not valid  -- Disagree - Clinically unable to determine / Unknown  -- Refer to Clinical Documentation Reviewer    PROVIDER RESPONSE TEXT:    Provider is clinically unable to determine a response to this query.  please defer to ED documentation.    Query created by: Tila Lord on 2024 8:10 AM      QUERY TEXT:    Pt admitted with acute respiratory failure and pneumonia and has   encephalopathy documented. If possible, please document in progress notes and   discharge summary further specificity regarding the type of encephalopathy:    The medical record reflects the following:  Risk Factors: pneumonia with acute respiratory failure  Clinical Indicators: minimal movement with sternal rub on arrival, currently   oriented to time, place, and person  Treatment: initially 
  Pulmonary Progress Note    Date of Admission: 1/19/2024   LOS: 2 days     CC:  Chief Complaint   Patient presents with    Altered Mental Status           Assessment/Plan     Acute hypercapnic respiratory failure with pCO2 greater than 100  -Full vent support, wean oxygen goal saturation 90%  -ABG compensated  -SAT/SBT     Pneumonia MRSA moraxella  -Vanco, can de-escalate cefepime to ceftriaxone     Chronic systolic heart failure  -LVEF 55 to 60%, unknown RV function     Sinus bradycardia  -Currently on dopamine, cardiology following  -Will try to minimize sedation     Superobesity with a BMI greater than 50     LADONNA/?  OHS  -Currently intubated but will likely need BiPAP once extubated     Lovenox  Pepcid     Due to the immediate potential for life-threatening deterioration due to respiratory failure, I spent 31 minutes providing critical care.  This time is excluding time spent performing separately billable procedures.     HPI/Subjective  NO acute events o/n.    [facility]        Intake/Output Summary (Last 24 hours) at 1/21/2024 1035  Last data filed at 1/21/2024 1025  Gross per 24 hour   Intake 1991.85 ml   Output 2855 ml   Net -863.15 ml         PHYSICAL EXAM:   Blood pressure (!) 95/50, pulse 71, temperature 98.8 °F (37.1 °C), temperature source Axillary, resp. rate 24, height 1.88 m (6' 2\"), weight (!) 181.2 kg (399 lb 7.6 oz), SpO2 98 %.'  Gen:  No acute distress. OP with ett  Resp:  No crackles. No wheezes. No rhonchi. No dullness on percussion.  CV: Regular rate. Regular rhythm. No murmur or rub. No edema.   M/S: No cyanosis. No clubbing.    Neuro:  intubated and sedated          Labs reviewed:  CBC:   Recent Labs     01/19/24  1333 01/20/24  0305 01/21/24  0538   WBC 4.0 8.0 7.3   HGB 12.9* 12.8* 11.7*   HCT 40.1* 39.4* 34.7*   MCV 85.8 84.0 82.5    253 222     BMP:   Recent Labs     01/19/24  1826 01/20/24  0305 01/21/24  0538    140 144   K 4.5 3.9 3.7    
 Latest Reference Range & Units 01/20/24 00:38   pH, Roge 7.350 - 7.450  7.417   pCO2, Roge 40.0 - 50.0 mmHg 50.5 (H)   pO2, Roge Not Established mmHg 30   HCO3, Venous 23 - 29 mmol/L 33 (H)    Current vbg  
1208: Pt extubated per Dr. Kwame Heck orders. Pt saturating 98% on 4L NC.    
1700: Pt having increased PVCs, sometimes reading off as trigeminy on cardiac monitor. Pt's AM labs WNL and pt not complaining of any pain or discomfort. Message sent to Dr Garza. Order received to check BMP, mag, and phos  1845: Pt's labs resulted and are in WNL. Dr Garza updated. Pt continues to have frequent PVCs and in trigeminal rhythm but also continues to deny any pain or discomfort. No new orders received at this time  
1730: Code stroke called due to concern for slurred speech. MD to bedside. Stroke scale assessed. CT head ordered and completed. 12-lead EKG completed.   
1730: Report received from LILY Hyman.   1805: Pt to ICU room 2124 at this time. Pt connected to monitor. NIHSS attempted. Pt unresponsive, intubated and sedated at this time.   
1817: Dr. Kwame Heck notified of pt's arrival to ICU. 12-lead EKG ordered at this time and MD notified of results. No further orders.    
4 Eyes Skin Assessment     NAME:  Fabio Beauchamp  YOB: 1965  MEDICAL RECORD NUMBER:  [id number]    The patient is being assessed for  Admission    I agree that at least one RN has performed a thorough Head to Toe Skin Assessment on the patient. ALL assessment sites listed below have been assessed.      Areas assessed by both nurses:    Head, Face, Ears, Shoulders, Back, Chest, Arms, Elbows, Hands, Sacrum. Buttock, Coccyx, Ischium, Legs. Feet and Heels, and Under Medical Devices         Does the Patient have a Wound? No noted wound(s)       Zach Prevention initiated by RN: Yes  Wound Care Orders initiated by RN: No    Pressure Injury (Stage 3,4, Unstageable, DTI, NWPT, and Complex wounds) if present, place Wound referral order by RN under : No    New Ostomies, if present place, Ostomy referral order under : No     Nurse 1 eSignature: Electronically signed by Shira Hazel RN on 1/19/24 at 8:18 PM EST    **SHARE this note so that the co-signing nurse can place an eSignature**    Nurse 2 eSignature: {Esignature:[id number]}    
4 Eyes Skin Assessment     NAME:  Fabio Beauchamp  YOB: 1965  MEDICAL RECORD NUMBER:  [id number]    The patient is being assessed for  Transfer from another unit:    I agree that at least one RN has performed a thorough Head to Toe Skin Assessment on the patient. ALL assessment sites listed below have been assessed.      Areas assessed by both nurses:    Head, Face, Ears, Shoulders, Back, Chest, Arms, Elbows, Hands, Sacrum. Buttock, Coccyx, Ischium, Legs. Feet and Heels, and Under Medical Devices         Does the Patient have a Wound? Yes wound(s) were present on assessment. LDA wound assessment was Initiated and completed by RN       Zach Prevention initiated by RN: Yes  Wound Care Orders initiated by RN: Yes    Pressure Injury (Stage 3,4, Unstageable, DTI, NWPT, and Complex wounds) if present, place Wound referral order by RN under : No    New Ostomies, if present place, Ostomy referral order under : No     Nurse 1 eSignature: Electronically signed by Nichole Osman RN on 1/24/24 at 1:15 AM EST    **SHARE this note so that the co-signing nurse can place an eSignature**    Nurse 2 eSignature: Electronically signed by Anne Kimble RN on 1/24/24 at 1:20 AM EST    
Assessment completed and medications given. Patient is A&Ox2 and denies any needs at this time.  Call light and personal belongings are within reach. Standard safety measures in place.  
Attempted to call report to Karol Fontanez. Received message \"this person is unavailable, please leave message\". Voicemail message left for nurse to return call to [phone] for report.   Electronically signed by Ailin Machuca RN on 1/25/2024 at 7:45 PM    
Clinical Pharmacy Note  Vancomycin Consult    Fabio Beauchamp is a 58 y.o. male ordered vancomycin for MRSA pneumonia and sepsis of unknown etiology; consult received from Dr. Tovar to manage therapy. Also receiving cefepime.    Allergies:  Carbamazepine, Chlorpromazine, Keflex [cephalexin], and Thioridazine     Temp max:  Temp (24hrs), Av.4 °F (36.9 °C), Min:97.3 °F (36.3 °C), Max:99.2 °F (37.3 °C)      Recent Labs     24  1333 24  0305 24  0538   WBC 4.0 8.0 7.3       Recent Labs     24  1826 24  0305 24  0538 24  2150   BUN 45* 41* 32*  --    CREATININE 1.1 1.3 1.6* 1.3         Intake/Output Summary (Last 24 hours) at 2024 0003  Last data filed at 2024 1828  Gross per 24 hour   Intake 1489.3 ml   Output 1620 ml   Net -130.7 ml       Culture Results:  MRSA in sputum 24    Ht Readings from Last 1 Encounters:   24 1.88 m (6' 2\")        Wt Readings from Last 1 Encounters:   24 (!) 181.2 kg (399 lb 7.6 oz)         Estimated Creatinine Clearance: 108 mL/min (based on SCr of 1.3 mg/dL).    Assessment:  Day # 3 of vancomycin.  Current regimen: 750 mg every 12 hours  Vancomycin level: 17.4 mg/L  Predicted AUC: 514    Plan:  Continue current regimen.    Monitor Scr closely. Next vanc trough scheduled for 24 at 1000      Thank you for the consult.     Fabi Holguin, TatiD      
Clinical Pharmacy Note  Vancomycin Consult    Fabio Beauchamp is a 58 y.o. male ordered vancomycin for sepsis of unknown etiology; consult received from Dr. Tovar to manage therapy. Also receiving cefepime.    Allergies:  Carbamazepine, Chlorpromazine, Keflex [cephalexin], and Thioridazine     Temp max:  Temp (24hrs), Av.4 °F (31.3 °C), Min:87 °F (30.6 °C), Max:90.5 °F (32.5 °C)      Recent Labs     24  1333   WBC 4.0       Recent Labs     24  1257 24  1826   BUN 46* 45*   CREATININE 1.3 1.1         Intake/Output Summary (Last 24 hours) at 2024  Last data filed at 2024 2000  Gross per 24 hour   Intake --   Output 4550 ml   Net -4550 ml       Culture Results:  Pending    Ht Readings from Last 1 Encounters:   24 1.88 m (6' 2\")        Wt Readings from Last 1 Encounters:   24 (!) 179 kg (394 lb 10 oz)         Estimated Creatinine Clearance: 127 mL/min (based on SCr of 1.1 mg/dL).    Assessment/Plan:    Loading dose 2000mg x1    Day # 1 of vancomycin.  Vancomycin 1000 mg IV every 12 hours.    Goal -600  Predicted     Vanc trough scheduled for 24    Thank you for the consult.     Fabi Holguin, PharmD      
Clinical Pharmacy Note  Vancomycin Consult    Fabio Beauchamp is a 58 y.o. male ordered vancomycin for sepsis of unknown etiology; consult received from Dr. Tovar to manage therapy. Also receiving cefepime.    Allergies:  Carbamazepine, Chlorpromazine, Keflex [cephalexin], and Thioridazine     Temp max:  Temp (24hrs), Av.4 °F (36.3 °C), Min:97.3 °F (36.3 °C), Max:97.5 °F (36.4 °C)      Recent Labs     24  1333 24  0305   WBC 4.0 8.0       Recent Labs     24  1257 24  1826 24  0305   BUN 46* 45* 41*   CREATININE 1.3 1.1 1.3         Intake/Output Summary (Last 24 hours) at 2024 0033  Last data filed at 2024 2120  Gross per 24 hour   Intake 1341.73 ml   Output 2500 ml   Net -1158.27 ml       Culture Results:  MRSA in sputum on 24    Ht Readings from Last 1 Encounters:   24 1.88 m (6' 2\")        Wt Readings from Last 1 Encounters:   24 (!) 178 kg (392 lb 6.7 oz)         Estimated Creatinine Clearance: 107 mL/min (based on SCr of 1.3 mg/dL).    Assessment:  Day # 2 of vancomycin.  Current regimen: 1000 mg every 12 hours  Vancomycin level: 19 mg/L (trough)    Plan:  Change regimen to 750 mg every 12 hours.      Thank you for the consult.      Fabi Holguin, TatiD    
Clinical Pharmacy Note  Vancomycin Consult    Fabio Beauchamp is a 58 y.o. male ordered vancomycin for sepsis; consult received from Dr. Tovar to manage therapy. Also receiving ceftriaxone.    Allergies:  Carbamazepine, Chlorpromazine, Keflex [cephalexin], and Thioridazine     Temp max:  Temp (24hrs), Av.3 °F (36.8 °C), Min:97.7 °F (36.5 °C), Max:98.6 °F (37 °C)      Recent Labs     24  0410 24  0429   WBC 8.1 5.7       Recent Labs     24  0410 24  1745 24  0429   BUN 27* 23* 20   CREATININE 1.3 1.2 1.3         Intake/Output Summary (Last 24 hours) at 2024 1101  Last data filed at 2024 0610  Gross per 24 hour   Intake 2841.32 ml   Output 2085 ml   Net 756.32 ml     Culture Results:  24 PNA panel - MRSA and Moraxella catarrhalis   24 respiratory culture - Moraxella catarrhalis  24 blood cultures - no growth after 4 days    Ht Readings from Last 1 Encounters:   24 1.88 m (6' 2\")        Wt Readings from Last 1 Encounters:   24 (!) 181.4 kg (399 lb 14.6 oz)         Estimated Creatinine Clearance: 108 mL/min (based on SCr of 1.3 mg/dL).    Assessment:  Day # 6 of vancomycin.  Current regimen: 750 mg every 12 hours  Vancomycin level: 15.9 mg/L  Predicted AUC: 446    Plan:  Continue current regimen.    Thank you for the consult.      Fatimah Schafer PharmD  2024 11:03 AM    
Comprehensive Nutrition Assessment    Type and Reason for Visit:  Initial, Consult (TF ordering and management)    Nutrition Recommendations/Plan:   If not extubated in the next 24 hours recommend Vital HP start at 20 ml/hr.  Increase by 10 ml every 4 hours to goal rate of 60 ml/hr (based on 20 hours) providing 1200 calories, 105 grams of protein, 912 ml free water.    Water flush 60 ml every 4 hours.    Prosource TF once per day, flush 30 ml before and after administration providing an additional 80 calories and 20 grams of protein.     Malnutrition Assessment:  Malnutrition Status:  No malnutrition (01/20/24 1334)      Nutrition Assessment:    Patient admitted with acute respiratory failure with hypoxia and hypercapnia.  NPO at this time.  Currently on vent support with possible extubation in the next 24 hours.  Will provide TF recommendations in case needed on 1/21.  Propofol at 15 mcg/kg/min providing 423 calories from fat.  Levo off.  Although BM greater than 50, patient is at risk for nutrition decline with increased protein needs and limited access to nutrition.    Nutrition Related Findings:    non pitting edema; labs reviewed 1/20 Wound Type:  (sacrum redness)       Current Nutrition Intake & Therapies:    Average Meal Intake: NPO  Average Supplements Intake: NPO  Diet NPO  Current Tube Feeding (TF) Orders:  Goal TF & Flush Orders Provides: Vital HP start at 20 ml/hr.  Increase by 10 ml every 4 hours to goal rate of 60 ml/hr (based on 20 hours) providing 1200 calories, 105 grams of protein, 912 ml free water.  Water flush 60 ml every 4 hours.  Prosource TF once per day, flush 30 ml before and after administration providing an additional 80 calories and 20 grams of protein.    Anthropometric Measures:  Height: 188 cm (6' 2\")  Ideal Body Weight (IBW): 190 lbs (86 kg)       Current Body Weight: 178 kg (392 lb 6.7 oz),   IBW. Weight Source: Bed Scale  Current BMI (kg/m2): 50.4                          BMI 
Comprehensive Nutrition Assessment    Type and Reason for Visit:  Reassess    Nutrition Recommendations/Plan:   Continue current diet.      Malnutrition Assessment:  Malnutrition Status:  No malnutrition (01/24/24 1232)    Context:  Acute Illness     Findings of the 6 clinical characteristics of malnutrition:  Energy Intake:  No significant decrease in energy intake  Weight Loss:  5% over 1 month       Nutrition Assessment:    FU - Pt. extubated on 1/21. Diet started on 1/22, upgraded to regular on 1/23. Meal intakes appear adequate, 76% and up since start of regular diet. Weight fluctuations noted, not intervention needed as diet acceptance is appropriate. Noted pressure area to coccyx, will monitor need for ONS to help with wound healing.    Nutrition Related Findings:    Nutrition related labs reviewed, most recent . LBM 1/23. Skin w/ area to coccyx. Wound Type: Stage II       Current Nutrition Intake & Therapies:    Average Meal Intake: %  Average Supplements Intake: None Ordered  ADULT DIET; Regular    Anthropometric Measures:  Height: 188 cm (6' 2\")  Ideal Body Weight (IBW): 190 lbs (86 kg)       Current Body Weight: 181.4 kg (399 lb 14.6 oz),   IBW. Weight Source: Bed Scale  Current BMI (kg/m2): 51.3                          BMI Categories: Obese Class 3 (BMI 40.0 or greater)    Estimated Daily Nutrient Needs:  Energy Requirements Based On: Kcal/kg  Weight Used for Energy Requirements: Ideal  Energy (kcal/day): 9459-1748 (20-22 kcal/86.4 kg)  Weight Used for Protein Requirements: Ideal  Protein (g/day): 130-155 (1.5-1.8 g/86.4 kg)  Method Used for Fluid Requirements: 1 ml/kcal  Fluid (ml/day): 240-1280mL    Nutrition Diagnosis:   Altered nutrition-related lab values related to endocrine dysfuntion as evidenced by lab values    Nutrition Interventions:   Food and/or Nutrient Delivery: Continue Current Diet  Nutrition Education/Counseling: Education not indicated  Coordination of Nutrition Care: 
Dr. Pablo Hung consulted for bradycardia. MD notified of 12-lead EKG results and  bradycardia of 30'-40's bmp. No new orders at this time.   
Facility/Department: [address] ICU  Initial Assessment  DYSPHAGIA BEDSIDE SWALLOW EVALUATION     Patient: Fabio Beauchamp   : 1965   MRN: [id number]      Evaluation Date: 2024   Admitting Diagnosis:   Hyperkalemia [E87.5]  Bradycardia [R00.1]  Acute respiratory failure with hypoxia and hypercapnia (HCC) [J96.01, J96.02]  Gisselle coma scale total score 3-8, at arrival to emergency department (HCC) [R40.2432]    Pain: Did not state                                                       CHART REVIEW:  2024 admitted with c/o AMS  MD ADMISSION H&P HPI:  Patient is a 58-year-old male with past medical history of hypertension, schizoaffective disorder, diabetes mellitus who presents to the ED due to unresponsiveness  - Patient was brought to the ED via EMS after being found nonresponsive, last known normal was earlier this morning.  On EMS arrival, patient was noted to have pinpoint pupils, given Narcan with no improvement or response.  On arrival in the ED he was noted to be in sinus bradycardia with a heart rate in the 30s, he was given 1 mg of atropine with minimal improvement, patient was placed on Levophed, intubated for airway protection.  He has been admitted for further evaluation and management.    2024 INTUBATED  2024 EXTUBATED  2024 code stroke    IMAGING:  CXR: 2024  IMPRESSION:  1. Cardiomegaly with pulmonary vascular congestion.  2. Supporting lines and tubes as above.    CT CHEST: 2024  IMPRESSION:  1. Right upper lobe and right lower lobe pneumonia.  2. Cardiomegaly with pulmonary hypertension.  3. Cholelithiasis.  4. Right nephrolithiasis.    CT HEAD: 2024  IMPRESSION:  No acute intracranial abnormality.  Stable CT.    CTA HEAD/NECK: 2024  IMPRESSION:  No acute abnormality or flow limiting stenosis of the major arteries of the head and neck.  Airspace opacities at the lung apices, right more than left, likely related to pneumonia versus mild pulmonary 
Handoff given to Wilma RN. Both RN's reviewed pts chart together. All questions answered.     Electronically signed by Cassidy Pruitt RN on 1/21/2024 at 5:10 AM     
Medication Reconciliation    List of medications patient is currently taking is complete.     Source of information: 1. Medication list from [facility]     John Underwood RPH, PharmD, BCPS  1/19/2024 6:31 PM              
NIH scale orders placed while patient was in ED. Patient head CT negative for stroke. Tisha stroke coordinator contacted and after chart review stated if provider is agreeable to d/c NIH, that would be appropriate. NP contacted and d/c NIH scale orders at this time.   
Occupational Therapy    OT referral received and chart reviewed. To bedside, pt adamantly refusing OT Eval today despite gentle encouragement and education on importance of mobility. Pt admit from LTC setting; will follow-up as schedule and pt condition permit.    Electronically signed by Arianna Jimenez OT on 1/23/2024 at 9:46 AM    
Occupational Therapy  Fabio Nito  [id number]  [id number]    OT orders received and pt's chart reviewed. Therapist to room, attempted to see pt for OT assessment. Pt agitated upon therapist arrival, became verbally aggressive with discussion. Unable to tolerate OOB activity at this time d/t agitation/aggression. RN and PCA to room & made aware. Will attempt again later as appropriate/therapy schedule permits.     Cathi Bravo, OTR/L [id number]    
PCA called and notified this RN pt refused vitals and blood sugar check. Electronically signed by Ailin Mccurdy RN on 1/24/2024 at 6:57 PM    
PT AAO x4 per this shift. Pt hyper vocal. Pt kelli pain. Pt ok with medication and lab draws this AM. Pt stating he does not want to go back to Nursing facility. VSS. Good PO intake. No further needs voiced at this time. Fall precautions in place. Bed alarm on. Call light within reach. Will continue care. Electronically signed by Nichole Osman RN on 1/24/2024 at 6:28 AM    
Patient BP 76/49 MAP 59. NP notified, starting levo per order.   
Patient HR ranging from 34-low 40s. NP contacted and ordered dobutamine for MAP goal of 65 and d/c levo.   
Patient SBP is currently 180, currently on 4 of dobutamine. NP contacted and changed to titrate to a MAP goal of 62 due to hypertension.   
Patient awake and alert, resting in bed. VS stable. Order to downgrade and room ready in 3119. Patient placed on telebox- call placed to [facility]U to verify rhythm- NSR. Handoff report already called per day shift LILY Montoya. Patient leaving floor in the bed with belongings and chart via transporter.  
Patient [facility] to [facility]. [facility] removed. [facility]. All belongings gathered and transported with patient.   Electronically signed by Ailin Machuca RN on 1/25/2024 at 7:46 PM    
Patient extubated at 1208 pm per MD to 4lpm NC. No complications noted  
Patient is alert & oriented x4, resting in bed, on room air. Patient has been verbally aggressive with staff. Patient refuses to turn to let RN assess skin. Patient refusing to move over to specialty bed. Patient educated on turning/ repositioning and importance to protect skin integrity. Patient still refusing despite education. Gets up with maximove. Right forearm IV normal saline locked, flushed easily. 2/4 bed rails up, bed in lowest position, fall precautions in place, bed alarm on, call light within reach. Morning medications given as ordered, tolerated well with thin liquids. No complaints of pain. Denies further needs at this time. Will cont to monitor and reassess.  Electronically signed by Ailin Machuca RN on 1/25/2024 at 2:27 PM    
Patient is refusing to have labs drawn at this time.  
Patient placed on 5/5 per MD. ABG to be obtained in 30 mintues  
Patient refusing labs for 0800. Patient arguing with . Notified Dr. Davidson about refusal.  
Patient remains verbally aggressive with staff. Patient refusing medications and vitals to be taken. RN manager at patient's bedside to educate on medications and need for VS.   Electronically signed by Ailin Machuca RN on 1/25/2024 at 2:30 PM    
Physical Therapy  PT referral received and chart reviewed.  To bedside, pt adamantly refusing PT Eval today despite gentle encouragement.  Pt admit from LTC setting; will follow-up tomorrow as able.  Stephanie Albert, PT    
Physical Therapy  Status Note    1/24/2024  Fabio Nito  [id number]  1021    Per OT note earlier:  OT orders received and pt's chart reviewed. Therapist to room, attempted to see pt for OT assessment. Pt agitated upon therapist arrival, became verbally aggressive with discussion. Unable to tolerate OOB activity at this time d/t agitation/aggression. RN and PCA to room & made aware. Will attempt again later as appropriate/therapy schedule permits.      Cathi Bravo, OTR/L [id number]      PT will also hold therapy until patient able to participate in therapy.    Electronically signed by CAMILLE SANTANA, PT [phone])  on 1/24/2024 at 11:02 AM    
Pt arrived to floor from ICU to  3119 at 2045 via stretcher. Pt oriented to room, call light, policies and procedures, the menu and ordering. Call light within reach. Bed in lowest position, bed alarm on, and wheels locked. Pt verbalized understanding. No complaints, questions or concerns at this time.    
Pt awake in bed being very verbal yelling using inappropriate language towards staff. Pt refused physical therapy entered room with therapy at bedside explaining therapy sessions plan pt still refused physical therapy. Electronically signed by Ailin Mccurdy RN on 1/24/2024 at 6:55 PM    
Pt refused evening medication and being transferred to bariatric speciality mattress. Pt allowed student RN to check BP only. Pt educated on the importance of being transferred to the appropriate mattress, pt still refused being transferred and stated we not here to help him. Explained to pt again that transferring to the bariatric mattress will help to prevent pressure injuries since he already has a stage 2 on his bottom. Pt still refused to be moved and refused turns. Pt was yelling at this RN during entire education session. Pt states he has no other needs at this time. Call light placed within reach. Electronically signed by Ailin Mccurdy RN on 1/24/2024 at 7:02 PM    
Pt refused noon time meds and BP check pt is yelling at staff. Pt states we are poisoning him. Notified pt why his medications were prescribed, pt still refused medications.  Pt refused head to toe assessment. Pt states he has no other needs at this time. Call light placed within reach. Electronically signed by Ailin Mccurdy RN on 1/24/2024 at 6:56 PM    
Pt resting quietly in bed with eyes closed, resp easy and unlabored. IV flushed  without difficulty per orders. No s/s of acute distress noted at this time. Appears comfortable. Call light is within reach. Plan of care continue    
Pt's central line removed per order without complication. Pressure held for five minutes, dressing put into place.   
[facility]   Speech Therapy  Daily Dysphagia Treatment Note    Fabio Beauchamp  AGE: 58 y.o.   GENDER: male  : 1965  [id number]  EPISODE DATE:  2024    Patient Active Problem List   Diagnosis    Cellulitis    HTN (hypertension), malignant    Chest pain    Kidney stone    Pyelonephritis, acute    Bipolar I disorder (HCC)    Hypothermia associated with environmental change    Acute metabolic encephalopathy    Cerebral edema (HCC)    Pulmonary edema    Sinus bradycardia    Hypothyroidism    Schizoaffective disorder (HCC)    Altered mental status    Psychosis (HCC)    Subchronic schizoaffective disorder with acute exacerbation (HCC)    Type 2 diabetes mellitus with complication, without long-term current use of insulin (HCC)    Hyperlipidemia    LADONNA (obstructive sleep apnea)    Morbid obesity with body mass index (BMI) of 50.0 to 59.9 in adult (HCC)    Schizoaffective disorder, chronic condition with acute exacerbation (HCC)    Acute and chronic respiratory failure with hypoxia (HCC)    Acute respiratory failure with hypoxia (HCC)    Influenzal pneumonia    Varicose ulcer of right lower extremity (HCC)    Acute respiratory failure with hypoxia and hypercapnia (HCC)    Chronic systolic heart failure (HCC)     Allergies   Allergen Reactions    Carbamazepine Other (See Comments)     Per chart review 12    Chlorpromazine Other (See Comments)     Per chart review 12    Keflex [Cephalexin] Other (See Comments)     Unknown reaction    Thioridazine Other (See Comments)     Per chart review 12       Treatment Diagnosis: Dysphagia     CHART REVIEW:  2024 admitted with c/o AMS  MD ADMISSION H&P HPI:  Patient is a 58-year-old male with past medical history of hypertension, schizoaffective disorder, diabetes mellitus who presents to the ED due to unresponsiveness  - Patient was brought to the ED via EMS after being found nonresponsive, last known normal was earlier this morning.  On 
This writer called Karol Fontanez to give report for pt. My phone number left to return call d/t being unable to speak with nurse.   
Unable to obtain ABG. 2 unsuccessful punctures on right radial  
Update given to Dion (pt's father).     
ml   Output 2325 ml   Net -886.33 ml         Physical Examination:     Gen:  No acute distress.  Eyes: PERRL. Anicteric sclera. No conjunctival injection.   ENT: No discharge. Posterior oropharynx clear. External appearance of ears and nose normal.  Neck: Trachea midline. No mass   Resp:  diminished bilaterally no wheezing   CV: Regular rate. Regular rhythm. No murmur or rub. + edema.   GI: Soft, Non-tender. Non-distended. +BS  Skin: Warm, dry, w/o erythema.   Lymph: No cervical or supraclavicular LAD.   M/S: No cyanosis. No clubbing.    Neuro:  CN 2-12 tested, no focal neurologic deficit.  Moves all extremities  Psych: Awake and alert, Oriented x 3.  Judgement and insight appropriate.  Mood stable.        Laboratory findings:-    CBC:   Recent Labs     01/22/24  0410   WBC 8.1   HGB 11.2*          BMP:    Recent Labs     01/21/24  2150 01/22/24  0410 01/22/24  1745   NA  --  146* 141   K  --  4.1 4.3   CL  --  107 105   CO2  --  27 28   BUN  --  27* 23*   CREATININE 1.3 1.3 1.2   GLUCOSE  --  87 151*       S. Calcium:  Recent Labs     01/22/24  1745   CALCIUM 8.6       S. Magnesium:  Recent Labs     01/22/24  1745   MG 2.00       S. Phosphorus:  Recent Labs     01/22/24  1745   PHOS 3.9       S. Glucose:  Recent Labs     01/21/24  2131 01/23/24  0814 01/23/24  1104   POCGLU 106* 112* 146*         Radiology Review:  Pertinent images / reports were reviewed as a part of this visit.        Robert Davidson MD, M.D.            1/23/2024, 12:08 PM    
°C)  RESPIRATIONS RANGE: Resp  Av.2  Min: 9  Max: 32  PULSE RANGE: Pulse  Av.5  Min: 64  Max: 91  BLOOD PRESSURE RANGE:  Systolic (24hrs), Av , Min:75 , Max:138   ; Diastolic (24hrs), Av, Min:47, Max:88    24HR INTAKE/OUTPUT:    Intake/Output Summary (Last 24 hours) at 2024 1026  Last data filed at 2024 0951  Gross per 24 hour   Intake 782.2 ml   Output 1740 ml   Net -957.8 ml         Patient Vitals for the past 96 hrs (Last 3 readings):   Weight   24 0600 (!) 181.1 kg (399 lb 4.1 oz)   24 0830 (!) 181.2 kg (399 lb 7.6 oz)   24 0705 (!) 178 kg (392 lb 6.7 oz)         General: awake, alert  HEENT: Normocephalic, atraumatic  Chest: clear to auscultation, no intercostal retractions  CVS: RRR, no murmur, no rub  Abdomen: soft, non tender, no organomegaly  Extremities: 1+ edema  Musculoskeletal: no joint swelling, no visible deformity  Neurological:moving extremities    : cornell in place; no hematuria noted    Allergies:  Allergies   Allergen Reactions    Carbamazepine Other (See Comments)     Per chart review 12    Chlorpromazine Other (See Comments)     Per chart review 12    Keflex [Cephalexin] Other (See Comments)     Unknown reaction    Thioridazine Other (See Comments)     Per chart review 12      LAB DATA:    CBC:   Lab Results   Component Value Date/Time    WBC 8.1 2024 04:10 AM    RBC 4.08 2024 04:10 AM    HGB 11.2 2024 04:10 AM    HCT 34.3 2024 04:10 AM    MCV 84.2 2024 04:10 AM    MCH 27.5 2024 04:10 AM    MCHC 32.6 2024 04:10 AM    RDW 19.9 2024 04:10 AM     2024 04:10 AM    MPV 8.3 2024 04:10 AM     BMP:    Lab Results   Component Value Date/Time     2024 04:10 AM    K 4.1 2024 04:10 AM    K 3.7 2024 05:38 AM     2024 04:10 AM    CO2 27 2024 04:10 AM    BUN 27 2024 04:10 AM    CREATININE 1.3 2024 04:10 AM    CALCIUM 9.0 2024 
[]follows complex commands  []aphasic []dysarthric  [] other:     Respiratory Status: [x]Room Air []O2 via nasal cannula []Other:  Dentition: [x]Adequate []Dentures []Missing Many Teeth []Edentulous []Other:  Patient Positioning: Upright in bed     PO Trials:  Thin Liquids: delayed swallow; decreased laryngeal elevation; NO overt signs/symptoms of penetration/aspiration  Nectar thick liquids  Honey Thick liquids  Puree   Soft food: prolonged but adequate oral prep; delayed swallow; decreased laryngeal elevation; NO overt signs/symptoms of penetration/aspiration  Regular food: prolonged but adequate oral prep - appears comparable to soft; delayed swallow; decreased laryngeal elevation; NO overt signs/symptoms of penetration/aspiration    Dysphagia Tx:   Direct Dysphagia tx: PO trials as described above; rec resume baseline diet of regular/thin  Dysphagia ex: NA  Training in compensatory strategies: set-up for positioning  Pt response to ex/training: agreeable and verbalizes understanding of current recs    Goals:   Pt will functionally tolerate recommended diet with no overt clinical s/s of aspiration met  Pt will functionally tolerate ongoing assessment of swallow function with diet to be determined as indicated met  Pt will advance to least restrictive diet as indicated met    Assessment:   Impressions:   Patient alert and cooperative; conversant; follows dx; oriented to self and place.  Clinically, patient appears to present with mild oral and mild pharyngeal stage dysphagia characterized by decreased mastication, decreased lingual manipulation, delayed swallow, and decreased laryngeal elevation. Prolonged but adequate bolus formation and movement with soft and regular solids; appears comparable across textures. Suspect premature bolus loss to the pharynx with all. No overt signs/symptoms of penetration/aspiration.  Recommend regular diet texture with thin liquids  Discontinue skilled ST d/t max potential achieved 
2.  Additional chronic and benign findings, as described.       CBC:   Recent Labs     01/19/24  1333 01/20/24  0305 01/21/24  0538   WBC 4.0 8.0 7.3   HGB 12.9* 12.8* 11.7*    253 222       BMP:    Recent Labs     01/19/24  1826 01/20/24  0305 01/21/24  0538    140 144   K 4.5 3.9 3.7    97* 104   CO2 29 29 29   BUN 45* 41* 32*   CREATININE 1.1 1.3 1.6*   GLUCOSE 139* 92 124*       Hepatic:   Recent Labs     01/19/24  1826 01/20/24  0305 01/21/24  0538   AST 22 25 22   ALT 13 16 12   BILITOT <0.2 0.3 <0.2   ALKPHOS 96 93 78       Lipids:   Lab Results   Component Value Date/Time    CHOL 282 03/12/2018 09:44 AM    HDL 56 03/12/2018 09:44 AM    TRIG 143 03/12/2018 09:44 AM     Hemoglobin A1C:   Lab Results   Component Value Date/Time    LABA1C 6.3 07/17/2022 04:33 AM     TSH:   Lab Results   Component Value Date/Time    TSH 17.34 07/19/2019 09:10 PM     Troponin: No results found for: \"TROPONINT\"  Lactic Acid:   Recent Labs     01/19/24  1257 01/19/24  1826   LACTA 0.9 1.5       BNP:   Recent Labs     01/19/24  1544   PROBNP 46       UA:  Lab Results   Component Value Date/Time    NITRU Negative 01/19/2024 01:10 PM    COLORU Yellow 01/19/2024 01:10 PM    PHUR 6.0 01/19/2024 01:10 PM    PHUR 5.0 01/19/2024 01:10 PM    WBCUA 0 01/19/2024 01:10 PM    RBCUA 1 01/19/2024 01:10 PM    MUCUS Rare 07/16/2022 07:26 AM    BACTERIA None Seen 01/19/2024 01:10 PM    CLARITYU Clear 01/19/2024 01:10 PM    SPECGRAV 1.028 01/19/2024 01:10 PM    LEUKOCYTESUR Negative 01/19/2024 01:10 PM    UROBILINOGEN 0.2 01/19/2024 01:10 PM    BILIRUBINUR Negative 01/19/2024 01:10 PM    BLOODU Negative 01/19/2024 01:10 PM    GLUCOSEU >=1000 01/19/2024 01:10 PM    KETUA Negative 01/19/2024 01:10 PM     Urine Cultures:   Lab Results   Component Value Date/Time    LABURIN  07/20/2019 12:16 AM     <50,000 CFU/ml mixed skin/urogenital joe. No further workup     Blood Cultures:   Lab Results   Component Value Date/Time    BC  
umbilical hernia.  Severe compression deformity of L2 is again noted.     1.  Findings compatible diarrheal process.  Mild colonic distension is noted with mild mesenteric swirling but no evidence for volvulus.  This may be due to a partial obstructing process due to an underlying internal hernia.  No significant small bowel distension. 2.  Additional chronic and benign findings, as described.       CBC:   Recent Labs     01/20/24  0305 01/21/24  0538 01/22/24  0410   WBC 8.0 7.3 8.1   HGB 12.8* 11.7* 11.2*    222 203       BMP:    Recent Labs     01/20/24  0305 01/21/24  0538 01/21/24  2150 01/22/24  0410    144  --  146*   K 3.9 3.7  --  4.1   CL 97* 104  --  107   CO2 29 29  --  27   BUN 41* 32*  --  27*   CREATININE 1.3 1.6* 1.3 1.3   GLUCOSE 92 124*  --  87       Hepatic:   Recent Labs     01/20/24  0305 01/21/24  0538 01/22/24  0410   AST 25 22 23   ALT 16 12 12   BILITOT 0.3 <0.2 0.3   ALKPHOS 93 78 78       Lipids:   Lab Results   Component Value Date/Time    CHOL 140 01/22/2024 04:10 AM    HDL 44 01/22/2024 04:10 AM    TRIG 115 01/22/2024 04:10 AM     Hemoglobin A1C:   Lab Results   Component Value Date/Time    LABA1C 6.4 01/22/2024 04:10 AM     TSH:   Lab Results   Component Value Date/Time    TSH 17.34 07/19/2019 09:10 PM     Troponin: No results found for: \"TROPONINT\"  Lactic Acid:   Recent Labs     01/19/24  1826   LACTA 1.5       BNP:   No results for input(s): \"PROBNP\" in the last 72 hours.    UA:  Lab Results   Component Value Date/Time    NITRU Negative 01/19/2024 01:10 PM    COLORU Yellow 01/19/2024 01:10 PM    PHUR 6.0 01/19/2024 01:10 PM    PHUR 5.0 01/19/2024 01:10 PM    WBCUA 0 01/19/2024 01:10 PM    RBCUA 1 01/19/2024 01:10 PM    MUCUS Rare 07/16/2022 07:26 AM    BACTERIA None Seen 01/19/2024 01:10 PM    CLARITYU Clear 01/19/2024 01:10 PM    SPECGRAV 1.028 01/19/2024 01:10 PM    LEUKOCYTESUR Negative 01/19/2024 01:10 PM    UROBILINOGEN 0.2 01/19/2024 01:10 PM    BILIRUBINUR 
<50,000 CFU/ml mixed skin/urogenital joe. No further workup     Blood Cultures:   Lab Results   Component Value Date/Time    BC No Growth after 4 days of incubation. 01/19/2024 02:20 PM     Lab Results   Component Value Date/Time    BLOODCULT2 No Growth after 4 days of incubation. 01/19/2024 07:16 PM     Organism:   Lab Results   Component Value Date/Time    ORG Moraxella catarrhalis 01/20/2024 08:45 AM    ORG Moraxella catarrhalis BY DNA 01/20/2024 08:45 AM    ORG Staph aureus MRSA DNA Detected 01/20/2024 08:45 AM         Assessment and Recommendations   Fabio Beauchamp is a 58 y.o. male who presents with AMS/     Acute hypoxic/hypercapnic respiratory failure  Respiratory acidosis.   Intubated on presentation, currently on MV. TiO2 50.   Vent management as per ICU team.   Extubated 1/22   Currently on room air.     Pneumonia.   Continue on cefepime/ vancomycin--> de-escalted to ceftriaxone and vancomycin ( would continue for 7 days ) .   Follow pneumonia work up ( maroxella + )   MRSA swab positive.   Follow blood cultures. ( NTD)    Sinus bradycardia  Currently requiring dopamine to maintain HR in the 60s, HR improving ( weaned off)   Seen by cardiology, was on BB as OP.   No plan for pace maker at the time being.   HR maintained in 70s off dopamine     Chronic systolic heart failure.   Concern for possible pulmonary edema   Echo pending, last echo with EF 40-45% , was initially treated with lasix that was later discontinues due to hypotension.   Will continue on gentle hydration for today.       schizoaffective disorder  Continue home olanzapine.   Continue valproic acid     Rising cr.   Hyperkalemia   1.6---> 1.3  Improving.   Refused labs 1/23 and 1/24  Appreciate nephrology input     Acute encephalopathy.   Drug screen negative, cause continues to be unclear, CT head negative.   patient is intubated for airway protection  AMS resolved.   Code stroke was called 1/21 for slurred speech, CT head negative

## 2024-03-13 ENCOUNTER — APPOINTMENT (OUTPATIENT)
Dept: GENERAL RADIOLOGY | Age: 59
End: 2024-03-13
Payer: MEDICAID

## 2024-03-13 ENCOUNTER — HOSPITAL ENCOUNTER (INPATIENT)
Age: 59
LOS: 9 days | Discharge: NURSING, REHAB OR LONG-TERM CARE | End: 2024-03-22
Attending: FAMILY MEDICINE | Admitting: FAMILY MEDICINE
Payer: MEDICAID

## 2024-03-13 ENCOUNTER — APPOINTMENT (OUTPATIENT)
Dept: CT IMAGING | Age: 59
End: 2024-03-13
Payer: MEDICAID

## 2024-03-13 DIAGNOSIS — N17.9 ACUTE KIDNEY INJURY (HCC): ICD-10-CM

## 2024-03-13 DIAGNOSIS — E03.9 HYPOTHYROIDISM, UNSPECIFIED TYPE: ICD-10-CM

## 2024-03-13 DIAGNOSIS — R06.89 HYPERCARBIA: ICD-10-CM

## 2024-03-13 DIAGNOSIS — E13.65 OTHER SPECIFIED DIABETES MELLITUS WITH HYPERGLYCEMIA, UNSPECIFIED WHETHER LONG TERM INSULIN USE (HCC): ICD-10-CM

## 2024-03-13 DIAGNOSIS — E87.5 HYPERKALEMIA: ICD-10-CM

## 2024-03-13 DIAGNOSIS — T68.XXXA HYPOTHERMIA, INITIAL ENCOUNTER: ICD-10-CM

## 2024-03-13 DIAGNOSIS — E87.29 METABOLIC ACIDOSIS, INCREASED ANION GAP: ICD-10-CM

## 2024-03-13 DIAGNOSIS — N39.0 URINARY TRACT INFECTION WITHOUT HEMATURIA, SITE UNSPECIFIED: ICD-10-CM

## 2024-03-13 DIAGNOSIS — G93.41 ACUTE METABOLIC ENCEPHALOPATHY: Primary | ICD-10-CM

## 2024-03-13 PROBLEM — R65.10 SIRS (SYSTEMIC INFLAMMATORY RESPONSE SYNDROME) (HCC): Status: ACTIVE | Noted: 2024-03-13

## 2024-03-13 PROBLEM — E03.5 MYXEDEMA COMA (HCC): Status: ACTIVE | Noted: 2024-03-13

## 2024-03-13 PROBLEM — D64.9 NORMOCYTIC ANEMIA: Status: ACTIVE | Noted: 2024-03-13

## 2024-03-13 PROBLEM — E11.65 HYPERGLYCEMIA DUE TO TYPE 2 DIABETES MELLITUS (HCC): Status: ACTIVE | Noted: 2024-03-13

## 2024-03-13 LAB
ALBUMIN SERPL-MCNC: 2.9 G/DL (ref 3.4–5)
ALBUMIN/GLOB SERPL: 0.5 {RATIO} (ref 1.1–2.2)
ALP SERPL-CCNC: 138 U/L (ref 40–129)
ALT SERPL-CCNC: 126 U/L (ref 10–40)
ANION GAP SERPL CALCULATED.3IONS-SCNC: 17 MMOL/L (ref 3–16)
ANISOCYTOSIS BLD QL SMEAR: ABNORMAL
AST SERPL-CCNC: 117 U/L (ref 15–37)
BACTERIA URNS QL MICRO: ABNORMAL /HPF
BASE EXCESS BLDV CALC-SCNC: -2.2 MMOL/L
BASOPHILS # BLD: 0 K/UL (ref 0–0.2)
BASOPHILS NFR BLD: 0 %
BETA-HYDROXYBUTYRATE: 0.12 MMOL/L (ref 0–0.27)
BILIRUB SERPL-MCNC: <0.2 MG/DL (ref 0–1)
BILIRUB UR QL STRIP.AUTO: NEGATIVE
BUN SERPL-MCNC: 122 MG/DL (ref 7–20)
BURR CELLS BLD QL SMEAR: ABNORMAL
CALCIUM SERPL-MCNC: 9.2 MG/DL (ref 8.3–10.6)
CALCIUM SERPL-MCNC: 9.3 MG/DL (ref 8.3–10.6)
CHLORIDE SERPL-SCNC: 103 MMOL/L (ref 99–110)
CK SERPL-CCNC: 61 U/L (ref 39–308)
CLARITY UR: ABNORMAL
CO2 BLDV-SCNC: 28 MMOL/L
CO2 SERPL-SCNC: 20 MMOL/L (ref 21–32)
COHGB MFR BLDV: 1.3 %
COLOR UR: YELLOW
CREAT SERPL-MCNC: 3.2 MG/DL (ref 0.9–1.3)
CREAT UR-MCNC: 37.6 MG/DL (ref 39–259)
CRP SERPL-MCNC: 256.4 MG/L (ref 0–5.1)
DEPRECATED RDW RBC AUTO: 22.4 % (ref 12.4–15.4)
EOSINOPHIL # BLD: 0.1 K/UL (ref 0–0.6)
EOSINOPHIL NFR BLD: 1 %
EPI CELLS #/AREA URNS AUTO: 0 /HPF (ref 0–5)
GFR SERPLBLD CREATININE-BSD FMLA CKD-EPI: 22 ML/MIN/{1.73_M2}
GLUCOSE BLD-MCNC: 94 MG/DL (ref 70–99)
GLUCOSE SERPL-MCNC: 195 MG/DL (ref 70–99)
GLUCOSE UR STRIP.AUTO-MCNC: >=1000 MG/DL
HCO3 BLDV-SCNC: 26 MMOL/L (ref 23–29)
HCT VFR BLD AUTO: 28.1 % (ref 40.5–52.5)
HGB BLD-MCNC: 9.3 G/DL (ref 13.5–17.5)
HGB UR QL STRIP.AUTO: ABNORMAL
HYALINE CASTS #/AREA URNS AUTO: 2 /LPF (ref 0–8)
KETONES UR STRIP.AUTO-MCNC: NEGATIVE MG/DL
LACTATE BLDV-SCNC: 1.7 MMOL/L (ref 0.4–2)
LEUKOCYTE ESTERASE UR QL STRIP.AUTO: ABNORMAL
LYMPHOCYTES # BLD: 0.7 K/UL (ref 1–5.1)
LYMPHOCYTES NFR BLD: 8 %
MAGNESIUM SERPL-MCNC: 1.9 MG/DL (ref 1.8–2.4)
MCH RBC QN AUTO: 27.2 PG (ref 26–34)
MCHC RBC AUTO-ENTMCNC: 33.2 G/DL (ref 31–36)
MCV RBC AUTO: 82 FL (ref 80–100)
METHGB MFR BLDV: 0.7 %
MICROCYTES BLD QL SMEAR: ABNORMAL
MONOCYTES # BLD: 0.5 K/UL (ref 0–1.3)
MONOCYTES NFR BLD: 5 %
MYELOCYTES NFR BLD MANUAL: 3 %
NEUTROPHILS # BLD: 7.7 K/UL (ref 1.7–7.7)
NEUTROPHILS NFR BLD: 81 %
NEUTS BAND NFR BLD MANUAL: 2 % (ref 0–7)
NITRITE UR QL STRIP.AUTO: NEGATIVE
NT-PROBNP SERPL-MCNC: 260 PG/ML (ref 0–124)
O2 THERAPY: ABNORMAL
OVALOCYTES BLD QL SMEAR: ABNORMAL
PCO2 BLDV: 63.1 MMHG (ref 40–50)
PERFORMED ON: NORMAL
PH BLDV: 7.22 [PH] (ref 7.35–7.45)
PH UR STRIP.AUTO: 6.5 [PH] (ref 5–8)
PLATELET # BLD AUTO: 500 K/UL (ref 135–450)
PMV BLD AUTO: 7.2 FL (ref 5–10.5)
PO2 BLDV: 39 MMHG
POIKILOCYTOSIS BLD QL SMEAR: ABNORMAL
POTASSIUM SERPL-SCNC: 5.8 MMOL/L (ref 3.5–5.1)
POTASSIUM UR-SCNC: 10.4 MMOL/L
PROCALCITONIN SERPL IA-MCNC: 0.93 NG/ML (ref 0–0.15)
PROT SERPL-MCNC: 8.7 G/DL (ref 6.4–8.2)
PROT UR STRIP.AUTO-MCNC: 300 MG/DL
RBC # BLD AUTO: 3.42 M/UL (ref 4.2–5.9)
RBC CLUMPS #/AREA URNS AUTO: 217 /HPF (ref 0–4)
SAO2 % BLDV: 63 %
SICKLE CELLS BLD QL SMEAR: ABNORMAL
SODIUM SERPL-SCNC: 140 MMOL/L (ref 136–145)
SODIUM UR-SCNC: 42 MMOL/L
SP GR UR STRIP.AUTO: 1.01 (ref 1–1.03)
T4 FREE SERPL-MCNC: 0.7 NG/DL (ref 0.9–1.8)
TARGETS BLD QL SMEAR: ABNORMAL
TROPONIN, HIGH SENSITIVITY: 14 NG/L (ref 0–22)
TSH SERPL DL<=0.005 MIU/L-ACNC: 26.3 UIU/ML (ref 0.27–4.2)
UA COMPLETE W REFLEX CULTURE PNL UR: YES
UA DIPSTICK W REFLEX MICRO PNL UR: YES
URN SPEC COLLECT METH UR: ABNORMAL
UROBILINOGEN UR STRIP-ACNC: 0.2 E.U./DL
UUN UR-MCNC: 489.6 MG/DL (ref 800–1666)
VALPROATE SERPL-MCNC: 21 UG/ML (ref 50–100)
WBC # BLD AUTO: 9 K/UL (ref 4–11)
WBC #/AREA URNS AUTO: 227 /HPF (ref 0–5)

## 2024-03-13 PROCEDURE — 96375 TX/PRO/DX INJ NEW DRUG ADDON: CPT

## 2024-03-13 PROCEDURE — 93005 ELECTROCARDIOGRAM TRACING: CPT | Performed by: NURSE PRACTITIONER

## 2024-03-13 PROCEDURE — 96361 HYDRATE IV INFUSION ADD-ON: CPT

## 2024-03-13 PROCEDURE — 2000000000 HC ICU R&B

## 2024-03-13 PROCEDURE — 2500000003 HC RX 250 WO HCPCS: Performed by: INTERNAL MEDICINE

## 2024-03-13 PROCEDURE — 85025 COMPLETE CBC W/AUTO DIFF WBC: CPT

## 2024-03-13 PROCEDURE — 96368 THER/DIAG CONCURRENT INF: CPT

## 2024-03-13 PROCEDURE — P9041 ALBUMIN (HUMAN),5%, 50ML: HCPCS | Performed by: INTERNAL MEDICINE

## 2024-03-13 PROCEDURE — 80053 COMPREHEN METABOLIC PANEL: CPT

## 2024-03-13 PROCEDURE — 2580000003 HC RX 258: Performed by: NURSE PRACTITIONER

## 2024-03-13 PROCEDURE — 2580000003 HC RX 258: Performed by: INTERNAL MEDICINE

## 2024-03-13 PROCEDURE — 87186 SC STD MICRODIL/AGAR DIL: CPT

## 2024-03-13 PROCEDURE — 71045 X-RAY EXAM CHEST 1 VIEW: CPT

## 2024-03-13 PROCEDURE — 87077 CULTURE AEROBIC IDENTIFY: CPT

## 2024-03-13 PROCEDURE — 82310 ASSAY OF CALCIUM: CPT

## 2024-03-13 PROCEDURE — 84484 ASSAY OF TROPONIN QUANT: CPT

## 2024-03-13 PROCEDURE — 6360000002 HC RX W HCPCS: Performed by: NURSE PRACTITIONER

## 2024-03-13 PROCEDURE — 6360000002 HC RX W HCPCS: Performed by: FAMILY MEDICINE

## 2024-03-13 PROCEDURE — 6370000000 HC RX 637 (ALT 250 FOR IP): Performed by: NURSE PRACTITIONER

## 2024-03-13 PROCEDURE — 84300 ASSAY OF URINE SODIUM: CPT

## 2024-03-13 PROCEDURE — 87040 BLOOD CULTURE FOR BACTERIA: CPT

## 2024-03-13 PROCEDURE — 82550 ASSAY OF CK (CPK): CPT

## 2024-03-13 PROCEDURE — 2580000003 HC RX 258: Performed by: FAMILY MEDICINE

## 2024-03-13 PROCEDURE — 82803 BLOOD GASES ANY COMBINATION: CPT

## 2024-03-13 PROCEDURE — 96365 THER/PROPH/DIAG IV INF INIT: CPT

## 2024-03-13 PROCEDURE — 84145 PROCALCITONIN (PCT): CPT

## 2024-03-13 PROCEDURE — 2500000003 HC RX 250 WO HCPCS: Performed by: FAMILY MEDICINE

## 2024-03-13 PROCEDURE — 51702 INSERT TEMP BLADDER CATH: CPT

## 2024-03-13 PROCEDURE — 6360000002 HC RX W HCPCS: Performed by: INTERNAL MEDICINE

## 2024-03-13 PROCEDURE — 82570 ASSAY OF URINE CREATININE: CPT

## 2024-03-13 PROCEDURE — 82010 KETONE BODYS QUAN: CPT

## 2024-03-13 PROCEDURE — 2500000003 HC RX 250 WO HCPCS: Performed by: NURSE PRACTITIONER

## 2024-03-13 PROCEDURE — 80164 ASSAY DIPROPYLACETIC ACD TOT: CPT

## 2024-03-13 PROCEDURE — 70450 CT HEAD/BRAIN W/O DYE: CPT

## 2024-03-13 PROCEDURE — 36415 COLL VENOUS BLD VENIPUNCTURE: CPT

## 2024-03-13 PROCEDURE — 83880 ASSAY OF NATRIURETIC PEPTIDE: CPT

## 2024-03-13 PROCEDURE — 83605 ASSAY OF LACTIC ACID: CPT

## 2024-03-13 PROCEDURE — 84443 ASSAY THYROID STIM HORMONE: CPT

## 2024-03-13 PROCEDURE — 86140 C-REACTIVE PROTEIN: CPT

## 2024-03-13 PROCEDURE — 84439 ASSAY OF FREE THYROXINE: CPT

## 2024-03-13 PROCEDURE — 81001 URINALYSIS AUTO W/SCOPE: CPT

## 2024-03-13 PROCEDURE — 84540 ASSAY OF URINE/UREA-N: CPT

## 2024-03-13 PROCEDURE — 6370000000 HC RX 637 (ALT 250 FOR IP): Performed by: INTERNAL MEDICINE

## 2024-03-13 PROCEDURE — 84133 ASSAY OF URINE POTASSIUM: CPT

## 2024-03-13 PROCEDURE — 99291 CRITICAL CARE FIRST HOUR: CPT

## 2024-03-13 PROCEDURE — 83735 ASSAY OF MAGNESIUM: CPT

## 2024-03-13 PROCEDURE — 87086 URINE CULTURE/COLONY COUNT: CPT

## 2024-03-13 PROCEDURE — 96366 THER/PROPH/DIAG IV INF ADDON: CPT

## 2024-03-13 RX ORDER — ONDANSETRON 2 MG/ML
4 INJECTION INTRAMUSCULAR; INTRAVENOUS EVERY 6 HOURS PRN
Status: DISCONTINUED | OUTPATIENT
Start: 2024-03-13 | End: 2024-03-22 | Stop reason: HOSPADM

## 2024-03-13 RX ORDER — DEXTROSE MONOHYDRATE 100 MG/ML
INJECTION, SOLUTION INTRAVENOUS CONTINUOUS PRN
Status: DISCONTINUED | OUTPATIENT
Start: 2024-03-13 | End: 2024-03-22 | Stop reason: HOSPADM

## 2024-03-13 RX ORDER — LEVOTHYROXINE SODIUM 20 UG/ML
100 INJECTION, SOLUTION INTRAVENOUS
Status: COMPLETED | OUTPATIENT
Start: 2024-03-13 | End: 2024-03-13

## 2024-03-13 RX ORDER — POLYETHYLENE GLYCOL 3350 17 G/17G
17 POWDER, FOR SOLUTION ORAL DAILY PRN
Status: DISCONTINUED | OUTPATIENT
Start: 2024-03-13 | End: 2024-03-20

## 2024-03-13 RX ORDER — MAGNESIUM HYDROXIDE/ALUMINUM HYDROXICE/SIMETHICONE 120; 1200; 1200 MG/30ML; MG/30ML; MG/30ML
30 SUSPENSION ORAL EVERY 6 HOURS PRN
Status: DISCONTINUED | OUTPATIENT
Start: 2024-03-13 | End: 2024-03-22 | Stop reason: HOSPADM

## 2024-03-13 RX ORDER — LIOTHYRONINE SODIUM 5 UG/1
5 TABLET ORAL EVERY 8 HOURS
Status: DISPENSED | OUTPATIENT
Start: 2024-03-14 | End: 2024-03-15

## 2024-03-13 RX ORDER — SODIUM CHLORIDE 9 MG/ML
25 INJECTION, SOLUTION INTRAVENOUS PRN
Status: DISCONTINUED | OUTPATIENT
Start: 2024-03-13 | End: 2024-03-14 | Stop reason: SDUPTHER

## 2024-03-13 RX ORDER — GLUCAGON 1 MG/ML
1 KIT INJECTION PRN
Status: DISCONTINUED | OUTPATIENT
Start: 2024-03-13 | End: 2024-03-22 | Stop reason: HOSPADM

## 2024-03-13 RX ORDER — INSULIN LISPRO 100 [IU]/ML
0-8 INJECTION, SOLUTION INTRAVENOUS; SUBCUTANEOUS EVERY 6 HOURS
Status: DISCONTINUED | OUTPATIENT
Start: 2024-03-13 | End: 2024-03-22 | Stop reason: HOSPADM

## 2024-03-13 RX ORDER — SODIUM CHLORIDE 0.9 % (FLUSH) 0.9 %
5-40 SYRINGE (ML) INJECTION PRN
Status: DISCONTINUED | OUTPATIENT
Start: 2024-03-13 | End: 2024-03-14 | Stop reason: SDUPTHER

## 2024-03-13 RX ORDER — LEVOTHYROXINE SODIUM 20 UG/ML
400 INJECTION, SOLUTION INTRAVENOUS
Status: DISCONTINUED | OUTPATIENT
Start: 2024-03-13 | End: 2024-03-13

## 2024-03-13 RX ORDER — SODIUM CHLORIDE 0.9 % (FLUSH) 0.9 %
5-40 SYRINGE (ML) INJECTION EVERY 12 HOURS SCHEDULED
Status: DISCONTINUED | OUTPATIENT
Start: 2024-03-13 | End: 2024-03-14 | Stop reason: SDUPTHER

## 2024-03-13 RX ORDER — VALPROIC ACID 250 MG/5ML
500 SOLUTION ORAL 2 TIMES DAILY
Status: DISCONTINUED | OUTPATIENT
Start: 2024-03-14 | End: 2024-03-14

## 2024-03-13 RX ORDER — LIDOCAINE HYDROCHLORIDE 10 MG/ML
5 INJECTION, SOLUTION EPIDURAL; INFILTRATION; INTRACAUDAL; PERINEURAL ONCE
Status: DISCONTINUED | OUTPATIENT
Start: 2024-03-13 | End: 2024-03-14

## 2024-03-13 RX ORDER — ACETAMINOPHEN 650 MG/1
650 SUPPOSITORY RECTAL EVERY 6 HOURS PRN
Status: DISCONTINUED | OUTPATIENT
Start: 2024-03-13 | End: 2024-03-22 | Stop reason: HOSPADM

## 2024-03-13 RX ORDER — ONDANSETRON 4 MG/1
4 TABLET, ORALLY DISINTEGRATING ORAL EVERY 8 HOURS PRN
Status: DISCONTINUED | OUTPATIENT
Start: 2024-03-13 | End: 2024-03-22 | Stop reason: HOSPADM

## 2024-03-13 RX ORDER — CALCIUM GLUCONATE 20 MG/ML
1000 INJECTION, SOLUTION INTRAVENOUS ONCE
Status: COMPLETED | OUTPATIENT
Start: 2024-03-13 | End: 2024-03-13

## 2024-03-13 RX ORDER — ALBUMIN, HUMAN INJ 5% 5 %
25 SOLUTION INTRAVENOUS EVERY 8 HOURS
Status: COMPLETED | OUTPATIENT
Start: 2024-03-13 | End: 2024-03-14

## 2024-03-13 RX ORDER — LEVOTHYROXINE SODIUM 20 UG/ML
100 INJECTION, SOLUTION INTRAVENOUS DAILY
Status: DISCONTINUED | OUTPATIENT
Start: 2024-03-14 | End: 2024-03-19

## 2024-03-13 RX ORDER — SODIUM CHLORIDE 9 MG/ML
INJECTION, SOLUTION INTRAVENOUS CONTINUOUS
Status: DISCONTINUED | OUTPATIENT
Start: 2024-03-13 | End: 2024-03-13

## 2024-03-13 RX ORDER — ATORVASTATIN CALCIUM 20 MG/1
20 TABLET, FILM COATED ORAL NIGHTLY
Status: DISCONTINUED | OUTPATIENT
Start: 2024-03-13 | End: 2024-03-22 | Stop reason: HOSPADM

## 2024-03-13 RX ORDER — ENOXAPARIN SODIUM 100 MG/ML
40 INJECTION SUBCUTANEOUS 2 TIMES DAILY
Status: DISCONTINUED | OUTPATIENT
Start: 2024-03-14 | End: 2024-03-22 | Stop reason: HOSPADM

## 2024-03-13 RX ORDER — ACETAMINOPHEN 325 MG/1
650 TABLET ORAL EVERY 6 HOURS PRN
Status: DISCONTINUED | OUTPATIENT
Start: 2024-03-13 | End: 2024-03-22 | Stop reason: HOSPADM

## 2024-03-13 RX ORDER — BUMETANIDE 1 MG/1
2 TABLET ORAL DAILY
Status: DISCONTINUED | OUTPATIENT
Start: 2024-03-14 | End: 2024-03-15

## 2024-03-13 RX ORDER — FUROSEMIDE 10 MG/ML
40 INJECTION INTRAMUSCULAR; INTRAVENOUS ONCE
Status: DISCONTINUED | OUTPATIENT
Start: 2024-03-13 | End: 2024-03-13

## 2024-03-13 RX ORDER — MAGNESIUM SULFATE IN WATER 40 MG/ML
2000 INJECTION, SOLUTION INTRAVENOUS PRN
Status: DISCONTINUED | OUTPATIENT
Start: 2024-03-13 | End: 2024-03-22 | Stop reason: HOSPADM

## 2024-03-13 RX ORDER — LEVOTHYROXINE SODIUM 20 UG/ML
300 INJECTION, SOLUTION INTRAVENOUS
Status: DISCONTINUED | OUTPATIENT
Start: 2024-03-13 | End: 2024-03-13

## 2024-03-13 RX ORDER — ASPIRIN 81 MG/1
81 TABLET ORAL DAILY
Status: DISCONTINUED | OUTPATIENT
Start: 2024-03-14 | End: 2024-03-22 | Stop reason: HOSPADM

## 2024-03-13 RX ORDER — LIOTHYRONINE SODIUM 5 UG/1
20 TABLET ORAL ONCE
Status: DISCONTINUED | OUTPATIENT
Start: 2024-03-13 | End: 2024-03-13

## 2024-03-13 RX ORDER — MIDODRINE HYDROCHLORIDE 5 MG/1
5 TABLET ORAL
Status: DISCONTINUED | OUTPATIENT
Start: 2024-03-13 | End: 2024-03-22 | Stop reason: HOSPADM

## 2024-03-13 RX ORDER — SODIUM CHLORIDE 9 MG/ML
INJECTION, SOLUTION INTRAVENOUS PRN
Status: DISCONTINUED | OUTPATIENT
Start: 2024-03-13 | End: 2024-03-22 | Stop reason: HOSPADM

## 2024-03-13 RX ADMIN — CALCIUM GLUCONATE 1000 MG: 20 INJECTION, SOLUTION INTRAVENOUS at 16:13

## 2024-03-13 RX ADMIN — SODIUM BICARBONATE 50 MEQ: 84 INJECTION INTRAVENOUS at 16:26

## 2024-03-13 RX ADMIN — SODIUM ZIRCONIUM CYCLOSILICATE 10 G: 10 POWDER, FOR SUSPENSION ORAL at 16:30

## 2024-03-13 RX ADMIN — HYDROCORTISONE SODIUM SUCCINATE 100 MG: 100 INJECTION, POWDER, FOR SOLUTION INTRAMUSCULAR; INTRAVENOUS at 21:42

## 2024-03-13 RX ADMIN — SODIUM BICARBONATE: 84 INJECTION, SOLUTION INTRAVENOUS at 17:22

## 2024-03-13 RX ADMIN — SODIUM BICARBONATE: 84 INJECTION, SOLUTION INTRAVENOUS at 23:08

## 2024-03-13 RX ADMIN — ALBUMIN (HUMAN) 25 G: 12.5 INJECTION, SOLUTION INTRAVENOUS at 18:17

## 2024-03-13 RX ADMIN — CEFTRIAXONE SODIUM 2000 MG: 2 INJECTION, POWDER, FOR SOLUTION INTRAMUSCULAR; INTRAVENOUS at 17:21

## 2024-03-13 RX ADMIN — SODIUM CHLORIDE: 9 INJECTION, SOLUTION INTRAVENOUS at 16:12

## 2024-03-13 RX ADMIN — LEVOTHYROXINE SODIUM 100 MCG: 20 INJECTION, SOLUTION INTRAVENOUS at 21:39

## 2024-03-13 NOTE — ED NOTES
Pharmacy just called this RN to inform that the albumin is being tubed up for patient hypotension; RN acknowledged and will check tube station

## 2024-03-13 NOTE — ED NOTES
Bedside Handoff report given to LILY Dillard; all questions and concerns answered; receiving Rn acknowledged and had no further questions at this time; receiving RN to assume all care at this time.       RN's found patient PIV pulled out of arm w/ blood on the ground; RN paused fluids and is replacing PIV

## 2024-03-13 NOTE — CONSULTS
Nephrology Consult Note   [place]      Chief Complaint: altered mental status     History of Present Illness: history obtained from the chart - patient confused  Mr Beauchamp is a 59 yo morbidly obese male with a past medical history significant for hypertension, hypothyroidism, DM II, hyperlipidemia, obesity with sleep apnea, schizophrenia that was brought in from [facility] because of altered mental status - Of note patient had a similar presentation inJan  and responded well to Narcan on previous admission. At the time of my evaluation patients eyes were open he could track me moving across the room but cannot answer questions appropriately   Nephrology consulted for ELPIDIO and following lab values demonstrated below:     Latest Reference Range & Units 24 14:22   Sodium 136 - 145 mmol/L 140   Potassium 3.5 - 5.1 mmol/L 5.8 (H)   Chloride 99 - 110 mmol/L 103   CO2 21 - 32 mmol/L 20 (L)   BUN,BUNPL 7 - 20 mg/dL 122 (HH)   Creatinine 0.9 - 1.3 mg/dL 3.2 (H)   Anion Gap 3 - 16  17 (H)   Est, Glom Filt Rate >60  22 !   Magnesium 1.80 - 2.40 mg/dL 1.90   Lactic Acid 0.4 - 2.0 mmol/L 1.7       Subjective:    Awake, confused    ROS: unobtainable     Scheduled Meds:   albumin human 5%  25 g IntraVENous Q8H    midodrine  5 mg Oral TID WC        sodium bicarbonate 75 mEq in sodium chloride 0.45 % 1,000 mL infusion         PRN Meds:.    Physical Exam:    TEMPERATURE:  Current -  ; Max - No data recorded  RESPIRATIONS RANGE: Resp  Av.3  Min: 11  Max: 18  PULSE RANGE: Pulse  Av.3  Min: 62  Max: 63  BLOOD PRESSURE RANGE:  Systolic (24hrs), Av , Min:97 , Max:113   ; Diastolic (24hrs), Av, Min:52, Max:70    24HR INTAKE/OUTPUT:    Intake/Output Summary (Last 24 hours) at 3/13/2024 1633  Last data filed at 3/13/2024 1623  Gross per 24 hour   Intake 43.59 ml   Output --   Net 43.59 ml       Patient Vitals for the past 96 hrs (Last 3 readings):   Weight   24 1400 (!) 170.7 kg (376 lb 5.2 oz)  Not Currently     Types: Opiates      Comment: 07/19/2019 drug screen + oxcodone    Sexual activity: Not Currently     Partners: Male, Female   Other Topics Concern    Not on file   Social History Narrative    ** Merged History Encounter **          Social Determinants of Health     Financial Resource Strain: Not on file   Food Insecurity: Not on file   Transportation Needs: Not on file   Physical Activity: Not on file   Stress: Not on file   Social Connections: Not on file   Intimate Partner Violence: Not on file   Housing Stability: Not on file         LAB DATA:    CBC:   Lab Results   Component Value Date/Time    WBC 9.0 03/13/2024 02:22 PM    RBC 3.42 03/13/2024 02:22 PM    HGB 9.3 03/13/2024 02:22 PM    HCT 28.1 03/13/2024 02:22 PM    MCV 82.0 03/13/2024 02:22 PM    MCH 27.2 03/13/2024 02:22 PM    MCHC 33.2 03/13/2024 02:22 PM    RDW 22.4 03/13/2024 02:22 PM     03/13/2024 02:22 PM    MPV 7.2 03/13/2024 02:22 PM     BMP:    Lab Results   Component Value Date/Time     03/13/2024 02:22 PM    K 5.8 03/13/2024 02:22 PM     03/13/2024 02:22 PM    CO2 20 03/13/2024 02:22 PM     03/13/2024 02:22 PM    CREATININE 3.2 03/13/2024 02:22 PM    CALCIUM 9.2 03/13/2024 02:22 PM    GFRAA >60 08/24/2022 12:00 PM    GFRAA 96 01/01/2013 08:25 AM    LABGLOM 22 03/13/2024 02:22 PM    GLUCOSE 195 03/13/2024 02:22 PM     Ionized Calcium:  No results found for: \"IONCA\"  Magnesium:    Lab Results   Component Value Date/Time    MG 1.90 03/13/2024 02:22 PM     Phosphorus:    Lab Results   Component Value Date/Time    PHOS 3.7 01/25/2024 05:34 AM     U/A:    Lab Results   Component Value Date/Time    COLORU Yellow 03/13/2024 03:44 PM    PHUR 6.5 03/13/2024 03:44 PM    WBCUA 227 03/13/2024 03:44 PM    RBCUA 217 03/13/2024 03:44 PM    MUCUS Rare 07/16/2022 07:26 AM    BACTERIA None Seen 03/13/2024 03:44 PM    CLARITYU CLOUDY 03/13/2024 03:44 PM    SPECGRAV 1.014 03/13/2024 03:44 PM    LEUKOCYTESUR MODERATE

## 2024-03-13 NOTE — ED NOTES
Straight cath performed per sterile protocol. Urine specimen obtained and sent to lab. Call light within reach. Will continue to monitor patient.

## 2024-03-13 NOTE — FLOWSHEET NOTE
03/13/24 1658   Vital Signs   Temp (!) 89.2 °F (31.8 °C)  (EDNP made aware; states to continue warming therapy w/ bear hugger)   Temp Source Rectal   Oxygen Therapy   SpO2 94 %   O2 Device None (Room air)

## 2024-03-13 NOTE — ED PROVIDER NOTES
I was available for consultation during patient's ED stay.  Patient was cared for by ZEB.  I did not evaluate or participate in patient care.    EKG Interpretation    Interpreted by emergency department physician    Rhythm: sinus bradycardia  Rate: 50-60  Axis: left  Ectopy: none  Conduction: normal  ST Segments: nonspecific changes  T Waves: non specific changes  Q Waves: none    Clinical Impression: Sinus bradycardia with signs of left ventricular hypertrophy with left axis deviation.  Patient is normal MI interval normal QRS duration normal QT QTc.  Relatively unchanged from previous EKG dated January 21, 2024.  There is significant patient movement artifact otherwise.  Interpreted by myself.    MD Manan Granger Stephen W, MD  03/13/24 8219

## 2024-03-13 NOTE — ED PROVIDER NOTES
[facility] EMERGENCY DEPARTMENT  [address] [address]  Dept: [phone]  Loc: [phone]  eMERGENCYdEPARTMENT eNCOUnter      Pt Name: Fabio Beauchamp  MRN: [id number]  Birthdate 1965  Date of evaluation: 3/13/2024  Provider:TIFFANIE Barth CNP      Independently seen and evaluated by the advanced practice provider  CHIEF COMPLAINT       Chief Complaint   Patient presents with    Altered Mental Status     Pt from SNF w/ c/o AMS. Pt was last normal on Sunday per nurse. Pt hx schizo-affective. Pt said to be combative. Pt only able to tell me his name at this time. Pinpoint pupils per EMS. 6IN narcan given w/ no effect.        CRITICAL CARE TIME   Because of high probability of sudden clinical deterioration of the patient's condition or  further deterioration, critical care time included my full attention to the patient's condition, including chart data review, documentation, medication ordering, reviewing the patient's old records, reevaluation patient's cardiac, pulmonary and neurological status. Reassessment of vital signs. Consultations with ED attending and admitting physician. Ordering, interpreting reviewing diagnostic testing. Therefore, my critical care time was 60 minutes of direct attention to the patient's condition did not include time spent on procedures.      HISTORY OF PRESENT ILLNESS  (Location/Symptom, Timing/Onset, Context/Setting, Quality, Duration,Modifying Factors, Severity.)   Fabio Beauchamp is a 58 y.o. male who presents to the emergency department PMHx: Bipolar, schizoaffective disorder, sleep apnea, hyperlipidemia, hypertension, hypocalcemia, hypothyroid, DM, cellulitis, morbid obesity, HF, hypercapnic/hypoxic respiratory failure      [facility] at [facility]  CODE STATUS full  Anticoagulation therapy none  H POA:  Social determinants: Advanced progressive comorbid conditions young age, debility    HPI provided by  components:    T4 Free 0.7 (*)     All other components within normal limits   CREATININE, RANDOM URINE - Abnormal; Notable for the following components:    Creatinine, Ur 37.6 (*)     All other components within normal limits   MICROSCOPIC URINALYSIS - Abnormal; Notable for the following components:    WBC,  (*)     RBC,  (*)     All other components within normal limits   UREA NITROGEN, URINE - Abnormal; Notable for the following components:    Urea Nitrogen, Ur 489.6 (*)     All other components within normal limits   CULTURE, BLOOD 1   CULTURE, BLOOD 2   CULTURE, URINE   TROPONIN   MAGNESIUM   LACTIC ACID   BETA-HYDROXYBUTYRATE   CK   CALCIUM   POTASSIUM, URINE, RANDOM   SODIUM, URINE, RANDOM       All other labs were within normal range or not returned as of this dictation.    EMERGENCY DEPARTMENT COURSE and DIFFERENTIAL DIAGNOSIS/MDM:   Vitals:    Vitals:    03/13/24 2015 03/13/24 2030 03/13/24 2050 03/13/24 2055   BP: 98/61 (!) 106/58 (!) 108/49 (!) 113/57   Pulse: 70 69 78 67   Resp: 20 18 13   Temp:  (!) 91.8 °F (33.2 °C)     TempSrc:  Bladder     SpO2:       Weight:       Height:         Medications   sodium bicarbonate 75 mEq in sodium chloride 0.45 % 1,000 mL infusion ( IntraVENous Restarted 3/13/24 1939)   albumin human 5% IV solution 25 g (0 g IntraVENous Stopped 3/13/24 1915)   midodrine (PROAMATINE) tablet 5 mg (5 mg Oral Not Given 3/13/24 1714)   hydrocortisone sodium succinate PF (SOLU-CORTEF) injection 100 mg (100 mg IntraVENous Given 3/13/24 2142)   lidocaine PF 1 % injection 5 mL (has no administration in time range)   sodium chloride flush 0.9 % injection 5-40 mL (has no administration in time range)   sodium chloride flush 0.9 % injection 5-40 mL (has no administration in time range)   0.9 % sodium chloride infusion (has no administration in time range)   calcium gluconate 1,000 mg in sodium chloride 50 mL (0 mg IntraVENous Stopped 3/13/24 1623)   sodium zirconium cyclosilicate

## 2024-03-13 NOTE — ED NOTES
RN attempted to obtain rectal temp but x2 thermometers did not  temp, RN placed patient on bare hugger machine for external warms and x2 warm blankets . RN informed EDNP

## 2024-03-13 NOTE — ED NOTES
Pt arrived to dept via [facility] from [facility].  Pt from [facility] w/ c/o AMS. Pt was last normal on Sunday per nurse. Pt hx schizo-affective. Pt said to be combative. Pt only able to tell me his name at this time. Pinpoint pupils per EMS. 6IN narcan given w/ no effect.  Pt awake, alert and oriented x 1.  Skin cool to the touch and unable to get axillary or oral temp.  Resp easy and unlabored.  Pt placed in gown and on cardiac monitor.  Call light in reach.

## 2024-03-14 LAB
ALBUMIN SERPL-MCNC: 2.5 G/DL (ref 3.4–5)
ALBUMIN SERPL-MCNC: 3 G/DL (ref 3.4–5)
ALBUMIN/GLOB SERPL: 0.5 {RATIO} (ref 1.1–2.2)
ALP SERPL-CCNC: 112 U/L (ref 40–129)
ALT SERPL-CCNC: 114 U/L (ref 10–40)
ANION GAP SERPL CALCULATED.3IONS-SCNC: 11 MMOL/L (ref 3–16)
ANION GAP SERPL CALCULATED.3IONS-SCNC: 12 MMOL/L (ref 3–16)
ANION GAP SERPL CALCULATED.3IONS-SCNC: 12 MMOL/L (ref 3–16)
AST SERPL-CCNC: 107 U/L (ref 15–37)
BASE EXCESS BLDA CALC-SCNC: 2 MMOL/L (ref -3–3)
BASE EXCESS BLDV CALC-SCNC: -1 MMOL/L (ref -3–3)
BASE EXCESS BLDV CALC-SCNC: 0.5 MMOL/L
BASE EXCESS BLDV CALC-SCNC: 0.9 MMOL/L
BASOPHILS # BLD: 0.1 K/UL (ref 0–0.2)
BASOPHILS NFR BLD: 1 %
BILIRUB SERPL-MCNC: <0.2 MG/DL (ref 0–1)
BUN SERPL-MCNC: 104 MG/DL (ref 7–20)
BUN SERPL-MCNC: 112 MG/DL (ref 7–20)
BUN SERPL-MCNC: 120 MG/DL (ref 7–20)
CALCIUM SERPL-MCNC: 8.7 MG/DL (ref 8.3–10.6)
CALCIUM SERPL-MCNC: 8.9 MG/DL (ref 8.3–10.6)
CALCIUM SERPL-MCNC: 9.1 MG/DL (ref 8.3–10.6)
CHLORIDE SERPL-SCNC: 106 MMOL/L (ref 99–110)
CHLORIDE SERPL-SCNC: 109 MMOL/L (ref 99–110)
CHLORIDE SERPL-SCNC: 109 MMOL/L (ref 99–110)
CO2 BLDA-SCNC: 29 MMOL/L
CO2 BLDV-SCNC: 27 MMOL/L
CO2 BLDV-SCNC: 30 MMOL/L
CO2 BLDV-SCNC: 30 MMOL/L
CO2 SERPL-SCNC: 24 MMOL/L (ref 21–32)
CO2 SERPL-SCNC: 26 MMOL/L (ref 21–32)
CO2 SERPL-SCNC: 29 MMOL/L (ref 21–32)
COHGB MFR BLDV: 1.2 %
COHGB MFR BLDV: 1.3 %
CREAT SERPL-MCNC: 2.9 MG/DL (ref 0.9–1.3)
CREAT SERPL-MCNC: 2.9 MG/DL (ref 0.9–1.3)
CREAT SERPL-MCNC: 3.3 MG/DL (ref 0.9–1.3)
DEPRECATED RDW RBC AUTO: 22.3 % (ref 12.4–15.4)
EKG ATRIAL RATE: 57 BPM
EKG DIAGNOSIS: NORMAL
EKG P AXIS: 49 DEGREES
EKG P-R INTERVAL: 188 MS
EKG Q-T INTERVAL: 488 MS
EKG QRS DURATION: 136 MS
EKG QTC CALCULATION (BAZETT): 474 MS
EKG R AXIS: -18 DEGREES
EKG T AXIS: 39 DEGREES
EKG VENTRICULAR RATE: 57 BPM
EOSINOPHIL # BLD: 0 K/UL (ref 0–0.6)
EOSINOPHIL NFR BLD: 0 %
EST. AVERAGE GLUCOSE BLD GHB EST-MCNC: 151.3 MG/DL
FERRITIN SERPL IA-MCNC: 2390 NG/ML (ref 30–400)
FOLATE SERPL-MCNC: 9.01 NG/ML (ref 4.78–24.2)
GFR SERPLBLD CREATININE-BSD FMLA CKD-EPI: 21 ML/MIN/{1.73_M2}
GFR SERPLBLD CREATININE-BSD FMLA CKD-EPI: 24 ML/MIN/{1.73_M2}
GFR SERPLBLD CREATININE-BSD FMLA CKD-EPI: 24 ML/MIN/{1.73_M2}
GLUCOSE BLD-MCNC: 104 MG/DL (ref 70–99)
GLUCOSE BLD-MCNC: 106 MG/DL (ref 70–99)
GLUCOSE BLD-MCNC: 124 MG/DL (ref 70–99)
GLUCOSE BLD-MCNC: 128 MG/DL (ref 70–99)
GLUCOSE BLD-MCNC: 135 MG/DL (ref 70–99)
GLUCOSE SERPL-MCNC: 152 MG/DL (ref 70–99)
GLUCOSE SERPL-MCNC: 92 MG/DL (ref 70–99)
GLUCOSE SERPL-MCNC: 95 MG/DL (ref 70–99)
HBA1C MFR BLD: 6.9 %
HCO3 BLDA-SCNC: 27.6 MMOL/L (ref 21–29)
HCO3 BLDV-SCNC: 25.3 MMOL/L (ref 23–29)
HCO3 BLDV-SCNC: 28 MMOL/L (ref 23–29)
HCO3 BLDV-SCNC: 28 MMOL/L (ref 23–29)
HCT VFR BLD AUTO: 24.3 % (ref 40.5–52.5)
HGB BLD-MCNC: 8.1 G/DL (ref 13.5–17.5)
IRON SATN MFR SERPL: 43 % (ref 20–50)
IRON SERPL-MCNC: 51 UG/DL (ref 59–158)
LYMPHOCYTES # BLD: 0.6 K/UL (ref 1–5.1)
LYMPHOCYTES NFR BLD: 7 %
MCH RBC QN AUTO: 27.3 PG (ref 26–34)
MCHC RBC AUTO-ENTMCNC: 33.3 G/DL (ref 31–36)
MCV RBC AUTO: 81.7 FL (ref 80–100)
METAMYELOCYTES NFR BLD MANUAL: 4 %
METHGB MFR BLDV: 0.4 %
METHGB MFR BLDV: 0.9 %
MICROCYTES BLD QL SMEAR: ABNORMAL
MONOCYTES # BLD: 0 K/UL (ref 0–1.3)
MONOCYTES NFR BLD: 0 %
NEUTROPHILS # BLD: 7.5 K/UL (ref 1.7–7.7)
NEUTROPHILS NFR BLD: 81 %
NEUTS BAND NFR BLD MANUAL: 7 % (ref 0–7)
O2 THERAPY: ABNORMAL
O2 THERAPY: ABNORMAL
OVALOCYTES BLD QL SMEAR: ABNORMAL
PATH INTERP BLD-IMP: NORMAL
PATH INTERP BLD-IMP: YES
PCO2 BLDA: 51.6 MM HG (ref 35–45)
PCO2 BLDV: 48.1 MM HG (ref 40–50)
PCO2 BLDV: 59.7 MMHG (ref 40–50)
PCO2 BLDV: 60.8 MMHG (ref 40–50)
PERFORMED ON: ABNORMAL
PH BLDA: 7.34 [PH] (ref 7.35–7.45)
PH BLDV: 7.28 [PH] (ref 7.35–7.45)
PH BLDV: 7.28 [PH] (ref 7.35–7.45)
PH BLDV: 7.33 [PH] (ref 7.35–7.45)
PHOSPHATE SERPL-MCNC: 5.9 MG/DL (ref 2.5–4.9)
PLATELET # BLD AUTO: 525 K/UL (ref 135–450)
PLATELET BLD QL SMEAR: ABNORMAL
PMV BLD AUTO: 7.3 FL (ref 5–10.5)
PO2 BLDA: 92.4 MM HG (ref 75–108)
PO2 BLDV: 151 MM HG
PO2 BLDV: 50 MMHG
PO2 BLDV: 62 MMHG
POC SAMPLE TYPE: ABNORMAL
POC SAMPLE TYPE: ABNORMAL
POTASSIUM SERPL-SCNC: 6.1 MMOL/L (ref 3.5–5.1)
POTASSIUM SERPL-SCNC: 6.2 MMOL/L (ref 3.5–5.1)
POTASSIUM SERPL-SCNC: 6.6 MMOL/L (ref 3.5–5.1)
POTASSIUM SERPL-SCNC: 7 MMOL/L (ref 3.5–5.1)
POTASSIUM SERPL-SCNC: 7.1 MMOL/L (ref 3.5–5.1)
POTASSIUM SERPL-SCNC: 7.3 MMOL/L (ref 3.5–5.1)
PROT SERPL-MCNC: 7.5 G/DL (ref 6.4–8.2)
RBC # BLD AUTO: 2.97 M/UL (ref 4.2–5.9)
SAO2 % BLDA: 97 % (ref 93–100)
SAO2 % BLDV: 81 %
SAO2 % BLDV: 89 %
SAO2 % BLDV: 99 %
SCHISTOCYTES BLD QL SMEAR: ABNORMAL
SODIUM SERPL-SCNC: 144 MMOL/L (ref 136–145)
SODIUM SERPL-SCNC: 145 MMOL/L (ref 136–145)
SODIUM SERPL-SCNC: 149 MMOL/L (ref 136–145)
T4 FREE SERPL-MCNC: 0.6 NG/DL (ref 0.9–1.8)
TARGETS BLD QL SMEAR: ABNORMAL
TIBC SERPL-MCNC: 118 UG/DL (ref 260–445)
TOXIC GRANULES BLD QL SMEAR: PRESENT
TSH SERPL DL<=0.005 MIU/L-ACNC: 16.35 UIU/ML (ref 0.27–4.2)
VIT B12 SERPL-MCNC: >2000 PG/ML (ref 211–911)
WBC # BLD AUTO: 8.1 K/UL (ref 4–11)

## 2024-03-14 PROCEDURE — 9990000010 HC NO CHARGE VISIT

## 2024-03-14 PROCEDURE — 6370000000 HC RX 637 (ALT 250 FOR IP): Performed by: NURSE PRACTITIONER

## 2024-03-14 PROCEDURE — 76937 US GUIDE VASCULAR ACCESS: CPT

## 2024-03-14 PROCEDURE — 2500000003 HC RX 250 WO HCPCS: Performed by: FAMILY MEDICINE

## 2024-03-14 PROCEDURE — 36600 WITHDRAWAL OF ARTERIAL BLOOD: CPT

## 2024-03-14 PROCEDURE — 36415 COLL VENOUS BLD VENIPUNCTURE: CPT

## 2024-03-14 PROCEDURE — 85025 COMPLETE CBC W/AUTO DIFF WBC: CPT

## 2024-03-14 PROCEDURE — 2500000003 HC RX 250 WO HCPCS

## 2024-03-14 PROCEDURE — 82728 ASSAY OF FERRITIN: CPT

## 2024-03-14 PROCEDURE — 84132 ASSAY OF SERUM POTASSIUM: CPT

## 2024-03-14 PROCEDURE — 82803 BLOOD GASES ANY COMBINATION: CPT

## 2024-03-14 PROCEDURE — 5A09457 ASSISTANCE WITH RESPIRATORY VENTILATION, 24-96 CONSECUTIVE HOURS, CONTINUOUS POSITIVE AIRWAY PRESSURE: ICD-10-PCS | Performed by: FAMILY MEDICINE

## 2024-03-14 PROCEDURE — 80053 COMPREHEN METABOLIC PANEL: CPT

## 2024-03-14 PROCEDURE — 84443 ASSAY THYROID STIM HORMONE: CPT

## 2024-03-14 PROCEDURE — 99291 CRITICAL CARE FIRST HOUR: CPT | Performed by: INTERNAL MEDICINE

## 2024-03-14 PROCEDURE — 83550 IRON BINDING TEST: CPT

## 2024-03-14 PROCEDURE — 6360000002 HC RX W HCPCS: Performed by: NURSE PRACTITIONER

## 2024-03-14 PROCEDURE — 83036 HEMOGLOBIN GLYCOSYLATED A1C: CPT

## 2024-03-14 PROCEDURE — 2700000000 HC OXYGEN THERAPY PER DAY

## 2024-03-14 PROCEDURE — APPNB15 APP NON BILLABLE TIME 0-15 MINS: Performed by: NURSE PRACTITIONER

## 2024-03-14 PROCEDURE — 6360000002 HC RX W HCPCS: Performed by: FAMILY MEDICINE

## 2024-03-14 PROCEDURE — 84439 ASSAY OF FREE THYROXINE: CPT

## 2024-03-14 PROCEDURE — 94660 CPAP INITIATION&MGMT: CPT

## 2024-03-14 PROCEDURE — 93308 TTE F-UP OR LMTD: CPT

## 2024-03-14 PROCEDURE — 94761 N-INVAS EAR/PLS OXIMETRY MLT: CPT

## 2024-03-14 PROCEDURE — 3E033XZ INTRODUCTION OF VASOPRESSOR INTO PERIPHERAL VEIN, PERCUTANEOUS APPROACH: ICD-10-PCS | Performed by: FAMILY MEDICINE

## 2024-03-14 PROCEDURE — 93320 DOPPLER ECHO COMPLETE: CPT

## 2024-03-14 PROCEDURE — 93010 ELECTROCARDIOGRAM REPORT: CPT | Performed by: INTERNAL MEDICINE

## 2024-03-14 PROCEDURE — 83540 ASSAY OF IRON: CPT

## 2024-03-14 PROCEDURE — 6370000000 HC RX 637 (ALT 250 FOR IP): Performed by: FAMILY MEDICINE

## 2024-03-14 PROCEDURE — 2580000003 HC RX 258

## 2024-03-14 PROCEDURE — 94640 AIRWAY INHALATION TREATMENT: CPT

## 2024-03-14 PROCEDURE — C1751 CATH, INF, PER/CENT/MIDLINE: HCPCS

## 2024-03-14 PROCEDURE — 2000000000 HC ICU R&B

## 2024-03-14 PROCEDURE — 2580000003 HC RX 258: Performed by: NURSE PRACTITIONER

## 2024-03-14 PROCEDURE — 87641 MR-STAPH DNA AMP PROBE: CPT

## 2024-03-14 PROCEDURE — 6370000000 HC RX 637 (ALT 250 FOR IP): Performed by: INTERNAL MEDICINE

## 2024-03-14 PROCEDURE — 82746 ASSAY OF FOLIC ACID SERUM: CPT

## 2024-03-14 PROCEDURE — 36569 INSJ PICC 5 YR+ W/O IMAGING: CPT

## 2024-03-14 PROCEDURE — 6360000002 HC RX W HCPCS: Performed by: INTERNAL MEDICINE

## 2024-03-14 PROCEDURE — 02HV33Z INSERTION OF INFUSION DEVICE INTO SUPERIOR VENA CAVA, PERCUTANEOUS APPROACH: ICD-10-PCS | Performed by: HOSPITALIST

## 2024-03-14 PROCEDURE — P9045 ALBUMIN (HUMAN), 5%, 250 ML: HCPCS | Performed by: FAMILY MEDICINE

## 2024-03-14 PROCEDURE — 2500000003 HC RX 250 WO HCPCS: Performed by: NURSE PRACTITIONER

## 2024-03-14 PROCEDURE — 93325 DOPPLER ECHO COLOR FLOW MAPG: CPT

## 2024-03-14 PROCEDURE — 2580000003 HC RX 258: Performed by: FAMILY MEDICINE

## 2024-03-14 PROCEDURE — 82607 VITAMIN B-12: CPT

## 2024-03-14 RX ORDER — OLANZAPINE 10 MG/2ML
10 INJECTION, POWDER, FOR SOLUTION INTRAMUSCULAR NIGHTLY
Status: DISCONTINUED | OUTPATIENT
Start: 2024-03-14 | End: 2024-03-21

## 2024-03-14 RX ORDER — ALBUTEROL SULFATE 2.5 MG/3ML
10 SOLUTION RESPIRATORY (INHALATION) ONCE
Status: COMPLETED | OUTPATIENT
Start: 2024-03-14 | End: 2024-03-14

## 2024-03-14 RX ORDER — LIDOCAINE HYDROCHLORIDE 10 MG/ML
5 INJECTION, SOLUTION EPIDURAL; INFILTRATION; INTRACAUDAL; PERINEURAL ONCE
Status: DISCONTINUED | OUTPATIENT
Start: 2024-03-14 | End: 2024-03-14

## 2024-03-14 RX ORDER — ATORVASTATIN CALCIUM 20 MG/1
20 TABLET, FILM COATED ORAL DAILY
COMMUNITY

## 2024-03-14 RX ORDER — NOREPINEPHRINE BITARTRATE 0.06 MG/ML
INJECTION, SOLUTION INTRAVENOUS
Status: COMPLETED
Start: 2024-03-14 | End: 2024-03-14

## 2024-03-14 RX ORDER — MINERAL OIL/HYDROPHIL PETROLAT
1 OINTMENT (GRAM) TOPICAL PRN
COMMUNITY

## 2024-03-14 RX ORDER — WATER 10 ML/10ML
INJECTION INTRAMUSCULAR; INTRAVENOUS; SUBCUTANEOUS
Status: COMPLETED
Start: 2024-03-14 | End: 2024-03-14

## 2024-03-14 RX ORDER — DEXTROSE MONOHYDRATE 100 MG/ML
INJECTION, SOLUTION INTRAVENOUS CONTINUOUS PRN
Status: DISCONTINUED | OUTPATIENT
Start: 2024-03-14 | End: 2024-03-14

## 2024-03-14 RX ORDER — SODIUM CHLORIDE 0.9 % (FLUSH) 0.9 %
5-40 SYRINGE (ML) INJECTION EVERY 12 HOURS SCHEDULED
Status: DISCONTINUED | OUTPATIENT
Start: 2024-03-14 | End: 2024-03-22 | Stop reason: HOSPADM

## 2024-03-14 RX ORDER — CALCIUM GLUCONATE 20 MG/ML
1000 INJECTION, SOLUTION INTRAVENOUS ONCE
Status: COMPLETED | OUTPATIENT
Start: 2024-03-14 | End: 2024-03-14

## 2024-03-14 RX ORDER — NOREPINEPHRINE BITARTRATE 0.06 MG/ML
1-100 INJECTION, SOLUTION INTRAVENOUS CONTINUOUS
Status: DISCONTINUED | OUTPATIENT
Start: 2024-03-14 | End: 2024-03-19

## 2024-03-14 RX ORDER — SODIUM CHLORIDE 9 MG/ML
25 INJECTION, SOLUTION INTRAVENOUS PRN
Status: DISCONTINUED | OUTPATIENT
Start: 2024-03-14 | End: 2024-03-14 | Stop reason: SDUPTHER

## 2024-03-14 RX ORDER — GLUCAGON 1 MG/ML
1 KIT INJECTION PRN
Status: DISCONTINUED | OUTPATIENT
Start: 2024-03-14 | End: 2024-03-14 | Stop reason: SDUPTHER

## 2024-03-14 RX ORDER — DEXTROSE MONOHYDRATE 100 MG/ML
INJECTION, SOLUTION INTRAVENOUS CONTINUOUS PRN
Status: DISCONTINUED | OUTPATIENT
Start: 2024-03-14 | End: 2024-03-14 | Stop reason: SDUPTHER

## 2024-03-14 RX ORDER — GLUCAGON 1 MG/ML
1 KIT INJECTION PRN
Status: DISCONTINUED | OUTPATIENT
Start: 2024-03-14 | End: 2024-03-14

## 2024-03-14 RX ORDER — VANCOMYCIN 2 G/400ML
2000 INJECTION, SOLUTION INTRAVENOUS ONCE
Status: COMPLETED | OUTPATIENT
Start: 2024-03-14 | End: 2024-03-14

## 2024-03-14 RX ORDER — FUROSEMIDE 10 MG/ML
40 INJECTION INTRAMUSCULAR; INTRAVENOUS ONCE
Status: COMPLETED | OUTPATIENT
Start: 2024-03-14 | End: 2024-03-14

## 2024-03-14 RX ORDER — CALCIUM GLUCONATE 20 MG/ML
1000 INJECTION, SOLUTION INTRAVENOUS ONCE
Status: DISCONTINUED | OUTPATIENT
Start: 2024-03-14 | End: 2024-03-14

## 2024-03-14 RX ORDER — SODIUM POLYSTYRENE SULFONATE 15 G/60ML
30 SUSPENSION ORAL; RECTAL ONCE
Status: DISCONTINUED | OUTPATIENT
Start: 2024-03-14 | End: 2024-03-14

## 2024-03-14 RX ORDER — SODIUM CHLORIDE 0.9 % (FLUSH) 0.9 %
5-40 SYRINGE (ML) INJECTION PRN
Status: DISCONTINUED | OUTPATIENT
Start: 2024-03-14 | End: 2024-03-22 | Stop reason: HOSPADM

## 2024-03-14 RX ADMIN — ALBUTEROL SULFATE 10 MG: 2.5 SOLUTION RESPIRATORY (INHALATION) at 04:07

## 2024-03-14 RX ADMIN — ALBUMIN (HUMAN) 25 G: 12.5 INJECTION, SOLUTION INTRAVENOUS at 09:30

## 2024-03-14 RX ADMIN — Medication 5 MCG/MIN: at 05:16

## 2024-03-14 RX ADMIN — WATER 10 ML: 1 INJECTION INTRAMUSCULAR; INTRAVENOUS; SUBCUTANEOUS at 19:51

## 2024-03-14 RX ADMIN — DEXTROSE MONOHYDRATE 250 ML: 100 INJECTION, SOLUTION INTRAVENOUS at 04:10

## 2024-03-14 RX ADMIN — Medication 10 ML: at 19:41

## 2024-03-14 RX ADMIN — ALBUTEROL SULFATE 10 MG: 2.5 SOLUTION RESPIRATORY (INHALATION) at 13:41

## 2024-03-14 RX ADMIN — CALCIUM GLUCONATE 1000 MG: 98 INJECTION, SOLUTION INTRAVENOUS at 04:29

## 2024-03-14 RX ADMIN — HYDROCORTISONE SODIUM SUCCINATE 100 MG: 100 INJECTION, POWDER, FOR SOLUTION INTRAMUSCULAR; INTRAVENOUS at 04:02

## 2024-03-14 RX ADMIN — ALBUMIN (HUMAN) 25 G: 12.5 INJECTION, SOLUTION INTRAVENOUS at 01:46

## 2024-03-14 RX ADMIN — SODIUM ZIRCONIUM CYCLOSILICATE 10 G: 10 POWDER, FOR SUSPENSION ORAL at 19:45

## 2024-03-14 RX ADMIN — SODIUM ZIRCONIUM CYCLOSILICATE 10 G: 10 POWDER, FOR SUSPENSION ORAL at 16:02

## 2024-03-14 RX ADMIN — HUMAN INSULIN 10 UNITS: 100 INJECTION, SOLUTION SUBCUTANEOUS at 04:16

## 2024-03-14 RX ADMIN — ENOXAPARIN SODIUM 40 MG: 100 INJECTION SUBCUTANEOUS at 19:41

## 2024-03-14 RX ADMIN — FUROSEMIDE 40 MG: 10 INJECTION, SOLUTION INTRAMUSCULAR; INTRAVENOUS at 09:17

## 2024-03-14 RX ADMIN — DEXTROSE MONOHYDRATE 250 ML: 100 INJECTION, SOLUTION INTRAVENOUS at 09:45

## 2024-03-14 RX ADMIN — VALPROATE SODIUM 250 MG: 100 INJECTION, SOLUTION INTRAVENOUS at 13:42

## 2024-03-14 RX ADMIN — Medication 10 ML: at 09:03

## 2024-03-14 RX ADMIN — LEVOTHYROXINE SODIUM 100 MCG: 20 INJECTION, SOLUTION INTRAVENOUS at 09:01

## 2024-03-14 RX ADMIN — HYDROCORTISONE SODIUM SUCCINATE 100 MG: 100 INJECTION, POWDER, FOR SOLUTION INTRAMUSCULAR; INTRAVENOUS at 12:01

## 2024-03-14 RX ADMIN — OLANZAPINE 10 MG: 10 INJECTION, POWDER, FOR SOLUTION INTRAMUSCULAR at 19:45

## 2024-03-14 RX ADMIN — SODIUM BICARBONATE 50 MEQ: 84 INJECTION INTRAVENOUS at 09:39

## 2024-03-14 RX ADMIN — HUMAN INSULIN 10 UNITS: 100 INJECTION, SOLUTION SUBCUTANEOUS at 09:39

## 2024-03-14 RX ADMIN — VALPROATE SODIUM 250 MG: 100 INJECTION, SOLUTION INTRAVENOUS at 23:46

## 2024-03-14 RX ADMIN — CALCIUM GLUCONATE 1000 MG: 20 INJECTION, SOLUTION INTRAVENOUS at 10:04

## 2024-03-14 RX ADMIN — ENOXAPARIN SODIUM 40 MG: 100 INJECTION SUBCUTANEOUS at 09:01

## 2024-03-14 RX ADMIN — HYDROCORTISONE SODIUM SUCCINATE 100 MG: 100 INJECTION, POWDER, FOR SOLUTION INTRAMUSCULAR; INTRAVENOUS at 19:41

## 2024-03-14 RX ADMIN — VANCOMYCIN 2000 MG: 2 INJECTION, SOLUTION INTRAVENOUS at 12:00

## 2024-03-14 RX ADMIN — SODIUM BICARBONATE: 84 INJECTION, SOLUTION INTRAVENOUS at 02:20

## 2024-03-14 RX ADMIN — VALPROATE SODIUM 250 MG: 100 INJECTION, SOLUTION INTRAVENOUS at 18:08

## 2024-03-14 RX ADMIN — CEFTRIAXONE SODIUM 2000 MG: 2 INJECTION, POWDER, FOR SOLUTION INTRAMUSCULAR; INTRAVENOUS at 16:07

## 2024-03-14 NOTE — ED NOTES
ED SBAR report provider to LILY Lucia. Patient to be transported to Room 2125 via stretcher by RN.Patient transported with bedside cardiac monitor and with IV medications infusing. IV site clean, dry, and intact. MEWS score and pain assessed as 2/10 moaning in pain with repositioning and documented. Patient's score on the MANZO Fall scale reviewed with receiving RN. Updated SNF on plan of care.

## 2024-03-14 NOTE — PROGRESS NOTES
Patient on 7 mcg/min on Levophed. Phone call placed to patient's father, Dion, for PICC consent. No answer. Voicemail left with call back number.    Electronically signed by Zan Craven RN on 3/14/2024 at 8:05 AM

## 2024-03-14 NOTE — PROGRESS NOTES
Nephrology paged, permission for PICC placement received. Attempted to call Pt's father for consent, message left on voicemail with call back number.

## 2024-03-14 NOTE — ASSESSMENT & PLAN NOTE
Signficant comorbitiy complicating all aspects of care.  CPAP at night for underlying LADONNA.

## 2024-03-14 NOTE — PROGRESS NOTES
Hypotensive with systolic BP's in the 50's and 60's, Dr. Cabrera called and en route to the bedside.    Odilia Paulino RN

## 2024-03-14 NOTE — ASSESSMENT & PLAN NOTE
Patient is encephalopathic, hypotensive, and hypothermic with a low T4 level.  TSH is elevated.  Initiate IV levothyroxine.  Consider adding Cytomel.  Solu-Cortef.  Endocrinology team consulted prior to admission.  Appreciate their input and care.  Will also order echocardiogram.  Strict I's/O.  Careful with IV fluids as he may develop decompensated heart failure in the setting.

## 2024-03-14 NOTE — H&P
MD Oleksandr   acetaminophen (TYLENOL) 500 MG tablet Take 1 tablet by mouth every 8 hours as needed for Pain    Oleksandr Zaman MD   sertraline (ZOLOFT) 100 MG tablet Take 1 tablet by mouth daily    Oleksandr Zaman MD   OLANZapine (ZYPREXA) 5 MG tablet Take 3 tablets by mouth nightly    Oleksandr Zaman MD   OLANZapine (ZYPREXA) 5 MG tablet Take 1 tablet by mouth every morning    Oleksandr Zaman MD   levothyroxine (SYNTHROID) 200 MCG tablet Take 1 tablet by mouth Daily    Oleksandr Zaman MD   lisinopril (PRINIVIL;ZESTRIL) 10 MG tablet Take 1 tablet by mouth daily    Oleksandr Zaman MD   valproic acid (DEPAKENE) 250 MG/5ML SOLN oral solution Take 10 mLs by mouth 2 times daily    Oleksandr Zaman MD   bumetanide (BUMEX) 2 MG tablet Take 1 tablet by mouth daily    Oleksandr Zaman MD   ferrous sulfate (IRON 325) 325 (65 Fe) MG tablet Take 1 tablet by mouth daily (with breakfast)    Oleksandr Zaman MD   loperamide (IMODIUM) 1 MG/5ML solution Take 10 mLs by mouth daily as needed for Diarrhea    Oleksandr Zaman MD   paliperidone palmitate ER (INVEGA SUSTENNA) 234 MG/1.5ML WALLY IM injection Inject 234 mg into the muscle every 30 days 14th of month    Oleksandr Zaman MD     Infusion Medications    sodium bicarbonate 75 mEq in sodium chloride 0.45 % 1,000 mL infusion 125 mL/hr at 03/13/24 1939     Scheduled Medications    albumin human 5%  25 g IntraVENous Q8H    midodrine  5 mg Oral TID WC    hydrocortisone sodium succinate PF  100 mg IntraVENous Q8H    Levothyroxine Sodium  400 mcg IntraVENous NOW    liothyronine  20 mcg Oral Once     PRN Meds:         Physical Exam Performed:      BP 98/61   Pulse 70   Temp (!) 91.2 °F (32.9 °C) (Rectal)   Resp 20   Ht 1.88 m (6' 2\")   Wt (!) 170.7 kg (376 lb 5.2 oz)   SpO2 95%   BMI 48.32 kg/m²     Const:  Easily arousable and responsive to voice commands and reasonably cooperative considering his current mental  03:44 PM    BILIRUBINUR Negative 03/13/2024 03:44 PM    BLOODU LARGE 03/13/2024 03:44 PM    GLUCOSEU >=1000 03/13/2024 03:44 PM    KETUA Negative 03/13/2024 03:44 PM       Imaging: Personally reviewed and interpreted for clinical significance and to assist with MDM    CT HEAD WO CONTRAST    Result Date: 3/13/2024      No acute intracranial abnormality.     XR CHEST PORTABLE    Result Date: 3/13/2024      Findings in keeping with CHF.

## 2024-03-14 NOTE — ED NOTES
ED staff continues to reposition monitor cables after pt removes. Gown remains in place. Bed in lowest position, door to room remains open. Attempted distraction with TV

## 2024-03-14 NOTE — ED NOTES
EDNP attempted to place central line for additional access. Unsuccessful with insertion. Pt remains with 1 PIV access in R forearm

## 2024-03-14 NOTE — ASSESSMENT & PLAN NOTE
In setting of myxedema coma.  Nephrology team consulted prior to admission.  Bicarb drip.  Strict I's/O.  Monitor renal function closely.

## 2024-03-14 NOTE — ASSESSMENT & PLAN NOTE
Continue with Rocephin.  Follow-up cultures.  Clinical picture not consistent with sepsis secondary to UTI.

## 2024-03-14 NOTE — CARE COORDINATION
Case Management Assessment  Initial Evaluation    Date/Time of Evaluation: 3/14/2024 4:49 PM  Assessment Completed by: Oliver Phipps RN    If patient is discharged prior to next notation, then this note serves as note for discharge by case management.    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]  Acute kidney injury (HCC) [N17.9]  Metabolic acidosis, increased anion gap [E87.29]  Hypothermia, initial encounter [T68.XXXA]  Urinary tract infection without hematuria, site unspecified [N39.0]  Acute metabolic encephalopathy [G93.41]  Hypothyroidism, unspecified type [E03.9]  Other specified diabetes mellitus with hyperglycemia, unspecified whether long term insulin use (HCC) [E13.65]                   Date / Time: 3/13/2024  1:51 PM    Patient Admission Status: Inpatient   Readmission Risk (Low < 19, Mod (19-27), High > 27): Readmission Risk Score: 24    Current PCP: Anshu Padilla MD  PCP verified by CM? Yes    Chart Reviewed: Yes      History Provided by: Other (see comment) (Legal Guardian/Father)  Patient Orientation: Alert and Oriented    Patient Cognition: Alert    Hospitalization in the last 30 days (Readmission):  No    If yes, Readmission Assessment in  Navigator will be completed.    Advance Directives:      Code Status: Full Code   Patient's Primary Decision Maker is: Legal Next of Kin    Primary Decision Maker: Dion BEAUCHAMP - Parent, Legal Guardian - [phone]    Discharge Planning:    Patient lives with: Alone Type of Home: Long-Term Care  Primary Care Giver: Other (Comment) (Facility Staff)  Patient Support Systems include: Parent, Other (Comment), Family Members (Facility staff)   Current Financial resources: Medicaid  Current community resources: ECF/Home Care  Current services prior to admission: Extended Care Facility            Current DME:              Type of Home Care  services:  None    ADLS  Prior functional level: Assistance with the following:, Bathing, Dressing, Cooking, Housework, Shopping, Mobility  Current functional level: Assistance with the following:, Bathing, Dressing, Cooking, Housework, Shopping, Mobility    PT AM-PAC:   /24  OT AM-PAC:   /24    Family can provide assistance at DC: No  Would you like Case Management to discuss the discharge plan with any other family members/significant others, and if so, who? Yes (Legal Guardian - Father)  Plans to Return to Present Housing: Yes  Other Identified Issues/Barriers to RETURNING to current housing: None  Potential Assistance needed at discharge: N/A            Potential DME:  N/A  Patient expects to discharge to: Long-term care  Plan for transportation at discharge: Other (see comment) (Facility Provided)    Financial    Payor: MEDICAID [place] / Plan: MEDICAID [facility] DEPT OF JOB / Product Type: *No Product type* /     Does insurance require precert for SNF: No    Potential assistance Purchasing Medications: No  Meds-to-Beds request:        [id number] - [place], [place] - [address] - P [phone] - F [phone]  [address] [facility] [address]  Phone: [phone] Fax: [phone]      Notes:    Factors facilitating achievement of predicted outcomes: Family support and Caregiver support    Barriers to discharge: Medical complications    Additional Case Management Notes: Discharge plan is to return to LTC facility.     Discharging to Facility/ Agency   Name: [facility]   Address:  [address] [place] [address]   Phone:  [phone]  Fax:  [phone]    No precert required. Will need BLS transportation. Schizoeffective disorder/bipolar. Father is his legal guardian. Guardianship paperwork placed on the patient's soft chart. Verified return with the admissions office at [facility] and with the father/legal guardian.     The Plan for Transition of Care is

## 2024-03-14 NOTE — PLAN OF CARE
Problem: Discharge Planning  Goal: Discharge to home or other facility with appropriate resources  Outcome: Progressing  Flowsheets  Taken 3/14/2024 0845 by Zan Cravne RN  Discharge to home or other facility with appropriate resources:   Identify barriers to discharge with patient and caregiver   Arrange for needed discharge resources and transportation as appropriate   Identify discharge learning needs (meds, wound care, etc)   Refer to discharge planning if patient needs post-hospital services based on physician order or complex needs related to functional status, cognitive ability or social support system  Taken 3/13/2024 2230 by Rea Sanderson RN  Discharge to home or other facility with appropriate resources:   Identify barriers to discharge with patient and caregiver   Arrange for needed discharge resources and transportation as appropriate   Identify discharge learning needs (meds, wound care, etc)     Problem: Pain  Goal: Verbalizes/displays adequate comfort level or baseline comfort level  Outcome: Progressing  Flowsheets (Taken 3/14/2024 1025)  Verbalizes/displays adequate comfort level or baseline comfort level:   Encourage patient to monitor pain and request assistance   Administer analgesics based on type and severity of pain and evaluate response   Consider cultural and social influences on pain and pain management   Assess pain using appropriate pain scale   Implement non-pharmacological measures as appropriate and evaluate response   Notify Licensed Independent Practitioner if interventions unsuccessful or patient reports new pain     Problem: Safety - Adult  Goal: Free from fall injury  Outcome: Progressing  Flowsheets (Taken 3/14/2024 1025)  Free From Fall Injury:   Instruct family/caregiver on patient safety   Based on caregiver fall risk screen, instruct family/caregiver to ask for assistance with transferring infant if caregiver noted to have fall risk factors     Problem: Chronic

## 2024-03-14 NOTE — ASSESSMENT & PLAN NOTE
Suspect acute phase reactants secondary to myxedema coma.  Although we are also treating a complicated UTI, believe primary cause of SIRS is a myxedema coma rather than urinary tract infection.  Overall, clinical picture not consistent with sepsis secondary to bacterial infection.

## 2024-03-14 NOTE — PROGRESS NOTES
Physical Therapy  PT referral received and chart reviewed.  Nursing request hold PT Eval due to medical concerns.  Will follow-up tomorrow as approp.  Stephanie Albert, PT

## 2024-03-14 NOTE — ASSESSMENT & PLAN NOTE
In setting of ELPIDIO.  Temporizing measures provided in the ED.  Bicarb drip overnight.  Monitor on telemetry.  Appreciate further input and care from nephrology team.

## 2024-03-14 NOTE — CONSULTS
Clinical Pharmacy Note  Vancomycin Consult    Fabio Beauchamp is a 58 y.o. male ordered vancomycin for HAP; consult received from Dr. Heck to manage therapy. Also receiving ceftriaxone.    Allergies:  Carbamazepine, Chlorpromazine, Keflex [cephalexin], and Thioridazine     Temp max:  Temp (24hrs), Av.6 °F (33.7 °C), Min:89.2 °F (31.8 °C), Max:97.5 °F (36.4 °C)      Recent Labs     24  1422 24  0430   WBC 9.0 8.1       Recent Labs     24  1422 24  0228 24  0430   * 120* 112*   CREATININE 3.2* 2.9* 2.9*         Intake/Output Summary (Last 24 hours) at 3/14/2024 1122  Last data filed at 3/14/2024 0923  Gross per 24 hour   Intake 2520.36 ml   Output 3000 ml   Net -479.64 ml       Culture Results:  pending    Ht Readings from Last 1 Encounters:   24 1.88 m (6' 2\")        Wt Readings from Last 1 Encounters:   24 (!) 171.7 kg (378 lb 8.5 oz)         Estimated Creatinine Clearance: 47 mL/min (A) (based on SCr of 2.9 mg/dL (H)).    Assessment/Plan:  Day # 1 of vancomycin.  Vancomycin 2000 mg IV x 1 dose  Goal trough 15-20  ELPIDIO on admission (Scr 2.9 mg/dL), baseline ~1.2 based on 2024 admission  Will initially dose based on levels due to high risk nephrotoxicity (morbid obesity, T2DM, ELPIDIO)  Level ordered for tomorrow with AM labs (~18 hours after dose)    Thank you for the consult.   Vesna Marks, PharmD, BCPS  3/14/2024 11:25 AM

## 2024-03-14 NOTE — PROGRESS NOTES
Shift Summary: ABG improved. Patient weaned from BIPAP on to 2L NC. Hyperkalemia protocol & Lokelma given for K > 7.0. PICC placed. Wound Care consulted for wounds to bilateral thighs & sacrum. On and off Levo gtt. Mentation improved - more awake, but still only alert to self.         Family updated: yes - two phone calls with patient's father, Dion.    Most recent vitals: /60   Pulse 84   Temp 98.5 °F (36.9 °C) (Bladder)   Resp 21   Ht 1.88 m (6' 2\")   Wt (!) 171.7 kg (378 lb 8.5 oz)   SpO2 96%   BMI 48.60 kg/m²      Rhythm: Normal Sinus Rhythm      NC/HFNC- 2 lpm  Respiratory support: - No ventilator support    Vent days: Day 0    Admission weight Weight - Scale: (!) 170.7 kg (376 lb 5.2 oz)  Today's weight   Wt Readings from Last 1 Encounters:   03/14/24 (!) 171.7 kg (378 lb 8.5 oz)         UOP >30ml/hr: yes - 40 lasix given ONCE       Restraints: no  Order current and documentation up to date?    Lines/Drains reviewed @ bedside.  PICC Double Lumen 03/14/24 Right Basilic (Active)   Number of days: 0       Peripheral IV 03/13/24 Right Forearm (Active)   Number of days: 0       Peripheral IV 03/14/24 Left Forearm (Active)   Number of days: 0       Urinary Catheter 03/13/24 Aden-Temperature (Active)   Number of days: 1     Aden need assessed each shift: yes      Drip rates at handoff:    norepinephrine Stopped (03/14/24 1339)    dextrose      sodium chloride         Lab Data:   CBC:   Recent Labs     03/13/24  1422 03/14/24  0430   WBC 9.0 8.1   HGB 9.3* 8.1*   HCT 28.1* 24.3*   MCV 82.0 81.7   * 525*     BMP:    Recent Labs     03/14/24  0430 03/14/24  0743 03/14/24  1221     --  149*   K 7.3* 7.1* 6.6*   CO2 24  --  29   *  --  104*   CREATININE 2.9*  --  3.3*     LIVR:   Recent Labs     03/13/24  1422 03/14/24  0430   * 107*   * 114*     PT/INR:   Recent Labs     03/13/24  1422 03/14/24  0430   PROT 8.7* 7.5     APTT: No results for input(s): \"APTT\" in the last 72

## 2024-03-14 NOTE — PROGRESS NOTES
Order for PICC line per Dr. Cabrera. Pre procedure and timeout done with pt's RN. Bessie with nephrology order placed.    Successful insertion of a double lumen PICC line into pt's right basilic vein. No issues gaining access or advancing guidewire/introducer/PICC line. PICC tip terminates in the SVC according to Sherlock 3CG tip confirmation system. PICC was seen dropping into SVC with tip tracking technology and discernable peaked p waves were noted without negative deflection. A printout will be in pt's chart.     PICC is cut at 48cm and out externally 0cm. All lumens flush without resistance and draw back brisk blood return.    PICC site CDI with hemostasis maintained and a chg dressing applied to site. Pt instructed to stay in bed and keep arm flat and still for 30 minutes  to promote hemostasis.     Zan BAUTISTA given handoff report

## 2024-03-14 NOTE — PROGRESS NOTES
Telephone conversation with patient's father, Dion. Updates provided and all questions answered at this time. Telephone consent obtained for placement of PICC line. Verified with Cristy BAUTISTA.    Electronically signed by Zan Craven RN on 3/14/2024 at 10:28 AM

## 2024-03-14 NOTE — PROGRESS NOTES
CO2 increased from 48.1 at 0034 to 59.7 at 0228 after being on BIPAP 15/5 16 35%    Settings increased to 20/5 20 35%, MD approved and VBG ordered for 1 hour

## 2024-03-14 NOTE — PROGRESS NOTES
Nephrology Progress Note   [email or web address]      Chief Complaint: altered mental status     History of Present Illness: history obtained from the chart - patient confused  Mr Beauchamp is a 57 yo morbidly obese male with a past medical history significant for hypertension, hypothyroidism, DM II, hyperlipidemia, obesity with sleep apnea, schizophrenia that was brought in from [facility] because of altered mental status - Of note patient had a similar presentation inJan 2023 and responded well to Narcan on previous admission. At the time of my evaluation patients eyes were open he could track me moving across the room but cannot answer questions appropriately   Nephrology consulted for ELPIDIO and following lab values demonstrated below:     Latest Reference Range & Units 03/13/24 14:22   Sodium 136 - 145 mmol/L 140   Potassium 3.5 - 5.1 mmol/L 5.8 (H)   Chloride 99 - 110 mmol/L 103   CO2 21 - 32 mmol/L 20 (L)   BUN,BUNPL 7 - 20 mg/dL 122 (HH)   Creatinine 0.9 - 1.3 mg/dL 3.2 (H)   Anion Gap 3 - 16  17 (H)   Est, Glom Filt Rate >60  22 !   Magnesium 1.80 - 2.40 mg/dL 1.90   Lactic Acid 0.4 - 2.0 mmol/L 1.7       Subjective:    Awake, confused, BIPAP    ROS: unobtainable     Scheduled Meds:   sodium chloride flush  5-40 mL IntraVENous 2 times per day    albuterol  10 mg Nebulization Once    valproate (DEPACON) 250 mg in sodium chloride 0.9 % 100 mL IVPB  250 mg IntraVENous Q6H    OLANZapine  15 mg Oral Nightly    Or    OLANZapine  10 mg IntraMUSCular Nightly    vancomycin  2,000 mg IntraVENous Once    vancomycin (VANCOCIN) intermittent dosing (placeholder)   Other RX Placeholder    sodium zirconium cyclosilicate  10 g Oral TID    midodrine  5 mg Oral TID WC    hydrocortisone sodium succinate PF  100 mg IntraVENous Q8H    atorvastatin  20 mg Oral Nightly    aspirin  81 mg Oral Daily    bumetanide  2 mg Oral Daily    insulin lispro  0-8 Units SubCUTAneous Q6H    enoxaparin  40 mg SubCUTAneous BID    Levothyroxine Sodium  100 mcg

## 2024-03-14 NOTE — PROGRESS NOTES
Dr. Heck made aware of patient's Potassium of 6.6. Orders for Lokelma. See MAR.    Electronically signed by Zan Craven RN on 3/14/2024 at 2:18 PM

## 2024-03-14 NOTE — PROGRESS NOTES
Shift Summary: Admission overnight. Put on bipap due to desaturation. Multiple blood gases drawn, pCO2 with little improvement. Warming blanket on. Levophed gtt started. PICC ordered, okay per nephrology.         Family updated: attempted to call Pt's father for PICC consent    Most recent vitals: BP (!) 111/50   Pulse 61   Temp (!) 95.3 °F (35.2 °C) (Bladder)   Resp 18   Ht 1.88 m (6' 2\")   Wt (!) 171.7 kg (378 lb 8.5 oz)   SpO2 97%   BMI 48.60 kg/m²      Rhythm: sinus rhythm    NC/HFNC- 4 lpm when off bipap  Respiratory support: - BiPAP   IPAP: 20 cmH20, CPAP/EPAP: 5 cmH2O continuous    Vent days: Day n/a    Admission weight Weight - Scale: (!) 170.7 kg (376 lb 5.2 oz)  Today's weight   Wt Readings from Last 1 Encounters:   03/13/24 (!) 171.7 kg (378 lb 8.5 oz)         UOP >30ml/hr: yes        Restraints: no  Order current and documentation up to date?    Lines/Drains reviewed @ bedside.  Peripheral IV 03/13/24 Right Forearm (Active)   Number of days: 0       Peripheral IV 03/14/24 Left Forearm (Active)   Number of days: 0       Urinary Catheter 03/13/24 Aden-Temperature (Active)   Number of days: 0     Aden need assessed each shift: yes      Drip rates at handoff:    norepinephrine 5 mcg/min (03/14/24 0600)    sodium chloride      sodium bicarbonate 75 mEq in sodium chloride 0.45 % 1,000 mL infusion 150 mL/hr at 03/14/24 0600    dextrose      sodium chloride      sodium chloride         Lab Data:   CBC:   Recent Labs     03/13/24 1422 03/14/24  0430   WBC 9.0 8.1   HGB 9.3* 8.1*   HCT 28.1* 24.3*   MCV 82.0 81.7   * 525*     BMP:    Recent Labs     03/14/24 0228 03/14/24  0430    145   K 7.0* 7.3*   CO2 26 24   * 112*   CREATININE 2.9* 2.9*     LIVR:   Recent Labs     03/13/24  1422 03/14/24  0430   * 107*   * 114*     PT/INR:   Recent Labs     03/13/24  1422 03/14/24  0430   PROT 8.7* 7.5     APTT: No results for input(s): \"APTT\" in the last 72 hours.  ABG:   Recent

## 2024-03-14 NOTE — PROGRESS NOTES
Pt arrived to unit in stable condition. Transferred to ICU bed, bath done, skin assessed. Pt answers some questions. Alert and oriented to self. Warming blanket back on Pt. Bed alarm active.

## 2024-03-14 NOTE — PROGRESS NOTES
Occupational Therapy      OT referral received and chart reviewed. Nursing request hold OT Eval due to medical concerns. Will follow-up as schedule and pt condition permit.    Electronically signed by Arianna Jimenez OT on 3/14/2024 at 8:24 AM

## 2024-03-14 NOTE — PROGRESS NOTES
Pt's potassium 7.0. Hospitalist paged. New orders for insulin, D10, calcium gluconate, and kayexalate.

## 2024-03-14 NOTE — PROGRESS NOTES
Hospitalist Progress Note  3/14/2024 11:30 AM    PCP: Anshu Padilla MD    [phone]     Date of Admission: 3/13/2024                                                                                                                     HOSPITAL COURSE    Patient demographics:  The patient  Fabio Beauchamp is a 58 y.o. male     Significant past medical history:   Patient Active Problem List   Diagnosis    Cellulitis    HTN (hypertension), malignant    Chest pain    Kidney stone    Pyelonephritis, acute    Bipolar I disorder (HCC)    Hypothermia associated with environmental change    Acute metabolic encephalopathy    Cerebral edema (HCC)    Pulmonary edema    Sinus bradycardia    Hypothyroidism    Schizoaffective disorder (HCC)    Altered mental status    Psychosis (HCC)    Subchronic schizoaffective disorder with acute exacerbation (HCC)    Type 2 diabetes mellitus with complication, without long-term current use of insulin (HCC)    Hyperlipidemia    LADONNA (obstructive sleep apnea)    Morbid obesity (HCC)    Schizoaffective disorder, chronic condition with acute exacerbation (HCC)    Acute and chronic respiratory failure with hypoxia (HCC)    Acute respiratory failure with hypoxia (HCC)    Influenzal pneumonia    Varicose ulcer of right lower extremity (HCC)    Acute respiratory failure with hypoxia and hypercapnia (HCC)    Chronic systolic heart failure (HCC)    Myxedema coma (HCC)    ELPIDIO (acute kidney injury) (HCC)    SIRS (systemic inflammatory response syndrome) (HCC)    Complicated UTI (urinary tract infection)    Hyperkalemia    Normocytic anemia    Hyperglycemia due to type 2 diabetes mellitus (HCC)         Presenting symptoms:      Diagnostic workup:      CONSULTS DURING ADMISSION :   IP CONSULT TO NEPHROLOGY  IP CONSULT TO HOSPITALIST  IP CONSULT TO ENDOCRINOLOGY  IP CONSULT TO CRITICAL CARE  IP CONSULT TO NEPHROLOGY  PHARMACY TO DOSE VANCOMYCIN      Patient was diagnosed with:        Treatment while

## 2024-03-14 NOTE — ED NOTES
Pt with excoriation to ABD and benedicto fold. Area cleaned, dried, and interdry material placed to assist in irritation relief. Pt tolerated well.

## 2024-03-14 NOTE — ED NOTES
Call placed to pharmacy regarding medications supplied. Pharmacy attempting to borrow IV synthroid due to not enough available. Pharmacy to update RN when available

## 2024-03-14 NOTE — PROGRESS NOTES
Bedside rounding completed with Critical Care team. This RN told Dr. Heck about patient's critical Potassium of 7.0. Orders to administer hyperkalemia protocol. See MAR.     Electronically signed by Zan Craven RN on 3/14/2024 at 9:59 AM     Rash improved per MD, re start VAC

## 2024-03-14 NOTE — PROGRESS NOTES
ABG ran on [facility] results:     7.34,52,92,28,1.7, 97%  ABG drawn from R Brachial w/o complication.

## 2024-03-15 ENCOUNTER — APPOINTMENT (OUTPATIENT)
Dept: CT IMAGING | Age: 59
End: 2024-03-15
Payer: MEDICAID

## 2024-03-15 LAB
ANION GAP SERPL CALCULATED.3IONS-SCNC: 11 MMOL/L (ref 3–16)
ANISOCYTOSIS BLD QL SMEAR: ABNORMAL
BACTERIA UR CULT: ABNORMAL
BASOPHILS # BLD: 0 K/UL (ref 0–0.2)
BASOPHILS NFR BLD: 0 %
BUN SERPL-MCNC: 97 MG/DL (ref 7–20)
CALCIUM SERPL-MCNC: 9.6 MG/DL (ref 8.3–10.6)
CHLORIDE SERPL-SCNC: 116 MMOL/L (ref 99–110)
CO2 SERPL-SCNC: 29 MMOL/L (ref 21–32)
CREAT SERPL-MCNC: 3 MG/DL (ref 0.9–1.3)
DEPRECATED RDW RBC AUTO: 21.9 % (ref 12.4–15.4)
EKG ATRIAL RATE: 68 BPM
EKG DIAGNOSIS: NORMAL
EKG P AXIS: 52 DEGREES
EKG P-R INTERVAL: 174 MS
EKG Q-T INTERVAL: 424 MS
EKG QRS DURATION: 132 MS
EKG QTC CALCULATION (BAZETT): 450 MS
EKG R AXIS: -19 DEGREES
EKG T AXIS: 48 DEGREES
EKG VENTRICULAR RATE: 68 BPM
EOSINOPHIL # BLD: 0 K/UL (ref 0–0.6)
EOSINOPHIL NFR BLD: 0 %
GFR SERPLBLD CREATININE-BSD FMLA CKD-EPI: 23 ML/MIN/{1.73_M2}
GLUCOSE BLD-MCNC: 119 MG/DL (ref 70–99)
GLUCOSE BLD-MCNC: 120 MG/DL (ref 70–99)
GLUCOSE BLD-MCNC: 129 MG/DL (ref 70–99)
GLUCOSE BLD-MCNC: 152 MG/DL (ref 70–99)
GLUCOSE BLD-MCNC: 180 MG/DL (ref 70–99)
GLUCOSE SERPL-MCNC: 132 MG/DL (ref 70–99)
HCT VFR BLD AUTO: 24 % (ref 40.5–52.5)
HGB BLD-MCNC: 8.2 G/DL (ref 13.5–17.5)
LYMPHOCYTES # BLD: 0.7 K/UL (ref 1–5.1)
LYMPHOCYTES NFR BLD: 7 %
MACROCYTES BLD QL SMEAR: ABNORMAL
MAGNESIUM SERPL-MCNC: 1.9 MG/DL (ref 1.8–2.4)
MCH RBC QN AUTO: 27.3 PG (ref 26–34)
MCHC RBC AUTO-ENTMCNC: 34.3 G/DL (ref 31–36)
MCV RBC AUTO: 79.3 FL (ref 80–100)
MICROCYTES BLD QL SMEAR: ABNORMAL
MONOCYTES # BLD: 0.1 K/UL (ref 0–1.3)
MONOCYTES NFR BLD: 1 %
MRSA DNA SPEC QL NAA+PROBE: NORMAL
MYELOCYTES NFR BLD MANUAL: 2 %
NEUTROPHILS # BLD: 8.8 K/UL (ref 1.7–7.7)
NEUTROPHILS NFR BLD: 80 %
NEUTS BAND NFR BLD MANUAL: 9 % (ref 0–7)
ORGANISM: ABNORMAL
OSMOLALITY UR: 429 MOSM/KG (ref 390–1070)
OVALOCYTES BLD QL SMEAR: ABNORMAL
PERFORMED ON: ABNORMAL
PHOSPHATE SERPL-MCNC: 6.2 MG/DL (ref 2.5–4.9)
PLATELET # BLD AUTO: 481 K/UL (ref 135–450)
PMV BLD AUTO: 6.8 FL (ref 5–10.5)
POIKILOCYTOSIS BLD QL SMEAR: ABNORMAL
POTASSIUM SERPL-SCNC: 5.3 MMOL/L (ref 3.5–5.1)
POTASSIUM SERPL-SCNC: 5.6 MMOL/L (ref 3.5–5.1)
PROCALCITONIN SERPL IA-MCNC: 0.47 NG/ML (ref 0–0.15)
PROMYELOCYTES NFR BLD MANUAL: 1 %
RBC # BLD AUTO: 3.03 M/UL (ref 4.2–5.9)
SCHISTOCYTES BLD QL SMEAR: ABNORMAL
SICKLE CELLS BLD QL SMEAR: ABNORMAL
SODIUM SERPL-SCNC: 156 MMOL/L (ref 136–145)
TARGETS BLD QL SMEAR: ABNORMAL
TOXIC GRANULES BLD QL SMEAR: PRESENT
VANCOMYCIN SERPL-MCNC: 19 UG/ML
WBC # BLD AUTO: 9.6 K/UL (ref 4–11)

## 2024-03-15 PROCEDURE — 80202 ASSAY OF VANCOMYCIN: CPT

## 2024-03-15 PROCEDURE — 71250 CT THORAX DX C-: CPT

## 2024-03-15 PROCEDURE — 97166 OT EVAL MOD COMPLEX 45 MIN: CPT

## 2024-03-15 PROCEDURE — 80048 BASIC METABOLIC PNL TOTAL CA: CPT

## 2024-03-15 PROCEDURE — 2700000000 HC OXYGEN THERAPY PER DAY

## 2024-03-15 PROCEDURE — 2580000003 HC RX 258: Performed by: NURSE PRACTITIONER

## 2024-03-15 PROCEDURE — 6360000002 HC RX W HCPCS: Performed by: INTERNAL MEDICINE

## 2024-03-15 PROCEDURE — 84132 ASSAY OF SERUM POTASSIUM: CPT

## 2024-03-15 PROCEDURE — 6370000000 HC RX 637 (ALT 250 FOR IP): Performed by: FAMILY MEDICINE

## 2024-03-15 PROCEDURE — 94660 CPAP INITIATION&MGMT: CPT

## 2024-03-15 PROCEDURE — 36592 COLLECT BLOOD FROM PICC: CPT

## 2024-03-15 PROCEDURE — 93005 ELECTROCARDIOGRAM TRACING: CPT | Performed by: INTERNAL MEDICINE

## 2024-03-15 PROCEDURE — 2000000000 HC ICU R&B

## 2024-03-15 PROCEDURE — 94761 N-INVAS EAR/PLS OXIMETRY MLT: CPT

## 2024-03-15 PROCEDURE — 2500000003 HC RX 250 WO HCPCS: Performed by: NURSE PRACTITIONER

## 2024-03-15 PROCEDURE — 2580000003 HC RX 258

## 2024-03-15 PROCEDURE — 2580000003 HC RX 258: Performed by: INTERNAL MEDICINE

## 2024-03-15 PROCEDURE — 97162 PT EVAL MOD COMPLEX 30 MIN: CPT

## 2024-03-15 PROCEDURE — 85025 COMPLETE CBC W/AUTO DIFF WBC: CPT

## 2024-03-15 PROCEDURE — 83735 ASSAY OF MAGNESIUM: CPT

## 2024-03-15 PROCEDURE — 6370000000 HC RX 637 (ALT 250 FOR IP): Performed by: INTERNAL MEDICINE

## 2024-03-15 PROCEDURE — 6360000002 HC RX W HCPCS: Performed by: FAMILY MEDICINE

## 2024-03-15 PROCEDURE — 97530 THERAPEUTIC ACTIVITIES: CPT

## 2024-03-15 PROCEDURE — 83935 ASSAY OF URINE OSMOLALITY: CPT

## 2024-03-15 PROCEDURE — 93010 ELECTROCARDIOGRAM REPORT: CPT | Performed by: INTERNAL MEDICINE

## 2024-03-15 PROCEDURE — 99291 CRITICAL CARE FIRST HOUR: CPT | Performed by: INTERNAL MEDICINE

## 2024-03-15 PROCEDURE — 2580000003 HC RX 258: Performed by: FAMILY MEDICINE

## 2024-03-15 PROCEDURE — 84100 ASSAY OF PHOSPHORUS: CPT

## 2024-03-15 PROCEDURE — 84145 PROCALCITONIN (PCT): CPT

## 2024-03-15 PROCEDURE — 6360000002 HC RX W HCPCS: Performed by: NURSE PRACTITIONER

## 2024-03-15 RX ORDER — WATER 10 ML/10ML
INJECTION INTRAMUSCULAR; INTRAVENOUS; SUBCUTANEOUS
Status: DISPENSED
Start: 2024-03-15 | End: 2024-03-16

## 2024-03-15 RX ORDER — DEXTROSE MONOHYDRATE 50 MG/ML
INJECTION, SOLUTION INTRAVENOUS CONTINUOUS
Status: DISCONTINUED | OUTPATIENT
Start: 2024-03-15 | End: 2024-03-20

## 2024-03-15 RX ADMIN — HYDROCORTISONE SODIUM SUCCINATE 50 MG: 100 INJECTION, POWDER, FOR SOLUTION INTRAMUSCULAR; INTRAVENOUS at 12:37

## 2024-03-15 RX ADMIN — Medication 10 ML: at 09:25

## 2024-03-15 RX ADMIN — DEXTROSE MONOHYDRATE: 50 INJECTION, SOLUTION INTRAVENOUS at 12:37

## 2024-03-15 RX ADMIN — SODIUM ZIRCONIUM CYCLOSILICATE 10 G: 10 POWDER, FOR SUSPENSION ORAL at 09:44

## 2024-03-15 RX ADMIN — LEVOTHYROXINE SODIUM 100 MCG: 20 INJECTION, SOLUTION INTRAVENOUS at 09:25

## 2024-03-15 RX ADMIN — VALPROATE SODIUM 250 MG: 100 INJECTION, SOLUTION INTRAVENOUS at 18:12

## 2024-03-15 RX ADMIN — VALPROATE SODIUM 250 MG: 100 INJECTION, SOLUTION INTRAVENOUS at 12:31

## 2024-03-15 RX ADMIN — HYDROCORTISONE SODIUM SUCCINATE 100 MG: 100 INJECTION, POWDER, FOR SOLUTION INTRAMUSCULAR; INTRAVENOUS at 05:38

## 2024-03-15 RX ADMIN — DEXTROSE MONOHYDRATE: 50 INJECTION, SOLUTION INTRAVENOUS at 19:11

## 2024-03-15 RX ADMIN — VALPROATE SODIUM 250 MG: 100 INJECTION, SOLUTION INTRAVENOUS at 05:38

## 2024-03-15 RX ADMIN — ENOXAPARIN SODIUM 40 MG: 100 INJECTION SUBCUTANEOUS at 09:25

## 2024-03-15 RX ADMIN — Medication 10 ML: at 20:23

## 2024-03-15 RX ADMIN — BUMETANIDE 2 MG: 1 TABLET ORAL at 09:25

## 2024-03-15 RX ADMIN — WATER 2 ML: 1 INJECTION INTRAMUSCULAR; INTRAVENOUS; SUBCUTANEOUS at 20:30

## 2024-03-15 RX ADMIN — SODIUM ZIRCONIUM CYCLOSILICATE 10 G: 10 POWDER, FOR SUSPENSION ORAL at 20:23

## 2024-03-15 RX ADMIN — MIDODRINE HYDROCHLORIDE 5 MG: 5 TABLET ORAL at 12:37

## 2024-03-15 RX ADMIN — ENOXAPARIN SODIUM 40 MG: 100 INJECTION SUBCUTANEOUS at 20:22

## 2024-03-15 RX ADMIN — ASPIRIN 81 MG: 81 TABLET, COATED ORAL at 09:25

## 2024-03-15 RX ADMIN — VALPROATE SODIUM 250 MG: 100 INJECTION, SOLUTION INTRAVENOUS at 23:32

## 2024-03-15 RX ADMIN — HYDROCORTISONE SODIUM SUCCINATE 50 MG: 100 INJECTION, POWDER, FOR SOLUTION INTRAMUSCULAR; INTRAVENOUS at 20:22

## 2024-03-15 RX ADMIN — CEFTRIAXONE SODIUM 2000 MG: 2 INJECTION, POWDER, FOR SOLUTION INTRAMUSCULAR; INTRAVENOUS at 16:26

## 2024-03-15 RX ADMIN — MIDODRINE HYDROCHLORIDE 5 MG: 5 TABLET ORAL at 09:25

## 2024-03-15 NOTE — PROGRESS NOTES
Shift Summary: Refused to take PO meds. Attempted to place NG to give keyexalate, unable to due to resistance and coiling in mouth. Morning K+ 5.6. Restarted Levophed.         Family updated: no    Most recent vitals: BP (!) 92/39   Pulse 65   Temp (!) 96.3 °F (35.7 °C) (Bladder)   Resp 18   Ht 1.88 m (6' 2\")   Wt (!) 169 kg (372 lb 9.2 oz)   SpO2 95%   BMI 47.84 kg/m²      Rhythm: NSR, SB, trigeminy    NC/HFNC- 1 lpm  Respiratory support: - BiPAP   IPAP: 20 cmH20, CPAP/EPAP: 5 cmH2O only when sleeping    Vent days: Day n/a    Admission weight Weight - Scale: (!) 170.7 kg (376 lb 5.2 oz)  Today's weight   Wt Readings from Last 1 Encounters:   03/15/24 (!) 169 kg (372 lb 9.2 oz)         UOP >30ml/hr: yes        Restraints: no Order current and documentation up to date?    Lines/Drains reviewed @ bedside.  PICC Double Lumen 03/14/24 Right Basilic (Active)   Number of days: 0       Peripheral IV 03/13/24 Right Forearm (Active)   Number of days: 1       Peripheral IV 03/14/24 Left Forearm (Active)   Number of days: 1       Urinary Catheter 03/13/24 Aden-Temperature (Active)   Number of days: 1     Aden need assessed each shift: yes      Drip rates at handoff:    norepinephrine 5 mcg/min (03/15/24 0616)    dextrose      sodium chloride         Lab Data:   CBC:   Recent Labs     03/14/24  0430 03/15/24  0438   WBC 8.1 9.6   HGB 8.1* 8.2*   HCT 24.3* 24.0*   MCV 81.7 79.3*   * 481*     BMP:    Recent Labs     03/14/24  1221 03/14/24  1714 03/14/24  2156 03/15/24  0438   *  --   --  156*   K 6.6*   < > 6.1* 5.6*   CO2 29  --   --  29   *  --   --  97*   CREATININE 3.3*  --   --  3.0*    < > = values in this interval not displayed.     LIVR:   Recent Labs     03/13/24  1422 03/14/24  0430   * 107*   * 114*     PT/INR:   Recent Labs     03/13/24 1422 03/14/24  0430   PROT 8.7* 7.5     APTT: No results for input(s): \"APTT\" in the last 72 hours.  ABG:   Recent Labs     03/14/24  0547

## 2024-03-15 NOTE — PROGRESS NOTES
Nephrology Progress Note   [email or web address]      Chief Complaint: altered mental status     History of Present Illness: history obtained from the chart - patient confused  Mr Beauchamp is a 59 yo morbidly obese male with a past medical history significant for hypertension, hypothyroidism, DM II, hyperlipidemia, obesity with sleep apnea, schizophrenia that was brought in from [facility] because of altered mental status - Of note patient had a similar presentation inJan 2023 and responded well to Narcan on previous admission. At the time of my evaluation patients eyes were open he could track me moving across the room but cannot answer questions appropriately   Nephrology consulted for ELPIDIO and following lab values demonstrated below:     Latest Reference Range & Units 03/13/24 14:22   Sodium 136 - 145 mmol/L 140   Potassium 3.5 - 5.1 mmol/L 5.8 (H)   Chloride 99 - 110 mmol/L 103   CO2 21 - 32 mmol/L 20 (L)   BUN,BUNPL 7 - 20 mg/dL 122 (HH)   Creatinine 0.9 - 1.3 mg/dL 3.2 (H)   Anion Gap 3 - 16  17 (H)   Est, Glom Filt Rate >60  22 !   Magnesium 1.80 - 2.40 mg/dL 1.90   Lactic Acid 0.4 - 2.0 mmol/L 1.7       Subjective:    Awake, confused, BIPAP    ROS: unobtainable     Scheduled Meds:   hydrocortisone sodium succinate PF  50 mg IntraVENous Q8H    sodium chloride flush  5-40 mL IntraVENous 2 times per day    valproate (DEPACON) 250 mg in sodium chloride 0.9 % 100 mL IVPB  250 mg IntraVENous Q6H    OLANZapine  15 mg Oral Nightly    Or    OLANZapine  10 mg IntraMUSCular Nightly    vancomycin (VANCOCIN) intermittent dosing (placeholder)   Other RX Placeholder    sodium zirconium cyclosilicate  10 g Oral TID    midodrine  5 mg Oral TID WC    atorvastatin  20 mg Oral Nightly    aspirin  81 mg Oral Daily    insulin lispro  0-8 Units SubCUTAneous Q6H    enoxaparin  40 mg SubCUTAneous BID    Levothyroxine Sodium  100 mcg IntraVENous Daily    [Held by provider] liothyronine  5 mcg Oral q8h    cefTRIAXone (ROCEPHIN) IV  2,000 mg  Exposure to Nephrotoxic agents    2. Hyperkalemia - Improved but still high - continue medical management  - Monitor closely    3. Hypotension secondary to ? Volume depletion  - continue Midodrine    4. Altered mental status - plan per Medicine    5. Morbid obesity    6. CKD II - Baseline creatinine 1.2-1.3 mg/dl     7. History of Hypercapnic respiratory failure    8. Hypernatremia - altered mental status so not expressing thirst  - Stop loop diuretic  - water deficit ~ 9.7 L to get sound down to 140 (assuming current weight is accurate)  - Start D5 as source of free water    Total critical care time spent 35 mins

## 2024-03-15 NOTE — PROGRESS NOTES
RN noticed rhythm changes on patient's monitor. Pt having more frequent PVCs. EKG completed & placed in patient's chart. Secure Message sent to Dr. Eubanks to make aware. No news orders at this time.    Electronically signed by Zan Craven RN on 3/15/2024 at 1:07 PM

## 2024-03-15 NOTE — PROGRESS NOTES
Pulmonary Progress Note    Date of Admission: 3/13/2024   LOS: 2 days     CC:  Chief Complaint   Patient presents with    Altered Mental Status     Pt from SNF w/ c/o AMS. Pt was last normal on Sunday per nurse. Pt hx schizo-affective. Pt said to be combative. Pt only able to tell me his name at this time. Pinpoint pupils per EMS. 6IN narcan given w/ no effect.            Assessment/Plan     Severe hyperkalemia  -Improving, good urine output     Acute hypercapnic respiratory failure requiring NIPPV  -Has history of LADONNA and likely OHS  -Avoid sedating medications  -Continue BiPAP at night and with naps  -Monitor mental status     Septic shock, -due to  Urinary tract infection, Proteus  -High leuk esterase with pyuria.  -Ceftriaxone  -Titrate pressors goal MAP 65  -Wean hydrocortisone to 50 every 8  -On midodrine    Hypernatremia  -High urine output will check urine osmolality  -Nephrology following, received IV fluid resuscitation       Bipolar  Zyprexa    Due to the immediate potential for life-threatening deterioration due to septic shock, I spent 32 minutes providing critical care.  This time is excluding time spent performing separately billable procedures.    HPI/Subjective  No acute events overnight.  Improving hyperkalemia  -Wearing BiPAP overnight    ROS:   No nausea  No Vomiting  No chest pain      Intake/Output Summary (Last 24 hours) at 3/15/2024 0930  Last data filed at 3/15/2024 0400  Gross per 24 hour   Intake 1882.92 ml   Output 4825 ml   Net -2942.08 ml         PHYSICAL EXAM:   Blood pressure 127/64, pulse 64, temperature (!) 96.7 °F (35.9 °C), temperature source Bladder, resp. rate 15, height 1.88 m (6' 2\"), weight (!) 169 kg (372 lb 9.2 oz), SpO2 96 %.'  Gen:  No acute distress.   Resp:  No crackles. No wheezes. No rhonchi. No dullness on percussion.  CV: Regular rate. Regular rhythm. No murmur or rub. No edema.   M/S: No cyanosis. No clubbing.    Neuro: Awake and alert          Labs  reviewed:  CBC:   Recent Labs     03/13/24  1422 03/14/24  0430 03/15/24  0438   WBC 9.0 8.1 9.6   HGB 9.3* 8.1* 8.2*   HCT 28.1* 24.3* 24.0*   MCV 82.0 81.7 79.3*   * 525* 481*     BMP:   Recent Labs     03/14/24  0228 03/14/24  0430 03/14/24  0743 03/14/24  1221 03/14/24  1714 03/14/24  2156 03/15/24  0438    145  --  149*  --   --  156*   K 7.0* 7.3*   < > 6.6* 6.2* 6.1* 5.6*    109  --  109  --   --  116*   CO2 26 24  --  29  --   --  29   PHOS 5.9*  --   --   --   --   --  6.2*   * 112*  --  104*  --   --  97*   CREATININE 2.9* 2.9*  --  3.3*  --   --  3.0*    < > = values in this interval not displayed.     LIVER PROFILE:   Recent Labs     03/13/24  1422 03/14/24 0430   * 107*   * 114*   BILITOT <0.2 <0.2   ALKPHOS 138* 112     PT/INR: No results for input(s): \"PROTIME\", \"INR\" in the last 72 hours.  APTT: No results for input(s): \"APTT\" in the last 72 hours.  UA:  Recent Labs     03/13/24  1544   COLORU Yellow   PHUR 6.5   WBCUA 227*   RBCUA 217*   BACTERIA None Seen   CLARITYU CLOUDY*   SPECGRAV 1.014   LEUKOCYTESUR MODERATE*   UROBILINOGEN 0.2   BILIRUBINUR Negative   BLOODU LARGE*   GLUCOSEU >=1000*     No results for input(s): \"PH\", \"PCO2\", \"PO2\" in the last 72 hours.      Films:  Radiology Review:  Pertinent images / reports were reviewed as a part of this visit.             Access    Arterial          PICC     PICC Double Lumen 03/14/24 Right Basilic (Active)   Central Line Being Utilized Yes 03/15/24 0840   Criteria for Appropriate Use Irritant/vesicant medication 03/15/24 0840   Site Assessment Clean, dry & intact 03/15/24 0840   Phlebitis Assessment No symptoms 03/15/24 0840   Infiltration Assessment 0 03/15/24 0840   Extremity Circumference (cm) 41 cm 03/15/24 0840   External Catheter Length (cm) 0 cm 03/15/24 0840   Proximal Lumen Color/Status Purple;Infusing;Alcohol cap present 03/15/24 0840   Distal Lumen Color/Status Red;Infusing;Alcohol cap present

## 2024-03-15 NOTE — PROGRESS NOTES
Shift Summary: Potassium still trending down. Continues on Lokelma, but PO intake poor. Pt off Levo gtt since 0650 and blood pressures stable. Nephro started pt on Dextrose gtt for gentle hydration and ordered CT chest w/o contrast. Wound care evaluated patient and ordered specialty bed. Pt having frequent PVCs. EKG completed and hospitalist made aware with no new orders. Warming blanket placed back on for bladder temp of 96.2.         Family updated: yes - phone call placed to patient's father, Dion. Voicemail left with callback number    Most recent vitals: BP (!) 110/57   Pulse 69   Temp 98 °F (36.7 °C) (Bladder)   Resp 11   Ht 1.88 m (6' 2\")   Wt (!) 169 kg (372 lb 9.2 oz)   SpO2 100%   BMI 47.84 kg/m²      Rhythm: Normal Sinus Rhythm with frequent PVCs     NC/HFNC- 2 lpm  Respiratory support: - No ventilator support    Vent days: Day 0    Admission weight Weight - Scale: (!) 170.7 kg (376 lb 5.2 oz)  Today's weight   Wt Readings from Last 1 Encounters:   03/15/24 (!) 169 kg (372 lb 9.2 oz)         UOP >30ml/hr: yes          Restraints: no  Order current and documentation up to date?    Lines/Drains reviewed @ bedside.  PICC Double Lumen 03/14/24 Right Basilic (Active)   Number of days: 1       Peripheral IV 03/13/24 Right Forearm (Active)   Number of days: 1       Peripheral IV 03/14/24 Left Forearm (Active)   Number of days: 1       Urinary Catheter 03/13/24 Cornell-Temperature (Active)   Number of days: 1     Cornell need assessed each shift: yes, cornell need still indicated. Nephro requiring strict I&Os      Drip rates at handoff:    dextrose 150 mL/hr at 03/15/24 1912    norepinephrine Stopped (03/15/24 0946)    dextrose      sodium chloride         Lab Data:   CBC:   Recent Labs     03/14/24  0430 03/15/24  0438   WBC 8.1 9.6   HGB 8.1* 8.2*   HCT 24.3* 24.0*   MCV 81.7 79.3*   * 481*     BMP:    Recent Labs     03/14/24  1221 03/14/24  1714 03/15/24  0438 03/15/24  1114   *  --  156*  --

## 2024-03-15 NOTE — CARE COORDINATION
[facility] Continence Nurse  Consult Note       NAME:  Fabio Beauchamp  MEDICAL RECORD NUMBER:  [id number]  AGE: 58 y.o.   GENDER: male  : 1965  TODAY'S DATE:  3/15/2024    Subjective   Reason for WOCN Evaluation and Assessment: WOUNDS TO BILATERAL THIGHS AND SACRUM      Fabio Beauchamp is a 58 y.o. male referred by:   [] Physician  [x] Nursing  [] Other:     Wound Identification:  Wound Type: pressure and undetermined  Contributing Factors: chronic pressure and decreased mobility    Wound History: Pt from SNF. Wounds POA, unable to determine cause of thigh wounds, however probable for pressure related.   Current Wound Care Treatment:  n/a    Patient Goal of Care:  [x] Wound Healing  [] Odor Control  [] Palliative Care  [] Pain Control   [] Other:         PAST MEDICAL HISTORY        Diagnosis Date    Altered mental status     Bipolar 1 disorder (HCC)     Bowel and bladder incontinence     Bradycardia     Cellulitis     Cerebral edema (HCC)     Chest pain     Diabetes mellitus (HCC)     Edema     BLE    Goiter     Goiter     Hyperlipidemia     Hypertension     Hypocalcemia     Hypothyroid     Kidney stone     Morbid obesity (HCC)     LADONNA (obstructive sleep apnea)     Psychosis ([facility])     Pulmonary edema     Pyelonephritis     Schizophrenia ([facility])      GCB    Sleep apnea        PAST SURGICAL HISTORY    Past Surgical History:   Procedure Laterality Date    THYROIDECTOMY         FAMILY HISTORY    Family History   Problem Relation Age of Onset    Diabetes Mother     No Known Problems Father     Diabetes Maternal Grandmother        SOCIAL HISTORY    Social History     Tobacco Use    Smoking status: Former    Smokeless tobacco: Never   Vaping Use    Vaping Use: Never used   Substance Use Topics    Alcohol use: Not Currently     Comment: rare    Drug use: Not Currently     Types: Opiates      Comment: 2019 drug screen + oxcodone       ALLERGIES    Allergies   Allergen Reactions     Outpatient Wound Care Center: No    Referrals:  []   [] Home Health Care  [] Supplies  [] Other    Patient/Caregiver Teaching:  Level of patient/caregiver understanding able to:   [] Indicates understanding       [] Needs reinforcement  [] Unsuccessful      [x] Verbal Understanding  [] Demonstrated understanding       [] No evidence of learning  [] Refused teaching         [] N/A       Electronically signed by Rea Neves RN, CWOCN on 3/15/2024 at 1:38 PM

## 2024-03-15 NOTE — PROGRESS NOTES
Assessment complete, VSS. Pt alert, oriented to self. Does not answer orientation questions, only responded \"yeah\" when asked if he wanted water. Pt took a sip of water, but when offered lokelma and other PO meds he only stared at this RN. No issue with administering subq and IM medications.   TV on, call light within reach, bed alarm active.

## 2024-03-15 NOTE — PROGRESS NOTES
Occupational Therapy  Facility/Department: [address] ICU  Occupational Therapy Initial Assessment    Name: Fabio Beauchamp  : 1965  MRN: [id number]  Date of Service: 3/15/2024    Discharge Recommendations:  Long Term Care without OT, Long Term Care with OT  OT Equipment Recommendations  Equipment Needed: No     Fabio Beauchamp scored a 6/24 on the AM-[facility] ADL Inpatient form.      Patient Diagnosis(es): The primary encounter diagnosis was Acute metabolic encephalopathy. Diagnoses of Metabolic acidosis, increased anion gap, Other specified diabetes mellitus with hyperglycemia, unspecified whether long term insulin use (HCC), Acute kidney injury (HCC), Hyperkalemia, Hypercarbia, Hypothyroidism, unspecified type, Hypothermia, initial encounter, and Urinary tract infection without hematuria, site unspecified were also pertinent to this visit.  Past Medical History:  has a past medical history of Altered mental status, Bipolar 1 disorder (HCC), Bowel and bladder incontinence, Bradycardia, Cellulitis, Cerebral edema ([facility]), Chest pain, Diabetes mellitus ([facility]), Edema, Goiter, Goiter, Hyperlipidemia, Hypertension, Hypocalcemia, Hypothyroid, Kidney stone, Morbid obesity (HCC), LADONNA (obstructive sleep apnea), Psychosis (HCC), Pulmonary edema, Pyelonephritis, Schizophrenia ([facility]), and Sleep apnea.  Past Surgical History:  has a past surgical history that includes Thyroidectomy.           Assessment   Performance deficits / Impairments: Decreased functional mobility ;Decreased strength;Decreased endurance;Decreased ADL status;Decreased safe awareness;Decreased balance;Decreased cognition;Decreased high-level IADLs;Decreased posture  Assessment: 57 y/o male admit 3/13/2024 with Myxedema Coma, Acute Respiratory/SIRs, UTI, Met Encephalopathy. CT Head : negative. PMH as noted including CHF, LADONNA, Bipolar, Schizoaffective D/O.   PTA pt from LT at [facility], unsure PLOF. Today, pt nonverbal and does not follow commands. Pt does  strength/endurance for ADLs  Short Term Goal 4: Assess transfers as able and write goal  Patient Goals   Patient goals : no goal stated       Therapy Time   Individual Concurrent Group Co-treatment   Time In 0930         Time Out 1000         Minutes 30         Timed Code Treatment Minutes: 15 Minutes     This note to serve as OT d/c summary if pt is d/c-ed prior to next therapy session.    Arianna Jimenez, OTR/L

## 2024-03-15 NOTE — PROGRESS NOTES
Physical Therapy  Facility/Department: [address] ICU  Physical Therapy Initial Assessment    Name: Fabio Beauchamp  : 1965  MRN: [id number]  Date of Service: 3/15/2024    Discharge Recommendations:  Continue to assess pending progress (LTC with/without PT; will monitor pt's progress/roberto.)   Fabio Beauchamp scored a / on the AM-PAC short mobility form. Current research shows that an AM-PAC score of 17 or less is typically not associated with a discharge to the patient's home setting. Based on the patient's AM-PAC score and their current functional mobility deficits, it is recommended that the patient have 3-5 sessions per week of Physical Therapy at d/c to increase the patient's independence.  Please see assessment section for further patient specific details.    If patient discharges prior to next session this note will serve as a discharge summary.  Please see below for the latest assessment towards goals.               Assessment   Body Structures, Functions, Activity Limitations Requiring Skilled Therapeutic Intervention: Decreased functional mobility ;Decreased strength;Decreased safe awareness;Decreased cognition;Decreased endurance;Increased pain  Assessment: 59 y/o male admit 3/13/2024 with Myxedema Coma, Acute Respiratory/SIRs, UTI, Met Encephalopathy. CT Head : negative. PMH as noted including CHF, LADONNA, Bipolar, Schizoaffective D/O.  Pt admit from LTC setting; unsure level of assist with care and functional mobility pta.  Pt did not attempt to engage with conversation or with functional activities.  Did appear sign discomfort with touch/mvt LEs atlhough no appreciative any injury of LEs.  Pt currently requiring Depend with oob to chair via [employer]-Armani.  Unable to roberto eob or attempt any further functional activities.  At this time, anticipate return LTC; unsure if cont PT upon d/c.  Will cont to monitor pt's progress/roberto.  Therapy Prognosis: Fair;Guarded  Decision Making: Medium  contact facility via phone unsuccessful.  Vision/Hearing  Vision  Vision:  (Eyes open; appears to turn head/track at times.)    Cognition   Orientation  Orientation Level: Unable to assess  Cognition  Overall Cognitive Status: Exceptions  Cognition Comment: Pt does not attempt to follow simple commands; nonverbal.     Objective           Gross Assessment  Strength: Grossly decreased, non-functional (Pt did not follow simple commands to fully assess.)       Attempt ROM Exs/Activities (in chair with legrest/LEs down) of LEs unable to roberto; immed yells out briefly at times and then abruptly quiets.                      Transfers  Bed to Chair: Dependent/Total (Via [employer]-Armani.)                   AM-PAC - Mobility    AM-PAC Basic Mobility - Inpatient   How much help is needed turning from your back to your side while in a flat bed without using bedrails?: Total  How much help is needed moving from lying on your back to sitting on the side of a flat bed without using bedrails?: Total  How much help is needed moving to and from a bed to a chair?: Total  How much help is needed standing up from a chair using your arms?: Total  How much help is needed walking in hospital room?: Total  How much help is needed climbing 3-5 steps with a railing?: Total  AM-PAC Inpatient Mobility Raw Score : 6  AM-PAC Inpatient T-Scale Score : 23.55  Mobility Inpatient CMS 0-100% Score: 100  Mobility Inpatient CMS G-Code Modifier : CN       \    Goals  Short Term Goals  Time Frame for Short Term Goals: Upon d/c acute care setting.  Short Term Goal 1: Bed Mob Min assist x 2.  Short Term Goal 2: EOB ~ 3-5 min Min assist.  Short Term Goal 3: Attempt Transfers via St[employer] as approp.  Patient Goals   Patient Goals : None stated.       Education  Patient Education  Education Given To: Patient  Education Provided Comments: Role of PT, POC, Need to call for assist, OOB via [employer]-Arm[employer].  Education Method: Verbal;Demonstration  Barriers to Learning:

## 2024-03-15 NOTE — PROGRESS NOTES
Multiple attempts made to place NG tube to give PO medications. Unsuccessful due to resistance in nasal cavity. Pt was convinced to drink keyexalate mixture from a tube feed syringe.

## 2024-03-15 NOTE — PROGRESS NOTES
Hospitalist Progress Note  3/15/2024 1:02 PM    PCP: Anshu Padilla MD    [phone]     Date of Admission: 3/13/2024                                                                                                                     HOSPITAL COURSE    Patient demographics:  The patient  Fabio Beauchamp is a 58 y.o. male     Significant past medical history:   Patient Active Problem List   Diagnosis    Cellulitis    HTN (hypertension), malignant    Chest pain    Kidney stone    Pyelonephritis, acute    Bipolar I disorder (HCC)    Hypothermia associated with environmental change    Acute metabolic encephalopathy    Cerebral edema (HCC)    Pulmonary edema    Sinus bradycardia    Hypothyroidism    Schizoaffective disorder (HCC)    Altered mental status    Psychosis (HCC)    Subchronic schizoaffective disorder with acute exacerbation (HCC)    Type 2 diabetes mellitus with complication, without long-term current use of insulin (HCC)    Hyperlipidemia    LADONNA (obstructive sleep apnea)    Morbid obesity (HCC)    Schizoaffective disorder, chronic condition with acute exacerbation (HCC)    Acute and chronic respiratory failure with hypoxia (HCC)    Acute respiratory failure with hypoxia (HCC)    Influenzal pneumonia    Varicose ulcer of right lower extremity (HCC)    Acute respiratory failure with hypoxia and hypercapnia (HCC)    Chronic systolic heart failure (HCC)    Myxedema coma (HCC)    ELPIDIO (acute kidney injury) (HCC)    SIRS (systemic inflammatory response syndrome) (HCC)    Complicated UTI (urinary tract infection)    Hyperkalemia    Normocytic anemia    Hyperglycemia due to type 2 diabetes mellitus (HCC)         Presenting symptoms:      Diagnostic workup:      CONSULTS DURING ADMISSION :   IP CONSULT TO NEPHROLOGY  IP CONSULT TO HOSPITALIST  IP CONSULT TO ENDOCRINOLOGY  IP CONSULT TO CRITICAL CARE  IP CONSULT TO NEPHROLOGY      Patient was diagnosed with:        Treatment while inpatient:                         03/15/24 0530 -- -- -- 68 15 99 % --   03/15/24 0515 -- -- -- 58 18 98 % --          72HR INTAKE/OUTPUT:    Intake/Output Summary (Last 24 hours) at 3/15/2024 1302  Last data filed at 3/15/2024 1130  Gross per 24 hour   Intake 1922.92 ml   Output 5925 ml   Net -4002.08 ml         Exam:    Gen:   Alert and oriented ×3    Eyes: PERRL. No sclera icterus. No conjunctival injection.   ENT: No discharge. Pharynx clear. External appearance of ears and nose normal.  Neck: Trachea midline. No obvious mass.    Resp: No accessory muscle use. No crackles. No wheezes. No rhonchi.  CV: Regular rate. Regular rhythm. No murmur or rub. No edema.   GI: Non-tender. Non-distended. No hernia.   Lymph: No cervical LAD. No supraclavicular LAD.   M/S: / Ext. No cyanosis. No clubbing. No joint deformity.    Neuro: CN 2-12 are intact,  no neurologic deficits noted.    PT/INR: No results for input(s): \"PROTIME\", \"INR\" in the last 72 hours.  APTT: No results for input(s): \"APTT\" in the last 72 hours.    CBC:   Recent Labs     03/13/24  1422 03/14/24  0430 03/15/24  0438   WBC 9.0 8.1 9.6   HGB 9.3* 8.1* 8.2*   HCT 28.1* 24.3* 24.0*   MCV 82.0 81.7 79.3*   * 525* 481*         BMP:   Recent Labs     03/14/24  0228 03/14/24  0430 03/14/24  0743 03/14/24  1221 03/14/24  1714 03/14/24  2156 03/15/24  0438 03/15/24  1114    145  --  149*  --   --  156*  --    K 7.0* 7.3*   < > 6.6* 6.2* 6.1* 5.6* 5.3*    109  --  109  --   --  116*  --    CO2 26 24  --  29  --   --  29  --    PHOS 5.9*  --   --   --   --   --  6.2*  --    * 112*  --  104*  --   --  97*  --    CREATININE 2.9* 2.9*  --  3.3*  --   --  3.0*  --     < > = values in this interval not displayed.         LIVER PROFILE:   Recent Labs     03/13/24  1422 03/14/24  0430   ALKPHOS 138* 112   * 107*   * 114*   BILITOT <0.2 <0.2       No results for input(s): \"AMYLASE\" in the last 72 hours.  No results for input(s): \"LIPASE\" in the last 72

## 2024-03-16 LAB
ANION GAP SERPL CALCULATED.3IONS-SCNC: 9 MMOL/L (ref 3–16)
BASOPHILS # BLD: 0 K/UL (ref 0–0.2)
BASOPHILS NFR BLD: 0.1 %
BUN SERPL-MCNC: 82 MG/DL (ref 7–20)
CALCIUM SERPL-MCNC: 9.5 MG/DL (ref 8.3–10.6)
CHLORIDE SERPL-SCNC: 121 MMOL/L (ref 99–110)
CO2 SERPL-SCNC: 28 MMOL/L (ref 21–32)
CREAT SERPL-MCNC: 2.4 MG/DL (ref 0.9–1.3)
DEPRECATED RDW RBC AUTO: 22 % (ref 12.4–15.4)
EOSINOPHIL # BLD: 0 K/UL (ref 0–0.6)
EOSINOPHIL NFR BLD: 0.1 %
GFR SERPLBLD CREATININE-BSD FMLA CKD-EPI: 31 ML/MIN/{1.73_M2}
GLUCOSE BLD-MCNC: 127 MG/DL (ref 70–99)
GLUCOSE BLD-MCNC: 161 MG/DL (ref 70–99)
GLUCOSE BLD-MCNC: 166 MG/DL (ref 70–99)
GLUCOSE BLD-MCNC: 180 MG/DL (ref 70–99)
GLUCOSE SERPL-MCNC: 134 MG/DL (ref 70–99)
HCT VFR BLD AUTO: 25.2 % (ref 40.5–52.5)
HGB BLD-MCNC: 8.5 G/DL (ref 13.5–17.5)
LYMPHOCYTES # BLD: 1.5 K/UL (ref 1–5.1)
LYMPHOCYTES NFR BLD: 18.3 %
MAGNESIUM SERPL-MCNC: 1.9 MG/DL (ref 1.8–2.4)
MCH RBC QN AUTO: 27.4 PG (ref 26–34)
MCHC RBC AUTO-ENTMCNC: 33.9 G/DL (ref 31–36)
MCV RBC AUTO: 80.8 FL (ref 80–100)
MONOCYTES # BLD: 0.5 K/UL (ref 0–1.3)
MONOCYTES NFR BLD: 6.5 %
NEUTROPHILS # BLD: 6.2 K/UL (ref 1.7–7.7)
NEUTROPHILS NFR BLD: 75 %
PERFORMED ON: ABNORMAL
PHOSPHATE SERPL-MCNC: 5.2 MG/DL (ref 2.5–4.9)
PLATELET # BLD AUTO: 469 K/UL (ref 135–450)
PMV BLD AUTO: 7 FL (ref 5–10.5)
POTASSIUM SERPL-SCNC: 5.1 MMOL/L (ref 3.5–5.1)
PROCALCITONIN SERPL IA-MCNC: 0.27 NG/ML (ref 0–0.15)
RBC # BLD AUTO: 3.12 M/UL (ref 4.2–5.9)
SODIUM SERPL-SCNC: 155 MMOL/L (ref 136–145)
SODIUM SERPL-SCNC: 158 MMOL/L (ref 136–145)
SODIUM SERPL-SCNC: 159 MMOL/L (ref 136–145)
WBC # BLD AUTO: 8.3 K/UL (ref 4–11)

## 2024-03-16 PROCEDURE — 2500000003 HC RX 250 WO HCPCS: Performed by: FAMILY MEDICINE

## 2024-03-16 PROCEDURE — 83735 ASSAY OF MAGNESIUM: CPT

## 2024-03-16 PROCEDURE — 2700000000 HC OXYGEN THERAPY PER DAY

## 2024-03-16 PROCEDURE — 84145 PROCALCITONIN (PCT): CPT

## 2024-03-16 PROCEDURE — 6370000000 HC RX 637 (ALT 250 FOR IP): Performed by: FAMILY MEDICINE

## 2024-03-16 PROCEDURE — 2580000003 HC RX 258: Performed by: NURSE PRACTITIONER

## 2024-03-16 PROCEDURE — 84100 ASSAY OF PHOSPHORUS: CPT

## 2024-03-16 PROCEDURE — 6360000002 HC RX W HCPCS: Performed by: INTERNAL MEDICINE

## 2024-03-16 PROCEDURE — 2580000003 HC RX 258: Performed by: INTERNAL MEDICINE

## 2024-03-16 PROCEDURE — 80048 BASIC METABOLIC PNL TOTAL CA: CPT

## 2024-03-16 PROCEDURE — 99232 SBSQ HOSP IP/OBS MODERATE 35: CPT | Performed by: INTERNAL MEDICINE

## 2024-03-16 PROCEDURE — 2060000000 HC ICU INTERMEDIATE R&B

## 2024-03-16 PROCEDURE — 85025 COMPLETE CBC W/AUTO DIFF WBC: CPT

## 2024-03-16 PROCEDURE — 94660 CPAP INITIATION&MGMT: CPT

## 2024-03-16 PROCEDURE — 2580000003 HC RX 258: Performed by: FAMILY MEDICINE

## 2024-03-16 PROCEDURE — 6370000000 HC RX 637 (ALT 250 FOR IP): Performed by: INTERNAL MEDICINE

## 2024-03-16 PROCEDURE — 94761 N-INVAS EAR/PLS OXIMETRY MLT: CPT

## 2024-03-16 PROCEDURE — 84295 ASSAY OF SERUM SODIUM: CPT

## 2024-03-16 PROCEDURE — 6360000002 HC RX W HCPCS: Performed by: FAMILY MEDICINE

## 2024-03-16 PROCEDURE — 6370000000 HC RX 637 (ALT 250 FOR IP): Performed by: NURSE PRACTITIONER

## 2024-03-16 PROCEDURE — 2500000003 HC RX 250 WO HCPCS: Performed by: NURSE PRACTITIONER

## 2024-03-16 RX ORDER — DESMOPRESSIN ACETATE 4 UG/ML
1 INJECTION, SOLUTION INTRAVENOUS; SUBCUTANEOUS ONCE
Status: COMPLETED | OUTPATIENT
Start: 2024-03-16 | End: 2024-03-16

## 2024-03-16 RX ADMIN — MIDODRINE HYDROCHLORIDE 5 MG: 5 TABLET ORAL at 08:39

## 2024-03-16 RX ADMIN — MIDODRINE HYDROCHLORIDE 5 MG: 5 TABLET ORAL at 16:25

## 2024-03-16 RX ADMIN — SODIUM ZIRCONIUM CYCLOSILICATE 10 G: 10 POWDER, FOR SUSPENSION ORAL at 20:08

## 2024-03-16 RX ADMIN — DEXTROSE MONOHYDRATE: 50 INJECTION, SOLUTION INTRAVENOUS at 14:00

## 2024-03-16 RX ADMIN — ATORVASTATIN CALCIUM 20 MG: 20 TABLET, FILM COATED ORAL at 20:08

## 2024-03-16 RX ADMIN — DEXTROSE MONOHYDRATE: 50 INJECTION, SOLUTION INTRAVENOUS at 23:33

## 2024-03-16 RX ADMIN — OLANZAPINE 15 MG: 5 TABLET, FILM COATED ORAL at 20:08

## 2024-03-16 RX ADMIN — HYDROCORTISONE SODIUM SUCCINATE 50 MG: 100 INJECTION, POWDER, FOR SOLUTION INTRAMUSCULAR; INTRAVENOUS at 12:57

## 2024-03-16 RX ADMIN — Medication 10 ML: at 20:09

## 2024-03-16 RX ADMIN — HYDROCORTISONE SODIUM SUCCINATE 50 MG: 100 INJECTION, POWDER, FOR SOLUTION INTRAMUSCULAR; INTRAVENOUS at 04:59

## 2024-03-16 RX ADMIN — VALPROATE SODIUM 250 MG: 100 INJECTION, SOLUTION INTRAVENOUS at 17:47

## 2024-03-16 RX ADMIN — SODIUM ZIRCONIUM CYCLOSILICATE 10 G: 10 POWDER, FOR SUSPENSION ORAL at 13:02

## 2024-03-16 RX ADMIN — DESMOPRESSIN ACETATE 1 MCG: 4 INJECTION, SOLUTION INTRAVENOUS; SUBCUTANEOUS at 16:23

## 2024-03-16 RX ADMIN — LEVOTHYROXINE SODIUM 100 MCG: 20 INJECTION, SOLUTION INTRAVENOUS at 08:37

## 2024-03-16 RX ADMIN — VALPROATE SODIUM 250 MG: 100 INJECTION, SOLUTION INTRAVENOUS at 05:36

## 2024-03-16 RX ADMIN — HYDROCORTISONE SODIUM SUCCINATE 50 MG: 100 INJECTION, POWDER, FOR SOLUTION INTRAMUSCULAR; INTRAVENOUS at 20:09

## 2024-03-16 RX ADMIN — Medication 5 MCG/MIN: at 06:40

## 2024-03-16 RX ADMIN — CEFTRIAXONE SODIUM 2000 MG: 2 INJECTION, POWDER, FOR SOLUTION INTRAMUSCULAR; INTRAVENOUS at 16:42

## 2024-03-16 RX ADMIN — VALPROATE SODIUM 250 MG: 100 INJECTION, SOLUTION INTRAVENOUS at 12:56

## 2024-03-16 RX ADMIN — DEXTROSE MONOHYDRATE: 50 INJECTION, SOLUTION INTRAVENOUS at 06:56

## 2024-03-16 RX ADMIN — ASPIRIN 81 MG: 81 TABLET, COATED ORAL at 13:07

## 2024-03-16 RX ADMIN — DEXTROSE MONOHYDRATE: 50 INJECTION, SOLUTION INTRAVENOUS at 18:41

## 2024-03-16 RX ADMIN — MIDODRINE HYDROCHLORIDE 5 MG: 5 TABLET ORAL at 13:00

## 2024-03-16 RX ADMIN — ENOXAPARIN SODIUM 40 MG: 100 INJECTION SUBCUTANEOUS at 08:37

## 2024-03-16 RX ADMIN — ENOXAPARIN SODIUM 40 MG: 100 INJECTION SUBCUTANEOUS at 20:08

## 2024-03-16 NOTE — PROGRESS NOTES
Myxedema coma (HCC)    ELPIDIO (acute kidney injury) (HCC)    SIRS (systemic inflammatory response syndrome) (HCC)    Complicated UTI (urinary tract infection)    Hyperkalemia    Normocytic anemia    Hyperglycemia due to type 2 diabetes mellitus (HCC)       Allergies  Carbamazepine, Chlorpromazine, Keflex [cephalexin], and Thioridazine    Medications    Scheduled Meds:   hydrocortisone sodium succinate PF  50 mg IntraVENous Q8H    sodium chloride flush  5-40 mL IntraVENous 2 times per day    valproate (DEPACON) 250 mg in sodium chloride 0.9 % 100 mL IVPB  250 mg IntraVENous Q6H    OLANZapine  15 mg Oral Nightly    Or    OLANZapine  10 mg IntraMUSCular Nightly    sodium zirconium cyclosilicate  10 g Oral TID    midodrine  5 mg Oral TID WC    atorvastatin  20 mg Oral Nightly    aspirin  81 mg Oral Daily    insulin lispro  0-8 Units SubCUTAneous Q6H    enoxaparin  40 mg SubCUTAneous BID    Levothyroxine Sodium  100 mcg IntraVENous Daily    cefTRIAXone (ROCEPHIN) IV  2,000 mg IntraVENous Q24H     Continuous Infusions:   dextrose 150 mL/hr at 03/16/24 0656    norepinephrine 5 mcg/min (03/16/24 0640)    dextrose      sodium chloride       PRN Meds:  sodium chloride flush, glucose, dextrose bolus **OR** dextrose bolus, glucagon (rDNA), dextrose, sodium chloride, magnesium sulfate, ondansetron **OR** ondansetron, polyethylene glycol, aluminum & magnesium hydroxide-simethicone, acetaminophen **OR** acetaminophen    Vitals   Vitals /wt Patient Vitals for the past 8 hrs:   BP Temp Temp src Pulse Resp SpO2 Weight   03/16/24 0815 131/66 (!) 96.2 °F (35.7 °C) Bladder 74 24 97 % --   03/16/24 0723 (!) 118/36 -- -- 57 23 99 % --   03/16/24 0650 (!) 106/48 -- -- 58 10 100 % --   03/16/24 0645 125/61 -- -- 50 10 100 % --   03/16/24 0600 (!) 93/46 -- -- 55 13 -- --   03/16/24 0501 -- -- -- 57 13 99 % (!) 169.2 kg (373 lb 0.3 oz)   03/16/24 0500 (!) 90/43 -- -- 63 18 99 % --   03/16/24 0400 (!) 104/55 (!) 96.7 °F (35.9 °C) Bladder 61 15 99  urinary tract infection.  Overall, clinical picture not consistent with sepsis secondary to bacterial infection.     Complicated UTI (urinary tract infection)  Continue on        Hyperkalemia  Due to ELPIDIO  Nephrology is following        Normocytic anemia        No overt signs of acute blood loss.  Monitor hemoglobin.  Check iron panel, B12, folic acid.     Hyperglycemia due to type 2 diabetes mellitus (HCC)        SSI.    Monitor glucose trend and insulin demands and adjust basal insulin if warranted.          Code Status: Full Code        Dispo - cc      The patient and / or the family were informed of the results of any tests, a time was given to answer questions, a plan was proposed and they agreed with plan.    SADIE KUMAR MD    This note was transcribed using Dragon Dictation software. Please disregard any translational errors.

## 2024-03-16 NOTE — PROGRESS NOTES
Shift Summary:  Pt off levo most of the shift until 640 am.     Family updated: No.     Most recent vitals: BP (!) 106/48   Pulse 58   Temp (!) 96.7 °F (35.9 °C) (Bladder)   Resp 10   Ht 1.88 m (6' 2\")   Wt (!) 169.2 kg (373 lb 0.3 oz)   SpO2 100%   BMI 47.89 kg/m²      Rhythm: bradycardia/ occasional trigeminy    NC/HFNC- 2 lpm  Respiratory support: - No ventilator support  - BiPAP   IPAP: 20 cmH20, CPAP/EPAP: 5 cmH2O only when sleeping    Admission weight Weight - Scale: (!) 170.7 kg (376 lb 5.2 oz)  Today's weight   Wt Readings from Last 1 Encounters:   03/16/24 (!) 169.2 kg (373 lb 0.3 oz)         UOP >30ml/hr: yes    Restraints: NO     Lines/Drains reviewed @ bedside.  PICC Double Lumen 03/14/24 Right Basilic (Active)   Number of days: 1       Peripheral IV 03/13/24 Right Forearm (Active)   Number of days: 2       Peripheral IV 03/14/24 Left Forearm (Active)   Number of days: 2       Urinary Catheter 03/13/24 Aden-Temperature (Active)   Number of days: 2     Aden need assessed each shift: YES      Drip rates at handoff:    dextrose 150 mL/hr at 03/16/24 0656    norepinephrine 5 mcg/min (03/16/24 0640)    dextrose      sodium chloride         Lab Data:   CBC:   Recent Labs     03/15/24  0438 03/16/24  0450   WBC 9.6 8.3   HGB 8.2* 8.5*   HCT 24.0* 25.2*   MCV 79.3* 80.8   * 469*     BMP:    Recent Labs     03/15/24  0438 03/15/24  1114 03/16/24  0450   *  --  158*   K 5.6* 5.3* 5.1   CO2 29  --  28   BUN 97*  --  82*   CREATININE 3.0*  --  2.4*     LIVR:   Recent Labs     03/13/24  1422 03/14/24  0430   * 107*   * 114*     PT/INR:   Recent Labs     03/13/24  1422 03/14/24  0430   PROT 8.7* 7.5     APTT: No results for input(s): \"APTT\" in the last 72 hours.  ABG:   Recent Labs     03/14/24  0547   PHART 7.336*   UIZ4SOR 51.6*   PO2ART 92.4     Any signed and held orders to be released? NO        4 Eyes Skin Assessment     NAME:  Fabio Beauchamp  DATE OF BIRTH:   1965  MEDICAL RECORD NUMBER:  [id number]    The patient is being assessed for  Shift Handoff    I agree that at least one RN has performed a thorough Head to Toe Skin Assessment on the patient. ALL assessment sites listed below have been assessed.      Areas assessed by both nurses:    Head, Face, Ears, Shoulders, Back, Chest, Arms, Elbows, Hands, Sacrum. Buttock, Coccyx, Ischium, Legs. Feet and Heels, and Under Medical Devices         Does the Patient have a Wound? Yes wound(s) were present on assessment. LDA wound assessment was Initiated and completed by RN       Zach Prevention initiated by RN: Yes  Wound Care Orders initiated by RN: Yes    Pressure Injury (Stage 3,4, Unstageable, DTI, NWPT, and Complex wounds) if present, place Wound referral order by RN under : No    New Ostomies, if present place, Ostomy referral order under : No     Nurse 1 eSignature: Electronically signed by Calos Dumont RN on 3/16/24 at 6:57 AM EDT    **SHARE this note so that the co-signing nurse can place an eSignature**    Nurse 2 eSignature: Electronically signed by Taniya Mckee RN on 3/16/24 at 7:22 AM EDT

## 2024-03-16 NOTE — PLAN OF CARE
Problem: Discharge Planning  Goal: Discharge to home or other facility with appropriate resources  Outcome: Progressing  Flowsheets (Taken 3/16/2024 0800)  Discharge to home or other facility with appropriate resources:   Identify barriers to discharge with patient and caregiver   Arrange for needed discharge resources and transportation as appropriate   Identify discharge learning needs (meds, wound care, etc)     Problem: Pain  Goal: Verbalizes/displays adequate comfort level or baseline comfort level  Outcome: Progressing  Flowsheets  Taken 3/16/2024 1615  Verbalizes/displays adequate comfort level or baseline comfort level: Encourage patient to monitor pain and request assistance  Taken 3/16/2024 1200  Verbalizes/displays adequate comfort level or baseline comfort level:   Encourage patient to monitor pain and request assistance   Assess pain using appropriate pain scale   Administer analgesics based on type and severity of pain and evaluate response   Implement non-pharmacological measures as appropriate and evaluate response  Taken 3/16/2024 0815  Verbalizes/displays adequate comfort level or baseline comfort level:   Encourage patient to monitor pain and request assistance   Assess pain using appropriate pain scale     Problem: Safety - Adult  Goal: Free from fall injury  Outcome: Progressing  Flowsheets (Taken 3/16/2024 1654)  Free From Fall Injury:   Instruct family/caregiver on patient safety   Based on caregiver fall risk screen, instruct family/caregiver to ask for assistance with transferring infant if caregiver noted to have fall risk factors     Problem: Chronic Conditions and Co-morbidities  Goal: Patient's chronic conditions and co-morbidity symptoms are monitored and maintained or improved  Outcome: Progressing  Flowsheets (Taken 3/16/2024 0800)  Care Plan - Patient's Chronic Conditions and Co-Morbidity Symptoms are Monitored and Maintained or Improved:   Monitor and assess patient's chronic

## 2024-03-16 NOTE — PLAN OF CARE
Problem: Discharge Planning  Goal: Discharge to home or other facility with appropriate resources  Outcome: Progressing  Flowsheets (Taken 3/16/2024 0800)  Discharge to home or other facility with appropriate resources:   Identify barriers to discharge with patient and caregiver   Arrange for needed discharge resources and transportation as appropriate   Identify discharge learning needs (meds, wound care, etc)     Problem: Pain  Goal: Verbalizes/displays adequate comfort level or baseline comfort level  Outcome: Progressing  Flowsheets  Taken 3/16/2024 1615  Verbalizes/displays adequate comfort level or baseline comfort level: Encourage patient to monitor pain and request assistance  Taken 3/16/2024 1200  Verbalizes/displays adequate comfort level or baseline comfort level:   Encourage patient to monitor pain and request assistance   Assess pain using appropriate pain scale   Administer analgesics based on type and severity of pain and evaluate response   Implement non-pharmacological measures as appropriate and evaluate response  Taken 3/16/2024 0815  Verbalizes/displays adequate comfort level or baseline comfort level:   Encourage patient to monitor pain and request assistance   Assess pain using appropriate pain scale     Problem: Chronic Conditions and Co-morbidities  Goal: Patient's chronic conditions and co-morbidity symptoms are monitored and maintained or improved  3/16/2024 1727 by Taniya Mckee, RN  Outcome: Progressing  3/16/2024 1655 by Taniya Mckee, RN  Outcome: Progressing  Flowsheets (Taken 3/16/2024 0800)  Care Plan - Patient's Chronic Conditions and Co-Morbidity Symptoms are Monitored and Maintained or Improved:   Monitor and assess patient's chronic conditions and comorbid symptoms for stability, deterioration, or improvement   Collaborate with multidisciplinary team to address chronic and comorbid conditions and prevent exacerbation or deterioration   Update acute care plan with appropriate

## 2024-03-16 NOTE — PROGRESS NOTES
2200: Received handoff report from Naty Bearden RN. All questions answered. Conducted initial assessment. Pt AONx2-3, afebrile, /56 (75), HR 66 bpm.       0000: Conducted reassessment. No changes. Pt is more alert and responsive at this time. BiPAP in place.    0400:Conducted reassessment. No changes.      0535: Noticed softening of patient pressures. BP 88/49 (61).    0600: BP 93/46 (60)     0640: Norepinephrine gtt restarted.     0641: /61 (80).

## 2024-03-16 NOTE — PLAN OF CARE

## 2024-03-16 NOTE — PROGRESS NOTES
Pulmonary Progress Note    Date of Admission: 3/13/2024   LOS: 3 days     CC:  Chief Complaint   Patient presents with    Altered Mental Status     Pt from SNF w/ c/o AMS. Pt was last normal on Sunday per nurse. Pt hx schizo-affective. Pt said to be combative. Pt only able to tell me his name at this time. Pinpoint pupils per EMS. 6IN narcan given w/ no effect.            Assessment/Plan        Chronic hypoxemic and hypercapnic respiratory failure requiring NIPPV  -Has history of LADONNA and likely OHS  -Avoid sedating medications  -Continue BiPAP at night and with naps  -Monitor mental status  -Wean oxygen goal saturation 90%     Septic shock resolved  Urinary tract infection, Proteus  -Ceftriaxone  -On midodrine, stop steroid    Hypernatremia  -Free water replacement per nephrology       Bipolar  Zyprexa    No further inpatient recommendations, we will sign off at this time.  Please let us know if we can be of any further assistance.     HPI/Subjective  No acute events overnight.   -Wearing BiPAP overnight    ROS:   No nausea  No Vomiting  No chest pain      Intake/Output Summary (Last 24 hours) at 3/16/2024 0848  Last data filed at 3/16/2024 0818  Gross per 24 hour   Intake 1361.51 ml   Output 4150 ml   Net -2788.49 ml         PHYSICAL EXAM:   Blood pressure 131/66, pulse 74, temperature (!) 96.2 °F (35.7 °C), temperature source Bladder, resp. rate 24, height 1.88 m (6' 2\"), weight (!) 169.2 kg (373 lb 0.3 oz), SpO2 97 %.'  Gen:  No acute distress.   Resp:  No crackles. No wheezes. No rhonchi. No dullness on percussion.  CV: Regular rate. Regular rhythm. No murmur or rub. No edema.   M/S: No cyanosis. No clubbing.    Neuro: Awake and alert          Labs reviewed:  CBC:   Recent Labs     03/14/24  0430 03/15/24  0438 03/16/24  0450   WBC 8.1 9.6 8.3   HGB 8.1* 8.2* 8.5*   HCT 24.3* 24.0* 25.2*   MCV 81.7 79.3* 80.8   * 481* 469*     BMP:   Recent Labs     03/14/24  0228 03/14/24  0430 03/14/24  1225

## 2024-03-16 NOTE — PROGRESS NOTES
Shift Summary: Pt more alert, ate lunch. Fluids increased.       Family updated: yes, guardian Dion    Most recent vitals: BP (!) 111/59   Pulse 60   Temp (!) 95.9 °F (35.5 °C) (Bladder)   Resp 15   Ht 1.88 m (6' 2\")   Wt (!) 169.2 kg (373 lb 0.3 oz)   SpO2 97%   BMI 47.89 kg/m²      Rhythm: bradycardia, trigeminy    NC/HFNC- 2 vs bipap      Admission weight Weight - Scale: (!) 170.7 kg (376 lb 5.2 oz)  Today's weight   Wt Readings from Last 1 Encounters:   03/16/24 (!) 169.2 kg (373 lb 0.3 oz)         UOP >30ml/hr: yes        arsenio/Drains reviewed @ bedside.  PICC Double Lumen 03/14/24 Right Basilic (Active)   Number of days: 2       Peripheral IV 03/13/24 Right Forearm (Active)   Number of days: 2       Peripheral IV 03/14/24 Left Forearm (Active)   Number of days: 2       Urinary Catheter 03/13/24 Aden-Temperature (Active)   Number of days: 3     Aden need assessed each shift: yes      Drip rates at handoff:    dextrose 200 mL/hr at 03/16/24 1400    norepinephrine Stopped (03/16/24 1036)    dextrose      sodium chloride         Lab Data:   CBC:   Recent Labs     03/15/24  0438 03/16/24  0450   WBC 9.6 8.3   HGB 8.2* 8.5*   HCT 24.0* 25.2*   MCV 79.3* 80.8   * 469*     BMP:    Recent Labs     03/15/24  0438 03/15/24  1114 03/16/24  0450 03/16/24  1300   *  --  158* 159*   K 5.6* 5.3* 5.1  --    CO2 29  --  28  --    BUN 97*  --  82*  --    CREATININE 3.0*  --  2.4*  --      LIVR:   Recent Labs     03/14/24  0430   *   *     PT/INR:   Recent Labs     03/14/24  0430   PROT 7.5     APTT: No results for input(s): \"APTT\" in the last 72 hours.  ABG:   Recent Labs     03/14/24  0547   PHART 7.336*   [id number] 51.6*   PO2ART 92.4       Any consults during the shift? no    Any signed and held orders to be released?  no        4 Eyes Skin Assessment     NAME:  Fabio Beauchamp  YOB: 1965  MEDICAL RECORD NUMBER:  [id number]    The patient is being assessed for  Shift

## 2024-03-16 NOTE — PROGRESS NOTES
03/16/2024 04:50 AM    K 7.3 03/14/2024 04:30 AM     03/16/2024 04:50 AM    CO2 28 03/16/2024 04:50 AM    BUN 82 03/16/2024 04:50 AM    CREATININE 2.4 03/16/2024 04:50 AM    CALCIUM 9.5 03/16/2024 04:50 AM    GFRAA >60 08/24/2022 12:00 PM    GFRAA 96 01/01/2013 08:25 AM    LABGLOM 31 03/16/2024 04:50 AM    GLUCOSE 134 03/16/2024 04:50 AM     Ionized Calcium:  No results found for: \"IONCA\"  Magnesium:    Lab Results   Component Value Date/Time    MG 1.90 03/16/2024 04:50 AM     Phosphorus:    Lab Results   Component Value Date/Time    PHOS 5.2 03/16/2024 04:50 AM     U/A:    Lab Results   Component Value Date/Time    COLORU Yellow 03/13/2024 03:44 PM    PHUR 6.5 03/13/2024 03:44 PM    WBCUA 227 03/13/2024 03:44 PM    RBCUA 217 03/13/2024 03:44 PM    MUCUS Rare 07/16/2022 07:26 AM    BACTERIA None Seen 03/13/2024 03:44 PM    CLARITYU CLOUDY 03/13/2024 03:44 PM    SPECGRAV 1.014 03/13/2024 03:44 PM    LEUKOCYTESUR MODERATE 03/13/2024 03:44 PM    UROBILINOGEN 0.2 03/13/2024 03:44 PM    BILIRUBINUR Negative 03/13/2024 03:44 PM    BLOODU LARGE 03/13/2024 03:44 PM    GLUCOSEU >=1000 03/13/2024 03:44 PM         IMPRESSION/RECOMMENDATIONS:      Principal Problem:    Myxedema coma (HCC)  Active Problems:    Acute respiratory failure with hypoxia (HCC)    Morbid obesity (HCC)    ELPIDIO (acute kidney injury) (HCC)    SIRS (systemic inflammatory response syndrome) (HCC)    Complicated UTI (urinary tract infection)    Hyperkalemia    Normocytic anemia    Hyperglycemia due to type 2 diabetes mellitus (HCC)  Resolved Problems:    * No resolved hospital problems. *    ELPIDIO - occurring in the setting of relative hypotension - creatinine starting to trend down  - Hold all antihypertensive medication  - continue Midodrine  - Maintain cornell - monitor output  - No immediate indication for RRT at this point in time  - Avoid Exposure to Nephrotoxic agents    2. Hyperkalemia - Improved on Lokelma - continue medical management  - Monitor  closely    3. Hypotension secondary to ? Volume depletion  - continue Midodrine    4. Altered mental status - plan per Medicine    5. Morbid obesity    6. CKD II - Baseline creatinine 1.2-1.3 mg/dl     7. History of Hypercapnic respiratory failure    8. Hypernatremia - altered mental status so not expressing thirst - worse  - Stopped loop diuretic  - water deficit ~ 9.7 L to get sound down to 140 (assuming current weight is accurate)  - Increase D5 rate and monitor sodium levels more closely    Total critical care time spent 35 mins

## 2024-03-16 NOTE — PROGRESS NOTES
Patient remained stable and comfortable in bed. No changes noted or complaints noted per patient. Report given to Calos ROSS RN.

## 2024-03-17 LAB
ANION GAP SERPL CALCULATED.3IONS-SCNC: 7 MMOL/L (ref 3–16)
BACTERIA BLD CULT ORG #2: NORMAL
BACTERIA BLD CULT: NORMAL
BASOPHILS # BLD: 0 K/UL (ref 0–0.2)
BASOPHILS NFR BLD: 0.2 %
BUN SERPL-MCNC: 63 MG/DL (ref 7–20)
CALCIUM SERPL-MCNC: 9.2 MG/DL (ref 8.3–10.6)
CHLORIDE SERPL-SCNC: 115 MMOL/L (ref 99–110)
CO2 SERPL-SCNC: 30 MMOL/L (ref 21–32)
CREAT SERPL-MCNC: 2.2 MG/DL (ref 0.9–1.3)
DEPRECATED RDW RBC AUTO: 22.4 % (ref 12.4–15.4)
EOSINOPHIL # BLD: 0 K/UL (ref 0–0.6)
EOSINOPHIL NFR BLD: 0.4 %
GFR SERPLBLD CREATININE-BSD FMLA CKD-EPI: 34 ML/MIN/{1.73_M2}
GLUCOSE BLD-MCNC: 185 MG/DL (ref 70–99)
GLUCOSE BLD-MCNC: 212 MG/DL (ref 70–99)
GLUCOSE BLD-MCNC: 235 MG/DL (ref 70–99)
GLUCOSE BLD-MCNC: 239 MG/DL (ref 70–99)
GLUCOSE SERPL-MCNC: 186 MG/DL (ref 70–99)
HCT VFR BLD AUTO: 24.5 % (ref 40.5–52.5)
HGB BLD-MCNC: 8.2 G/DL (ref 13.5–17.5)
LYMPHOCYTES # BLD: 1.5 K/UL (ref 1–5.1)
LYMPHOCYTES NFR BLD: 18.2 %
MAGNESIUM SERPL-MCNC: 1.8 MG/DL (ref 1.8–2.4)
MCH RBC QN AUTO: 27.5 PG (ref 26–34)
MCHC RBC AUTO-ENTMCNC: 33.7 G/DL (ref 31–36)
MCV RBC AUTO: 81.6 FL (ref 80–100)
MONOCYTES # BLD: 0.5 K/UL (ref 0–1.3)
MONOCYTES NFR BLD: 5.7 %
NEUTROPHILS # BLD: 6.1 K/UL (ref 1.7–7.7)
NEUTROPHILS NFR BLD: 75.5 %
PERFORMED ON: ABNORMAL
PHOSPHATE SERPL-MCNC: 4.7 MG/DL (ref 2.5–4.9)
PLATELET # BLD AUTO: 353 K/UL (ref 135–450)
PMV BLD AUTO: 7 FL (ref 5–10.5)
POTASSIUM SERPL-SCNC: 4.6 MMOL/L (ref 3.5–5.1)
PROCALCITONIN SERPL IA-MCNC: 0.17 NG/ML (ref 0–0.15)
RBC # BLD AUTO: 3 M/UL (ref 4.2–5.9)
SODIUM SERPL-SCNC: 146 MMOL/L (ref 136–145)
SODIUM SERPL-SCNC: 149 MMOL/L (ref 136–145)
SODIUM SERPL-SCNC: 152 MMOL/L (ref 136–145)
SODIUM SERPL-SCNC: 153 MMOL/L (ref 136–145)
WBC # BLD AUTO: 8.1 K/UL (ref 4–11)

## 2024-03-17 PROCEDURE — 84145 PROCALCITONIN (PCT): CPT

## 2024-03-17 PROCEDURE — 6360000002 HC RX W HCPCS: Performed by: INTERNAL MEDICINE

## 2024-03-17 PROCEDURE — 2580000003 HC RX 258: Performed by: NURSE PRACTITIONER

## 2024-03-17 PROCEDURE — 94761 N-INVAS EAR/PLS OXIMETRY MLT: CPT

## 2024-03-17 PROCEDURE — 2500000003 HC RX 250 WO HCPCS: Performed by: NURSE PRACTITIONER

## 2024-03-17 PROCEDURE — 94660 CPAP INITIATION&MGMT: CPT

## 2024-03-17 PROCEDURE — 6360000002 HC RX W HCPCS: Performed by: FAMILY MEDICINE

## 2024-03-17 PROCEDURE — 6370000000 HC RX 637 (ALT 250 FOR IP): Performed by: INTERNAL MEDICINE

## 2024-03-17 PROCEDURE — 2700000000 HC OXYGEN THERAPY PER DAY

## 2024-03-17 PROCEDURE — 80048 BASIC METABOLIC PNL TOTAL CA: CPT

## 2024-03-17 PROCEDURE — 2580000003 HC RX 258: Performed by: INTERNAL MEDICINE

## 2024-03-17 PROCEDURE — 84295 ASSAY OF SERUM SODIUM: CPT

## 2024-03-17 PROCEDURE — 6370000000 HC RX 637 (ALT 250 FOR IP): Performed by: FAMILY MEDICINE

## 2024-03-17 PROCEDURE — 83735 ASSAY OF MAGNESIUM: CPT

## 2024-03-17 PROCEDURE — 36592 COLLECT BLOOD FROM PICC: CPT

## 2024-03-17 PROCEDURE — 85025 COMPLETE CBC W/AUTO DIFF WBC: CPT

## 2024-03-17 PROCEDURE — 84100 ASSAY OF PHOSPHORUS: CPT

## 2024-03-17 PROCEDURE — 2580000003 HC RX 258: Performed by: FAMILY MEDICINE

## 2024-03-17 PROCEDURE — 6370000000 HC RX 637 (ALT 250 FOR IP): Performed by: NURSE PRACTITIONER

## 2024-03-17 PROCEDURE — 2060000000 HC ICU INTERMEDIATE R&B

## 2024-03-17 RX ADMIN — MIDODRINE HYDROCHLORIDE 5 MG: 5 TABLET ORAL at 17:42

## 2024-03-17 RX ADMIN — OLANZAPINE 15 MG: 5 TABLET, FILM COATED ORAL at 20:03

## 2024-03-17 RX ADMIN — INSULIN LISPRO 2 UNITS: 100 INJECTION, SOLUTION INTRAVENOUS; SUBCUTANEOUS at 17:42

## 2024-03-17 RX ADMIN — HYDROCORTISONE SODIUM SUCCINATE 50 MG: 100 INJECTION, POWDER, FOR SOLUTION INTRAMUSCULAR; INTRAVENOUS at 20:03

## 2024-03-17 RX ADMIN — MIDODRINE HYDROCHLORIDE 5 MG: 5 TABLET ORAL at 13:09

## 2024-03-17 RX ADMIN — Medication 10 ML: at 20:12

## 2024-03-17 RX ADMIN — ATORVASTATIN CALCIUM 20 MG: 20 TABLET, FILM COATED ORAL at 20:03

## 2024-03-17 RX ADMIN — DEXTROSE MONOHYDRATE: 50 INJECTION, SOLUTION INTRAVENOUS at 04:44

## 2024-03-17 RX ADMIN — VALPROATE SODIUM 250 MG: 100 INJECTION, SOLUTION INTRAVENOUS at 11:55

## 2024-03-17 RX ADMIN — VALPROATE SODIUM 250 MG: 100 INJECTION, SOLUTION INTRAVENOUS at 06:17

## 2024-03-17 RX ADMIN — CEFTRIAXONE SODIUM 2000 MG: 2 INJECTION, POWDER, FOR SOLUTION INTRAMUSCULAR; INTRAVENOUS at 15:27

## 2024-03-17 RX ADMIN — INSULIN LISPRO 2 UNITS: 100 INJECTION, SOLUTION INTRAVENOUS; SUBCUTANEOUS at 12:02

## 2024-03-17 RX ADMIN — Medication 10 ML: at 09:08

## 2024-03-17 RX ADMIN — ASPIRIN 81 MG: 81 TABLET, COATED ORAL at 09:05

## 2024-03-17 RX ADMIN — DEXTROSE MONOHYDRATE: 50 INJECTION, SOLUTION INTRAVENOUS at 15:19

## 2024-03-17 RX ADMIN — ENOXAPARIN SODIUM 40 MG: 100 INJECTION SUBCUTANEOUS at 09:05

## 2024-03-17 RX ADMIN — SODIUM ZIRCONIUM CYCLOSILICATE 10 G: 10 POWDER, FOR SUSPENSION ORAL at 20:03

## 2024-03-17 RX ADMIN — HYDROCORTISONE SODIUM SUCCINATE 50 MG: 100 INJECTION, POWDER, FOR SOLUTION INTRAMUSCULAR; INTRAVENOUS at 04:57

## 2024-03-17 RX ADMIN — VALPROATE SODIUM 250 MG: 100 INJECTION, SOLUTION INTRAVENOUS at 00:29

## 2024-03-17 RX ADMIN — SODIUM ZIRCONIUM CYCLOSILICATE 10 G: 10 POWDER, FOR SUSPENSION ORAL at 09:05

## 2024-03-17 RX ADMIN — HYDROCORTISONE SODIUM SUCCINATE 50 MG: 100 INJECTION, POWDER, FOR SOLUTION INTRAMUSCULAR; INTRAVENOUS at 12:02

## 2024-03-17 RX ADMIN — DEXTROSE MONOHYDRATE: 50 INJECTION, SOLUTION INTRAVENOUS at 20:06

## 2024-03-17 RX ADMIN — VALPROATE SODIUM 250 MG: 100 INJECTION, SOLUTION INTRAVENOUS at 17:50

## 2024-03-17 RX ADMIN — ENOXAPARIN SODIUM 40 MG: 100 INJECTION SUBCUTANEOUS at 20:03

## 2024-03-17 RX ADMIN — DEXTROSE MONOHYDRATE: 50 INJECTION, SOLUTION INTRAVENOUS at 09:58

## 2024-03-17 RX ADMIN — INSULIN LISPRO 2 UNITS: 100 INJECTION, SOLUTION INTRAVENOUS; SUBCUTANEOUS at 21:28

## 2024-03-17 RX ADMIN — MIDODRINE HYDROCHLORIDE 5 MG: 5 TABLET ORAL at 09:05

## 2024-03-17 RX ADMIN — SODIUM ZIRCONIUM CYCLOSILICATE 10 G: 10 POWDER, FOR SUSPENSION ORAL at 13:08

## 2024-03-17 RX ADMIN — LEVOTHYROXINE SODIUM 100 MCG: 20 INJECTION, SOLUTION INTRAVENOUS at 09:08

## 2024-03-17 NOTE — PROGRESS NOTES
4 Eyes Skin Assessment     NAME:  Fabio Beauchamp  YOB: 1965  MEDICAL RECORD NUMBER:  [id number]    The patient is being assessed for  Transfer to New Unit    I agree that at least one RN has performed a thorough Head to Toe Skin Assessment on the patient. ALL assessment sites listed below have been assessed.      Areas assessed by both nurses:    Head, Face, Ears, Shoulders, Back, Chest, Arms, Elbows, Hands, Sacrum. Buttock, Coccyx, Ischium, Legs. Feet and Heels, and Under Medical Devices         Does the Patient have a Wound? Yes wound(s) were present on assessment. LDA wound assessment was Initiated and completed by RN       Zach Prevention initiated by RN: Yes  Wound Care Orders initiated by RN: Yes    Pressure Injury (Stage 3,4, Unstageable, DTI, NWPT, and Complex wounds) if present, place Wound referral order by RN under : Yes    New Ostomies, if present place, Ostomy referral order under : No     Nurse 1 eSignature: Electronically signed by Ana Cristina Cardona RN on 3/17/24 at 12:48 AM EDT    **SHARE this note so that the co-signing nurse can place an eSignature**    Nurse 2 eSignature: Electronically signed by Lisa Obando RN on 3/17/24 at 12:48 AM EDT

## 2024-03-17 NOTE — PROGRESS NOTES
1700: Received handoff report from Taniya Mckee RN. All questions answered. Assessed patient. Pt doing well on RA, SaO2 100%, afebrile, HR 62 bpm, /90 (96).     2000:Conducted reassessment. No changes.      2312: Called LILY De La Paz for handoff report. All questions answered.    2330: Patient successfully transferred to PCU Rm [id number]. All belongings brought up w/ pt. LILY De La Paz at bedside. All questions answered. Handed off Depacon meds w/ patient to RN.

## 2024-03-17 NOTE — PROGRESS NOTES
Hospitalist Progress Note  3/17/2024 10:27 AM    PCP: Anshu Padilla MD    [id number]     Date of Admission: 3/13/2024                                                                                                                     HOSPITAL COURSE    Patient demographics:  The patient  Fabio Beauchamp is a 58 y.o. male     Significant past medical history:   Patient Active Problem List   Diagnosis    Cellulitis    HTN (hypertension), malignant    Chest pain    Kidney stone    Pyelonephritis, acute    Bipolar I disorder (HCC)    Hypothermia associated with environmental change    Acute metabolic encephalopathy    Cerebral edema (HCC)    Pulmonary edema    Sinus bradycardia    Hypothyroidism    Schizoaffective disorder (HCC)    Altered mental status    Psychosis (HCC)    Subchronic schizoaffective disorder with acute exacerbation (HCC)    Type 2 diabetes mellitus with complication, without long-term current use of insulin (HCC)    Hyperlipidemia    LADONNA (obstructive sleep apnea)    Morbid obesity (HCC)    Schizoaffective disorder, chronic condition with acute exacerbation (HCC)    Acute and chronic respiratory failure with hypoxia (HCC)    Acute respiratory failure with hypoxia (HCC)    Influenzal pneumonia    Varicose ulcer of right lower extremity (HCC)    Acute respiratory failure with hypoxia and hypercapnia (HCC)    Chronic systolic heart failure (HCC)    Myxedema coma (HCC)    ELPIDIO (acute kidney injury) (HCC)    SIRS (systemic inflammatory response syndrome) (HCC)    Complicated UTI (urinary tract infection)    Hyperkalemia    Normocytic anemia    Hyperglycemia due to type 2 diabetes mellitus (HCC)         Presenting symptoms:  AMS    Diagnostic workup:      CONSULTS DURING ADMISSION :   IP CONSULT TO NEPHROLOGY  IP CONSULT TO HOSPITALIST  IP CONSULT TO ENDOCRINOLOGY  IP CONSULT TO CRITICAL CARE  IP CONSULT TO NEPHROLOGY      Patient was diagnosed with:  Myxedema coma  Acute respiratory failure with  03/13/2024 03:44 PM       No results for input(s): \"TSHREFLEX\" in the last 72 hours.      No components found for: \"MGR3309\"  POC GLUCOSE:    Recent Labs     03/16/24  0458 03/16/24  1053 03/16/24  1641 03/16/24  2113 03/17/24  0451   POCGLU 127* 161* 166* 180* 185*     No results for input(s): \"LABA1C\" in the last 72 hours.     Lab Results   Component Value Date/Time    LABA1C 6.9 03/14/2024 04:30 AM     estimated ejection fraction of 55-60%(1/19/2024)  Repeat echocardiogram on this admission-technically difficult study    estimated ejection fraction of 65-70%(3/13/2024)      ASSESSMENT AND PLAN     Myxedema coma (HCC)  Patient is encephalopathic, hypotensive, hypoxic, and hypothermic with a low T4 level.    TSH is elevated.  Initiate IV levothyroxine.    Endocrinology team consulted prior to admission.    Mental status and speech is improving       Acute respiratory failure with hypoxia (HCC)  Improving on BiPAP   Echocardiogram- estimated EF 65-70%      Septic shock due to hospital-acquired pneumonia  MRSA and Moraxella  Continue patient on ceftriaxone     Morbid Obesity E66.01   (Body mass index is 47.63 kg/m².)   Complicating assessment and treatment. Placing patient at risk for multiple co-morbidities as well as early death and contributing to the patient's presentation. Counseled on weight loss.        ELPIDIO (acute kidney injury) (HCC)  IV fluids and midodrine  Creatinine is improved  Nephrology is following          Suspect acute phase reactants secondary to myxedema coma.  Although we are also treating a complicated UTI, believe primary cause of SIRS is a myxedema coma rather than urinary tract infection.  Overall, clinical picture not consistent with sepsis secondary to bacterial infection.     Complicated UTI (urinary tract infection)  Continue on        Hyperkalemia  Due to ELPIDIO  Nephrology is following        Normocytic anemia        No overt signs of acute blood loss.  Monitor hemoglobin.  Check iron

## 2024-03-17 NOTE — PROGRESS NOTES
Nephrology Progress Note   [facility]      Chief Complaint: altered mental status     History of Present Illness: history obtained from the chart - patient confused  Mr Beauchamp is a 59 yo morbidly obese male with a past medical history significant for hypertension, hypothyroidism, DM II, hyperlipidemia, obesity with sleep apnea, schizophrenia that was brought in from [facility] because of altered mental status - Of note patient had a similar presentation inJan 2023 and responded well to Narcan on previous admission. At the time of my evaluation patients eyes were open he could track me moving across the room but cannot answer questions appropriately   Nephrology consulted for ELPIDIO and following lab values demonstrated below:     Latest Reference Range & Units 03/13/24 14:22   Sodium 136 - 145 mmol/L 140   Potassium 3.5 - 5.1 mmol/L 5.8 (H)   Chloride 99 - 110 mmol/L 103   CO2 21 - 32 mmol/L 20 (L)   BUN,BUNPL 7 - 20 mg/dL 122 (HH)   Creatinine 0.9 - 1.3 mg/dL 3.2 (H)   Anion Gap 3 - 16  17 (H)   Est, Glom Filt Rate >60  22 !   Magnesium 1.80 - 2.40 mg/dL 1.90   Lactic Acid 0.4 - 2.0 mmol/L 1.7       Subjective:    Awake, alert today and able to talk    ROS: no shortness of breath, no chest pain. All other ROS negative    Scheduled Meds:   hydrocortisone sodium succinate PF  50 mg IntraVENous Q8H    sodium chloride flush  5-40 mL IntraVENous 2 times per day    valproate (DEPACON) 250 mg in sodium chloride 0.9 % 100 mL IVPB  250 mg IntraVENous Q6H    OLANZapine  15 mg Oral Nightly    Or    OLANZapine  10 mg IntraMUSCular Nightly    sodium zirconium cyclosilicate  10 g Oral TID    midodrine  5 mg Oral TID WC    atorvastatin  20 mg Oral Nightly    aspirin  81 mg Oral Daily    insulin lispro  0-8 Units SubCUTAneous Q6H    enoxaparin  40 mg SubCUTAneous BID    Levothyroxine Sodium  100 mcg IntraVENous Daily    cefTRIAXone (ROCEPHIN) IV  2,000 mg IntraVENous Q24H        dextrose 200 mL/hr at 03/17/24 0958    norepinephrine  continue medical management  - Monitor closely    3. Hypotension secondary to ? Volume depletion  - continue Midodrine    4. Altered mental status - plan per Medicine    5. Morbid obesity    6. CKD II - Baseline creatinine 1.2-1.3 mg/dl     7. History of Hypercapnic respiratory failure    8. Hypernatremia - Improving after stopping loop diuretic, starting D5 and administering single dose of DDAVP  - Initial water deficit ~ 9.7 L to get sound down to 140 (assuming current weight is accurate)  - Continue D5

## 2024-03-18 LAB
ANION GAP SERPL CALCULATED.3IONS-SCNC: 5 MMOL/L (ref 3–16)
ANION GAP SERPL CALCULATED.3IONS-SCNC: 7 MMOL/L (ref 3–16)
BASOPHILS # BLD: 0 K/UL (ref 0–0.2)
BASOPHILS NFR BLD: 0 %
BUN SERPL-MCNC: 43 MG/DL (ref 7–20)
BUN SERPL-MCNC: 46 MG/DL (ref 7–20)
CALCIUM SERPL-MCNC: 8.8 MG/DL (ref 8.3–10.6)
CALCIUM SERPL-MCNC: 8.9 MG/DL (ref 8.3–10.6)
CHLORIDE SERPL-SCNC: 107 MMOL/L (ref 99–110)
CHLORIDE SERPL-SCNC: 109 MMOL/L (ref 99–110)
CO2 SERPL-SCNC: 27 MMOL/L (ref 21–32)
CO2 SERPL-SCNC: 29 MMOL/L (ref 21–32)
CREAT SERPL-MCNC: 1.4 MG/DL (ref 0.9–1.3)
CREAT SERPL-MCNC: 1.6 MG/DL (ref 0.9–1.3)
DEPRECATED RDW RBC AUTO: 22 % (ref 12.4–15.4)
EOSINOPHIL # BLD: 0 K/UL (ref 0–0.6)
EOSINOPHIL NFR BLD: 0 %
GFR SERPLBLD CREATININE-BSD FMLA CKD-EPI: 50 ML/MIN/{1.73_M2}
GFR SERPLBLD CREATININE-BSD FMLA CKD-EPI: 58 ML/MIN/{1.73_M2}
GLUCOSE BLD-MCNC: 129 MG/DL (ref 70–99)
GLUCOSE BLD-MCNC: 146 MG/DL (ref 70–99)
GLUCOSE BLD-MCNC: 173 MG/DL (ref 70–99)
GLUCOSE BLD-MCNC: 200 MG/DL (ref 70–99)
GLUCOSE SERPL-MCNC: 128 MG/DL (ref 70–99)
GLUCOSE SERPL-MCNC: 135 MG/DL (ref 70–99)
HCT VFR BLD AUTO: 25 % (ref 40.5–52.5)
HGB BLD-MCNC: 8.3 G/DL (ref 13.5–17.5)
LYMPHOCYTES # BLD: 1.8 K/UL (ref 1–5.1)
LYMPHOCYTES NFR BLD: 21 %
MAGNESIUM SERPL-MCNC: 1.6 MG/DL (ref 1.8–2.4)
MCH RBC QN AUTO: 27.2 PG (ref 26–34)
MCHC RBC AUTO-ENTMCNC: 33.2 G/DL (ref 31–36)
MCV RBC AUTO: 82 FL (ref 80–100)
MONOCYTES # BLD: 0.3 K/UL (ref 0–1.3)
MONOCYTES NFR BLD: 4 %
NEUTROPHILS # BLD: 6.5 K/UL (ref 1.7–7.7)
NEUTROPHILS NFR BLD: 75 %
NT-PROBNP SERPL-MCNC: 562 PG/ML (ref 0–124)
PERFORMED ON: ABNORMAL
PHOSPHATE SERPL-MCNC: 4.7 MG/DL (ref 2.5–4.9)
PLATELET # BLD AUTO: 323 K/UL (ref 135–450)
PMV BLD AUTO: 7.1 FL (ref 5–10.5)
POTASSIUM SERPL-SCNC: 3.9 MMOL/L (ref 3.5–5.1)
POTASSIUM SERPL-SCNC: 4 MMOL/L (ref 3.5–5.1)
PROCALCITONIN SERPL IA-MCNC: 0.14 NG/ML (ref 0–0.15)
RBC # BLD AUTO: 3.05 M/UL (ref 4.2–5.9)
SODIUM SERPL-SCNC: 125 MMOL/L (ref 136–145)
SODIUM SERPL-SCNC: 141 MMOL/L (ref 136–145)
SODIUM SERPL-SCNC: 141 MMOL/L (ref 136–145)
SODIUM SERPL-SCNC: 143 MMOL/L (ref 136–145)
T4 FREE SERPL-MCNC: 0.7 NG/DL (ref 0.9–1.8)
TSH SERPL DL<=0.005 MIU/L-ACNC: 14.31 UIU/ML (ref 0.27–4.2)
WBC # BLD AUTO: 8.7 K/UL (ref 4–11)

## 2024-03-18 PROCEDURE — 6360000002 HC RX W HCPCS: Performed by: INTERNAL MEDICINE

## 2024-03-18 PROCEDURE — 97530 THERAPEUTIC ACTIVITIES: CPT | Performed by: PHYSICAL THERAPIST

## 2024-03-18 PROCEDURE — 83880 ASSAY OF NATRIURETIC PEPTIDE: CPT

## 2024-03-18 PROCEDURE — 2580000003 HC RX 258: Performed by: FAMILY MEDICINE

## 2024-03-18 PROCEDURE — 84439 ASSAY OF FREE THYROXINE: CPT

## 2024-03-18 PROCEDURE — 84100 ASSAY OF PHOSPHORUS: CPT

## 2024-03-18 PROCEDURE — 6370000000 HC RX 637 (ALT 250 FOR IP): Performed by: INTERNAL MEDICINE

## 2024-03-18 PROCEDURE — 2580000003 HC RX 258: Performed by: INTERNAL MEDICINE

## 2024-03-18 PROCEDURE — 94760 N-INVAS EAR/PLS OXIMETRY 1: CPT

## 2024-03-18 PROCEDURE — 85025 COMPLETE CBC W/AUTO DIFF WBC: CPT

## 2024-03-18 PROCEDURE — 6360000002 HC RX W HCPCS: Performed by: FAMILY MEDICINE

## 2024-03-18 PROCEDURE — 2580000003 HC RX 258: Performed by: NURSE PRACTITIONER

## 2024-03-18 PROCEDURE — 84443 ASSAY THYROID STIM HORMONE: CPT

## 2024-03-18 PROCEDURE — 83735 ASSAY OF MAGNESIUM: CPT

## 2024-03-18 PROCEDURE — 6360000002 HC RX W HCPCS: Performed by: HOSPITALIST

## 2024-03-18 PROCEDURE — 6370000000 HC RX 637 (ALT 250 FOR IP): Performed by: NURSE PRACTITIONER

## 2024-03-18 PROCEDURE — 6370000000 HC RX 637 (ALT 250 FOR IP): Performed by: FAMILY MEDICINE

## 2024-03-18 PROCEDURE — 80048 BASIC METABOLIC PNL TOTAL CA: CPT

## 2024-03-18 PROCEDURE — 2500000003 HC RX 250 WO HCPCS: Performed by: NURSE PRACTITIONER

## 2024-03-18 PROCEDURE — 97110 THERAPEUTIC EXERCISES: CPT

## 2024-03-18 PROCEDURE — 84295 ASSAY OF SERUM SODIUM: CPT

## 2024-03-18 PROCEDURE — 84145 PROCALCITONIN (PCT): CPT

## 2024-03-18 PROCEDURE — 2060000000 HC ICU INTERMEDIATE R&B

## 2024-03-18 RX ADMIN — HYDROCORTISONE SODIUM SUCCINATE 50 MG: 100 INJECTION, POWDER, FOR SOLUTION INTRAMUSCULAR; INTRAVENOUS at 12:27

## 2024-03-18 RX ADMIN — VALPROATE SODIUM 250 MG: 100 INJECTION, SOLUTION INTRAVENOUS at 06:22

## 2024-03-18 RX ADMIN — Medication 10 ML: at 09:26

## 2024-03-18 RX ADMIN — CEFTRIAXONE SODIUM 2000 MG: 2 INJECTION, POWDER, FOR SOLUTION INTRAMUSCULAR; INTRAVENOUS at 18:54

## 2024-03-18 RX ADMIN — DEXTROSE MONOHYDRATE: 50 INJECTION, SOLUTION INTRAVENOUS at 00:25

## 2024-03-18 RX ADMIN — ENOXAPARIN SODIUM 40 MG: 100 INJECTION SUBCUTANEOUS at 21:48

## 2024-03-18 RX ADMIN — VALPROATE SODIUM 250 MG: 100 INJECTION, SOLUTION INTRAVENOUS at 19:06

## 2024-03-18 RX ADMIN — MAGNESIUM SULFATE HEPTAHYDRATE 2000 MG: 40 INJECTION, SOLUTION INTRAVENOUS at 09:14

## 2024-03-18 RX ADMIN — ATORVASTATIN CALCIUM 20 MG: 20 TABLET, FILM COATED ORAL at 21:48

## 2024-03-18 RX ADMIN — DEXTROSE MONOHYDRATE: 50 INJECTION, SOLUTION INTRAVENOUS at 22:20

## 2024-03-18 RX ADMIN — HYDROCORTISONE SODIUM SUCCINATE 50 MG: 100 INJECTION, POWDER, FOR SOLUTION INTRAMUSCULAR; INTRAVENOUS at 21:48

## 2024-03-18 RX ADMIN — VALPROATE SODIUM 250 MG: 100 INJECTION, SOLUTION INTRAVENOUS at 14:10

## 2024-03-18 RX ADMIN — VALPROATE SODIUM 250 MG: 100 INJECTION, SOLUTION INTRAVENOUS at 00:25

## 2024-03-18 RX ADMIN — MIDODRINE HYDROCHLORIDE 5 MG: 5 TABLET ORAL at 08:41

## 2024-03-18 RX ADMIN — SODIUM ZIRCONIUM CYCLOSILICATE 10 G: 10 POWDER, FOR SUSPENSION ORAL at 08:41

## 2024-03-18 RX ADMIN — DEXTROSE MONOHYDRATE: 50 INJECTION, SOLUTION INTRAVENOUS at 12:28

## 2024-03-18 RX ADMIN — HYDROCORTISONE SODIUM SUCCINATE 50 MG: 100 INJECTION, POWDER, FOR SOLUTION INTRAMUSCULAR; INTRAVENOUS at 06:20

## 2024-03-18 RX ADMIN — OLANZAPINE 15 MG: 5 TABLET, FILM COATED ORAL at 21:39

## 2024-03-18 RX ADMIN — SODIUM ZIRCONIUM CYCLOSILICATE 10 G: 10 POWDER, FOR SUSPENSION ORAL at 21:48

## 2024-03-18 RX ADMIN — ASPIRIN 81 MG: 81 TABLET, COATED ORAL at 08:41

## 2024-03-18 RX ADMIN — LEVOTHYROXINE SODIUM 100 MCG: 20 INJECTION, SOLUTION INTRAVENOUS at 08:52

## 2024-03-18 RX ADMIN — ENOXAPARIN SODIUM 40 MG: 100 INJECTION SUBCUTANEOUS at 08:51

## 2024-03-18 NOTE — PLAN OF CARE
Problem: Discharge Planning  Goal: Discharge to home or other facility with appropriate resources  Outcome: Progressing  Flowsheets (Taken 3/18/2024 0625)  Discharge to home or other facility with appropriate resources:   Identify barriers to discharge with patient and caregiver   Identify discharge learning needs (meds, wound care, etc)     Problem: Pain  Goal: Verbalizes/displays adequate comfort level or baseline comfort level  Outcome: Progressing  Flowsheets (Taken 3/18/2024 0625)  Verbalizes/displays adequate comfort level or baseline comfort level:   Encourage patient to monitor pain and request assistance   Assess pain using appropriate pain scale     Problem: Safety - Adult  Goal: Free from fall injury  Outcome: Progressing  Flowsheets (Taken 3/18/2024 0625)  Free From Fall Injury: Instruct family/caregiver on patient safety

## 2024-03-18 NOTE — PROGRESS NOTES
Nephrology Progress Note   [facility]      Chief Complaint: altered mental status     History of Present Illness: history obtained from the chart - patient confused  Mr Beauchamp is a 57 yo morbidly obese male with a past medical history significant for hypertension, hypothyroidism, DM II, hyperlipidemia, obesity with sleep apnea, schizophrenia that was brought in from [facility] because of altered mental status - Of note patient had a similar presentation inJan 2023 and responded well to Narcan on previous admission. At the time of my evaluation patients eyes were open he could track me moving across the room but cannot answer questions appropriately   Nephrology consulted for ELPIDIO and following lab values demonstrated below:     Latest Reference Range & Units 03/13/24 14:22   Sodium 136 - 145 mmol/L 140   Potassium 3.5 - 5.1 mmol/L 5.8 (H)   Chloride 99 - 110 mmol/L 103   CO2 21 - 32 mmol/L 20 (L)   BUN,BUNPL 7 - 20 mg/dL 122 (HH)   Creatinine 0.9 - 1.3 mg/dL 3.2 (H)   Anion Gap 3 - 16  17 (H)   Est, Glom Filt Rate >60  22 !   Magnesium 1.80 - 2.40 mg/dL 1.90   Lactic Acid 0.4 - 2.0 mmol/L 1.7       Subjective:    Awake, alert today and able to talk    ROS: no shortness of breath, no chest pain. All other ROS negative    Scheduled Meds:   hydrocortisone sodium succinate PF  50 mg IntraVENous Q8H    sodium chloride flush  5-40 mL IntraVENous 2 times per day    valproate (DEPACON) 250 mg in sodium chloride 0.9 % 100 mL IVPB  250 mg IntraVENous Q6H    OLANZapine  15 mg Oral Nightly    Or    OLANZapine  10 mg IntraMUSCular Nightly    sodium zirconium cyclosilicate  10 g Oral TID    midodrine  5 mg Oral TID WC    atorvastatin  20 mg Oral Nightly    aspirin  81 mg Oral Daily    insulin lispro  0-8 Units SubCUTAneous Q6H    enoxaparin  40 mg SubCUTAneous BID    Levothyroxine Sodium  100 mcg IntraVENous Daily    cefTRIAXone (ROCEPHIN) IV  2,000 mg IntraVENous Q24H        dextrose 100 mL/hr at 03/18/24 1228    norepinephrine  Date/Time     03/18/2024 06:31 AM    K 4.0 03/18/2024 06:31 AM    K 7.3 03/14/2024 04:30 AM     03/18/2024 06:31 AM    CO2 27 03/18/2024 06:31 AM    BUN 46 03/18/2024 06:31 AM    CREATININE 1.6 03/18/2024 06:31 AM    CALCIUM 8.8 03/18/2024 06:31 AM    GFRAA >60 08/24/2022 12:00 PM    GFRAA 96 01/01/2013 08:25 AM    LABGLOM 50 03/18/2024 06:31 AM    GLUCOSE 135 03/18/2024 06:31 AM     Ionized Calcium:  No results found for: \"IONCA\"  Magnesium:    Lab Results   Component Value Date/Time    MG 1.60 03/18/2024 06:31 AM     Phosphorus:    Lab Results   Component Value Date/Time    PHOS 4.7 03/18/2024 06:31 AM     U/A:    Lab Results   Component Value Date/Time    COLORU Yellow 03/13/2024 03:44 PM    PHUR 6.5 03/13/2024 03:44 PM    WBCUA 227 03/13/2024 03:44 PM    RBCUA 217 03/13/2024 03:44 PM    MUCUS Rare 07/16/2022 07:26 AM    BACTERIA None Seen 03/13/2024 03:44 PM    CLARITYU CLOUDY 03/13/2024 03:44 PM    SPECGRAV 1.014 03/13/2024 03:44 PM    LEUKOCYTESUR MODERATE 03/13/2024 03:44 PM    UROBILINOGEN 0.2 03/13/2024 03:44 PM    BILIRUBINUR Negative 03/13/2024 03:44 PM    BLOODU LARGE 03/13/2024 03:44 PM    GLUCOSEU >=1000 03/13/2024 03:44 PM         IMPRESSION/RECOMMENDATIONS:      Principal Problem:    Myxedema coma (HCC)  Active Problems:    Acute respiratory failure with hypoxia (HCC)    Morbid obesity (HCC)    ELPIDIO (acute kidney injury) (HCC)    SIRS (systemic inflammatory response syndrome) (HCC)    Complicated UTI (urinary tract infection)    Hyperkalemia    Normocytic anemia    Hyperglycemia due to type 2 diabetes mellitus (HCC)  Resolved Problems:    * No resolved hospital problems. *    ELPIDIO - occurring in the setting of relative hypotension - creatinine trending down .   - Hold all antihypertensive medication  - Hold  Midodrine for SBP> 110 mmHg   - Maintain cornell - monitor output  - No immediate indication for RRT at this point in time  - Avoid Exposure to Nephrotoxic agents    2. Hyperkalemia -

## 2024-03-18 NOTE — PROGRESS NOTES
Patient was cooperative with care. Temperature was low 95 rectally and heating blanket was ordered and applied at this time. Ate 100 % of his meals. He is A/O to self, time, place. He was in bed and bed bath was given with barrier cream Triad. Has a picc line on R.ACL.

## 2024-03-18 NOTE — PROGRESS NOTES
Physical Therapy      Fabio Beauchamp  [id number]  [id number]    Pt agreeable to working with therapy; he reports they use a lift at his ECF for transfers and mostly stays in bed.  Pt agreeable to ROM ex but needed significant assist with B LEs. Pt reports discomfort in his feet when therapist pressed against them for gentle ROM.  Pt appears to be at his baseline for mobility tasks. Will sign off from therapy at this time and recommend return to his ECF with prior level of assist.  Pt left in bed with all needs in reach  Electronically signed by SUSAN MANN PT on 3/18/2024 at 3:30 PM

## 2024-03-18 NOTE — CARE COORDINATION
DISCHARGE PLANNING:    DC plan to return to [facility] LTC.  Patient's father is legal guardian.  No precert will be required to return to [facility].  Patient will need transport.      #[phone]  Electronically signed by Dalila Quintero RN on 3/18/2024 at 3:01 PM

## 2024-03-18 NOTE — PROGRESS NOTES
Comprehensive Nutrition Assessment    Type and Reason for Visit:  Initial, Wound    Nutrition Recommendations/Plan:   Continue regular 5 carb diet w/ low fat/low chol/high fiber/2 g Na restrictions. Add herberth BID      Malnutrition Assessment:  Malnutrition Status:  No malnutrition (03/18/24 1005)    Context:  Acute Illness       Nutrition Assessment:    Pt presenting w/ myxedema. Pt w/ multiple unstageable pressure injuries. Hx of chf, weight fluctuations pta likely related to fluid changes. No reported decrease in intake due to poor appetite pta. Good intake while inpatient. Mostly eating at least half of meals. Nutrition related labs reviewed. Current diet order appropriate for pt. Will send wound healing ONS BID.    Nutrition Related Findings:    135 glucose Wound Type: Unstageable, Pressure Injury       Current Nutrition Intake & Therapies:    Average Meal Intake: 51-75%  Average Supplements Intake: None Ordered  ADULT DIET; Regular; 5 carb choices (75 gm/meal); Low Fat/Low Chol/High Fiber/2 gm Na    Anthropometric Measures:  Height: 188 cm (6' 2\")  Ideal Body Weight (IBW): 190 lbs (86 kg)       Current Body Weight: 170.6 kg (376 lb 1.7 oz),   IBW.    Current BMI (kg/m2): 48.3                               Estimated Daily Nutrient Needs:  Energy Requirements Based On: Kcal/kg  Weight Used for Energy Requirements: Current  Energy (kcal/day): 1706 - 2218 (10 - 13 kcal/kg)  Weight Used for Protein Requirements: Ideal  Protein (g/day): 129 (1.5 g/kg IBW)  Method Used for Fluid Requirements: 1 ml/kcal  Fluid (ml/day): or per provider    Nutrition Diagnosis:   Increased nutrient needs related to increase demand for energy/nutrients as evidenced by wounds    Nutrition Interventions:   Food and/or Nutrient Delivery: Continue Current Diet, Start Oral Nutrition Supplement  Nutrition Education/Counseling: Education not indicated  Coordination of Nutrition Care: Continue to monitor while inpatient       Goals:     Goals:  Meet at least 75% of estimated needs, by next RD assessment       Nutrition Monitoring and Evaluation:   Behavioral-Environmental Outcomes: None Identified  Food/Nutrient Intake Outcomes: Food and Nutrient Intake, Supplement Intake  Physical Signs/Symptoms Outcomes: Biochemical Data, Weight, Nutrition Focused Physical Findings    Discharge Planning:    No discharge needs at this time     Justin Ford RD  Contact: [phone]

## 2024-03-18 NOTE — PROGRESS NOTES
Occupational Therapy      Pt seen bedside. Pt verbal and able to state name, hospital setting and follow simple commands, altho slightly delayed. Pt reports he resides in LTC at [facility]. Pt reports he transfers to a w/c via buddy lift but often stays in bed. Pt reports comfortable in bed and declined transfer to chair. Pt participate in UE/LE exercises in bed and assist with repositioning. Pt completed shoulder flex, elbow flex/ext, and forward punches, 10 reps each). Pt appears at baseline with plans to return to LTC at discharge. No further skilled OT warranted.     Time: 18 minutes    Electronically signed by Arianna Jimenez OT on 3/18/2024 at 3:29 PM

## 2024-03-18 NOTE — PLAN OF CARE
Problem: Discharge Planning  Goal: Discharge to home or other facility with appropriate resources  3/18/2024 1939 by Alistair Kc RN  Outcome: Progressing     Problem: Pain  Goal: Verbalizes/displays adequate comfort level or baseline comfort level  3/18/2024 1939 by Alistair Kc RN  Outcome: Progressing     Problem: Safety - Adult  Goal: Free from fall injury  3/18/2024 1939 by Alistair Kc RN  Outcome: Progressing     Problem: Chronic Conditions and Co-morbidities  Goal: Patient's chronic conditions and co-morbidity symptoms are monitored and maintained or improved  Outcome: Progressing     Problem: Skin/Tissue Integrity  Goal: Absence of new skin breakdown  Description: 1.  Monitor for areas of redness and/or skin breakdown  2.  Assess vascular access sites hourly  3.  Every 4-6 hours minimum:  Change oxygen saturation probe site  4.  Every 4-6 hours:  If on nasal continuous positive airway pressure, respiratory therapy assess nares and determine need for appliance change or resting period.  Outcome: Progressing     Problem: ABCDS Injury Assessment  Goal: Absence of physical injury  Outcome: Progressing

## 2024-03-18 NOTE — PROGRESS NOTES
URRFLXCULT Yes 03/13/2024 03:44 PM       No results for input(s): \"TSHREFLEX\" in the last 72 hours.      No components found for: \"EOE3248\"  POC GLUCOSE:    Recent Labs     03/17/24  0451 03/17/24  1114 03/17/24  1712 03/17/24  1958 03/18/24  0553   POCGLU 185* 212* 239* 235* 146*     No results for input(s): \"LABA1C\" in the last 72 hours.     Lab Results   Component Value Date/Time    LABA1C 6.9 03/14/2024 04:30 AM     estimated ejection fraction of 55-60%(1/19/2024)  Repeat echocardiogram on this admission-technically difficult study    estimated ejection fraction of 65-70%(3/13/2024)      ASSESSMENT AND PLAN     Myxedema coma (HCC)  Patient is encephalopathic, hypotensive, hypoxic, and hypothermic with a low T4 level.    TSH is elevated.  Initiate IV levothyroxine.    Endocrinology team consulted prior to admission.    Mental status and speech is improving       Acute respiratory failure with hypoxia (HCC)  Improving on BiPAP   Echocardiogram- estimated EF 65-70%      Septic shock due to hospital-acquired pneumonia  MRSA and Moraxella  Continue patient on ceftriaxone  Blood pressure is improving     Morbid Obesity E66.01   (Body mass index is 48.28 kg/m².)   Complicating assessment and treatment. Placing patient at risk for multiple co-morbidities as well as early death and contributing to the patient's presentation. Counseled on weight loss.        ELPIDIO (acute kidney injury) (HCC)  Discontinue midodrine for systolic blood pressure more than 110  Creatinine is improved  Nephrology is following          Suspect acute phase reactants secondary to myxedema coma.  Although we are also treating a complicated UTI, believe primary cause of SIRS is a myxedema coma rather than urinary tract infection.  Overall, clinical picture not consistent with sepsis secondary to bacterial infection.     Complicated UTI (urinary tract infection)  Received ceftriaxone  Repeat urinalysis     Hyperkalemia  Due to ELPIDIO  Nephrology is  following        Normocytic anemia   No overt signs of acute blood loss.    Hemoglobin is stable     Hyperglycemia due to type 2 diabetes mellitus (HCC)        SSI.    Monitor glucose trend and insulin demands and adjust basal insulin if warranted.          Code Status: Full Code        Dispo - cc      The patient and / or the family were informed of the results of any tests, a time was given to answer questions, a plan was proposed and they agreed with plan.    SADIE KUMAR MD    This note was transcribed using Dragon Dictation software. Please disregard any translational errors.

## 2024-03-18 NOTE — PROGRESS NOTES
Patient sodium on 0100 draw came back at 125. Perfect serve message sent to Paulette Cintron NP. D5 put on hold, redraw ordered as inaccuracy is suspected.     Redraw at 0300 was 141, perfect serve message sent. Given orders by Paulette Cintron NP to continue to hold D5 for now.     Patient resting in bed with eyes closed, BiPAP currently on.

## 2024-03-19 LAB
ANION GAP SERPL CALCULATED.3IONS-SCNC: 7 MMOL/L (ref 3–16)
ANISOCYTOSIS BLD QL SMEAR: ABNORMAL
BASOPHILS # BLD: 0 K/UL (ref 0–0.2)
BASOPHILS NFR BLD: 0 %
BUN SERPL-MCNC: 42 MG/DL (ref 7–20)
CALCIUM SERPL-MCNC: 8.9 MG/DL (ref 8.3–10.6)
CHLORIDE SERPL-SCNC: 111 MMOL/L (ref 99–110)
CO2 SERPL-SCNC: 28 MMOL/L (ref 21–32)
CREAT SERPL-MCNC: 1.5 MG/DL (ref 0.9–1.3)
DEPRECATED RDW RBC AUTO: 22.4 % (ref 12.4–15.4)
EOSINOPHIL # BLD: 0 K/UL (ref 0–0.6)
EOSINOPHIL NFR BLD: 0 %
GFR SERPLBLD CREATININE-BSD FMLA CKD-EPI: 54 ML/MIN/{1.73_M2}
GLUCOSE BLD-MCNC: 110 MG/DL (ref 70–99)
GLUCOSE BLD-MCNC: 114 MG/DL (ref 70–99)
GLUCOSE BLD-MCNC: 145 MG/DL (ref 70–99)
GLUCOSE BLD-MCNC: 147 MG/DL (ref 70–99)
GLUCOSE BLD-MCNC: 162 MG/DL (ref 70–99)
GLUCOSE SERPL-MCNC: 135 MG/DL (ref 70–99)
HCT VFR BLD AUTO: 26.3 % (ref 40.5–52.5)
HGB BLD-MCNC: 8.8 G/DL (ref 13.5–17.5)
LYMPHOCYTES # BLD: 1.9 K/UL (ref 1–5.1)
LYMPHOCYTES NFR BLD: 21 %
MACROCYTES BLD QL SMEAR: ABNORMAL
MAGNESIUM SERPL-MCNC: 1.9 MG/DL (ref 1.8–2.4)
MCH RBC QN AUTO: 27.4 PG (ref 26–34)
MCHC RBC AUTO-ENTMCNC: 33.3 G/DL (ref 31–36)
MCV RBC AUTO: 82.3 FL (ref 80–100)
MICROCYTES BLD QL SMEAR: ABNORMAL
MONOCYTES # BLD: 0.1 K/UL (ref 0–1.3)
MONOCYTES NFR BLD: 1 %
MYELOCYTES NFR BLD MANUAL: 3 %
NEUTROPHILS # BLD: 7.1 K/UL (ref 1.7–7.7)
NEUTROPHILS NFR BLD: 74 %
NEUTS BAND NFR BLD MANUAL: 1 % (ref 0–7)
NRBC BLD-RTO: 1 /100 WBC
OVALOCYTES BLD QL SMEAR: ABNORMAL
PERFORMED ON: ABNORMAL
PHOSPHATE SERPL-MCNC: 3.9 MG/DL (ref 2.5–4.9)
PLATELET # BLD AUTO: 339 K/UL (ref 135–450)
PMV BLD AUTO: 7.1 FL (ref 5–10.5)
POIKILOCYTOSIS BLD QL SMEAR: ABNORMAL
POTASSIUM SERPL-SCNC: 3.8 MMOL/L (ref 3.5–5.1)
PROCALCITONIN SERPL IA-MCNC: 0.17 NG/ML (ref 0–0.15)
RBC # BLD AUTO: 3.19 M/UL (ref 4.2–5.9)
SICKLE CELLS BLD QL SMEAR: ABNORMAL
SODIUM SERPL-SCNC: 145 MMOL/L (ref 136–145)
SODIUM SERPL-SCNC: 146 MMOL/L (ref 136–145)
TARGETS BLD QL SMEAR: ABNORMAL
WBC # BLD AUTO: 9.1 K/UL (ref 4–11)

## 2024-03-19 PROCEDURE — 85025 COMPLETE CBC W/AUTO DIFF WBC: CPT

## 2024-03-19 PROCEDURE — 2060000000 HC ICU INTERMEDIATE R&B

## 2024-03-19 PROCEDURE — 94660 CPAP INITIATION&MGMT: CPT

## 2024-03-19 PROCEDURE — 6360000002 HC RX W HCPCS: Performed by: FAMILY MEDICINE

## 2024-03-19 PROCEDURE — 80048 BASIC METABOLIC PNL TOTAL CA: CPT

## 2024-03-19 PROCEDURE — 6370000000 HC RX 637 (ALT 250 FOR IP): Performed by: FAMILY MEDICINE

## 2024-03-19 PROCEDURE — A4216 STERILE WATER/SALINE, 10 ML: HCPCS | Performed by: FAMILY MEDICINE

## 2024-03-19 PROCEDURE — 84100 ASSAY OF PHOSPHORUS: CPT

## 2024-03-19 PROCEDURE — 2500000003 HC RX 250 WO HCPCS: Performed by: NURSE PRACTITIONER

## 2024-03-19 PROCEDURE — 2580000003 HC RX 258: Performed by: INTERNAL MEDICINE

## 2024-03-19 PROCEDURE — 94760 N-INVAS EAR/PLS OXIMETRY 1: CPT

## 2024-03-19 PROCEDURE — 6360000002 HC RX W HCPCS: Performed by: HOSPITALIST

## 2024-03-19 PROCEDURE — 83735 ASSAY OF MAGNESIUM: CPT

## 2024-03-19 PROCEDURE — 6370000000 HC RX 637 (ALT 250 FOR IP): Performed by: HOSPITALIST

## 2024-03-19 PROCEDURE — 6370000000 HC RX 637 (ALT 250 FOR IP): Performed by: INTERNAL MEDICINE

## 2024-03-19 PROCEDURE — 84145 PROCALCITONIN (PCT): CPT

## 2024-03-19 PROCEDURE — 6370000000 HC RX 637 (ALT 250 FOR IP): Performed by: NURSE PRACTITIONER

## 2024-03-19 PROCEDURE — 2580000003 HC RX 258: Performed by: FAMILY MEDICINE

## 2024-03-19 PROCEDURE — 2580000003 HC RX 258: Performed by: NURSE PRACTITIONER

## 2024-03-19 PROCEDURE — 84295 ASSAY OF SERUM SODIUM: CPT

## 2024-03-19 RX ORDER — LEVOTHYROXINE SODIUM 0.1 MG/1
200 TABLET ORAL DAILY
Status: DISCONTINUED | OUTPATIENT
Start: 2024-03-19 | End: 2024-03-19

## 2024-03-19 RX ORDER — VALPROIC ACID 250 MG/5ML
500 SOLUTION ORAL 2 TIMES DAILY
Status: DISCONTINUED | OUTPATIENT
Start: 2024-03-19 | End: 2024-03-22 | Stop reason: HOSPADM

## 2024-03-19 RX ORDER — LEVOTHYROXINE SODIUM 0.05 MG/1
50 TABLET ORAL ONCE
Status: COMPLETED | OUTPATIENT
Start: 2024-03-19 | End: 2024-03-19

## 2024-03-19 RX ORDER — LEVOTHYROXINE SODIUM 0.12 MG/1
250 TABLET ORAL DAILY
Status: DISCONTINUED | OUTPATIENT
Start: 2024-03-20 | End: 2024-03-22 | Stop reason: HOSPADM

## 2024-03-19 RX ADMIN — VALPROIC ACID 500 MG: 250 SOLUTION ORAL at 22:09

## 2024-03-19 RX ADMIN — ENOXAPARIN SODIUM 40 MG: 100 INJECTION SUBCUTANEOUS at 20:30

## 2024-03-19 RX ADMIN — LEVOTHYROXINE SODIUM 50 MCG: 0.05 TABLET ORAL at 14:32

## 2024-03-19 RX ADMIN — VALPROIC ACID 500 MG: 250 SOLUTION ORAL at 12:41

## 2024-03-19 RX ADMIN — DEXTROSE MONOHYDRATE: 50 INJECTION, SOLUTION INTRAVENOUS at 09:16

## 2024-03-19 RX ADMIN — LEVOTHYROXINE SODIUM 100 MCG: 20 INJECTION, SOLUTION INTRAVENOUS at 09:18

## 2024-03-19 RX ADMIN — SODIUM ZIRCONIUM CYCLOSILICATE 10 G: 10 POWDER, FOR SUSPENSION ORAL at 09:19

## 2024-03-19 RX ADMIN — ENOXAPARIN SODIUM 40 MG: 100 INJECTION SUBCUTANEOUS at 09:18

## 2024-03-19 RX ADMIN — VALPROATE SODIUM 250 MG: 100 INJECTION, SOLUTION INTRAVENOUS at 00:07

## 2024-03-19 RX ADMIN — CEFTRIAXONE SODIUM 2000 MG: 2 INJECTION, POWDER, FOR SOLUTION INTRAMUSCULAR; INTRAVENOUS at 18:20

## 2024-03-19 RX ADMIN — MIDODRINE HYDROCHLORIDE 5 MG: 5 TABLET ORAL at 12:41

## 2024-03-19 RX ADMIN — ASPIRIN 81 MG: 81 TABLET, COATED ORAL at 09:19

## 2024-03-19 RX ADMIN — DEXTROSE MONOHYDRATE: 50 INJECTION, SOLUTION INTRAVENOUS at 18:41

## 2024-03-19 RX ADMIN — HYDROCORTISONE SODIUM SUCCINATE 50 MG: 100 INJECTION, POWDER, FOR SOLUTION INTRAMUSCULAR; INTRAVENOUS at 09:19

## 2024-03-19 RX ADMIN — OLANZAPINE 15 MG: 5 TABLET, FILM COATED ORAL at 20:30

## 2024-03-19 RX ADMIN — MIDODRINE HYDROCHLORIDE 5 MG: 5 TABLET ORAL at 17:57

## 2024-03-19 RX ADMIN — INSULIN LISPRO 2 UNITS: 100 INJECTION, SOLUTION INTRAVENOUS; SUBCUTANEOUS at 00:06

## 2024-03-19 RX ADMIN — ATORVASTATIN CALCIUM 20 MG: 20 TABLET, FILM COATED ORAL at 20:30

## 2024-03-19 RX ADMIN — Medication 10 ML: at 09:18

## 2024-03-19 RX ADMIN — VALPROATE SODIUM 250 MG: 100 INJECTION, SOLUTION INTRAVENOUS at 05:41

## 2024-03-19 NOTE — CARE COORDINATION
[facility] Nurse  Follow-up Progress Note       NAME:  Fabio Beauchamp  MEDICAL RECORD NUMBER:  [id number]  AGE:  58 y.o.   GENDER:  male  :  1965  TODAY'S DATE:  3/19/2024    Subjective:   Follow up for thigh wounds from unknown cause. Pt now on PCU.   Awaiting d/c planning.    Objective:    /60   Pulse 66   Temp 97.5 °F (36.4 °C) (Oral)   Resp 21   Ht 1.88 m (6' 2\")   Wt (!) 199.4 kg (439 lb 9.6 oz)   SpO2 95%   BMI 56.44 kg/m²   Zach Risk Score: Zach Scale Score: 16  Assessment:   Measurements:  Wound 03/15/24 Thigh Posterior;Right (Active)   Wound Image   24 1708   Wound Etiology Pressure Stage 3 24 1708   Dressing/Treatment Triad hydro/zinc oxide-based hydrophilic paste 24 1708   Wound Length (cm) 5 cm 24 1708   Wound Width (cm) 4 cm 24 1708   Wound Surface Area (cm^2) 20 cm^2 24 1708   Change in Wound Size % (l*w) 0 24 1708   Wound Assessment Pink/red 24 1708   Drainage Amount None (dry) 24 0900   Odor None 24 0900   Mahsa-wound Assessment Intact 24   Margins Defined edges 24   Wound Thickness Description not for Pressure Injury Full thickness 24 170   Number of days: 4       Wound 03/15/24 Thigh Left;Posterior (Active)   Wound Image   24 1708   Wound Etiology Pressure Stage 3 24 1708   Dressing/Treatment Triad hydro/zinc oxide-based hydrophilic paste 24 1708   Wound Length (cm) 2 cm 24 1708   Wound Width (cm) 9 cm 24 1708   Wound Surface Area (cm^2) 18 cm^2 24 1708   Change in Wound Size % (l*w) 0 24 1708   Wound Assessment Pink/red 24 1708   Drainage Amount Scant (moist but unmeasurable) 24 1708   Drainage Description Sanguinous 24 1708   Odor None 24   Mahsa-wound Assessment Intact 24   Margins Defined edges 24   Wound Thickness Description not for  Pressure Injury Full thickness 03/17/24 0918   Number of days: 4     Bilateral posterior thigh wounds improving with slough removed. Stage 3. Triad barrier applied.               Pt on bariatric low air loss bed.      Plan:   Plan of Care:   Buttock area and posterior thigh wounds- triad barrier 2x daily, blue barrier wipes for cleansing  Will continue to follow.    Specialty Bed Required : Yes   [x] Low Air Loss   [x] Pressure Redistribution  [] Fluid Immersion  [x] Bariatric  [] Total Pressure Relief  [] Other:     Current Diet: ADULT DIET; Regular; 5 carb choices (75 gm/meal); Low Fat/Low Chol/High Fiber/2 gm Na  ADULT ORAL NUTRITION SUPPLEMENT; Breakfast, Dinner; Wound Healing Oral Supplement  Dietician consult:  Yes    Discharge Plan:  Placement for patient upon discharge: skilled nursing   Patient appropriate for Outpatient Wound Care Center: No    Referrals:  []   [] Home Health Care  [] Supplies  [] Other    Patient/Caregiver Teaching:  Level of patient/caregiver understanding able to:   [] Indicates understanding       [] Needs reinforcement  [] Unsuccessful      [x] Verbal Understanding  [] Demonstrated understanding       [] No evidence of learning  [] Refused teaching         [] N/A       Electronically signed by Rea Neves RN, CWOCN on 3/19/2024 at 5:12 PM

## 2024-03-19 NOTE — FLOWSHEET NOTE
Pt temp has been within normal limits during my shift. Bear Hugger not reapplied.       03/19/24 0718 03/19/24 1236 03/19/24 1616   Vital Signs   Temp 97.3 °F (36.3 °C) 97.4 °F (36.3 °C) 97.5 °F (36.4 °C)     Electronically signed by Kaylyn Engel RN on 3/19/2024 at 6:36 PM

## 2024-03-19 NOTE — PLAN OF CARE
Problem: Discharge Planning  Goal: Discharge to home or other facility with appropriate resources  3/19/2024 1338 by Kaylyn Engel RN  Outcome: Progressing  Flowsheets (Taken 3/19/2024 0900)  Discharge to home or other facility with appropriate resources:   Identify barriers to discharge with patient and caregiver   Arrange for needed discharge resources and transportation as appropriate   Refer to discharge planning if patient needs post-hospital services based on physician order or complex needs related to functional status, cognitive ability or social support system  3/19/2024 0116 by Zan Marshall RN  Outcome: Progressing  Flowsheets (Taken 3/19/2024 0116)  Discharge to home or other facility with appropriate resources: Identify discharge learning needs (meds, wound care, etc)     Problem: Pain  Goal: Verbalizes/displays adequate comfort level or baseline comfort level  3/19/2024 1338 by Kaylyn Engel RN  Outcome: Progressing  Flowsheets (Taken 3/19/2024 0900)  Verbalizes/displays adequate comfort level or baseline comfort level:   Encourage patient to monitor pain and request assistance   Assess pain using appropriate pain scale   Administer analgesics based on type and severity of pain and evaluate response   Implement non-pharmacological measures as appropriate and evaluate response   Consider cultural and social influences on pain and pain management   Notify Licensed Independent Practitioner if interventions unsuccessful or patient reports new pain  3/19/2024 0116 by Zan Marshall RN  Outcome: Progressing  Flowsheets (Taken 3/19/2024 0116)  Verbalizes/displays adequate comfort level or baseline comfort level: Assess pain using appropriate pain scale     Problem: Safety - Adult  Goal: Free from fall injury  3/19/2024 1338 by Kaylyn Engel RN  Outcome: Progressing  Flowsheets (Taken 3/19/2024 0900)  Free From Fall Injury: Instruct family/caregiver on patient safety  3/19/2024 0116 by Zan Marshall  (Taken 3/19/2024 0116)  Absence of Physical Injury: Implement safety measures based on patient assessment     Problem: Metabolic/Fluid and Electrolytes - Adult  Goal: Glucose maintained within prescribed range  3/19/2024 1338 by Kaylyn Engel, RN  Outcome: Progressing  Flowsheets (Taken 3/19/2024 0900)  Glucose maintained within prescribed range:   Monitor blood glucose as ordered   Assess for signs and symptoms of hyperglycemia and hypoglycemia   Administer ordered medications to maintain glucose within target range   Assess barriers to adequate nutritional intake and initiate nutrition consult as needed   Instruct patient on self management of diabetes and initiate consult as needed  3/19/2024 0116 by Zan Marshall, RN  Outcome: Progressing  Flowsheets (Taken 3/19/2024 0116)  Glucose maintained within prescribed range: Monitor blood glucose as ordered

## 2024-03-19 NOTE — FLOWSHEET NOTE
Pt rectal temp 95.4. triad cream applied to bilateral open areas on back of both thighs. Pt positioned in bed per comfort denies pain at this time.

## 2024-03-19 NOTE — PROGRESS NOTES
Hospitalist Progress Note  3/19/2024 11:50 AM    PCP: Anshu Padilla MD    [phone]     Date of Admission: 3/13/2024                                                                                                                     HOSPITAL COURSE    Patient demographics:  The patient  Fabio Beauchamp is a 58 y.o. male     Significant past medical history:   Patient Active Problem List   Diagnosis    Cellulitis    HTN (hypertension), malignant    Chest pain    Kidney stone    Pyelonephritis, acute    Bipolar I disorder (HCC)    Hypothermia associated with environmental change    Acute metabolic encephalopathy    Cerebral edema (HCC)    Pulmonary edema    Sinus bradycardia    Hypothyroidism    Schizoaffective disorder (HCC)    Altered mental status    Psychosis (HCC)    Subchronic schizoaffective disorder with acute exacerbation (HCC)    Type 2 diabetes mellitus with complication, without long-term current use of insulin (HCC)    Hyperlipidemia    LADONNA (obstructive sleep apnea)    Morbid obesity (HCC)    Schizoaffective disorder, chronic condition with acute exacerbation (HCC)    Acute and chronic respiratory failure with hypoxia (HCC)    Acute respiratory failure with hypoxia (HCC)    Influenzal pneumonia    Varicose ulcer of right lower extremity (HCC)    Acute respiratory failure with hypoxia and hypercapnia (HCC)    Chronic systolic heart failure (HCC)    Myxedema coma (HCC)    ELPIDIO (acute kidney injury) (HCC)    SIRS (systemic inflammatory response syndrome) (HCC)    Complicated UTI (urinary tract infection)    Hyperkalemia    Normocytic anemia    Hyperglycemia due to type 2 diabetes mellitus (HCC)         Presenting symptoms:  AMS    Diagnostic workup:      CONSULTS DURING ADMISSION :   IP CONSULT TO NEPHROLOGY  IP CONSULT TO HOSPITALIST  IP CONSULT TO ENDOCRINOLOGY  IP CONSULT TO CRITICAL CARE  IP CONSULT TO NEPHROLOGY      Patient was diagnosed with:  Myxedema coma  Acute respiratory failure with  hours.    UA:  No results for input(s): \"NITRITE\", \"LABCAST\", \"WBCUA\", \"RBCUA\", \"MUCUS\" in the last 72 hours.      TROPONIN:   No results for input(s): \"CKTOTAL\", \"TROPONINI\" in the last 72 hours.      Lab Results   Component Value Date/Time    URRFLXCULT Yes 03/13/2024 03:44 PM       No results for input(s): \"TSHREFLEX\" in the last 72 hours.      No components found for: \"NBL6577\"  POC GLUCOSE:    Recent Labs     03/18/24  1118 03/18/24  1713 03/18/24  2306 03/19/24  0509 03/19/24  0849   POCGLU 129* 173* 200* 145* 110*     No results for input(s): \"LABA1C\" in the last 72 hours.     Lab Results   Component Value Date/Time    LABA1C 6.9 03/14/2024 04:30 AM     estimated ejection fraction of 55-60%(1/19/2024)  Repeat echocardiogram on this admission-technically difficult study    estimated ejection fraction of 65-70%(3/13/2024)      ASSESSMENT AND PLAN     Myxedema coma (HCC)  Patient is encephalopathic, hypotensive, hypoxic, and hypothermic with a low T4 level.    TSH remains elevated  Increase levothyroxine to 250 mcg.     Acute respiratory failure with hypoxia (HCC)  Improving on BiPAP   Echocardiogram- estimated EF 65-70%      Septic shock due to hospital-acquired pneumonia  MRSA and Moraxella  Continue patient on ceftriaxone  Blood pressure is improving     Morbid Obesity E66.01   (Body mass index is 56.44 kg/m².)   Complicating assessment and treatment. Placing patient at risk for multiple co-morbidities as well as early death and contributing to the patient's presentation. Counseled on weight loss.        ELPIDIO (acute kidney injury) (HCC)  Midodrine discontinued as blood pressure has improved          Suspect acute phase reactants secondary to myxedema coma.  Although we are also treating a complicated UTI, believe primary cause of SIRS is a myxedema coma rather than urinary tract infection.  Overall, clinical picture not consistent with sepsis secondary to bacterial infection.     Complicated UTI (urinary tract

## 2024-03-19 NOTE — PLAN OF CARE
Problem: Discharge Planning  Goal: Discharge to home or other facility with appropriate resources  3/19/2024 0116 by Zan Marshall RN  Outcome: Progressing  Flowsheets (Taken 3/19/2024 0116)  Discharge to home or other facility with appropriate resources: Identify discharge learning needs (meds, wound care, etc)     Problem: Pain  Goal: Verbalizes/displays adequate comfort level or baseline comfort level  3/19/2024 0116 by Zan Marshall RN  Outcome: Progressing  Flowsheets (Taken 3/19/2024 0116)  Verbalizes/displays adequate comfort level or baseline comfort level: Assess pain using appropriate pain scale     Problem: Safety - Adult  Goal: Free from fall injury  3/19/2024 0116 by Zan Marshall RN  Outcome: Progressing     Problem: Chronic Conditions and Co-morbidities  Goal: Patient's chronic conditions and co-morbidity symptoms are monitored and maintained or improved  3/19/2024 0116 by Zan Marshall RN  Outcome: Progressing  Flowsheets (Taken 3/19/2024 0116)  Care Plan - Patient's Chronic Conditions and Co-Morbidity Symptoms are Monitored and Maintained or Improved: Monitor and assess patient's chronic conditions and comorbid symptoms for stability, deterioration, or improvement     Problem: Skin/Tissue Integrity  Goal: Absence of new skin breakdown  Description: 1.  Monitor for areas of redness and/or skin breakdown  2.  Assess vascular access sites hourly  3.  Every 4-6 hours minimum:  Change oxygen saturation probe site  4.  Every 4-6 hours:  If on nasal continuous positive airway pressure, respiratory therapy assess nares and determine need for appliance change or resting period.  3/19/2024 0116 by Zan Marshall RN  Outcome: Progressing     Problem: ABCDS Injury Assessment  Goal: Absence of physical injury  3/19/2024 0116 by Zan Marshall RN  Outcome: Progressing  Flowsheets (Taken 3/19/2024 0116)  Absence of Physical Injury: Implement safety measures based on patient assessment

## 2024-03-19 NOTE — PROGRESS NOTES
Confirmed w/Justyna [facility] that new Synthroid PO ordered today should be not given as pt had Synthroid IV already this am. Pt does have a once dose Synthroid 50 mcg PO due now that Justyna relays should be given. Informed Justyna that pt is eating lunch right now; Justyna relays does should be given. Will give.    Electronically signed by Kaylyn Engel RN on 3/19/2024 at 2:22 PM

## 2024-03-19 NOTE — PROGRESS NOTES
[facility]phrology Progress Note   [facility]      Chief Complaint: altered mental status     History of Present Illness: history obtained from the chart - patient confused  Mr Beauchamp is a 59 yo morbidly obese male with a past medical history significant for hypertension, hypothyroidism, DM II, hyperlipidemia, obesity with sleep apnea, schizophrenia that was brought in from [facility] because of altered mental status - Of note patient had a similar presentation inJan 2023 and responded well to Narcan on previous admission. At the time of my evaluation patients eyes were open he could track me moving across the room but cannot answer questions appropriately   Nephrology consulted for ELPIDIO and following lab values demonstrated below:     Latest Reference Range & Units 03/13/24 14:22   Sodium 136 - 145 mmol/L 140   Potassium 3.5 - 5.1 mmol/L 5.8 (H)   Chloride 99 - 110 mmol/L 103   CO2 21 - 32 mmol/L 20 (L)   BUN,BUNPL 7 - 20 mg/dL 122 (HH)   Creatinine 0.9 - 1.3 mg/dL 3.2 (H)   Anion Gap 3 - 16  17 (H)   Est, Glom Filt Rate >60  22 !   Magnesium 1.80 - 2.40 mg/dL 1.90   Lactic Acid 0.4 - 2.0 mmol/L 1.7       Subjective:    Awake, alert today and able to talk    ROS: no shortness of breath, no chest pain. All other ROS negative    Scheduled Meds:   valproic acid  500 mg Oral BID    [START ON 3/20/2024] hydrocortisone sodium succinate PF  50 mg IntraVENous Daily    [START ON 3/20/2024] levothyroxine  250 mcg Oral Daily    levothyroxine  50 mcg Oral Once    sodium chloride flush  5-40 mL IntraVENous 2 times per day    OLANZapine  15 mg Oral Nightly    Or    OLANZapine  10 mg IntraMUSCular Nightly    midodrine  5 mg Oral TID WC    atorvastatin  20 mg Oral Nightly    aspirin  81 mg Oral Daily    insulin lispro  0-8 Units SubCUTAneous Q6H    enoxaparin  40 mg SubCUTAneous BID    cefTRIAXone (ROCEPHIN) IV  2,000 mg IntraVENous Q24H        dextrose 100 mL/hr at 03/19/24 0916    dextrose      sodium chloride         PRN

## 2024-03-20 LAB
ALBUMIN SERPL-MCNC: 2.8 G/DL (ref 3.4–5)
ANION GAP SERPL CALCULATED.3IONS-SCNC: 11 MMOL/L (ref 3–16)
ANISOCYTOSIS BLD QL SMEAR: ABNORMAL
BASOPHILS # BLD: 0 K/UL (ref 0–0.2)
BASOPHILS NFR BLD: 0 %
BUN SERPL-MCNC: 40 MG/DL (ref 7–20)
CALCIUM SERPL-MCNC: 8.8 MG/DL (ref 8.3–10.6)
CHLORIDE SERPL-SCNC: 113 MMOL/L (ref 99–110)
CO2 SERPL-SCNC: 27 MMOL/L (ref 21–32)
CREAT SERPL-MCNC: 1.3 MG/DL (ref 0.9–1.3)
DEPRECATED RDW RBC AUTO: 22 % (ref 12.4–15.4)
EOSINOPHIL # BLD: 0.2 K/UL (ref 0–0.6)
EOSINOPHIL NFR BLD: 3 %
GFR SERPLBLD CREATININE-BSD FMLA CKD-EPI: >60 ML/MIN/{1.73_M2}
GLUCOSE BLD-MCNC: 110 MG/DL (ref 70–99)
GLUCOSE BLD-MCNC: 121 MG/DL (ref 70–99)
GLUCOSE BLD-MCNC: 121 MG/DL (ref 70–99)
GLUCOSE BLD-MCNC: 154 MG/DL (ref 70–99)
GLUCOSE BLD-MCNC: 158 MG/DL (ref 70–99)
GLUCOSE SERPL-MCNC: 103 MG/DL (ref 70–99)
HCT VFR BLD AUTO: 26.2 % (ref 40.5–52.5)
HGB BLD-MCNC: 8.7 G/DL (ref 13.5–17.5)
LYMPHOCYTES # BLD: 1.1 K/UL (ref 1–5.1)
LYMPHOCYTES NFR BLD: 14 %
MAGNESIUM SERPL-MCNC: 1.8 MG/DL (ref 1.8–2.4)
MCH RBC QN AUTO: 27 PG (ref 26–34)
MCHC RBC AUTO-ENTMCNC: 33.1 G/DL (ref 31–36)
MCV RBC AUTO: 81.8 FL (ref 80–100)
METAMYELOCYTES NFR BLD MANUAL: 1 %
MICROCYTES BLD QL SMEAR: ABNORMAL
MONOCYTES # BLD: 0.5 K/UL (ref 0–1.3)
MONOCYTES NFR BLD: 6 %
MYELOCYTES NFR BLD MANUAL: 2 %
NEUTROPHILS # BLD: 6.2 K/UL (ref 1.7–7.7)
NEUTROPHILS NFR BLD: 73 %
NEUTS BAND NFR BLD MANUAL: 1 % (ref 0–7)
NRBC BLD-RTO: 1 /100 WBC
NT-PROBNP SERPL-MCNC: 955 PG/ML (ref 0–124)
OVALOCYTES BLD QL SMEAR: ABNORMAL
PERFORMED ON: ABNORMAL
PHOSPHATE SERPL-MCNC: 2.9 MG/DL (ref 2.5–4.9)
PLATELET # BLD AUTO: 301 K/UL (ref 135–450)
PMV BLD AUTO: 7.6 FL (ref 5–10.5)
POIKILOCYTOSIS BLD QL SMEAR: ABNORMAL
POTASSIUM SERPL-SCNC: 3.4 MMOL/L (ref 3.5–5.1)
PROCALCITONIN SERPL IA-MCNC: 0.21 NG/ML (ref 0–0.15)
RBC # BLD AUTO: 3.21 M/UL (ref 4.2–5.9)
SCHISTOCYTES BLD QL SMEAR: ABNORMAL
SODIUM SERPL-SCNC: 151 MMOL/L (ref 136–145)
WBC # BLD AUTO: 8.1 K/UL (ref 4–11)

## 2024-03-20 PROCEDURE — 2060000000 HC ICU INTERMEDIATE R&B

## 2024-03-20 PROCEDURE — 80069 RENAL FUNCTION PANEL: CPT

## 2024-03-20 PROCEDURE — 2580000003 HC RX 258: Performed by: FAMILY MEDICINE

## 2024-03-20 PROCEDURE — 6360000002 HC RX W HCPCS: Performed by: FAMILY MEDICINE

## 2024-03-20 PROCEDURE — 83735 ASSAY OF MAGNESIUM: CPT

## 2024-03-20 PROCEDURE — 94760 N-INVAS EAR/PLS OXIMETRY 1: CPT

## 2024-03-20 PROCEDURE — 84145 PROCALCITONIN (PCT): CPT

## 2024-03-20 PROCEDURE — 2580000003 HC RX 258: Performed by: INTERNAL MEDICINE

## 2024-03-20 PROCEDURE — 6370000000 HC RX 637 (ALT 250 FOR IP): Performed by: NURSE PRACTITIONER

## 2024-03-20 PROCEDURE — 6370000000 HC RX 637 (ALT 250 FOR IP): Performed by: FAMILY MEDICINE

## 2024-03-20 PROCEDURE — 6370000000 HC RX 637 (ALT 250 FOR IP): Performed by: HOSPITALIST

## 2024-03-20 PROCEDURE — 85025 COMPLETE CBC W/AUTO DIFF WBC: CPT

## 2024-03-20 PROCEDURE — 83880 ASSAY OF NATRIURETIC PEPTIDE: CPT

## 2024-03-20 PROCEDURE — 6370000000 HC RX 637 (ALT 250 FOR IP): Performed by: INTERNAL MEDICINE

## 2024-03-20 PROCEDURE — 6360000002 HC RX W HCPCS: Performed by: HOSPITALIST

## 2024-03-20 PROCEDURE — 94660 CPAP INITIATION&MGMT: CPT

## 2024-03-20 RX ORDER — PREDNISONE 20 MG/1
40 TABLET ORAL DAILY
Status: DISCONTINUED | OUTPATIENT
Start: 2024-03-21 | End: 2024-03-22 | Stop reason: HOSPADM

## 2024-03-20 RX ORDER — LEVOFLOXACIN 500 MG/1
500 TABLET, FILM COATED ORAL DAILY
Status: DISCONTINUED | OUTPATIENT
Start: 2024-03-21 | End: 2024-03-20

## 2024-03-20 RX ORDER — SENNOSIDES A AND B 8.6 MG/1
1 TABLET, FILM COATED ORAL NIGHTLY
Status: DISCONTINUED | OUTPATIENT
Start: 2024-03-20 | End: 2024-03-22 | Stop reason: HOSPADM

## 2024-03-20 RX ORDER — POTASSIUM CHLORIDE 750 MG/1
20 TABLET, FILM COATED, EXTENDED RELEASE ORAL ONCE
Status: COMPLETED | OUTPATIENT
Start: 2024-03-20 | End: 2024-03-20

## 2024-03-20 RX ORDER — DEXTROSE MONOHYDRATE 50 MG/ML
INJECTION, SOLUTION INTRAVENOUS CONTINUOUS
Status: DISCONTINUED | OUTPATIENT
Start: 2024-03-20 | End: 2024-03-22 | Stop reason: HOSPADM

## 2024-03-20 RX ORDER — DOXYCYCLINE HYCLATE 100 MG
100 TABLET ORAL EVERY 12 HOURS SCHEDULED
Status: DISCONTINUED | OUTPATIENT
Start: 2024-03-20 | End: 2024-03-22 | Stop reason: HOSPADM

## 2024-03-20 RX ORDER — TAMSULOSIN HYDROCHLORIDE 0.4 MG/1
0.4 CAPSULE ORAL DAILY
Status: DISCONTINUED | OUTPATIENT
Start: 2024-03-20 | End: 2024-03-22 | Stop reason: HOSPADM

## 2024-03-20 RX ORDER — POLYETHYLENE GLYCOL 3350 17 G/17G
17 POWDER, FOR SOLUTION ORAL 2 TIMES DAILY
Status: DISCONTINUED | OUTPATIENT
Start: 2024-03-20 | End: 2024-03-22 | Stop reason: HOSPADM

## 2024-03-20 RX ORDER — BISACODYL 10 MG
10 SUPPOSITORY, RECTAL RECTAL DAILY PRN
Status: DISCONTINUED | OUTPATIENT
Start: 2024-03-20 | End: 2024-03-22 | Stop reason: HOSPADM

## 2024-03-20 RX ADMIN — LEVOTHYROXINE SODIUM 250 MCG: 0.12 TABLET ORAL at 06:16

## 2024-03-20 RX ADMIN — ATORVASTATIN CALCIUM 20 MG: 20 TABLET, FILM COATED ORAL at 22:03

## 2024-03-20 RX ADMIN — SENNOSIDES 8.6 MG: 8.6 TABLET, FILM COATED ORAL at 22:03

## 2024-03-20 RX ADMIN — HYDROCORTISONE SODIUM SUCCINATE 50 MG: 100 INJECTION, POWDER, FOR SOLUTION INTRAMUSCULAR; INTRAVENOUS at 10:56

## 2024-03-20 RX ADMIN — OLANZAPINE 15 MG: 5 TABLET, FILM COATED ORAL at 22:03

## 2024-03-20 RX ADMIN — Medication 10 ML: at 11:01

## 2024-03-20 RX ADMIN — DEXTROSE MONOHYDRATE: 50 INJECTION, SOLUTION INTRAVENOUS at 13:44

## 2024-03-20 RX ADMIN — VALPROIC ACID 500 MG: 250 SOLUTION ORAL at 10:56

## 2024-03-20 RX ADMIN — ASPIRIN 81 MG: 81 TABLET, COATED ORAL at 10:56

## 2024-03-20 RX ADMIN — POLYETHYLENE GLYCOL 3350 17 G: 17 POWDER, FOR SOLUTION ORAL at 10:56

## 2024-03-20 RX ADMIN — ENOXAPARIN SODIUM 40 MG: 100 INJECTION SUBCUTANEOUS at 22:03

## 2024-03-20 RX ADMIN — MIDODRINE HYDROCHLORIDE 5 MG: 5 TABLET ORAL at 17:46

## 2024-03-20 RX ADMIN — TAMSULOSIN HYDROCHLORIDE 0.4 MG: 0.4 CAPSULE ORAL at 13:49

## 2024-03-20 RX ADMIN — MIDODRINE HYDROCHLORIDE 5 MG: 5 TABLET ORAL at 13:49

## 2024-03-20 RX ADMIN — VALPROIC ACID 500 MG: 250 SOLUTION ORAL at 22:07

## 2024-03-20 RX ADMIN — POTASSIUM CHLORIDE 20 MEQ: 750 TABLET, EXTENDED RELEASE ORAL at 13:49

## 2024-03-20 RX ADMIN — BISACODYL 10 MG: 10 SUPPOSITORY RECTAL at 15:02

## 2024-03-20 RX ADMIN — POLYETHYLENE GLYCOL 3350 17 G: 17 POWDER, FOR SOLUTION ORAL at 22:02

## 2024-03-20 RX ADMIN — ENOXAPARIN SODIUM 40 MG: 100 INJECTION SUBCUTANEOUS at 10:56

## 2024-03-20 RX ADMIN — DEXTROSE MONOHYDRATE: 50 INJECTION, SOLUTION INTRAVENOUS at 02:12

## 2024-03-20 ASSESSMENT — PAIN SCALES - GENERAL: PAINLEVEL_OUTOF10: 0

## 2024-03-20 NOTE — CARE COORDINATION
DISCHARGE PLANNING:    DC plan to return to [facility].  No precert needed.  Will need transport.  Confusion clearing.  Patient's father is legal guardian.  Watching for evolving discharge needs.    #[phone]  Electronically signed by Dalila Quintero RN on 3/20/2024 at 2:17 PM

## 2024-03-20 NOTE — PROGRESS NOTES
Hospitalist Progress Note  3/20/2024 10:13 AM    PCP: Anshu Padilla MD    [phone]     Date of Admission: 3/13/2024                                                                                                                     HOSPITAL COURSE    Patient demographics:  The patient  Fabio Beauchamp is a 58 y.o. male     Significant past medical history:   Patient Active Problem List   Diagnosis    Cellulitis    HTN (hypertension), malignant    Chest pain    Kidney stone    Pyelonephritis, acute    Bipolar I disorder (HCC)    Hypothermia associated with environmental change    Acute metabolic encephalopathy    Cerebral edema (HCC)    Pulmonary edema    Sinus bradycardia    Hypothyroidism    Schizoaffective disorder (HCC)    Altered mental status    Psychosis (HCC)    Subchronic schizoaffective disorder with acute exacerbation (HCC)    Type 2 diabetes mellitus with complication, without long-term current use of insulin (HCC)    Hyperlipidemia    LADONNA (obstructive sleep apnea)    Morbid obesity (HCC)    Schizoaffective disorder, chronic condition with acute exacerbation (HCC)    Acute and chronic respiratory failure with hypoxia (HCC)    Acute respiratory failure with hypoxia (HCC)    Influenzal pneumonia    Varicose ulcer of right lower extremity (HCC)    Acute respiratory failure with hypoxia and hypercapnia (HCC)    Chronic systolic heart failure (HCC)    Myxedema coma (HCC)    ELPIDIO (acute kidney injury) (HCC)    SIRS (systemic inflammatory response syndrome) (HCC)    Complicated UTI (urinary tract infection)    Hyperkalemia    Normocytic anemia    Hyperglycemia due to type 2 diabetes mellitus (HCC)         Presenting symptoms:  AMS    Diagnostic workup:      CONSULTS DURING ADMISSION :   IP CONSULT TO NEPHROLOGY  IP CONSULT TO HOSPITALIST  IP CONSULT TO ENDOCRINOLOGY  IP CONSULT TO CRITICAL CARE  IP CONSULT TO NEPHROLOGY      Patient was diagnosed with:  Myxedema coma  Acute respiratory failure with  to bacterial infection.     Complicated UTI (urinary tract infection)  Received ceftriaxone  Repeat urinalysis     Hyperkalemia  Improved  Discontinue Lokelma       Normocytic anemia   No overt signs of acute blood loss.    Hemoglobin is stable     Hyperglycemia due to type 2 diabetes mellitus (HCC)        SSI.    Monitor glucose trend and insulin demands and adjust basal insulin if warranted.          Code Status: Full Code        Dispo - cc      The patient and / or the family were informed of the results of any tests, a time was given to answer questions, a plan was proposed and they agreed with plan.    SADIE KUMAR MD    This note was transcribed using Dragon Dictation software. Please disregard any translational errors.

## 2024-03-20 NOTE — PLAN OF CARE
Problem: Discharge Planning  Goal: Discharge to home or other facility with appropriate resources  3/19/2024 2058 by Zan Marshall RN  Outcome: Progressing  Flowsheets (Taken 3/19/2024 2058)  Discharge to home or other facility with appropriate resources: Identify discharge learning needs (meds, wound care, etc)     Problem: Pain  Goal: Verbalizes/displays adequate comfort level or baseline comfort level  3/19/2024 2058 by Zan Marshall RN  Outcome: Progressing  Flowsheets (Taken 3/19/2024 2058)  Verbalizes/displays adequate comfort level or baseline comfort level: Assess pain using appropriate pain scale     Problem: Chronic Conditions and Co-morbidities  Goal: Patient's chronic conditions and co-morbidity symptoms are monitored and maintained or improved  3/19/2024 2058 by Zan Marshall RN  Outcome: Progressing  Flowsheets (Taken 3/19/2024 2058)  Care Plan - Patient's Chronic Conditions and Co-Morbidity Symptoms are Monitored and Maintained or Improved: Monitor and assess patient's chronic conditions and comorbid symptoms for stability, deterioration, or improvement     Problem: ABCDS Injury Assessment  Goal: Absence of physical injury  3/19/2024 2058 by Zan Marshall RN  Outcome: Progressing  Flowsheets (Taken 3/19/2024 2058)  Absence of Physical Injury: Implement safety measures based on patient assessment     Problem: Metabolic/Fluid and Electrolytes - Adult  Goal: Glucose maintained within prescribed range  3/19/2024 2058 by Zan Marshall RN  Outcome: Progressing  Flowsheets (Taken 3/19/2024 2058)  Glucose maintained within prescribed range: Monitor blood glucose as ordered

## 2024-03-20 NOTE — PLAN OF CARE
Problem: Discharge Planning  Goal: Discharge to home or other facility with appropriate resources  Outcome: Progressing  Flowsheets (Taken 3/20/2024 1030)  Discharge to home or other facility with appropriate resources:   Identify barriers to discharge with patient and caregiver   Arrange for needed discharge resources and transportation as appropriate   Refer to discharge planning if patient needs post-hospital services based on physician order or complex needs related to functional status, cognitive ability or social support system     Problem: Pain  Goal: Verbalizes/displays adequate comfort level or baseline comfort level  Outcome: Progressing     Problem: Safety - Adult  Goal: Free from fall injury  Outcome: Progressing  Flowsheets (Taken 3/20/2024 1030)  Free From Fall Injury: Instruct family/caregiver on patient safety     Problem: Chronic Conditions and Co-morbidities  Goal: Patient's chronic conditions and co-morbidity symptoms are monitored and maintained or improved  Outcome: Progressing  Flowsheets (Taken 3/20/2024 1030)  Care Plan - Patient's Chronic Conditions and Co-Morbidity Symptoms are Monitored and Maintained or Improved:   Monitor and assess patient's chronic conditions and comorbid symptoms for stability, deterioration, or improvement   Collaborate with multidisciplinary team to address chronic and comorbid conditions and prevent exacerbation or deterioration   Update acute care plan with appropriate goals if chronic or comorbid symptoms are exacerbated and prevent overall improvement and discharge     Problem: Skin/Tissue Integrity  Goal: Absence of new skin breakdown  Description: 1.  Monitor for areas of redness and/or skin breakdown  2.  Assess vascular access sites hourly  3.  Every 4-6 hours minimum:  Change oxygen saturation probe site  4.  Every 4-6 hours:  If on nasal continuous positive airway pressure, respiratory therapy assess nares and determine need for appliance change or

## 2024-03-20 NOTE — PROGRESS NOTES
Pt with no documented bowel movement from what I found. Pt given glycolax with am meds.     Electronically signed by Kaylyn Engel RN on 3/20/2024 at 11:30 AM      Notified Sandeep Daley MD about pt not having a documented bowel movement since admission. Asked if he would like anything ordered in addition to prn glycolax. Awaiting response/orders.    Electronically signed by Kaylyn Engel RN on 3/20/2024 at 12:39 PM      Sandeep Daley MD responded and orders placed.    Electronically signed by Kaylyn Engel RN on 3/20/2024 at 12:55 PM

## 2024-03-20 NOTE — FLOWSHEET NOTE
Assessment completed per flow sheet. Pt alert and oriented to self and place. POC discussed with patient.

## 2024-03-20 NOTE — PROGRESS NOTES
[facility]phrology Progress Note   [facility]      Chief Complaint: altered mental status     History of Present Illness: history obtained from the chart - patient confused  Mr Beauchamp is a 59 yo morbidly obese male with a past medical history significant for hypertension, hypothyroidism, DM II, hyperlipidemia, obesity with sleep apnea, schizophrenia that was brought in from [facility] because of altered mental status - Of note patient had a similar presentation inJan  and responded well to Narcan on previous admission. At the time of my evaluation patients eyes were open he could track me moving across the room but cannot answer questions appropriately   Nephrology consulted for ELPIDIO and following lab values demonstrated below:    Subjective:    Awake, alert today and able to talk    ROS: no shortness of breath, no chest pain. All other ROS negative    Scheduled Meds:   [START ON 3/21/2024] predniSONE  40 mg Oral Daily    tamsulosin  0.4 mg Oral Daily    potassium chloride  20 mEq Oral Once    valproic acid  500 mg Oral BID    levothyroxine  250 mcg Oral Daily    sodium chloride flush  5-40 mL IntraVENous 2 times per day    OLANZapine  15 mg Oral Nightly    Or    OLANZapine  10 mg IntraMUSCular Nightly    midodrine  5 mg Oral TID WC    atorvastatin  20 mg Oral Nightly    aspirin  81 mg Oral Daily    insulin lispro  0-8 Units SubCUTAneous Q6H    enoxaparin  40 mg SubCUTAneous BID        dextrose      sodium chloride         PRN Meds:.    Physical Exam:    TEMPERATURE:  Current - Temp: 98.8 °F (37.1 °C); Max - Temp  Av.2 °F (36.8 °C)  Min: 97.5 °F (36.4 °C)  Max: 98.8 °F (37.1 °C)  RESPIRATIONS RANGE: Resp  Av.1  Min: 12  Max: 28  PULSE RANGE: Pulse  Av.3  Min: 59  Max: 77  BLOOD PRESSURE RANGE:  Systolic (24hrs), Av , Min:111 , Max:146   ; Diastolic (24hrs), Av, Min:60, Max:84    24HR INTAKE/OUTPUT:    Intake/Output Summary (Last 24 hours) at 3/20/2024 1249  Last data filed at 3/20/2024

## 2024-03-20 NOTE — FLOWSHEET NOTE
Pt tearful wants bipap off when asked what was wrong he states that he misses this girl in [place]. Attempted to comfort patient.

## 2024-03-20 NOTE — FLOWSHEET NOTE
Pt cornell removed and external cath placed at approx 1500. Pt had post cornell void of 300cc @1743.        03/20/24 1506 03/20/24 1743   [REMOVED] Urinary Catheter 03/13/24 Cornell-Temperature   Removal Date/Time: 03/20/24 1510  Placement Date/Time: 03/13/24 1658   Present on Admission/Arrival: No  Inserted by: ozzy perdomo  2nd Staff Assisting: [email or web address]  Insertion attempts: 1  Catheter Type: Cornell-Temperature  Catheter Size: 16 FR  Catheter Balloon...   Output (mL) 1500 mL  --    Discontinuation Reason Per nurse-driven protocol  --    External Urinary Catheter   Placement Date/Time: 03/20/24 1530   Present on Admission/Arrival: No  Inserted by: Kaylyn COOPER RN  Insertion Practices: Hand hygiene;Perineal cleansing;Connected to suction   Placement Initiated  --    Perineal Care Yes  --    Suction 40 mmgHg continuous  --    Urine Color  --  Yellow   Urine Appearance  --  Clear   Output (mL)  --  300 mL   Urine Assessment   Urinary Status  --  External catheter   Urine Color  --  Yellow/straw   Urine Appearance  --  Clear   Urine Odor  --  No odor     Electronically signed by Kaylyn Engel RN on 3/20/2024 at 5:50 PM

## 2024-03-21 LAB
ALBUMIN SERPL-MCNC: 2.7 G/DL (ref 3.4–5)
ALBUMIN SERPL-MCNC: 3 G/DL (ref 3.4–5)
ANION GAP SERPL CALCULATED.3IONS-SCNC: 10 MMOL/L (ref 3–16)
ANION GAP SERPL CALCULATED.3IONS-SCNC: 7 MMOL/L (ref 3–16)
BACTERIA URNS QL MICRO: ABNORMAL /HPF
BASOPHILS # BLD: 0 K/UL (ref 0–0.2)
BASOPHILS NFR BLD: 0.3 %
BILIRUB UR QL STRIP.AUTO: NEGATIVE
BUN SERPL-MCNC: 35 MG/DL (ref 7–20)
BUN SERPL-MCNC: 38 MG/DL (ref 7–20)
CALCIUM SERPL-MCNC: 8.7 MG/DL (ref 8.3–10.6)
CALCIUM SERPL-MCNC: 8.7 MG/DL (ref 8.3–10.6)
CHLORIDE SERPL-SCNC: 111 MMOL/L (ref 99–110)
CHLORIDE SERPL-SCNC: 114 MMOL/L (ref 99–110)
CLARITY UR: CLEAR
CO2 SERPL-SCNC: 26 MMOL/L (ref 21–32)
CO2 SERPL-SCNC: 28 MMOL/L (ref 21–32)
COLOR UR: YELLOW
CREAT SERPL-MCNC: 1.3 MG/DL (ref 0.9–1.3)
CREAT SERPL-MCNC: 1.4 MG/DL (ref 0.9–1.3)
DEPRECATED RDW RBC AUTO: 21.3 % (ref 12.4–15.4)
EOSINOPHIL # BLD: 0.1 K/UL (ref 0–0.6)
EOSINOPHIL NFR BLD: 1.7 %
EPI CELLS #/AREA URNS AUTO: 0 /HPF (ref 0–5)
GFR SERPLBLD CREATININE-BSD FMLA CKD-EPI: 58 ML/MIN/{1.73_M2}
GFR SERPLBLD CREATININE-BSD FMLA CKD-EPI: >60 ML/MIN/{1.73_M2}
GLUCOSE BLD-MCNC: 141 MG/DL (ref 70–99)
GLUCOSE BLD-MCNC: 141 MG/DL (ref 70–99)
GLUCOSE BLD-MCNC: 150 MG/DL (ref 70–99)
GLUCOSE BLD-MCNC: 176 MG/DL (ref 70–99)
GLUCOSE BLD-MCNC: 193 MG/DL (ref 70–99)
GLUCOSE BLD-MCNC: 199 MG/DL (ref 70–99)
GLUCOSE SERPL-MCNC: 156 MG/DL (ref 70–99)
GLUCOSE SERPL-MCNC: 195 MG/DL (ref 70–99)
GLUCOSE UR STRIP.AUTO-MCNC: >=1000 MG/DL
HCT VFR BLD AUTO: 26.6 % (ref 40.5–52.5)
HGB BLD-MCNC: 8.6 G/DL (ref 13.5–17.5)
HGB UR QL STRIP.AUTO: ABNORMAL
HYALINE CASTS #/AREA URNS AUTO: 0 /LPF (ref 0–8)
KETONES UR STRIP.AUTO-MCNC: NEGATIVE MG/DL
LEUKOCYTE ESTERASE UR QL STRIP.AUTO: ABNORMAL
LYMPHOCYTES # BLD: 1.6 K/UL (ref 1–5.1)
LYMPHOCYTES NFR BLD: 22.1 %
MAGNESIUM SERPL-MCNC: 1.6 MG/DL (ref 1.8–2.4)
MCH RBC QN AUTO: 26.8 PG (ref 26–34)
MCHC RBC AUTO-ENTMCNC: 32.6 G/DL (ref 31–36)
MCV RBC AUTO: 82.3 FL (ref 80–100)
MONOCYTES # BLD: 0.4 K/UL (ref 0–1.3)
MONOCYTES NFR BLD: 4.7 %
NEUTROPHILS # BLD: 5.3 K/UL (ref 1.7–7.7)
NEUTROPHILS NFR BLD: 71.2 %
NITRITE UR QL STRIP.AUTO: NEGATIVE
NT-PROBNP SERPL-MCNC: 603 PG/ML (ref 0–124)
PERFORMED ON: ABNORMAL
PH UR STRIP.AUTO: 6.5 [PH] (ref 5–8)
PHOSPHATE SERPL-MCNC: 3.4 MG/DL (ref 2.5–4.9)
PHOSPHATE SERPL-MCNC: 3.5 MG/DL (ref 2.5–4.9)
PLATELET # BLD AUTO: 204 K/UL (ref 135–450)
PMV BLD AUTO: 7.2 FL (ref 5–10.5)
POTASSIUM SERPL-SCNC: 3.7 MMOL/L (ref 3.5–5.1)
POTASSIUM SERPL-SCNC: 4.5 MMOL/L (ref 3.5–5.1)
PROCALCITONIN SERPL IA-MCNC: 0.21 NG/ML (ref 0–0.15)
PROT UR STRIP.AUTO-MCNC: NEGATIVE MG/DL
RBC # BLD AUTO: 3.23 M/UL (ref 4.2–5.9)
RBC CLUMPS #/AREA URNS AUTO: 0 /HPF (ref 0–4)
SODIUM SERPL-SCNC: 146 MMOL/L (ref 136–145)
SODIUM SERPL-SCNC: 150 MMOL/L (ref 136–145)
SP GR UR STRIP.AUTO: <=1.005 (ref 1–1.03)
UA DIPSTICK W REFLEX MICRO PNL UR: YES
URN SPEC COLLECT METH UR: ABNORMAL
UROBILINOGEN UR STRIP-ACNC: 0.2 E.U./DL
WBC # BLD AUTO: 7.4 K/UL (ref 4–11)
WBC #/AREA URNS AUTO: 8 /HPF (ref 0–5)

## 2024-03-21 PROCEDURE — 81001 URINALYSIS AUTO W/SCOPE: CPT

## 2024-03-21 PROCEDURE — 2580000003 HC RX 258: Performed by: INTERNAL MEDICINE

## 2024-03-21 PROCEDURE — 84145 PROCALCITONIN (PCT): CPT

## 2024-03-21 PROCEDURE — 2700000000 HC OXYGEN THERAPY PER DAY

## 2024-03-21 PROCEDURE — 2580000003 HC RX 258: Performed by: HOSPITALIST

## 2024-03-21 PROCEDURE — 6370000000 HC RX 637 (ALT 250 FOR IP): Performed by: FAMILY MEDICINE

## 2024-03-21 PROCEDURE — 94660 CPAP INITIATION&MGMT: CPT

## 2024-03-21 PROCEDURE — 94760 N-INVAS EAR/PLS OXIMETRY 1: CPT

## 2024-03-21 PROCEDURE — 85025 COMPLETE CBC W/AUTO DIFF WBC: CPT

## 2024-03-21 PROCEDURE — 36415 COLL VENOUS BLD VENIPUNCTURE: CPT

## 2024-03-21 PROCEDURE — 83735 ASSAY OF MAGNESIUM: CPT

## 2024-03-21 PROCEDURE — 83880 ASSAY OF NATRIURETIC PEPTIDE: CPT

## 2024-03-21 PROCEDURE — 6360000002 HC RX W HCPCS: Performed by: FAMILY MEDICINE

## 2024-03-21 PROCEDURE — 2580000003 HC RX 258: Performed by: FAMILY MEDICINE

## 2024-03-21 PROCEDURE — 80069 RENAL FUNCTION PANEL: CPT

## 2024-03-21 PROCEDURE — 6370000000 HC RX 637 (ALT 250 FOR IP): Performed by: NURSE PRACTITIONER

## 2024-03-21 PROCEDURE — 6370000000 HC RX 637 (ALT 250 FOR IP): Performed by: HOSPITALIST

## 2024-03-21 PROCEDURE — 6370000000 HC RX 637 (ALT 250 FOR IP): Performed by: INTERNAL MEDICINE

## 2024-03-21 PROCEDURE — 2060000000 HC ICU INTERMEDIATE R&B

## 2024-03-21 RX ORDER — POTASSIUM CHLORIDE 20 MEQ/1
20 TABLET, EXTENDED RELEASE ORAL ONCE
Status: COMPLETED | OUTPATIENT
Start: 2024-03-21 | End: 2024-03-21

## 2024-03-21 RX ADMIN — DOXYCYCLINE HYCLATE 100 MG: 100 TABLET, COATED ORAL at 09:44

## 2024-03-21 RX ADMIN — MIDODRINE HYDROCHLORIDE 5 MG: 5 TABLET ORAL at 09:44

## 2024-03-21 RX ADMIN — Medication 10 ML: at 20:47

## 2024-03-21 RX ADMIN — DEXTROSE MONOHYDRATE: 50 INJECTION, SOLUTION INTRAVENOUS at 23:24

## 2024-03-21 RX ADMIN — VALPROIC ACID 500 MG: 250 SOLUTION ORAL at 20:46

## 2024-03-21 RX ADMIN — ENOXAPARIN SODIUM 40 MG: 100 INJECTION SUBCUTANEOUS at 20:45

## 2024-03-21 RX ADMIN — DEXTROSE MONOHYDRATE: 50 INJECTION, SOLUTION INTRAVENOUS at 06:24

## 2024-03-21 RX ADMIN — POTASSIUM CHLORIDE 20 MEQ: 1500 TABLET, EXTENDED RELEASE ORAL at 17:13

## 2024-03-21 RX ADMIN — OLANZAPINE 15 MG: 5 TABLET, FILM COATED ORAL at 20:46

## 2024-03-21 RX ADMIN — DEXTROSE MONOHYDRATE: 50 INJECTION, SOLUTION INTRAVENOUS at 15:24

## 2024-03-21 RX ADMIN — SENNOSIDES 8.6 MG: 8.6 TABLET, FILM COATED ORAL at 20:46

## 2024-03-21 RX ADMIN — Medication 10 ML: at 09:45

## 2024-03-21 RX ADMIN — ENOXAPARIN SODIUM 40 MG: 100 INJECTION SUBCUTANEOUS at 09:44

## 2024-03-21 RX ADMIN — MAGNESIUM SULFATE HEPTAHYDRATE 2000 MG: 40 INJECTION, SOLUTION INTRAVENOUS at 10:06

## 2024-03-21 RX ADMIN — DOXYCYCLINE HYCLATE 100 MG: 100 TABLET, COATED ORAL at 00:04

## 2024-03-21 RX ADMIN — ATORVASTATIN CALCIUM 20 MG: 20 TABLET, FILM COATED ORAL at 20:46

## 2024-03-21 RX ADMIN — MIDODRINE HYDROCHLORIDE 5 MG: 5 TABLET ORAL at 17:13

## 2024-03-21 RX ADMIN — PREDNISONE 40 MG: 20 TABLET ORAL at 09:44

## 2024-03-21 RX ADMIN — VALPROIC ACID 500 MG: 250 SOLUTION ORAL at 09:52

## 2024-03-21 RX ADMIN — DOXYCYCLINE HYCLATE 100 MG: 100 TABLET, COATED ORAL at 20:46

## 2024-03-21 RX ADMIN — POLYETHYLENE GLYCOL 3350 17 G: 17 POWDER, FOR SOLUTION ORAL at 20:46

## 2024-03-21 RX ADMIN — POLYETHYLENE GLYCOL 3350 17 G: 17 POWDER, FOR SOLUTION ORAL at 09:44

## 2024-03-21 RX ADMIN — TAMSULOSIN HYDROCHLORIDE 0.4 MG: 0.4 CAPSULE ORAL at 09:44

## 2024-03-21 RX ADMIN — LEVOTHYROXINE SODIUM 250 MCG: 0.12 TABLET ORAL at 06:27

## 2024-03-21 RX ADMIN — ASPIRIN 81 MG: 81 TABLET, COATED ORAL at 09:44

## 2024-03-21 RX ADMIN — SODIUM CHLORIDE: 9 INJECTION, SOLUTION INTRAVENOUS at 10:05

## 2024-03-21 ASSESSMENT — PAIN SCALES - GENERAL
PAINLEVEL_OUTOF10: 0
PAINLEVEL_OUTOF10: 0

## 2024-03-21 NOTE — PLAN OF CARE
Problem: Discharge Planning  Goal: Discharge to home or other facility with appropriate resources  Outcome: Progressing     Problem: Pain  Goal: Verbalizes/displays adequate comfort level or baseline comfort level  Outcome: Progressing     Problem: Safety - Adult  Goal: Free from fall injury  Outcome: Progressing     Problem: Chronic Conditions and Co-morbidities  Goal: Patient's chronic conditions and co-morbidity symptoms are monitored and maintained or improved  Outcome: Progressing     Problem: Skin/Tissue Integrity  Goal: Absence of new skin breakdown  Description: 1.  Monitor for areas of redness and/or skin breakdown  2.  Assess vascular access sites hourly  3.  Every 4-6 hours minimum:  Change oxygen saturation probe site  4.  Every 4-6 hours:  If on nasal continuous positive airway pressure, respiratory therapy assess nares and determine need for appliance change or resting period.  Outcome: Progressing     Problem: ABCDS Injury Assessment  Goal: Absence of physical injury  Outcome: Progressing     Problem: Metabolic/Fluid and Electrolytes - Adult  Goal: Glucose maintained within prescribed range  Outcome: Progressing   Alert candy confused r/o with short term effect. Resp. Easy and even Sats. WNL on RA/BIPAP No c/o pain Purwik in place with good output Turned and repositioned frequently Lungs diminished Cough and deep breathing exercises encouraged Free from fall/injury

## 2024-03-21 NOTE — PROGRESS NOTES
Nephrology Progress Note   [facility]      Chief Complaint: altered mental status     History of Present Illness: history obtained from the chart - patient confused  Mr Beauchamp is a 57 yo morbidly obese male with a past medical history significant for hypertension, hypothyroidism, DM II, hyperlipidemia, obesity with sleep apnea, schizophrenia that was brought in from [facility] because of altered mental status - Of note patient had a similar presentation inJan  and responded well to Narcan on previous admission. At the time of my evaluation patients eyes were open he could track me moving across the room but cannot answer questions appropriately   Nephrology consulted for ELPIDIO and following lab values demonstrated below:    Subjective:    Awake, alert .feels ok     ROS: no shortness of breath, no chest pain. All other ROS negative    Scheduled Meds:   predniSONE  40 mg Oral Daily    tamsulosin  0.4 mg Oral Daily    senna  1 tablet Oral Nightly    polyethylene glycol  17 g Oral BID    doxycycline hyclate  100 mg Oral 2 times per day    valproic acid  500 mg Oral BID    levothyroxine  250 mcg Oral Daily    sodium chloride flush  5-40 mL IntraVENous 2 times per day    OLANZapine  15 mg Oral Nightly    Or    OLANZapine  10 mg IntraMUSCular Nightly    midodrine  5 mg Oral TID WC    atorvastatin  20 mg Oral Nightly    aspirin  81 mg Oral Daily    insulin lispro  0-8 Units SubCUTAneous Q6H    enoxaparin  40 mg SubCUTAneous BID        dextrose 125 mL/hr at 24 0624    dextrose      sodium chloride 5 mL/hr at 24 1005       PRN Meds:.    Physical Exam:    TEMPERATURE:  Current - Temp: 97.5 °F (36.4 °C); Max - Temp  Av °F (36.7 °C)  Min: 97.5 °F (36.4 °C)  Max: 98.3 °F (36.8 °C)  RESPIRATIONS RANGE: Resp  Av.8  Min: 13  Max: 19  PULSE RANGE: Pulse  Av.8  Min: 61  Max: 70  BLOOD PRESSURE RANGE:  Systolic (24hrs), Av , Min:113 , Max:146   ; Diastolic (24hrs), Av, Min:69, Max:80    24HR

## 2024-03-21 NOTE — PROGRESS NOTES
Hospitalist Progress Note  3/21/2024 10:47 AM    PCP: Anshu Padilla MD    [phone]     Date of Admission: 3/13/2024                                                                                                                     HOSPITAL COURSE    Patient demographics:  The patient  Fabio Beauchamp is a 58 y.o. male     Significant past medical history:   Patient Active Problem List   Diagnosis    Cellulitis    HTN (hypertension), malignant    Chest pain    Kidney stone    Pyelonephritis, acute    Bipolar I disorder (HCC)    Hypothermia associated with environmental change    Acute metabolic encephalopathy    Cerebral edema (HCC)    Pulmonary edema    Sinus bradycardia    Hypothyroidism    Schizoaffective disorder (HCC)    Altered mental status    Psychosis (HCC)    Subchronic schizoaffective disorder with acute exacerbation (HCC)    Type 2 diabetes mellitus with complication, without long-term current use of insulin (HCC)    Hyperlipidemia    LADONNA (obstructive sleep apnea)    Morbid obesity (HCC)    Schizoaffective disorder, chronic condition with acute exacerbation (HCC)    Acute and chronic respiratory failure with hypoxia (HCC)    Acute respiratory failure with hypoxia (HCC)    Influenzal pneumonia    Varicose ulcer of right lower extremity (HCC)    Acute respiratory failure with hypoxia and hypercapnia (HCC)    Chronic systolic heart failure (HCC)    Myxedema coma (HCC)    ELPIDIO (acute kidney injury) (HCC)    SIRS (systemic inflammatory response syndrome) (HCC)    Complicated UTI (urinary tract infection)    Hyperkalemia    Normocytic anemia    Hyperglycemia due to type 2 diabetes mellitus (HCC)         Presenting symptoms:  AMS    Diagnostic workup:      CONSULTS DURING ADMISSION :   IP CONSULT TO NEPHROLOGY  IP CONSULT TO HOSPITALIST  IP CONSULT TO ENDOCRINOLOGY  IP CONSULT TO CRITICAL CARE  IP CONSULT TO NEPHROLOGY      Patient was diagnosed with:  Myxedema coma  Acute respiratory failure with

## 2024-03-21 NOTE — PROGRESS NOTES
RN spoke with Ed BAUTISTA at [facility], pt can make his needs know but does have psych history and flight of ideas.

## 2024-03-21 NOTE — PLAN OF CARE
Problem: Discharge Planning  Goal: Discharge to home or other facility with appropriate resources  3/21/2024 1102 by Rea Castro RN  Outcome: Progressing     Problem: Pain  Goal: Verbalizes/displays adequate comfort level or baseline comfort level  3/21/2024 1102 by Rea Castro RN  Outcome: Progressing     Problem: Safety - Adult  Goal: Free from fall injury  3/21/2024 1102 by Rea Castro RN  Outcome: Progressing     Problem: Chronic Conditions and Co-morbidities  Goal: Patient's chronic conditions and co-morbidity symptoms are monitored and maintained or improved  3/21/2024 1102 by Rea Castro RN  Outcome: Progressing     Problem: Skin/Tissue Integrity  Goal: Absence of new skin breakdown  3/21/2024 1102 by Rea Castro RN  Outcome: Progressing     Problem: ABCDS Injury Assessment  Goal: Absence of physical injury  3/21/2024 1102 by Rea Castro RN  Outcome: Progressing     Problem: Metabolic/Fluid and Electrolytes - Adult  Goal: Glucose maintained within prescribed range  3/21/2024 1102 by Rea Castro, RN  Outcome: Progressing

## 2024-03-21 NOTE — PROGRESS NOTES
RN left message with  for Dakota BAUTISTA at [facility] for call back.    [facility]  [phone]

## 2024-03-22 VITALS
RESPIRATION RATE: 14 BRPM | WEIGHT: 315 LBS | HEIGHT: 74 IN | TEMPERATURE: 97.8 F | OXYGEN SATURATION: 91 % | HEART RATE: 60 BPM | BODY MASS INDEX: 40.43 KG/M2 | SYSTOLIC BLOOD PRESSURE: 122 MMHG | DIASTOLIC BLOOD PRESSURE: 82 MMHG

## 2024-03-22 LAB
ANION GAP SERPL CALCULATED.3IONS-SCNC: 9 MMOL/L (ref 3–16)
BASOPHILS # BLD: 0 K/UL (ref 0–0.2)
BASOPHILS NFR BLD: 0.2 %
BUN SERPL-MCNC: 33 MG/DL (ref 7–20)
CALCIUM SERPL-MCNC: 8.6 MG/DL (ref 8.3–10.6)
CHLORIDE SERPL-SCNC: 106 MMOL/L (ref 99–110)
CO2 SERPL-SCNC: 27 MMOL/L (ref 21–32)
CREAT SERPL-MCNC: 1.4 MG/DL (ref 0.9–1.3)
DEPRECATED RDW RBC AUTO: 21.9 % (ref 12.4–15.4)
EOSINOPHIL # BLD: 0.1 K/UL (ref 0–0.6)
EOSINOPHIL NFR BLD: 1.2 %
GFR SERPLBLD CREATININE-BSD FMLA CKD-EPI: 58 ML/MIN/{1.73_M2}
GLUCOSE BLD-MCNC: 182 MG/DL (ref 70–99)
GLUCOSE BLD-MCNC: 192 MG/DL (ref 70–99)
GLUCOSE SERPL-MCNC: 181 MG/DL (ref 70–99)
HCT VFR BLD AUTO: 24.9 % (ref 40.5–52.5)
HGB BLD-MCNC: 8.3 G/DL (ref 13.5–17.5)
LYMPHOCYTES # BLD: 1.7 K/UL (ref 1–5.1)
LYMPHOCYTES NFR BLD: 22.9 %
MAGNESIUM SERPL-MCNC: 1.7 MG/DL (ref 1.8–2.4)
MCH RBC QN AUTO: 27.7 PG (ref 26–34)
MCHC RBC AUTO-ENTMCNC: 33.3 G/DL (ref 31–36)
MCV RBC AUTO: 83.2 FL (ref 80–100)
MONOCYTES # BLD: 0.3 K/UL (ref 0–1.3)
MONOCYTES NFR BLD: 3.9 %
NEUTROPHILS # BLD: 5.3 K/UL (ref 1.7–7.7)
NEUTROPHILS NFR BLD: 71.8 %
PERFORMED ON: ABNORMAL
PERFORMED ON: ABNORMAL
PHOSPHATE SERPL-MCNC: 3.3 MG/DL (ref 2.5–4.9)
PLATELET # BLD AUTO: 166 K/UL (ref 135–450)
PMV BLD AUTO: 7.6 FL (ref 5–10.5)
POTASSIUM SERPL-SCNC: 4.1 MMOL/L (ref 3.5–5.1)
PROCALCITONIN SERPL IA-MCNC: 0.14 NG/ML (ref 0–0.15)
RBC # BLD AUTO: 3 M/UL (ref 4.2–5.9)
SODIUM SERPL-SCNC: 142 MMOL/L (ref 136–145)
WBC # BLD AUTO: 7.4 K/UL (ref 4–11)

## 2024-03-22 PROCEDURE — 6370000000 HC RX 637 (ALT 250 FOR IP): Performed by: FAMILY MEDICINE

## 2024-03-22 PROCEDURE — 6360000002 HC RX W HCPCS: Performed by: FAMILY MEDICINE

## 2024-03-22 PROCEDURE — 6370000000 HC RX 637 (ALT 250 FOR IP): Performed by: HOSPITALIST

## 2024-03-22 PROCEDURE — 2580000003 HC RX 258: Performed by: FAMILY MEDICINE

## 2024-03-22 PROCEDURE — 94760 N-INVAS EAR/PLS OXIMETRY 1: CPT

## 2024-03-22 PROCEDURE — 36415 COLL VENOUS BLD VENIPUNCTURE: CPT

## 2024-03-22 PROCEDURE — 84100 ASSAY OF PHOSPHORUS: CPT

## 2024-03-22 PROCEDURE — 80048 BASIC METABOLIC PNL TOTAL CA: CPT

## 2024-03-22 PROCEDURE — 85025 COMPLETE CBC W/AUTO DIFF WBC: CPT

## 2024-03-22 PROCEDURE — 2580000003 HC RX 258: Performed by: INTERNAL MEDICINE

## 2024-03-22 PROCEDURE — 84145 PROCALCITONIN (PCT): CPT

## 2024-03-22 PROCEDURE — 83735 ASSAY OF MAGNESIUM: CPT

## 2024-03-22 RX ORDER — PREDNISONE 20 MG/1
40 TABLET ORAL DAILY
Qty: 6 TABLET | Refills: 0
Start: 2024-03-23 | End: 2024-03-26

## 2024-03-22 RX ORDER — TAMSULOSIN HYDROCHLORIDE 0.4 MG/1
0.4 CAPSULE ORAL DAILY
Qty: 30 CAPSULE | Refills: 3 | Status: SHIPPED | OUTPATIENT
Start: 2024-03-23

## 2024-03-22 RX ORDER — LEVOTHYROXINE SODIUM 0.12 MG/1
250 TABLET ORAL DAILY
Qty: 30 TABLET | Refills: 3
Start: 2024-03-23

## 2024-03-22 RX ORDER — DOXYCYCLINE HYCLATE 100 MG
100 TABLET ORAL EVERY 12 HOURS SCHEDULED
Qty: 6 TABLET | Refills: 0
Start: 2024-03-22 | End: 2024-03-25

## 2024-03-22 RX ADMIN — PREDNISONE 40 MG: 20 TABLET ORAL at 09:28

## 2024-03-22 RX ADMIN — TAMSULOSIN HYDROCHLORIDE 0.4 MG: 0.4 CAPSULE ORAL at 09:30

## 2024-03-22 RX ADMIN — ASPIRIN 81 MG: 81 TABLET, COATED ORAL at 09:30

## 2024-03-22 RX ADMIN — DEXTROSE MONOHYDRATE: 50 INJECTION, SOLUTION INTRAVENOUS at 09:08

## 2024-03-22 RX ADMIN — Medication 10 ML: at 09:31

## 2024-03-22 RX ADMIN — POLYETHYLENE GLYCOL 3350 17 G: 17 POWDER, FOR SOLUTION ORAL at 09:31

## 2024-03-22 RX ADMIN — MIDODRINE HYDROCHLORIDE 5 MG: 5 TABLET ORAL at 09:29

## 2024-03-22 RX ADMIN — LEVOTHYROXINE SODIUM 250 MCG: 0.12 TABLET ORAL at 07:00

## 2024-03-22 RX ADMIN — VALPROIC ACID 500 MG: 250 SOLUTION ORAL at 09:31

## 2024-03-22 RX ADMIN — ENOXAPARIN SODIUM 40 MG: 100 INJECTION SUBCUTANEOUS at 09:31

## 2024-03-22 RX ADMIN — DOXYCYCLINE HYCLATE 100 MG: 100 TABLET, COATED ORAL at 09:29

## 2024-03-22 NOTE — PLAN OF CARE
Problem: Discharge Planning  Goal: Discharge to home or other facility with appropriate resources  3/22/2024 1112 by Rea Castro RN  Outcome: Progressing     Problem: Pain  Goal: Verbalizes/displays adequate comfort level or baseline comfort level  3/22/2024 1112 by Rea Castro RN  Outcome: Progressing     Problem: Safety - Adult  Goal: Free from fall injury  3/22/2024 1112 by Rea Castro RN  Outcome: Progressing     Problem: Chronic Conditions and Co-morbidities  Goal: Patient's chronic conditions and co-morbidity symptoms are monitored and maintained or improved  3/22/2024 1112 by Rea Castro RN  Outcome: Progressing     Problem: Skin/Tissue Integrity  Goal: Absence of new skin breakdown  3/22/2024 1112 by Rea Castro RN  Outcome: Progressing     Problem: ABCDS Injury Assessment  Goal: Absence of physical injury  3/22/2024 1112 by Rea Castro, RN  Outcome: Progressing     Problem: Metabolic/Fluid and Electrolytes - Adult  Goal: Glucose maintained within prescribed range  3/22/2024 1112 by Rea Castro, RN  Outcome: Progressing

## 2024-03-22 NOTE — PLAN OF CARE
Problem: Discharge Planning  Goal: Discharge to home or other facility with appropriate resources  3/22/2024 0036 by Sola Moseley RN  Outcome: Progressing  3/21/2024 1102 by Rea Castro RN  Outcome: Progressing     Problem: Pain  Goal: Verbalizes/displays adequate comfort level or baseline comfort level  3/22/2024 0036 by Sola Moseley RN  Outcome: Progressing  3/21/2024 1102 by Rea Castro RN  Outcome: Progressing     Problem: Safety - Adult  Goal: Free from fall injury  3/22/2024 0036 by Sola Moseley RN  Outcome: Progressing  3/21/2024 1102 by Rea Castro RN  Outcome: Progressing     Problem: Chronic Conditions and Co-morbidities  Goal: Patient's chronic conditions and co-morbidity symptoms are monitored and maintained or improved  3/22/2024 0036 by Sola Moseley RN  Outcome: Progressing  3/21/2024 1102 by Rea Castro RN  Outcome: Progressing     Problem: Skin/Tissue Integrity  Goal: Absence of new skin breakdown  Description: 1.  Monitor for areas of redness and/or skin breakdown  2.  Assess vascular access sites hourly  3.  Every 4-6 hours minimum:  Change oxygen saturation probe site  4.  Every 4-6 hours:  If on nasal continuous positive airway pressure, respiratory therapy assess nares and determine need for appliance change or resting period.  3/22/2024 0036 by Sola Moseley RN  Outcome: Progressing  3/21/2024 1102 by Rea Castro RN  Outcome: Progressing     Problem: ABCDS Injury Assessment  Goal: Absence of physical injury  3/22/2024 0036 by Sola Moseley RN  Outcome: Progressing  3/21/2024 1102 by Rea Castro RN  Outcome: Progressing     Problem: Metabolic/Fluid and Electrolytes - Adult  Goal: Glucose maintained within prescribed range  3/22/2024 0036 by Sola Moseley RN  Outcome: Progressing  3/21/2024 1102 by Rea Castro RN  Outcome: Progressing   Alert and oriented with some confusion r/o with short term effect. Resp. Easy and even Sats. WNL on RA

## 2024-03-22 NOTE — PLAN OF CARE
Problem: Discharge Planning  Goal: Discharge to home or other facility with appropriate resources  3/22/2024 1444 by Rea Castro RN  Outcome: Completed     Problem: Pain  Goal: Verbalizes/displays adequate comfort level or baseline comfort level  3/22/2024 1444 by Rea Castro RN  Outcome: Completed     Problem: Safety - Adult  Goal: Free from fall injury  3/22/2024 1444 by Rea Castro RN  Outcome: Completed     Problem: Chronic Conditions and Co-morbidities  Goal: Patient's chronic conditions and co-morbidity symptoms are monitored and maintained or improved  3/22/2024 1444 by Rea Castro RN  Outcome: Completed     Problem: Skin/Tissue Integrity  Goal: Absence of new skin breakdown  3/22/2024 1444 by Rea Castro RN  Outcome: Completed     Problem: ABCDS Injury Assessment  Goal: Absence of physical injury  3/22/2024 1444 by Rea Castro, RN  Outcome: Completed     Problem: Metabolic/Fluid and Electrolytes - Adult  Goal: Glucose maintained within prescribed range  3/22/2024 1444 by Rea Castro, RN  Outcome: Completed

## 2024-03-22 NOTE — PROGRESS NOTES
Comprehensive Nutrition Assessment    Type and Reason for Visit:  Reassess    Nutrition Recommendations/Plan:   Continue current diet.   Continue Michael BID while inpatient, reassess need at new facility.     Malnutrition Assessment:  Malnutrition Status:  No malnutrition (03/22/24 1005)    Context:  Acute Illness     Findings of the 6 clinical characteristics of malnutrition:  Energy Intake:  No significant decrease in energy intake  Weight Loss:  Unable to assess (Weight Hx. indicating 63# gain from 3/18-3/19)       Nutrition Assessment:    FU - Pt. continues on a 5 carb/ cardiac diet. Meal intakes appear adequate. Supplements include Michael BID. Supplement acceptance appears adeqate. Impaired skin integirty noted to Rt./Lt. thigh. Unable to assess weight fluctutations at this time. Weight Hx. suggesting a 63# weight gain from 3/18-3/19. Discharge order noted. Pt. plans to discharge at 4 pm to [facility]. No new recommendations at this time. Suggest stopping Michael at discharge and reassess need at new facility.    Nutrition Related Findings:    135 glucose Wound Type: Stage III       Current Nutrition Intake & Therapies:    Average Meal Intake: 26-50%, %  Average Supplements Intake: %  ADULT DIET; Regular; 5 carb choices (75 gm/meal); Low Fat/Low Chol/High Fiber/2 gm Na  ADULT ORAL NUTRITION SUPPLEMENT; Breakfast, Dinner; Wound Healing Oral Supplement    Anthropometric Measures:  Height: 188 cm (6' 2\")  Ideal Body Weight (IBW): 190 lbs (86 kg)       Current Body Weight: 196.2 kg (432 lb 8.7 oz),   IBW.    Current BMI (kg/m2): 55.5                          BMI Categories: Obese Class 3 (BMI 40.0 or greater)    Estimated Daily Nutrient Needs:  Energy Requirements Based On: Kcal/kg  Weight Used for Energy Requirements: Usual  Energy (kcal/day): 1706 - 2218 (10 - 13 kcal/kg)  Weight Used for Protein Requirements: Ideal  Protein (g/day): 129 (1.5 g/kg IBW)  Method Used for Fluid Requirements: 1

## 2024-03-22 NOTE — PROGRESS NOTES
Nephrology Progress Note   [facility]      Chief Complaint: altered mental status     History of Present Illness: history obtained from the chart - patient confused  Mr Beauchamp is a 59 yo morbidly obese male with a past medical history significant for hypertension, hypothyroidism, DM II, hyperlipidemia, obesity with sleep apnea, schizophrenia that was brought in from [facility] because of altered mental status - Of note patient had a similar presentation inJan  and responded well to Narcan on previous admission. At the time of my evaluation patients eyes were open he could track me moving across the room but cannot answer questions appropriately   Nephrology consulted for ELPIDIO and following lab values demonstrated below:    Subjective:    Resting     ROS: no shortness of breath, no chest pain. All other ROS negative    Scheduled Meds:   predniSONE  40 mg Oral Daily    tamsulosin  0.4 mg Oral Daily    senna  1 tablet Oral Nightly    polyethylene glycol  17 g Oral BID    doxycycline hyclate  100 mg Oral 2 times per day    valproic acid  500 mg Oral BID    levothyroxine  250 mcg Oral Daily    sodium chloride flush  5-40 mL IntraVENous 2 times per day    OLANZapine  15 mg Oral Nightly    midodrine  5 mg Oral TID WC    atorvastatin  20 mg Oral Nightly    aspirin  81 mg Oral Daily    insulin lispro  0-8 Units SubCUTAneous Q6H    enoxaparin  40 mg SubCUTAneous BID        dextrose 75 mL/hr at 24 0955    dextrose      sodium chloride Stopped (24 1328)       PRN Meds:.    Physical Exam:    TEMPERATURE:  Current - Temp: 97.8 °F (36.6 °C); Max - Temp  Av.6 °F (36.4 °C)  Min: 97.3 °F (36.3 °C)  Max: 97.8 °F (36.6 °C)  RESPIRATIONS RANGE: Resp  Av.2  Min: 14  Max: 19  PULSE RANGE: Pulse  Av.2  Min: 55  Max: 73  BLOOD PRESSURE RANGE:  Systolic (24hrs), Av , Min:113 , Max:163   ; Diastolic (24hrs), Av, Min:50, Max:82    24HR INTAKE/OUTPUT:    Intake/Output Summary (Last 24 hours) at 3/22/2024  03/22/2024 05:00 AM     Phosphorus:    Lab Results   Component Value Date/Time    PHOS 3.3 03/22/2024 05:00 AM     U/A:    Lab Results   Component Value Date/Time    COLORU Yellow 03/21/2024 09:00 PM    PHUR 6.5 03/21/2024 09:00 PM    WBCUA 8 03/21/2024 09:00 PM    RBCUA 0 03/21/2024 09:00 PM    MUCUS Rare 07/16/2022 07:26 AM    BACTERIA None Seen 03/21/2024 09:00 PM    CLARITYU Clear 03/21/2024 09:00 PM    SPECGRAV <=1.005 03/21/2024 09:00 PM    LEUKOCYTESUR TRACE 03/21/2024 09:00 PM    UROBILINOGEN 0.2 03/21/2024 09:00 PM    BILIRUBINUR Negative 03/21/2024 09:00 PM    BLOODU TRACE-INTACT 03/21/2024 09:00 PM    GLUCOSEU >=1000 03/21/2024 09:00 PM         IMPRESSION/RECOMMENDATIONS:      Principal Problem:    Myxedema coma (HCC)  Active Problems:    Acute respiratory failure with hypoxia (HCC)    Morbid obesity (HCC)    ELPIDIO (acute kidney injury) (HCC)    SIRS (systemic inflammatory response syndrome) (HCC)    Complicated UTI (urinary tract infection)    Hyperkalemia    Normocytic anemia    Hyperglycemia due to type 2 diabetes mellitus (HCC)  Resolved Problems:    * No resolved hospital problems. *    ELPIDIO - occurring in the setting of relative hypotension - creatinine better . Tolerating IVF.     2. Hypokalemia resolved .     3. Hypotension secondary to ? Volume depletion  - resolved     4. Altered mental status - plan per Medicine    5. Morbid obesity    6. CKD II - Baseline creatinine 1.2-1.3 mg/dl     7. History of Hypercapnic respiratory failure    8. Hypernatremia - Na better with D5 w.   Monitor labs .   Dc plan per admitting team .         Irvin White MD,FACP

## 2024-03-22 NOTE — PROGRESS NOTES
Hospitalist Progress Note  3/22/2024 10:21 AM    PCP: Anshu Padilla MD    [phone]     Date of Admission: 3/13/2024                                                                                                                     HOSPITAL COURSE    Patient demographics:  The patient  Fabio Beauchamp is a 58 y.o. male     Significant past medical history:   Patient Active Problem List   Diagnosis    Cellulitis    HTN (hypertension), malignant    Chest pain    Kidney stone    Pyelonephritis, acute    Bipolar I disorder (HCC)    Hypothermia associated with environmental change    Acute metabolic encephalopathy    Cerebral edema (HCC)    Pulmonary edema    Sinus bradycardia    Hypothyroidism    Schizoaffective disorder (HCC)    Altered mental status    Psychosis (HCC)    Subchronic schizoaffective disorder with acute exacerbation (HCC)    Type 2 diabetes mellitus with complication, without long-term current use of insulin (HCC)    Hyperlipidemia    LADONNA (obstructive sleep apnea)    Morbid obesity (HCC)    Schizoaffective disorder, chronic condition with acute exacerbation (HCC)    Acute and chronic respiratory failure with hypoxia (HCC)    Acute respiratory failure with hypoxia (HCC)    Influenzal pneumonia    Varicose ulcer of right lower extremity (HCC)    Acute respiratory failure with hypoxia and hypercapnia (HCC)    Chronic systolic heart failure (HCC)    Myxedema coma (HCC)    ELPIDIO (acute kidney injury) (HCC)    SIRS (systemic inflammatory response syndrome) (HCC)    Complicated UTI (urinary tract infection)    Hyperkalemia    Normocytic anemia    Hyperglycemia due to type 2 diabetes mellitus (HCC)         Presenting symptoms:  AMS    Diagnostic workup:      CONSULTS DURING ADMISSION :   IP CONSULT TO NEPHROLOGY  IP CONSULT TO HOSPITALIST  IP CONSULT TO ENDOCRINOLOGY  IP CONSULT TO CRITICAL CARE  IP CONSULT TO NEPHROLOGY      Patient was diagnosed with:  Myxedema coma  Acute respiratory failure with

## 2024-03-22 NOTE — DISCHARGE INSTR - COC
wound healing oral supplement     Routes of Feeding: Oral  Liquids: Thin Liquids  Daily Fluid Restriction: no  Last Modified Barium Swallow with Video (Video Swallowing Test): not done    Treatments at the Time of Hospital Discharge:   Respiratory Treatments: per POC  Oxygen Therapy:  see below  Ventilator:    - BiPAP   IPAP: 20 cmH20, CPAP/EPAP: 5 cmH2O only when sleeping    Rehab Therapies: Physical Therapy and Occupational Therapy  Weight Bearing Status/Restrictions: No weight bearing restrictions  Other Medical Equipment (for information only, NOT a DME order):  wheelchair  Other Treatments: bariatric alternating pressure relief with low air loss mattress,     Patient's personal belongings (please select all that are sent with patient):  All belongs sent with pt     RN SIGNATURE:  Electronically signed by Rea Castro RN on 3/22/24 at 2:36 PM EDT    CASE MANAGEMENT/SOCIAL WORK SECTION    Inpatient Status Date: 3/13/24    Readmission Risk Assessment Score:  Readmission Risk              Risk of Unplanned Readmission:  36         Discharging to Facility/ Agency   Name: [facility]   Address:  [address] [place] [address]   Phone:  [phone]  Fax:  [phone]     / signature: Electronically signed by Dalila Quintero RN on 3/22/24 at 12:14 PM EDT    PHYSICIAN SECTION    Prognosis: {Prognosis:5404101884}    Condition at Discharge: { Patient Condition:795667481}    Rehab Potential (if transferring to Rehab): {Prognosis:4491828909}    Recommended Labs or Other Treatments After Discharge: ***    Physician Certification: I certify the above information and transfer of Fabio Beauchamp  is necessary for the continuing treatment of the diagnosis listed and that he requires {Admit to Appropriate Level of Care:89291} for {GREATER/LESS:606244875} 30 days.     Update Admission H&P: {CHP DME Changes in HandP:452502426}    PHYSICIAN SIGNATURE:   {[facility] [place]:[id number]}

## 2024-03-22 NOTE — CARE COORDINATION
Case Management Discharge Note          Date / Time of Note: 3/22/2024 12:06 PM                  Patient Name: Fabio Beauchamp   YOB: 1965  Diagnosis: Hyperkalemia [E87.5]  Hypercarbia [R06.89]  Myxedema coma (HCC) [E03.5]  Acute kidney injury (HCC) [N17.9]  Metabolic acidosis, increased anion gap [E87.29]  Hypothermia, initial encounter [T68.XXXA]  Urinary tract infection without hematuria, site unspecified [N39.0]  Acute metabolic encephalopathy [G93.41]  Hypothyroidism, unspecified type [E03.9]  Other specified diabetes mellitus with hyperglycemia, unspecified whether long term insulin use (HCC) [E13.65]   Date / Time: 3/13/2024  1:51 PM    Financial:  Pay[place] / Plan: [place]PT [place] / Product Type: *No Product type* /      Pharmacy:    [id number] [place], [place] - [address] - P [phone] - F [phone]  [address] [place] [address]  Phone: [phone] Fax: [phone]      Assistance purchasing medications?: Potential Assistance Purchasing Medications: No  Assistance provided by Case Management: None at this time    DISCHARGE Disposition: Long Term Care Facility (LTC)    Nursing Facility:   Discharging to Facility/ Agency   Name: [facility]   Address:  [address] [place] [address]   Phone:  [phone]  Fax:  [phone]     LOC at discharge: Long Term Care  SRAVAN Completed: Yes               Notification completed in HENS/PAS?:  Not Applicable    Transportation:  Transportation PLAN for discharge: EMS transportation   Mode of Transport: Ambulance stretcher - BLS  Reason for medical transport: Bed confined: Meets the following criteria 1) unable to get out of bed without assistance or ambulate, 2) unable to safely sit up in a wheelchair, 3) unable to maintain erect seating position in a chair for time needed for transport  Name of Transport Company: [facility] [employer]  Phone:  [phone]  Time of Transport: 4 pm    Transport form completed: Yes    IMM Completed:   Not Indicated    Additional CM Notes:   DC order noted.  Transportation arranged with [facility] at 4 PM.  Patient, Guardian, facility, and bedside nurse made aware.    The Plan for Transition of Care is related to the following treatment goals of Hyperkalemia [E87.5]  Hypercarbia [R06.89]  Myxedema coma (HCC) [E03.5]  Acute kidney injury (HCC) [N17.9]  Metabolic acidosis, increased anion gap [E87.29]  Hypothermia, initial encounter [T68.XXXA]  Urinary tract infection without hematuria, site unspecified [N39.0]  Acute metabolic encephalopathy [G93.41]  Hypothyroidism, unspecified type [E03.9]  Other specified diabetes mellitus with hyperglycemia, unspecified whether long term insulin use (HCC) [E13.65]    The Patient and/or patient representative Fabio and his family were provided with a choice of provider and agrees with the discharge plan Yes    Freedom of choice list was provided with basic dialogue that supports the patient's individualized plan of care/goals and shares the quality data associated with the providers. Yes    Dalila Quintero RN  [facility]   Case Management Department  Ph: [phone]

## 2024-03-26 NOTE — DISCHARGE SUMMARY
Hospital Medicine Discharge Summary      Patient ID: Fabio Beauchamp , [id number]     Patient's PCP: Anshu Padilla MD    Admit Date: 3/13/2024     Discharge Date: 3/22/2024      Admitting Physician: Pérez Cabrera MD    Discharge Physician: SADIE KUMAR MD     Discharge Diagnoses:     Active Hospital Problems    Diagnosis Date Noted    Acute respiratory failure with hypoxia (HCC) [J96.01] 07/16/2022     Priority: Medium    Myxedema coma (HCC) [E03.5] 03/13/2024    ELPIDIO (acute kidney injury) (HCC) [N17.9] 03/13/2024    SIRS (systemic inflammatory response syndrome) (HCC) [R65.10] 03/13/2024    Complicated UTI (urinary tract infection) [N39.0] 03/13/2024    Hyperkalemia [E87.5] 03/13/2024    Normocytic anemia [D64.9] 03/13/2024    Hyperglycemia due to type 2 diabetes mellitus (HCC) [E11.65] 03/13/2024    Morbid obesity ([facility]) [E66.01]          The patient was seen and examined on the day of discharge and this discharge summary is in conjunction with any daily progress note from day of discharge.    HOSPITAL COURSE    Patient demographics:  The patient  Fabio Beauchamp is a 58 y.o. male      Significant past medical history:       Patient Active Problem List   Diagnosis    Cellulitis    HTN (hypertension), malignant    Chest pain    Kidney stone    Pyelonephritis, acute    Bipolar I disorder (HCC)    Hypothermia associated with environmental change    Acute metabolic encephalopathy    Cerebral edema (HCC)    Pulmonary edema    Sinus bradycardia    Hypothyroidism    Schizoaffective disorder (HCC)    Altered mental status    Psychosis (HCC)    Subchronic schizoaffective disorder with acute exacerbation (HCC)    Type 2 diabetes mellitus with complication, without long-term current use of insulin (HCC)    Hyperlipidemia    LADONNA (obstructive sleep apnea)    Morbid obesity (HCC)    Schizoaffective disorder, chronic condition with acute exacerbation (HCC)    Acute and chronic respiratory failure with

## 2024-06-25 NOTE — DISCHARGE INSTRUCTIONS
Already prescribed pain medications may be used to treat any associated pain with your fall. What Type Of Note Output Would You Prefer (Optional)?: Standard Output What Is The Reason For Today's Visit?: Annual Full Body Skin Examination with No Concerns What Is The Reason For Today's Visit? (Being Monitored For X): concerning skin lesions on an annual basis